# Patient Record
Sex: FEMALE | Race: WHITE | NOT HISPANIC OR LATINO | Employment: OTHER | URBAN - METROPOLITAN AREA
[De-identification: names, ages, dates, MRNs, and addresses within clinical notes are randomized per-mention and may not be internally consistent; named-entity substitution may affect disease eponyms.]

---

## 2023-09-13 LAB
INR IN PPP BY COAGULATION ASSAY EXTERNAL: 1.8
PROTHROMBIN TIME (PT) IN PPP BY COAGULATION ASSAY EXTERNAL: NORMAL SECONDS

## 2023-10-24 ENCOUNTER — TELEPHONE (OUTPATIENT)
Dept: PHARMACY | Facility: HOSPITAL | Age: 88
End: 2023-10-24
Payer: MEDICARE

## 2023-10-24 DIAGNOSIS — Z79.899 ON AMIODARONE THERAPY: Primary | ICD-10-CM

## 2023-10-24 LAB
INR IN PPP BY COAGULATION ASSAY EXTERNAL: 2 (ref 2–3)
PROTHROMBIN TIME (PT) IN PPP BY COAGULATION ASSAY EXTERNAL: NORMAL SECONDS

## 2023-10-24 RX ORDER — AMIODARONE HYDROCHLORIDE 200 MG/1
200 TABLET ORAL DAILY
Qty: 90 TABLET | Refills: 0 | OUTPATIENT
Start: 2023-10-24

## 2023-10-24 NOTE — PROGRESS NOTES
Chief Complaint/Reason for Visit:   8 month follow up (overdue 6 month fup)     History Of Present Illness:    Ms. Magana is coming today as an 8-month cardiovascular follow-up.  We have followed this patient previously for atrial fibrillation, diastolic heart failure, aortic valve stenosis, and hypertension.  Patient previously followed with Dr. Limon and has since switched to Dr. Gutierres.  Echocardiogram from 2022 showed normal LV systolic function, EF 60-65%, positive diastolic dysfunction.  Normal RV size and function, moderate aortic valve stenosis with mean gradient 15 mmHg.  Patient was cardioverted in June, 2022 and has been maintaining sinus rhythm with the use of amiodarone.    Patient comes in today in a wheelchair accompanied by her daughter.  She denies any chest pain, pressure, palpitations, orthopnea, or edema.  She has been taking her medication as prescribed.  As far as she knows she is maintaining sinus rhythm.  She follows with the [facility] for her INR checks.    Past Medical History:  She has a past medical history of Essential (primary) hypertension (10/03/2022), Paroxysmal atrial fibrillation (CMS/HCC) (10/03/2022), Personal history of other (healed) physical injury and trauma, Personal history of other diseases of the nervous system and sense organs (08/31/2020), and Personal history of other specified conditions (07/22/2020).    Past Surgical History:  She has a past surgical history that includes Other surgical history (06/28/2022); MR angio head wo IV contrast (7/21/2020); and MR angio neck wo IV contrast (7/21/2020).      Social History:  She reports that she has never smoked. She has never used smokeless tobacco. She reports that she does not drink alcohol and does not use drugs.    Family History:  Family History   Problem Relation Name Age of Onset    Other (Half sister with CAD) Sister      Coronary artery disease Other Children         Allergies:  Patient has no  known allergies.    Medications:  Current Outpatient Medications   Medication Instructions    acetaminophen (Tylenol) 325 mg tablet oral    amiodarone (PACERONE) 200 mg, oral, Daily    calcium carbonate (Tums Ultra) 470 mg calcium (1,177 mg) chewable tablet CHEW AND SWALLOW TWO TABLETS BY MOUTH FOUR TIMES A DAY AS NEEDED FOR EPIGASTRIC DISCOMFORT OR UPSET STOMACH    cimetidine (TAGAMET) 300 mg, oral, 2 times daily    levothyroxine (SYNTHROID, LEVOXYL) 88 mcg, oral, Daily RT    Simbrinza 1-0.2 % drops,suspension ophthalmic suspension ophthalmic (eye)    warfarin (COUMADIN) 2.5 mg, oral, Every other day, Take as directed per After Visit Summary.       Review of Systems:  Constitutional: feeling tired but stays active  Eyes: no eyesight problems . glasses.   ENT: no hearing loss.   Cardiovascular: No CP/pressure/palpitations  Respiratory: no shortness of breath . Has chest congestion and orthopnea when in Afib (nothing recently)  Gastrointestinal: no blood in stools.   Genitourinary: no hematuria.   Musculoskeletal: arthralgias . knee pain R.   Skin: no skin rashes.   Neurological: no frequent falls and no dizziness.   Psychiatric: no confusion and no memory lapses or loss.   Endocrine: thyroid disorder, but no diabetes mellitus.     Vitals  Visit Vitals  BP (!) 102/48   Pulse 63   Wt 58.1 kg (128 lb)   BMI 23.41 kg/m²   Smoking Status Never   BSA 1.59 m²        Physical Exam:  Constitutional: alert and in no acute distress, thin  Eyes: no erythema, swelling or discharge from the eye .   Ears, Nose, Mouth, and Throat:. mask.   Neck: neck is supple, symmetric, trachea midline, no masses .   Pulmonary: no increased work of breathing or signs of respiratory distress  and lungs clear to auscultation.    Cardiovascular: carotid pulses 2+ bilaterally with no bruit , JVP was normal,  the heart rate was normal the rhythm was regular, normal S1, normal S2, no S3, no S4 gr 2/6 syst murmur at base., pedal pulses 2+ bilaterally   "and no edema .   Abdomen: abdomen non-tender, no masses .   Musculoskeletal: normal gait.   Skin: skin warm and dry, normal skin turgor .   Neurologic: non-focal neurologic examination.   Psychiatric oriented to person, place and time  and normal mood and affect .      Last Labs:  CBC -  Lab Results   Component Value Date    WBC 7.0 08/06/2023    HGB 13.1 08/06/2023    HCT 40.1 08/06/2023    MCV 96 08/06/2023     08/06/2023     Lab Results   Component Value Date    GLUCOSE 112 (H) 08/06/2023    CALCIUM 9.8 08/06/2023     08/06/2023    K 4.4 08/06/2023    CO2 22 08/06/2023     (H) 08/06/2023    BUN 26 (H) 08/06/2023    CREATININE 1.55 (H) 08/06/2023      CMP -  Lab Results   Component Value Date    CALCIUM 9.8 08/06/2023    PROT 6.4 08/06/2023    ALBUMIN 4.1 08/06/2023    AST 16 08/06/2023    ALT 11 08/06/2023    ALKPHOS 64 08/06/2023    BILITOT 0.6 08/06/2023       LIPID PANEL -   No results found for: \"CHOL\", \"TRIG\", \"HDL\", \"CHHDL\", \"LDLF\", \"VLDL\", \"NHDL\"    Lab Results   Component Value Date     (H) 12/11/2022     [id number]   1. Heart Failure or EF less than or equal to 35%? Yes (1 pt)   2. Hypertension? Yes (1 pt)   3. Age? Greater than or equal to 75 (2 pt)   4. Diabetes? No (0 pt)   5. Stroke, TIA, or Systemic Emboli? No (0 pt)   6. Vascular Disease? No (0 pt)   7. Gender? Female (1 pt)   Total Risk Score: The [id number] Score is 5 which corresponds to High Risk.     Last Cardiology Tests:  ECG: EKG done in the office today and personally reviewed shows sinus rhythm at 63 bpm, QT corrected interval 470 ms.  There are no significant ST or T wave abnormalities.  This will go to Dr. Gutierres for review.    Echo:12-17-22  CONCLUSIONS:   1. Left ventricular systolic function is normal with a 60-65% estimated ejection fraction.   2. Spectral Doppler shows a pseudonormal pattern of left ventricular diastolic filling.   3. The left atrium is moderately dilated.   4. Moderate aortic valve " stenosis.       Lab review: I have personally reviewed the laboratory result(s)     Assessment/Plan:  Paroxysmal atrial fibrillation: Patient has been maintaining sinus rhythm most recently with the use of amiodarone.  Her EKG done in the office today is acceptable.  She is anticoagulated with warfarin and follows at the [facility].  Patient is not having any abnormal bleeding or bruising and will continue on anticoagulation.  Patient had recent lab work done but did not have her thyroid checked.  I recommended that she get her thyroid checked this week.  I ordered amiodarone labs that will be due in February.    Chronic diastolic heart failure: Patient has a history of chronic diastolic heart failure.  It was thought to be exacerbated by atrial fibrillation.  She currently has been maintaining sinus rhythm with amiodarone and looks to be well compensated.  Patient should be on a low-sodium diet.    Aortic valve stenosis: Patient has moderate aortic valve stenosis noted on her December, 2022 echocardiogram.  I recommending updating an echocardiogram to evaluate the aortic valve this December.  Patient's blood pressure and heart rate are well controlled.    Hypertension: Patient's blood pressure today is on the low normal side she is 102/48.  She is not having any issues with lightheadedness.  Patient is not on any blood pressure lowering medications any longer.  (She previously was on metoprolol)    Patient will be scheduled to see Dr. Gutierres in 6 months.  Patient instructed to call with any cardiovascular complaints. All questions were answered.       Dragon dictation was utilized to create this document. Quite often unanticipated grammatical, syntax,  and other interpretive errors are inadvertently transcribed by the computer software.  Please disregard these errors.  Please excuse any errors that have escaped final proofreading.        Keshia Mansfield, APRN-CNP

## 2023-10-25 ENCOUNTER — ANTICOAGULATION - WARFARIN VISIT (OUTPATIENT)
Dept: PHARMACY | Facility: HOSPITAL | Age: 88
End: 2023-10-25
Payer: MEDICARE

## 2023-10-25 DIAGNOSIS — I48.0 PAROXYSMAL ATRIAL FIBRILLATION (MULTI): Primary | ICD-10-CM

## 2023-10-25 NOTE — PATIENT INSTRUCTIONS
Your INR today is in range at 2.0. Please continue your weekly regimen of 5mg on Sunday and Thursday and 2.5 mg every day. We will follow up in one week.  If any questions arise do not hesitate to call us at [phone] M-F from 8:30am-4:30pm or at   [phone] after 5pm or on the weekends. Continue to monitor for any excess bleeding or bruising, especially for blood in your stool.

## 2023-10-25 NOTE — PROGRESS NOTES
I received a fax from Berny on 10/24/23 with an INR reading of 2.0. Her daughter reports no changes in medications and no signs or symptoms of bleeding or unexpected bruising. Her mother has been eating well and nothing has changed. Given her INR is in range at 2.0, we will continue her on 5mg on Sunday and Thursday and 2.5mg all other days. We will follow up in 2 weeks.

## 2023-10-26 ENCOUNTER — OFFICE VISIT (OUTPATIENT)
Dept: CARDIOLOGY | Facility: CLINIC | Age: 88
End: 2023-10-26
Payer: MEDICARE

## 2023-10-26 VITALS
HEART RATE: 63 BPM | BODY MASS INDEX: 23.41 KG/M2 | DIASTOLIC BLOOD PRESSURE: 48 MMHG | SYSTOLIC BLOOD PRESSURE: 102 MMHG | WEIGHT: 128 LBS

## 2023-10-26 DIAGNOSIS — I48.19 PERSISTENT ATRIAL FIBRILLATION (MULTI): Primary | ICD-10-CM

## 2023-10-26 DIAGNOSIS — I35.0 AORTIC VALVE STENOSIS, ETIOLOGY OF CARDIAC VALVE DISEASE UNSPECIFIED: ICD-10-CM

## 2023-10-26 DIAGNOSIS — Z79.899 ENCOUNTER FOR LONG-TERM (CURRENT) USE OF HIGH-RISK MEDICATION: ICD-10-CM

## 2023-10-26 DIAGNOSIS — I50.32 CHRONIC DIASTOLIC HEART FAILURE (MULTI): ICD-10-CM

## 2023-10-26 DIAGNOSIS — I10 ESSENTIAL HYPERTENSION: ICD-10-CM

## 2023-10-26 PROBLEM — R60.0 LOWER EXTREMITY EDEMA: Status: ACTIVE | Noted: 2023-10-26

## 2023-10-26 PROBLEM — I48.0 PAROXYSMAL ATRIAL FIBRILLATION (MULTI): Status: ACTIVE | Noted: 2023-10-26

## 2023-10-26 PROCEDURE — 3074F SYST BP LT 130 MM HG: CPT | Performed by: NURSE PRACTITIONER

## 2023-10-26 PROCEDURE — 99214 OFFICE O/P EST MOD 30 MIN: CPT | Performed by: NURSE PRACTITIONER

## 2023-10-26 PROCEDURE — 1036F TOBACCO NON-USER: CPT | Performed by: NURSE PRACTITIONER

## 2023-10-26 PROCEDURE — 1159F MED LIST DOCD IN RCRD: CPT | Performed by: NURSE PRACTITIONER

## 2023-10-26 PROCEDURE — 3078F DIAST BP <80 MM HG: CPT | Performed by: NURSE PRACTITIONER

## 2023-10-26 PROCEDURE — 1160F RVW MEDS BY RX/DR IN RCRD: CPT | Performed by: NURSE PRACTITIONER

## 2023-10-26 RX ORDER — AMIODARONE HYDROCHLORIDE 200 MG/1
200 TABLET ORAL DAILY
Qty: 90 TABLET | Refills: 1 | Status: SHIPPED | OUTPATIENT
Start: 2023-10-26

## 2023-10-26 RX ORDER — LEVOTHYROXINE SODIUM 88 UG/1
88 TABLET ORAL
COMMUNITY
Start: 2023-01-17

## 2023-10-26 RX ORDER — CALCIUM CARBONATE 1177 MG/1
BAR, CHEWABLE ORAL
COMMUNITY
Start: 2020-07-27

## 2023-10-26 RX ORDER — CIMETIDINE 300 MG/1
300 TABLET, FILM COATED ORAL 2 TIMES DAILY
COMMUNITY
Start: 2023-01-12

## 2023-10-26 RX ORDER — ACETAMINOPHEN 325 MG/1
TABLET ORAL
COMMUNITY
Start: 2020-07-27

## 2023-10-26 RX ORDER — BRINZOLAMIDE/BRIMONIDINE TARTRATE 10; 2 MG/ML; MG/ML
SUSPENSION/ DROPS OPHTHALMIC
COMMUNITY
Start: 2022-12-24

## 2023-10-26 RX ORDER — AMIODARONE HYDROCHLORIDE 200 MG/1
200 TABLET ORAL 2 TIMES DAILY
COMMUNITY
Start: 2022-07-28 | End: 2023-10-26 | Stop reason: SDUPTHER

## 2023-10-26 RX ORDER — WARFARIN 2.5 MG/1
2.5 TABLET ORAL EVERY OTHER DAY
COMMUNITY
End: 2024-05-06 | Stop reason: WASHOUT

## 2023-10-26 RX ORDER — CIMETIDINE 300 MG/1
300 TABLET, FILM COATED ORAL 2 TIMES DAILY
COMMUNITY
End: 2023-10-26 | Stop reason: SDUPTHER

## 2023-10-26 NOTE — PATIENT INSTRUCTIONS
Continue on current meds  Heart healthy, low sodium diet  Mediterranean diet is recommended  Updated echo in December  TSH this week  Amiodarone labs in February  Follow up with Dr Gutierres in 6 months     Linda called very upset crying and scattered because her daughter Daysi has been diagnosed with more thyroid problems , sound like during the conversation Linda stated that it was cancer .     Patient is asking for refills of her Ativan     Ativan      Last Written Prescription Date:  10-6-16  Last Fill Quantity: 30,   # refills: 0  Last Office Visit with [facility], [facility] or [facility] prescribing provider: 7-6-16 with ericka Suresh  Future Office visit:       Routing refill request to provider for review/approval because:  Drug not on the [facility], [facility] or [facility] refill protocol or controlled substance    [employer] [facility] [employer] [facility]

## 2023-11-28 ENCOUNTER — TELEPHONE (OUTPATIENT)
Dept: PHARMACY | Facility: HOSPITAL | Age: 88
End: 2023-11-28
Payer: MEDICARE

## 2023-11-29 ENCOUNTER — ANTICOAGULATION - WARFARIN VISIT (OUTPATIENT)
Dept: PHARMACY | Facility: HOSPITAL | Age: 88
End: 2023-11-29
Payer: MEDICARE

## 2023-11-29 NOTE — PATIENT INSTRUCTIONS
Your INR today is in range at 2.0. Please continue your weekly regimen of 5mg on Sunday and Thursday and 2.5 mg every day. We will follow up in two weeks.  If any questions arise do not hesitate to call us at [phone] M-F from 8:30am-4:30pm or at   [phone] after 5pm or on the weekends. Continue to monitor for any excess bleeding or bruising, especially for blood in your stool.

## 2023-11-29 NOTE — PROGRESS NOTES
I received a phone call from Ms. Magana's daughter Goldy on 11/29/23 with an INR reading of 2.0. Her daughter reports no changes in medications and no signs or symptoms of bleeding or unexpected bruising. Her mother has been eating well and nothing has changed. Given her INR is in range at 2.0, we will continue her on 5mg on Sunday and Thursday and 2.5mg all other days. We will follow up in 2 weeks.

## 2023-12-13 LAB
INR IN PPP BY COAGULATION ASSAY EXTERNAL: 1.2 (ref 2–3)
PROTHROMBIN TIME (PT) IN PPP BY COAGULATION ASSAY EXTERNAL: ABNORMAL SECONDS

## 2023-12-19 ENCOUNTER — APPOINTMENT (OUTPATIENT)
Dept: CARDIOLOGY | Facility: HOSPITAL | Age: 88
End: 2023-12-19
Payer: COMMERCIAL

## 2024-01-02 ENCOUNTER — ANTICOAGULATION - WARFARIN VISIT (OUTPATIENT)
Dept: PHARMACY | Facility: HOSPITAL | Age: 89
End: 2024-01-02
Payer: MEDICARE

## 2024-01-02 DIAGNOSIS — I48.0 PAROXYSMAL ATRIAL FIBRILLATION (MULTI): Primary | ICD-10-CM

## 2024-01-02 LAB
INR IN PPP BY COAGULATION ASSAY EXTERNAL: 1.9 (ref 2–3)
PROTHROMBIN TIME (PT) IN PPP BY COAGULATION ASSAY EXTERNAL: ABNORMAL SECONDS

## 2024-01-02 RX ORDER — WARFARIN 2.5 MG/1
TABLET ORAL
Qty: 40 TABLET | Refills: 3 | Status: SHIPPED | OUTPATIENT
Start: 2024-01-02 | End: 2024-05-06

## 2024-01-02 NOTE — PROGRESS NOTES
I received a phone call from Ms. Magana's daughter Goldy on 1/2/2024 with an INR reading of 1.9. Her daughter reports no changes in medications and no signs or symptoms of bleeding or unexpected bruising. Her mother has been eating well and nothing has changed. Given her INR's have been on the lower end of therapeutic goal we discussed increasing her weekly regimen slightly. We will increase her regimen to 5mg M,W,F ad 2.5mg all other days. We will follow up in 2 weeks.

## 2024-01-02 NOTE — PATIENT INSTRUCTIONS
Your INR today is slightly low at 1.9. We will increase your weekly regimen slightly to 5mg M,W,F and 2.5mg all other days. We will follow up in two weeks.  If any questions arise do not hesitate to call us at [phone] M-F from 8:30am-4:30pm or at   [phone] after 5pm or on the weekends. Continue to monitor for any excess bleeding or bruising, especially for blood in your stool.

## 2024-01-04 ENCOUNTER — HOSPITAL ENCOUNTER (OUTPATIENT)
Dept: CARDIOLOGY | Facility: HOSPITAL | Age: 89
Discharge: HOME | End: 2024-01-04
Payer: MEDICARE

## 2024-01-04 DIAGNOSIS — I35.0 AORTIC VALVE STENOSIS, ETIOLOGY OF CARDIAC VALVE DISEASE UNSPECIFIED: ICD-10-CM

## 2024-01-04 LAB
AORTIC VALVE MEAN GRADIENT: 16.9
AORTIC VALVE PEAK VELOCITY: 2.7
AV PEAK GRADIENT: 29.2
AVA (PEAK VEL): 1.02
AVA (VTI): 0.97
EJECTION FRACTION APICAL 4 CHAMBER: 49.3
EJECTION FRACTION: 54
LEFT ATRIUM VOLUME AREA LENGTH INDEX BSA: 54.5
LEFT VENTRICLE INTERNAL DIMENSION DIASTOLE: 2.92 (ref 3.5–6)
LEFT VENTRICULAR OUTFLOW TRACT DIAMETER: 1.95
MITRAL VALVE E/A RATIO: 1.01
MITRAL VALVE E/E' RATIO: 17.67
RIGHT VENTRICLE FREE WALL PEAK S': 12.02
RIGHT VENTRICLE PEAK SYSTOLIC PRESSURE: 34.1
TRICUSPID ANNULAR PLANE SYSTOLIC EXCURSION: 2.8

## 2024-01-04 PROCEDURE — 93306 TTE W/DOPPLER COMPLETE: CPT | Performed by: INTERNAL MEDICINE

## 2024-01-04 PROCEDURE — 93306 TTE W/DOPPLER COMPLETE: CPT

## 2024-01-05 DIAGNOSIS — Z79.899 ENCOUNTER FOR LONG-TERM (CURRENT) USE OF HIGH-RISK MEDICATION: Primary | ICD-10-CM

## 2024-01-05 DIAGNOSIS — I10 ESSENTIAL HYPERTENSION: ICD-10-CM

## 2024-01-05 DIAGNOSIS — I48.19 PERSISTENT ATRIAL FIBRILLATION (MULTI): ICD-10-CM

## 2024-01-05 DIAGNOSIS — I50.32 CHRONIC DIASTOLIC HEART FAILURE (MULTI): ICD-10-CM

## 2024-01-05 NOTE — RESULT ENCOUNTER NOTE
Echo reviewed. No significant change in AORTIC STENOSIS.  Results called to patient's daughter.  No changes made.

## 2024-01-16 LAB
INR IN PPP BY COAGULATION ASSAY EXTERNAL: 1.4 (ref 2–3)
PROTHROMBIN TIME (PT) IN PPP BY COAGULATION ASSAY EXTERNAL: ABNORMAL SECONDS

## 2024-01-29 LAB
INR IN PPP BY COAGULATION ASSAY EXTERNAL: 1.8 (ref 2–3)
PROTHROMBIN TIME (PT) IN PPP BY COAGULATION ASSAY EXTERNAL: ABNORMAL SECONDS

## 2024-01-30 ENCOUNTER — ANTICOAGULATION - WARFARIN VISIT (OUTPATIENT)
Dept: PHARMACY | Facility: HOSPITAL | Age: 89
End: 2024-01-30
Payer: MEDICARE

## 2024-01-30 DIAGNOSIS — I48.0 PAROXYSMAL ATRIAL FIBRILLATION (MULTI): Primary | ICD-10-CM

## 2024-01-30 NOTE — PATIENT INSTRUCTIONS
Your INR today is slightly low at 1.8. Please boost today with 5mg and then continue your weekly regimen slightly of 5mg M,W,F and 2.5mg all other days. We will follow up in one week.  If any questions arise do not hesitate to call us at [phone] M-F from 8:30am-4:30pm or at   [phone] after 5pm or on the weekends. Continue to monitor for any excess bleeding or bruising, especially for blood in your stool.

## 2024-01-30 NOTE — PROGRESS NOTES
I received a fax on 1.29 from Berny with an INR of 1.8. I also received two faxes on 1.22.24 with an INR from 12.13 of 1.2, and1.16 with an INR of 1.4. I spoke with her daughter Goldy and discussed we need to be receiving these reports when she tests. Mrs. Magana has had no change in diet. No signs or symptoms of bleeding. She was increased on her thyroid medication a week ago from 88mcg to 100mcg. Given her INR is low today at 1.8, we will boost today with 5mg and then continue on 5mg M,W,F ad 2.5mg all other days. We discussed the potential interaction with the change in levothyroxine dosage. We will follow up next week.

## 2024-02-14 ENCOUNTER — ANTICOAGULATION - WARFARIN VISIT (OUTPATIENT)
Dept: PHARMACY | Facility: HOSPITAL | Age: 89
End: 2024-02-14
Payer: MEDICARE

## 2024-02-14 DIAGNOSIS — I48.0 PAROXYSMAL ATRIAL FIBRILLATION (MULTI): Primary | ICD-10-CM

## 2024-02-14 NOTE — PROGRESS NOTES
I received a fax from Berny on 2.6 with an INR of 1.9. I spoke with her daughter Goldy today. She stated she retested her mom today and got an INR of 2.0. Mrs. Magana has had no change in diet. No signs or symptoms of bleeding. She was increased on her thyroid medication a week ago from 88mcg to 100mcg.Given her INR is in range today at 2.0 we will continue her on 5mg M,W,F ad 2.5mg all other days. We discussed the increase in levothyroxine may be why she is in range today. We will follow up next week. If her INR comes back slightly low we will then increase her weekly regimen slightly.

## 2024-02-14 NOTE — PATIENT INSTRUCTIONS
Your INR today is in range at 2.0. Please continue your weekly regimen of 5mg M,W,F and 2.5mg all other days. We will follow up in one week.  If any questions arise do not hesitate to call us at [phone] M-F from 8:30am-4:30pm or at   [phone] after 5pm or on the weekends. Continue to monitor for any excess bleeding or bruising, especially for blood in your stool.

## 2024-02-21 ENCOUNTER — ANTICOAGULATION - WARFARIN VISIT (OUTPATIENT)
Dept: PHARMACY | Facility: HOSPITAL | Age: 89
End: 2024-02-21
Payer: MEDICARE

## 2024-02-21 DIAGNOSIS — I48.0 PAROXYSMAL ATRIAL FIBRILLATION (MULTI): Primary | ICD-10-CM

## 2024-02-21 NOTE — PATIENT INSTRUCTIONS
Your INR today is slightly low at 1.9. Please increase your weekly regimen to 5mg M,W,F, Sun  and 2.5mg all other days. We will follow up in one week.  If any questions arise do not hesitate to call us at [phone] M-F from 8:30am-4:30pm or at   [phone] after 5pm or on the weekends. Continue to monitor for any excess bleeding or bruising, especially for blood in your stool.

## 2024-02-21 NOTE — PROGRESS NOTES
I received a fax from Berny on 2.20.24 with an INR of 1.9. I spoke with her daughter Goldy today. Mrs. Magana has had no change in diet. No signs or symptoms of bleeding. She was increased on her thyroid medication a week ago from 88mcg to 100mcg. Given her INR is low again at 1.9, we will increase her weekly regimen to 5mg M,W,F, Sun, and 2.5mg all other days. We will follow up next week.

## 2024-03-05 ENCOUNTER — ANTICOAGULATION - WARFARIN VISIT (OUTPATIENT)
Dept: PHARMACY | Facility: HOSPITAL | Age: 89
End: 2024-03-05
Payer: MEDICARE

## 2024-03-05 DIAGNOSIS — I48.0 PAROXYSMAL ATRIAL FIBRILLATION (MULTI): Primary | ICD-10-CM

## 2024-03-05 NOTE — PATIENT INSTRUCTIONS
Your INR today is slightly low at 1.9. Please continue your weekly regimen of 5mg M,W,F, Sun and 2.5mg all other days. We will follow up in one week.  If any questions arise do not hesitate to call us at [phone] M-F from 8:30am-4:30pm or at   [phone] after 5pm or on the weekends. Continue to monitor for any excess bleeding or bruising, especially for blood in your stool.

## 2024-03-05 NOTE — PROGRESS NOTES
I received a fax from Berny on 2.28.24 with an INR of 1.9. I spoke with her daughter Goldy today after numerous attempts to reach last week. Mrs. Magana has had no change in diet. No signs or symptoms of bleeding. She was increased on her thyroid medication a few weeks ago from 88mcg to 100mcg. Her daughter stated she thinks her mom missed a dose but could not remember when. Given she is testing her tomorrow we will hold off on any changes until we get the new INR result. Follow up tomorrow.

## 2024-03-07 ENCOUNTER — ANTICOAGULATION - WARFARIN VISIT (OUTPATIENT)
Dept: PHARMACY | Facility: HOSPITAL | Age: 89
End: 2024-03-07
Payer: MEDICARE

## 2024-03-07 DIAGNOSIS — I48.0 PAROXYSMAL ATRIAL FIBRILLATION (MULTI): Primary | ICD-10-CM

## 2024-03-07 NOTE — PATIENT INSTRUCTIONS
Your INR today is within range at 2.0. Please increase your weekly regimen to 2.5mg Tues, Thurs and 5mg all other days. We will follow up in one week.  If any questions arise do not hesitate to call us at [phone] M-F from 8:30am-4:30pm or at   [phone] after 5pm or on the weekends. Continue to monitor for any excess bleeding or bruising, especially for blood in your stool.

## 2024-03-07 NOTE — PROGRESS NOTES
I received a fax from Berny on 3.6.24 with an INR of 2.0. I spoke with her daughter Goldy today after numerous attempts to reach last week. Mrs. Magana has had no change in diet. No signs or symptoms of bleeding. She was increased on her thyroid medication a few weeks ago from 88mcg to 100mcg. She typically does not eat greens and did not have any this week. Despite the increase in her weekly regimen her INR is in range but on the lower end of goal. We will increase her weekly regimen to 2.5mg Tue, Thurs and 5mg all other days. We will follow up in a week.

## 2024-03-22 ENCOUNTER — ANTICOAGULATION - WARFARIN VISIT (OUTPATIENT)
Dept: PHARMACY | Facility: HOSPITAL | Age: 89
End: 2024-03-22
Payer: MEDICARE

## 2024-03-22 DIAGNOSIS — I48.0 PAROXYSMAL ATRIAL FIBRILLATION (MULTI): Primary | ICD-10-CM

## 2024-03-22 NOTE — PROGRESS NOTES
I received a fax from Berny on 3.21.24 with an INR of 2.0. Previous fax from 3/14 had an INR of 2.0. I spoke with her daughter Goldy today after numerous attempts to reach last week. Mrs. Magana has had no change in diet. No signs or symptoms of bleeding. She is on levothyroxine. She stated her mom only took 2.5mg on Saturday and not 5mg. She typically does not eat greens and did not have any this week. We will increase her weekly regimen to 2.5mg Tue, Thurs and 5mg all other days. We will follow up in a week.

## 2024-03-22 NOTE — PATIENT INSTRUCTIONS
Your INR today is within range at 2.0. Please start your weekly regimen of 2.5mg Tues, Thurs and 5mg all other days. We will follow up in one week.  If any questions arise do not hesitate to call us at [phone] M-F from 8:30am-4:30pm or at   [phone] after 5pm or on the weekends. Continue to monitor for any excess bleeding or bruising, especially for blood in your stool.

## 2024-03-27 PROBLEM — R53.1 ASTHENIA: Status: ACTIVE | Noted: 2024-03-27

## 2024-03-27 PROBLEM — Z20.822 CONTACT WITH AND (SUSPECTED) EXPOSURE TO COVID-19: Status: ACTIVE | Noted: 2022-12-24

## 2024-03-27 PROBLEM — I99.8 ISCHEMIA OF LOWER EXTREMITY: Status: ACTIVE | Noted: 2024-03-27

## 2024-03-27 PROBLEM — Z87.828 PERSONAL HISTORY OF OTHER (HEALED) PHYSICAL INJURY AND TRAUMA: Status: ACTIVE | Noted: 2024-03-27

## 2024-03-27 PROBLEM — K59.00 CONSTIPATION: Status: ACTIVE | Noted: 2024-03-27

## 2024-03-27 PROBLEM — R07.9 CHEST PAIN: Status: ACTIVE | Noted: 2022-05-23

## 2024-03-27 PROBLEM — Z86.69 PERSONAL HISTORY OF OTHER DISEASES OF THE NERVOUS SYSTEM AND SENSE ORGANS: Status: ACTIVE | Noted: 2024-03-27

## 2024-03-27 PROBLEM — Z87.898 PERSONAL HISTORY OF OTHER SPECIFIED CONDITIONS: Status: ACTIVE | Noted: 2024-03-27

## 2024-03-27 PROBLEM — R42 DIZZINESS: Status: ACTIVE | Noted: 2024-03-27

## 2024-03-27 PROBLEM — R51.9 ACUTE HEADACHE: Status: ACTIVE | Noted: 2024-03-27

## 2024-03-27 PROBLEM — I50.9 CONGESTIVE HEART FAILURE (MULTI): Status: ACTIVE | Noted: 2022-12-11

## 2024-03-27 PROBLEM — R00.1 BRADYCARDIA: Status: ACTIVE | Noted: 2024-03-27

## 2024-03-27 PROBLEM — N18.9 CHRONIC KIDNEY DISEASE: Status: ACTIVE | Noted: 2022-12-24

## 2024-03-27 PROBLEM — K56.41 FECAL IMPACTION OF RECTUM (MULTI): Status: ACTIVE | Noted: 2023-08-06

## 2024-03-27 RX ORDER — MELOXICAM 7.5 MG/1
7.5 TABLET ORAL DAILY
COMMUNITY

## 2024-03-27 RX ORDER — METOPROLOL SUCCINATE 25 MG/1
25 TABLET, EXTENDED RELEASE ORAL DAILY
COMMUNITY
Start: 2019-01-17

## 2024-03-27 RX ORDER — TRAVOPROST OPHTHALMIC SOLUTION 0.04 MG/ML
1 SOLUTION OPHTHALMIC NIGHTLY
COMMUNITY

## 2024-03-27 RX ORDER — DICYCLOMINE HYDROCHLORIDE 20 MG/1
20 TABLET ORAL
COMMUNITY

## 2024-03-27 RX ORDER — LOSARTAN POTASSIUM 100 MG/1
100 TABLET ORAL
COMMUNITY

## 2024-03-27 RX ORDER — LEVOTHYROXINE SODIUM 100 UG/1
100 TABLET ORAL DAILY
COMMUNITY
Start: 2024-01-12

## 2024-03-27 RX ORDER — PANTOPRAZOLE SODIUM 40 MG/1
40 TABLET, DELAYED RELEASE ORAL
COMMUNITY
Start: 2023-10-14

## 2024-03-27 RX ORDER — OXYCODONE HYDROCHLORIDE 10 MG/1
10 TABLET ORAL EVERY 8 HOURS PRN
COMMUNITY
Start: 2022-12-24

## 2024-03-28 LAB
INR IN PPP BY COAGULATION ASSAY EXTERNAL: 2.1 (ref 2–3)
PROTHROMBIN TIME (PT) IN PPP BY COAGULATION ASSAY EXTERNAL: NORMAL SECONDS

## 2024-03-29 ENCOUNTER — ANTICOAGULATION - WARFARIN VISIT (OUTPATIENT)
Dept: PHARMACY | Facility: HOSPITAL | Age: 89
End: 2024-03-29
Payer: MEDICARE

## 2024-03-29 DIAGNOSIS — I48.0 PAROXYSMAL ATRIAL FIBRILLATION (MULTI): Primary | ICD-10-CM

## 2024-03-29 NOTE — PROGRESS NOTES
I received a fax from Berny on 3.29.24 with an INR of 2.1. I spoke with her daughter Goldy today. Mrs. Magana has had no change in diet. No signs or symptoms of bleeding. She is on levothyroxine. She stated she realized the tablets they have at home are the 2.5mg tablets not the 5mg. Therefore she has only been giving her mom 1.25mg Tues, Thurs, Sat and 2.5mg all other days.  She stated they did have the 5mg tablets but lately were only using the 2.5mg tablets. We discovered this after discussing the pill color and realizing she was no longer giving her mom the peach tablets only the green. She typically does not eat greens and did not have any this week. Given she has been taking this dosage and her INR is in range at 2.1, we will continue 1.25mg Tues, Thurs, Sat and 2.5mg all other days. We will follow up next week.

## 2024-03-29 NOTE — PATIENT INSTRUCTIONS
Your INR today is within range at 2.1. Please continue 1.25mg Tues, Thurs, Sat and 2.5mg all other days. We will follow up next week.    If any questions arise do not hesitate to call us at [phone] M-F from 8:30am-4:30pm or at   [phone] after 5pm or on the weekends. Continue to monitor for any excess bleeding or bruising, especially for blood in your stool.

## 2024-04-11 LAB
INR IN PPP BY COAGULATION ASSAY EXTERNAL: 1.9
PROTHROMBIN TIME (PT) IN PPP BY COAGULATION ASSAY EXTERNAL: NORMAL SECONDS

## 2024-04-12 ENCOUNTER — ANTICOAGULATION - WARFARIN VISIT (OUTPATIENT)
Dept: PHARMACY | Facility: HOSPITAL | Age: 89
End: 2024-04-12
Payer: MEDICARE

## 2024-04-12 DIAGNOSIS — I48.0 PAROXYSMAL ATRIAL FIBRILLATION (MULTI): Primary | ICD-10-CM

## 2024-04-12 NOTE — PROGRESS NOTES
I received a fax from Berny on 4.11.24 with an INR of 1.9. I also received a fax from 4/4 with an INR of 2.2. I spoke with her daughter Goldy today. Mrs. Magana has had no change in diet. She typically does not eat greens and did not have any this week. No signs or symptoms of bleeding. She is on levothyroxine. Last week she stated she realized the tablets they have at home are the 2.5mg tablets not the 5mg. Therefore she has only been giving her mom 1.25mg Tues, Thurs, Sat and 2.5mg all other days.  She stated they did have the 5mg tablets but lately were only using the 2.5mg tablets. We discovered this after discussing the pill color and realizing she was no longer giving her mom the peach tablets only the green. Given she her INR slightly subtherapeutic at 1.9, we will continue her on 2.5mg Tue, Thurs and 5mg all other days. We will follow up in a week.

## 2024-04-12 NOTE — PATIENT INSTRUCTIONS
Your INR today is within range at 1.9. Please continue 1.25mg Tues, Thurs, Sat and 2.5mg all other days. We will follow up next week.  If any questions arise do not hesitate to call us at [phone] M-F from 8:30am-4:30pm or at   [phone] after 5pm or on the weekends. Continue to monitor for any excess bleeding or bruising, especially for blood in your stool.

## 2024-04-23 DIAGNOSIS — I48.0 PAROXYSMAL ATRIAL FIBRILLATION (MULTI): ICD-10-CM

## 2024-04-25 ENCOUNTER — ANTICOAGULATION - WARFARIN VISIT (OUTPATIENT)
Dept: PHARMACY | Facility: HOSPITAL | Age: 89
End: 2024-04-25
Payer: MEDICARE

## 2024-04-25 DIAGNOSIS — I48.0 PAROXYSMAL ATRIAL FIBRILLATION (MULTI): Primary | ICD-10-CM

## 2024-04-25 LAB
INR IN PPP BY COAGULATION ASSAY EXTERNAL: 2.2 (ref 2–3)
PROTHROMBIN TIME (PT) IN PPP BY COAGULATION ASSAY EXTERNAL: NORMAL SECONDS

## 2024-04-25 NOTE — PATIENT INSTRUCTIONS
Your INR today is within range at 2.2. Please continue 1.25mg Tues, Thurs, Sat and 2.5mg all other days. We will follow up next week.  If any questions arise do not hesitate to call us at [phone] M-F from 8:30am-4:30pm or at   [phone] after 5pm or on the weekends. Continue to monitor for any excess bleeding or bruising, especially for blood in your stool.

## 2024-04-25 NOTE — PROGRESS NOTES
I received a fax from Berny on 4.25.24 with an INR of 2.2. I also received a fax from 4.18.24 with an INR of 2.1. I spoke with her daughter Goldy today after she returned our phone call. Mrs. Magana has had no change in diet. She typically does not eat greens and did not have any this week. No signs or symptoms of bleeding. She is on levothyroxine. Given her INR is in range at 2.2 today, we will continue 1.25mg Tues, Thurs, Sat and 2.5mg all other days. We will follow up next week.

## 2024-04-30 ENCOUNTER — OFFICE VISIT (OUTPATIENT)
Dept: CARDIOLOGY | Facility: CLINIC | Age: 89
End: 2024-04-30
Payer: MEDICARE

## 2024-04-30 DIAGNOSIS — I48.19 PERSISTENT ATRIAL FIBRILLATION (MULTI): ICD-10-CM

## 2024-05-01 PROBLEM — M25.50 PAIN IN UNSPECIFIED JOINT: Status: ACTIVE | Noted: 2024-05-01

## 2024-05-01 PROBLEM — K21.9 GASTRO-ESOPHAGEAL REFLUX DISEASE WITHOUT ESOPHAGITIS: Status: ACTIVE | Noted: 2024-05-01

## 2024-05-01 PROBLEM — E03.9 HYPOTHYROIDISM, UNSPECIFIED: Status: ACTIVE | Noted: 2024-05-01

## 2024-05-01 RX ORDER — DICLOFENAC SODIUM 10 MG/G
2 GEL TOPICAL 4 TIMES DAILY
COMMUNITY
Start: 2024-02-29

## 2024-05-06 ENCOUNTER — ANTICOAGULATION - WARFARIN VISIT (OUTPATIENT)
Dept: PHARMACY | Facility: HOSPITAL | Age: 89
End: 2024-05-06
Payer: MEDICARE

## 2024-05-06 DIAGNOSIS — I48.19 PERSISTENT ATRIAL FIBRILLATION (MULTI): ICD-10-CM

## 2024-05-06 DIAGNOSIS — I48.0 PAROXYSMAL ATRIAL FIBRILLATION (MULTI): Primary | ICD-10-CM

## 2024-05-06 RX ORDER — WARFARIN 2.5 MG/1
TABLET ORAL
Qty: 24 TABLET | Refills: 6 | Status: SHIPPED | OUTPATIENT
Start: 2024-05-06

## 2024-05-06 NOTE — PROGRESS NOTES
I received a fax from Berny on 5/3/24 with an INR of 2.2. I spoke with her daughter Goldy today. Mrs. Magana has had no change in diet. She typically does not eat greens and did not have any this week. No signs or symptoms of bleeding. She is on levothyroxine. Given her INR is in range at 2.2 today, we will continue her on 2.5mg Tue, Thurs and 5mg all other days. We will follow up in a week.    Verbal clearance received for patient to hold ASA x7 days from Dr. Iglesias.

## 2024-05-06 NOTE — PATIENT INSTRUCTIONS
Your INR today is within range at 2.2. Please continue 1.25mg Tues, Thurs, Sat and 2.5mg all other days. We will follow up next week.  If any questions arise do not hesitate to call us at [phone] M-F from 8:30am-4:30pm or at   [phone] after 5pm or on the weekends. Continue to monitor for any excess bleeding or bruising, especially for blood in your stool.

## 2024-05-10 ENCOUNTER — ANTICOAGULATION - WARFARIN VISIT (OUTPATIENT)
Dept: PHARMACY | Facility: HOSPITAL | Age: 89
End: 2024-05-10
Payer: MEDICARE

## 2024-05-10 DIAGNOSIS — I48.0 PAROXYSMAL ATRIAL FIBRILLATION (MULTI): Primary | ICD-10-CM

## 2024-05-10 DIAGNOSIS — I48.19 PERSISTENT ATRIAL FIBRILLATION (MULTI): ICD-10-CM

## 2024-05-10 NOTE — PATIENT INSTRUCTIONS
Your INR today is within range at 2.1. Please continue 1.25mg Tues, Thurs, Sat and 2.5mg all other days. We will follow up next week.  If any questions arise do not hesitate to call us at [phone] M-F from 8:30am-4:30pm or at   [phone] after 5pm or on the weekends. Continue to monitor for any excess bleeding or bruising, especially for blood in your stool.

## 2024-05-10 NOTE — PROGRESS NOTES
I received a fax from Berny on 5/10/24 with an INR of 2.1. I spoke with her daughter Goldy today. Mrs. Magana has had no change in diet. She typically does not eat greens. No signs or symptoms of bleeding. She is on levothyroxine. Given her INR is in range at 2.1 today, we will continue 1.25mg Tues, Thurs, Sat and 2.5mg all other days. We will follow up next week.
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
## 2024-05-22 ENCOUNTER — ANTICOAGULATION - WARFARIN VISIT (OUTPATIENT)
Dept: PHARMACY | Facility: HOSPITAL | Age: 89
End: 2024-05-22
Payer: MEDICARE

## 2024-05-22 DIAGNOSIS — I48.0 PAROXYSMAL ATRIAL FIBRILLATION (MULTI): Primary | ICD-10-CM

## 2024-05-22 DIAGNOSIS — I48.19 PERSISTENT ATRIAL FIBRILLATION (MULTI): ICD-10-CM

## 2024-05-22 NOTE — PATIENT INSTRUCTIONS
Your INR today is within range at 2.0. Please continue 1.25mg Tues, Thurs, Sat and 2.5mg all other days. We will follow up next week.  If any questions arise do not hesitate to call us at [phone] M-F from 8:30am-4:30pm or at   [phone] after 5pm or on the weekends. Continue to monitor for any excess bleeding or bruising, especially for blood in your stool.

## 2024-05-22 NOTE — PROGRESS NOTES
I received a fax from Berny on 5/18/24 with an INR of 2.0 from 5.16.24. I got ahold of her daughter Goldy today. Mrs. Magana has had no change in diet. She typically does not eat greens. No signs or symptoms of bleeding. She is on levothyroxine. Given her INR is in range at 2.0, we will continue 1.25mg Tues, Thurs, Sat and 2.5mg all other days. We will follow up next week.

## 2024-05-23 ENCOUNTER — OFFICE VISIT (OUTPATIENT)
Dept: CARDIOLOGY | Facility: CLINIC | Age: 89
End: 2024-05-23
Payer: MEDICARE

## 2024-05-23 VITALS — HEART RATE: 59 BPM | DIASTOLIC BLOOD PRESSURE: 86 MMHG | SYSTOLIC BLOOD PRESSURE: 128 MMHG

## 2024-05-23 DIAGNOSIS — Z79.899 ENCOUNTER FOR LONG-TERM (CURRENT) USE OF HIGH-RISK MEDICATION: Primary | ICD-10-CM

## 2024-05-23 PROCEDURE — 3074F SYST BP LT 130 MM HG: CPT | Performed by: INTERNAL MEDICINE

## 2024-05-23 PROCEDURE — 1160F RVW MEDS BY RX/DR IN RCRD: CPT | Performed by: INTERNAL MEDICINE

## 2024-05-23 PROCEDURE — 3079F DIAST BP 80-89 MM HG: CPT | Performed by: INTERNAL MEDICINE

## 2024-05-23 PROCEDURE — 99213 OFFICE O/P EST LOW 20 MIN: CPT | Performed by: INTERNAL MEDICINE

## 2024-05-23 PROCEDURE — 93000 ELECTROCARDIOGRAM COMPLETE: CPT | Performed by: INTERNAL MEDICINE

## 2024-05-23 PROCEDURE — 1159F MED LIST DOCD IN RCRD: CPT | Performed by: INTERNAL MEDICINE
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
## 2024-05-23 NOTE — PATIENT INSTRUCTIONS
Continue all current medications as prescribed.   Repeat echocardiogram (ultrasound of the heart) in 1 year to followup on your heart function and structure.   Followup with Dr. Gutierres in 1 year, sooner should any issues or concerns arise before then.     If you have any questions or cardiac concerns, please call our office at [phone].

## 2024-06-04 ENCOUNTER — ANTICOAGULATION - WARFARIN VISIT (OUTPATIENT)
Dept: PHARMACY | Facility: HOSPITAL | Age: 89
End: 2024-06-04
Payer: MEDICARE

## 2024-06-04 DIAGNOSIS — I48.19 PERSISTENT ATRIAL FIBRILLATION (MULTI): ICD-10-CM

## 2024-06-04 DIAGNOSIS — I48.0 PAROXYSMAL ATRIAL FIBRILLATION (MULTI): Primary | ICD-10-CM

## 2024-06-06 NOTE — PATIENT INSTRUCTIONS
Your INR today is within range at 2.1. Please continue 1.25mg Tues, Thurs, Sat and 2.5mg all other days. We will follow up next week.  If any questions arise do not hesitate to call us at [phone] M-F from 8:30am-4:30pm or at   [phone] after 5pm or on the weekends. Continue to monitor for any excess bleeding or bruising, especially for blood in your stool.

## 2024-06-06 NOTE — PROGRESS NOTES
I received a fax from Berny on 5.31.24 with an INR of 2.1. I also received a fax on 5.24.24 with an INR of 2.1. I got ahold of her daughter Goldy today. Mrs. Magana has had no change in diet. She typically does not eat greens. No signs or symptoms of bleeding. She is on levothyroxine. Given her INR is in range at 2.1, we will continue 1.25mg Tues, Thurs, Sat and 2.5mg all other days. We will follow up next week.

## 2024-06-20 ENCOUNTER — ANTICOAGULATION - WARFARIN VISIT (OUTPATIENT)
Dept: PHARMACY | Facility: HOSPITAL | Age: 89
End: 2024-06-20
Payer: MEDICARE

## 2024-06-20 DIAGNOSIS — I48.19 PERSISTENT ATRIAL FIBRILLATION (MULTI): ICD-10-CM

## 2024-06-20 DIAGNOSIS — I48.0 PAROXYSMAL ATRIAL FIBRILLATION (MULTI): Primary | ICD-10-CM

## 2024-06-20 RX ORDER — WARFARIN 2.5 MG/1
TABLET ORAL
Qty: 72 TABLET | Refills: 3 | Status: SHIPPED | OUTPATIENT
Start: 2024-06-20

## 2024-06-20 NOTE — PROGRESS NOTES
I received a fax from Berny on 6.20.24 with an INR of 2.1. Mrs. Magana has had no change in diet. She typically does not eat greens. No signs or symptoms of bleeding. She is on levothyroxine. Given her INR is in range at 2.1, we will continue 1.25mg Tues, Thurs, Sat and 2.5mg all other days. We will follow up next week.

## 2024-06-20 NOTE — PATIENT INSTRUCTIONS
Your INR today is within range at 2.1. Please continue 1.25mg Tues, Thurs, Sat and 2.5mg all other days. We will follow up next week.  If any questions arise do not hesitate to call us at [phone] M-F from 8:30am-4:30pm or at   [phone] after 5pm or on the weekends. Continue to monitor for any excess bleeding or bruising, especially for blood in your stool.

## 2024-06-24 LAB
INR IN PPP BY COAGULATION ASSAY EXTERNAL: 2 (ref 2–3)
PROTHROMBIN TIME (PT) IN PPP BY COAGULATION ASSAY EXTERNAL: NORMAL

## 2024-07-01 LAB
INR IN PPP BY COAGULATION ASSAY EXTERNAL: 2.2 (ref 2–3)
PROTHROMBIN TIME (PT) IN PPP BY COAGULATION ASSAY EXTERNAL: NORMAL

## 2024-07-11 ENCOUNTER — ANTICOAGULATION - WARFARIN VISIT (OUTPATIENT)
Dept: PHARMACY | Facility: HOSPITAL | Age: 89
End: 2024-07-11
Payer: MEDICARE

## 2024-07-11 DIAGNOSIS — I48.19 PERSISTENT ATRIAL FIBRILLATION (MULTI): ICD-10-CM

## 2024-07-11 DIAGNOSIS — I48.0 PAROXYSMAL ATRIAL FIBRILLATION (MULTI): Primary | ICD-10-CM

## 2024-07-11 NOTE — PATIENT INSTRUCTIONS
Your INR today is within range at 2.0. Please continue 1.25mg Tues, Thurs, Sat and 2.5mg all other days. We will follow up next week.  If any questions arise do not hesitate to call us at [phone] M-F from 8:30am-4:30pm or at   [phone] after 5pm or on the weekends. Continue to monitor for any excess bleeding or bruising, especially for blood in your stool.

## 2024-07-11 NOTE — PROGRESS NOTES
I received a fax from Berny on 7.11.24 with an INR of 2.0. I also received faxes from 7.1.24 with an INR of 2.2 and 6.24.24 with an INR of 2.0. Mrs. Magana has had no change in diet. She typically does not eat greens. No signs or symptoms of bleeding. She is on levothyroxine. Given her INR is in range at 2.0, we will continue 1.25mg Tues, Thurs, Sat and 2.5mg all other days. We will follow up next week.

## 2024-07-18 LAB
INR IN PPP BY COAGULATION ASSAY EXTERNAL: 1.8 (ref 2–3)
PROTHROMBIN TIME (PT) IN PPP BY COAGULATION ASSAY EXTERNAL: ABNORMAL

## 2024-07-25 ENCOUNTER — ANTICOAGULATION - WARFARIN VISIT (OUTPATIENT)
Dept: PHARMACY | Facility: HOSPITAL | Age: 89
End: 2024-07-25
Payer: MEDICARE

## 2024-07-25 NOTE — PROGRESS NOTES
I received a fax from Berny on 7.25.24 with an INR of 2.0. Mrs. Magana has had no changes in her medications or diet. She typically does not eat greens. No signs or symptoms of bleeding. She is on levothyroxine. Given her INR is in range at 2.0, we will continue 1.25mg Tues, Thurs, Sat and 2.5mg all other days. We will follow up next week.

## 2024-07-29 NOTE — PATIENT INSTRUCTIONS
Your INR today is within range at 2.0. Please continue 1.25mg Tues, Thurs, Sat and 2.5mg all other days. We will follow up next week.  If any questions arise do not hesitate to call us at [phone] M-F from 8:30am-4:30pm or at   [phone] after 5pm or on the weekends. Continue to monitor for any excess bleeding or bruising, especially for blood in your stool.

## 2024-08-15 DIAGNOSIS — I48.19 PERSISTENT ATRIAL FIBRILLATION (MULTI): ICD-10-CM

## 2024-08-15 RX ORDER — AMIODARONE HYDROCHLORIDE 200 MG/1
200 TABLET ORAL DAILY
Qty: 90 TABLET | Refills: 0 | Status: SHIPPED | OUTPATIENT
Start: 2024-08-15

## 2024-08-22 LAB
INR IN PPP BY COAGULATION ASSAY EXTERNAL: 2.1 (ref 2–3)
PROTHROMBIN TIME (PT) IN PPP BY COAGULATION ASSAY EXTERNAL: NORMAL

## 2024-08-26 ENCOUNTER — ANTICOAGULATION - WARFARIN VISIT (OUTPATIENT)
Dept: PHARMACY | Facility: HOSPITAL | Age: 89
End: 2024-08-26
Payer: MEDICARE

## 2024-08-26 DIAGNOSIS — I48.0 PAROXYSMAL ATRIAL FIBRILLATION (MULTI): ICD-10-CM

## 2024-08-26 DIAGNOSIS — I48.19 PERSISTENT ATRIAL FIBRILLATION (MULTI): Primary | ICD-10-CM

## 2024-08-26 NOTE — PATIENT INSTRUCTIONS
Your INR today is within range at 2.0. Please continue 1.25mg Tues, Thurs, Sat and 2.5mg all other days. We will follow up next week.  If any questions arise do not hesitate to call us at [phone] M-F from 8:30am-4:30pm or at   [phone] after 5pm or on the weekends. Continue to monitor for any excess bleeding or bruising, especially for blood in your stool.

## 2024-08-28 LAB
INR IN PPP BY COAGULATION ASSAY EXTERNAL: 1.9 (ref 2–3)
PROTHROMBIN TIME (PT) IN PPP BY COAGULATION ASSAY EXTERNAL: ABNORMAL

## 2024-09-24 ENCOUNTER — ANTICOAGULATION - WARFARIN VISIT (OUTPATIENT)
Dept: PHARMACY | Facility: HOSPITAL | Age: 89
End: 2024-09-24
Payer: MEDICARE

## 2024-09-24 DIAGNOSIS — I48.19 PERSISTENT ATRIAL FIBRILLATION (MULTI): Primary | ICD-10-CM

## 2024-09-24 DIAGNOSIS — I48.0 PAROXYSMAL ATRIAL FIBRILLATION (MULTI): ICD-10-CM

## 2024-09-24 NOTE — PROGRESS NOTES
I received a fax from Berny on 9.19.24 with an INR of 1.8, a fax on 9.4.24 with an INR of 2.1, and one from 8.28.24 with an INR of 1.9. I spoke with Ms. Magana's daughter Goldy today after numerous attempts to reach her over the past few weeks. No changes in medications or diet. No signs or symptoms of bleeding. Goldy mentioned her mother is eating well overall. There is a possibly her mother missed a dose within the past week. Given her INR is slightly low at 1.8, we will boost today with 2.5mg and then continue 1.25mg Tues, Thurs, Sat and 2.5mg all other days. We will follow up next week.

## 2024-09-24 NOTE — PATIENT INSTRUCTIONS
Your INR today was slightly low at 1.8. Please take 2.5mg and then continue 1.25mg Tues, Thurs, Sat and 2.5mg all other days. We will follow up next week.  If any questions arise do not hesitate to call us at [phone] M-F from 8:30am-4:30pm or at   [phone] after 5pm or on the weekends. Continue to monitor for any excess bleeding or bruising, especially for blood in your stool.

## 2024-10-18 ENCOUNTER — TELEPHONE (OUTPATIENT)
Dept: PHARMACY | Facility: HOSPITAL | Age: 89
End: 2024-10-18
Payer: MEDICARE

## 2024-10-18 NOTE — TELEPHONE ENCOUNTER
I am unable to reach Goldy, who is Jennifer's daughter and caregiver concerning her INRs and warfarin therapy management. Jennifer's last recorded test was 9/19/2024. I have left voice-mails that are not being returned. I will continue my attempts to reach her.

## 2024-10-22 ENCOUNTER — OFFICE VISIT (OUTPATIENT)
Dept: URGENT CARE | Age: 89
End: 2024-10-22
Payer: MEDICARE

## 2024-10-22 ENCOUNTER — ANTICOAGULATION - WARFARIN VISIT (OUTPATIENT)
Dept: PHARMACY | Facility: HOSPITAL | Age: 89
End: 2024-10-22
Payer: MEDICARE

## 2024-10-22 VITALS
OXYGEN SATURATION: 97 % | HEART RATE: 66 BPM | TEMPERATURE: 97.5 F | SYSTOLIC BLOOD PRESSURE: 145 MMHG | DIASTOLIC BLOOD PRESSURE: 96 MMHG | RESPIRATION RATE: 18 BRPM

## 2024-10-22 DIAGNOSIS — I48.19 PERSISTENT ATRIAL FIBRILLATION (MULTI): Primary | ICD-10-CM

## 2024-10-22 DIAGNOSIS — L03.113 CELLULITIS OF RIGHT UPPER EXTREMITY: Primary | ICD-10-CM

## 2024-10-22 DIAGNOSIS — L30.4 INTERTRIGO: ICD-10-CM

## 2024-10-22 PROCEDURE — 3080F DIAST BP >= 90 MM HG: CPT

## 2024-10-22 PROCEDURE — 3077F SYST BP >= 140 MM HG: CPT

## 2024-10-22 PROCEDURE — 99204 OFFICE O/P NEW MOD 45 MIN: CPT

## 2024-10-22 RX ORDER — DOXYCYCLINE 100 MG/1
100 CAPSULE ORAL 2 TIMES DAILY
Qty: 14 CAPSULE | Refills: 0 | Status: SHIPPED | OUTPATIENT
Start: 2024-10-22 | End: 2024-10-29

## 2024-10-22 RX ORDER — KETOCONAZOLE 20 MG/G
CREAM TOPICAL DAILY
Qty: 60 G | Refills: 1 | Status: SHIPPED | OUTPATIENT
Start: 2024-10-22 | End: 2024-11-05

## 2024-10-22 ASSESSMENT — ENCOUNTER SYMPTOMS
FEVER: 0
CHILLS: 0
NAUSEA: 0
WOUND: 0
WHEEZING: 0
SHORTNESS OF BREATH: 0
VOMITING: 0
DIARRHEA: 0

## 2024-10-22 NOTE — PROGRESS NOTES
Subjective   Patient ID: Jennifer Magana is a 89 y.o. female. They present today with a chief complaint of Wound Check (Pt has wound or possible insect bite on right hand for the 3 past 4 days. ).    History of Present Illness  Patient presents with daughter for symptoms of redness, swelling to right had that began 4 days ago. Patient explains she believes she may have been bitten. Explains that the area is itchy and slightly tender. Denies discharge or drainage from region. Denies fever, chills, sweats. Denies n/v/d. Denies chest pain, sob. Denies any red streaking. Patients daughter also explains that patient has a rash beneath breasts b/l that is red but is non tender and does not itch. Patient denies any limitation to range of motion in hand. Denies injury or fall onto right hand. Denies any wound, laceration or abrasion.       History provided by:  Relative and patient  Wound Check         Past Medical History  Allergies as of 10/22/2024    (No Known Allergies)       (Not in a hospital admission)       Past Medical History:   Diagnosis Date    Essential (primary) hypertension 10/03/2022    Essential hypertension    Paroxysmal atrial fibrillation (Multi) 10/03/2022    Paroxysmal atrial fibrillation    Personal history of other (healed) physical injury and trauma     History of kidney injury    Personal history of other diseases of the nervous system and sense organs 08/31/2020    History of tremor    Personal history of other specified conditions 07/22/2020    History of dizziness       Past Surgical History:   Procedure Laterality Date    MR HEAD ANGIO WO IV CONTRAST  7/21/2020    MR HEAD ANGIO WO IV CONTRAST 7/21/2020 [facility]    MR NECK ANGIO WO IV CONTRAST  7/21/2020    MR NECK ANGIO WO IV CONTRAST 7/21/2020 [facility]    OTHER SURGICAL HISTORY  06/28/2022    Hysterectomy        reports that she has never smoked. She has never used smokeless tobacco. She reports that she does not  drink alcohol and does not use drugs.    Review of Systems  Review of Systems   Constitutional:  Negative for chills and fever.   Respiratory:  Negative for shortness of breath and wheezing.    Cardiovascular:  Negative for chest pain.   Gastrointestinal:  Negative for diarrhea, nausea and vomiting.   Skin:  Positive for rash. Negative for wound.                                  Objective    Vitals:    10/22/24 1227   BP: (!) 145/96   Pulse: 66   Resp: 18   Temp: 36.4 °C (97.5 °F)   SpO2: 97%     No LMP recorded.    Physical Exam  Constitutional:       General: She is not in acute distress.     Appearance: She is not ill-appearing.   Pulmonary:      Effort: Pulmonary effort is normal. No respiratory distress.      Breath sounds: No wheezing.   Musculoskeletal:      Right hand: Swelling present. No bony tenderness. Normal range of motion. Normal sensation. Normal capillary refill. Normal pulse.   Skin:     Comments: Erythematous well marked rash beneath breasts b/l with erythematous satellite lesions.     Right hand with erythema and localized swelling to dorsal aspect generalized over 2-4th MCP joints. No fluctuance. Induration noted on palpation. Tender to touch.   Neurological:      Mental Status: She is alert.         Procedures    Point of Care Test & Imaging Results from this visit  No results found for this visit on 10/22/24.   No results found.    Diagnostic study results (if any) were reviewed by Veronica Mathis PA-C.    Assessment/Plan   Allergies, medications, history, and pertinent labs/EKGs/Imaging reviewed by Veronica Mathis PA-C.     Medical Decision Making  MDM- Signs and symptoms consistent with cellulitis and intertrigo, these rashes are not related. No evidence of sepsis, abscess, or other complications. Plan is for antibiotic therapy and topical antifungal treatment and symptomatic support at home. Plan to follow with PCP. Patient advised to return to clinic or go to the ED if symptoms  change or worsen. Patient and family member  verbalized understanding and agrees with plan.       Orders and Diagnoses  Diagnoses and all orders for this visit:  Cellulitis of right upper extremity  -     doxycycline (Vibramycin) 100 mg capsule; Take 1 capsule (100 mg) by mouth 2 times a day for 7 days. Take with at least 8 ounces (large glass) of water, do not lie down for 30 minutes after  Intertrigo  -     ketoconazole (NIZOral) 2 % cream; Apply topically once daily for 14 days.      Medical Admin Record      Patient disposition: Home    Electronically signed by Veronica Mathis PA-C  12:36 PM

## 2024-10-22 NOTE — PATIENT INSTRUCTIONS
Your INR today was slightly low at 1.9. Please continue 1.25mg Tues, Thurs, Sat and 2.5mg all other days. We will follow up next week.  If any questions arise do not hesitate to call us at [phone] M-F from 8:30am-4:30pm or at   [phone] after 5pm or on the weekends. Continue to monitor for any excess bleeding or bruising, especially for blood in your stool.

## 2024-10-22 NOTE — PROGRESS NOTES
Received a fax from [facility] on 10.17.24 with an INR of 1.9 and on on 10.10.24 with an INR of 2.2, on 10.3.24 with an INR of 2.0, and on 9.26.24 with an INR of 1.9 . I spoke with Ms. Magana's daughter Goldy today after numerous attempts to reach her over the past few weeks. No changes in medications or diet. No signs or symptoms of bleeding. Goldy mentioned her mother is eating well overall. Given her INR is slightly low at 1.9 but this result was from last Thursday, we will continue 1.25mg Tues, Thurs, Sat and 2.5mg all other days. We will follow up next week.  
No pertinent family history in first degree relatives

## 2024-11-07 LAB
INR IN PPP BY COAGULATION ASSAY EXTERNAL: 1.7 (ref 2–3)
PROTHROMBIN TIME (PT) IN PPP BY COAGULATION ASSAY EXTERNAL: ABNORMAL

## 2024-11-22 ENCOUNTER — ANTICOAGULATION - WARFARIN VISIT (OUTPATIENT)
Dept: PHARMACY | Facility: HOSPITAL | Age: 89
End: 2024-11-22
Payer: MEDICARE

## 2024-11-22 DIAGNOSIS — I48.19 PERSISTENT ATRIAL FIBRILLATION (MULTI): Primary | ICD-10-CM

## 2024-11-22 NOTE — PROGRESS NOTES
Received a fax from [facility] on 10.31.24 with an INR of 1.9, 11.7.24 with an INR of 1.7, and on 11.21.24 with an INR of 1.9. Goldy answered today after numerous attempts to reach her over the past few weeks again. We discussed the importance in answering our phone calls. No changes in medications or diet. No signs or symptoms of bleeding. Goldy mentioned her mother is eating well overall. Given her INR is slightly low at 1.9 and has concurrently been running on the lower end, we will increase her regimen to 1.25mg Tues, Thurs and 2.5mg all other days. We will follow up next week.

## 2024-11-22 NOTE — PATIENT INSTRUCTIONS
Your INR today was slightly low at 1.9. Please increase your weekly regimen to 1.25mg Tues, Thurs, Sat and 2.5mg all other days. We will follow up next week.  If any questions arise do not hesitate to call us at [phone] M-F from 8:30am-4:30pm or at   [phone] after 5pm or on the weekends. Continue to monitor for any excess bleeding or bruising, especially for blood in your stool.

## 2024-12-11 ENCOUNTER — ANTICOAGULATION - WARFARIN VISIT (OUTPATIENT)
Dept: PHARMACY | Facility: HOSPITAL | Age: 89
End: 2024-12-11
Payer: MEDICARE

## 2024-12-11 DIAGNOSIS — I48.19 PERSISTENT ATRIAL FIBRILLATION (MULTI): Primary | ICD-10-CM

## 2024-12-11 DIAGNOSIS — I48.0 PAROXYSMAL ATRIAL FIBRILLATION (MULTI): ICD-10-CM

## 2024-12-11 NOTE — PATIENT INSTRUCTIONS
Your INR today was in range at 2.0. Please continue your weekly regimen to 1.25mg Tues & Thurs, and 2.5mg all other days. We will follow up next week.  If any questions arise do not hesitate to call us at [phone] M-F from 8:30am-4:30pm or at   [phone] after 5pm or on the weekends. Continue to monitor for any excess bleeding or bruising, especially for blood in your stool.

## 2024-12-11 NOTE — PROGRESS NOTES
We received a fax from [facility] on 11/28 with an INR of 1.9, and another on 12/5 with an INR of 2.0. We continue to have difficulty connecting with Jennifer or Goldy to follow up after testing. I spoke with Goldy, who reported no changes in Jennifer's medications or diet. No signs or symptoms of bleeding/bruising or headaches. No extra/missing doses of warfarin. Given her last INR resulted in range, we will continue with her regimen of 1.25mg Tues, Thurs and 2.5mg all other days. We will attempt to follow up again next week.

## 2024-12-23 ENCOUNTER — ANTICOAGULATION - WARFARIN VISIT (OUTPATIENT)
Dept: PHARMACY | Facility: HOSPITAL | Age: 89
End: 2024-12-23
Payer: MEDICARE

## 2024-12-23 DIAGNOSIS — I48.19 PERSISTENT ATRIAL FIBRILLATION (MULTI): ICD-10-CM

## 2024-12-23 DIAGNOSIS — I48.0 PAROXYSMAL ATRIAL FIBRILLATION (MULTI): Primary | ICD-10-CM

## 2024-12-23 NOTE — PROGRESS NOTES
12/23: We received a fax from [facility] today from 12/19 with an INR of 2.1. I spoke with Goldy today, who reported no changes in Jennifer's medications or diet. No signs or symptoms of bleeding/bruising or headaches. No extra/missing doses of warfarin. Given her last INR resulted in range, we will continue with her regimen of 1.25mg Tues, Thurs and 2.5mg all other days. We will attempt to follow up again next week after she tests again this Thursday.

## 2024-12-23 NOTE — PATIENT INSTRUCTIONS
Your INR today was in range at 2.1. Please continue your weekly regimen to 1.25mg Tues & Thurs, and 2.5mg all other days. We will follow up next week.  If any questions arise do not hesitate to call us at [phone] M-F from 8:30am-4:30pm or at   [phone] after 5pm or on the weekends. Continue to monitor for any excess bleeding or bruising, especially for blood in your stool.

## 2025-01-13 ENCOUNTER — ANTICOAGULATION - WARFARIN VISIT (OUTPATIENT)
Dept: PHARMACY | Facility: HOSPITAL | Age: OVER 89
End: 2025-01-13
Payer: MEDICARE

## 2025-01-13 DIAGNOSIS — I48.19 PERSISTENT ATRIAL FIBRILLATION (MULTI): Primary | ICD-10-CM

## 2025-01-13 DIAGNOSIS — I48.0 PAROXYSMAL ATRIAL FIBRILLATION (MULTI): ICD-10-CM

## 2025-01-13 NOTE — PROGRESS NOTES
1/13/2025: We received a fax from [facility] today on 1/9 with an INR of 2.1. I spoke with Goldy today, who reported no changes in Jennifer's medications or diet. No signs or symptoms of bleeding/bruising or headaches. No extra/missing doses of warfarin. Given her last INR resulted in range, we will continue with her regimen of 1.25mg Tues, Thurs and 2.5mg all other days. We will attempt to follow up again this week after she tests again this Thursday.

## 2025-01-13 NOTE — PATIENT INSTRUCTIONS
Your INR today was in range at 2.1. Please continue your weekly regimen to 1.25mg Tues & Thurs, and 2.5mg all other days. We will follow up next week.  If any questions arise do not hesitate to call us at [phone] M-F from 8:30am-4:30pm or at   [phone] after 5pm or on the weekends. Continue to monitor for any excess bleeding or bruising, especially for blood in your stool.

## 2025-01-24 DIAGNOSIS — Z79.899 ENCOUNTER FOR MONITORING AMIODARONE THERAPY: Primary | ICD-10-CM

## 2025-01-24 DIAGNOSIS — Z51.81 ENCOUNTER FOR MONITORING AMIODARONE THERAPY: Primary | ICD-10-CM

## 2025-01-24 DIAGNOSIS — I48.19 PERSISTENT ATRIAL FIBRILLATION (MULTI): ICD-10-CM

## 2025-01-24 RX ORDER — AMIODARONE HYDROCHLORIDE 200 MG/1
200 TABLET ORAL DAILY
Qty: 30 TABLET | Refills: 0 | Status: SHIPPED | OUTPATIENT
Start: 2025-01-24

## 2025-01-24 NOTE — TELEPHONE ENCOUNTER
RN called and spoke with daughter Goldy, notified daughter of need for labs, CXR and EKG. Daughter verbalized understanding and is agreeable to plan.

## 2025-02-04 ENCOUNTER — ANTICOAGULATION - WARFARIN VISIT (OUTPATIENT)
Dept: PHARMACY | Facility: HOSPITAL | Age: OVER 89
End: 2025-02-04
Payer: MEDICARE

## 2025-02-04 DIAGNOSIS — I48.0 PAROXYSMAL ATRIAL FIBRILLATION (MULTI): ICD-10-CM

## 2025-02-04 DIAGNOSIS — I48.19 PERSISTENT ATRIAL FIBRILLATION (MULTI): Primary | ICD-10-CM

## 2025-02-04 NOTE — PATIENT INSTRUCTIONS
Your INR today was in range at 2.0. Please continue your weekly regimen to 1.25mg Tues & Thurs, and 2.5mg all other days. We will follow up next week.  If any questions arise do not hesitate to call us at [phone] M-F from 8:30am-4:30pm or at   [phone] after 5pm or on the weekends. Continue to monitor for any excess bleeding or bruising, especially for blood in your stool.

## 2025-02-04 NOTE — PROGRESS NOTES
We received 2 faxed results from [facility] today. #1 from 1/23/2024 INR result: 2.1.  #2 from 1/30/2024 with an INR of 2.0. I spoke with Goldy today, who reported no changes in Jennifer's medications or diet. No signs or symptoms of bleeding/bruising or headaches. No extra/missing doses of warfarin. Given her INR is in range, we will continue with her regimen of 1.25mg Tues, Thurs and 2.5mg all other days. We will attempt to follow up again this week after she tests Jennifer again this Thursday.

## 2025-03-07 ENCOUNTER — ANTICOAGULATION - WARFARIN VISIT (OUTPATIENT)
Dept: PHARMACY | Facility: HOSPITAL | Age: OVER 89
End: 2025-03-07
Payer: MEDICARE

## 2025-03-07 DIAGNOSIS — I48.19 PERSISTENT ATRIAL FIBRILLATION (MULTI): Primary | ICD-10-CM

## 2025-03-07 DIAGNOSIS — I48.0 PAROXYSMAL ATRIAL FIBRILLATION (MULTI): ICD-10-CM

## 2025-03-07 RX ORDER — WARFARIN SODIUM 2.5 MG/1
TABLET ORAL
Qty: 72 TABLET | Refills: 3 | Status: SHIPPED | OUTPATIENT
Start: 2025-03-07

## 2025-03-07 NOTE — PATIENT INSTRUCTIONS
Your INR today was in range at 2.1. Please continue your weekly regimen of 1.25mg Tues & Thurs, and 2.5mg all other days. We will follow up next week.  If any questions arise do not hesitate to call us at [phone] M-F from 8:30am-4:30pm or at   [phone] after 5pm or on the weekends. Continue to monitor for any excess bleeding or bruising, especially for blood in your stool.

## 2025-03-07 NOTE — PROGRESS NOTES
We received faxed results from [facility] with INRS from , from 3/6 with an INR of 2.1, 2.27 with an INR of 1.9, and one from 2.13 with an INR of 1.9. I spoke with Goldy today, who reported that Jennifer was put on Prednisone and Percocet on Tuesday 3/4 after falling. She has one more day left for a 5 day course. No changes to her diet. No signs or symptoms of bleeding/bruising unrelated to her fall. No extra/missing doses of warfarin. Given her INR is in range, we will continue with her regimen of 1.25mg Tues, Thurs and 2.5mg all other days. We will follow up next week. We discussed we may increase her dosage slightly next week.

## 2025-03-18 ENCOUNTER — HOSPITAL ENCOUNTER (OUTPATIENT)
Dept: RADIOLOGY | Facility: CLINIC | Age: OVER 89
Discharge: HOME | End: 2025-03-18
Payer: MEDICARE

## 2025-03-18 DIAGNOSIS — Z51.81 ENCOUNTER FOR MONITORING AMIODARONE THERAPY: ICD-10-CM

## 2025-03-18 DIAGNOSIS — Z79.899 ENCOUNTER FOR MONITORING AMIODARONE THERAPY: ICD-10-CM

## 2025-03-18 PROCEDURE — 71046 X-RAY EXAM CHEST 2 VIEWS: CPT

## 2025-03-19 LAB
ALBUMIN SERPL-MCNC: 4.3 G/DL (ref 3.6–5.1)
ALP SERPL-CCNC: 63 U/L (ref 37–153)
ALT SERPL-CCNC: 17 U/L (ref 6–29)
ANION GAP SERPL CALCULATED.4IONS-SCNC: 11 MMOL/L (CALC) (ref 7–17)
AST SERPL-CCNC: 16 U/L (ref 10–35)
BILIRUB SERPL-MCNC: 0.6 MG/DL (ref 0.2–1.2)
BUN SERPL-MCNC: 31 MG/DL (ref 7–25)
CALCIUM SERPL-MCNC: 9.7 MG/DL (ref 8.6–10.4)
CHLORIDE SERPL-SCNC: 108 MMOL/L (ref 98–110)
CO2 SERPL-SCNC: 24 MMOL/L (ref 20–32)
CREAT SERPL-MCNC: 1.76 MG/DL (ref 0.6–0.95)
EGFRCR SERPLBLD CKD-EPI 2021: 26 ML/MIN/1.73M2
ERYTHROCYTE [DISTWIDTH] IN BLOOD BY AUTOMATED COUNT: 14.1 % (ref 11–15)
GLUCOSE SERPL-MCNC: 88 MG/DL (ref 65–99)
HCT VFR BLD AUTO: 40.6 % (ref 35–45)
HGB BLD-MCNC: 13 G/DL (ref 11.7–15.5)
MCH RBC QN AUTO: 30.4 PG (ref 27–33)
MCHC RBC AUTO-ENTMCNC: 32 G/DL (ref 32–36)
MCV RBC AUTO: 95.1 FL (ref 80–100)
PLATELET # BLD AUTO: 242 THOUSAND/UL (ref 140–400)
PMV BLD REES-ECKER: 10.9 FL (ref 7.5–12.5)
POTASSIUM SERPL-SCNC: 5 MMOL/L (ref 3.5–5.3)
PROT SERPL-MCNC: 6.5 G/DL (ref 6.1–8.1)
RBC # BLD AUTO: 4.27 MILLION/UL (ref 3.8–5.1)
SODIUM SERPL-SCNC: 143 MMOL/L (ref 135–146)
WBC # BLD AUTO: 12.2 THOUSAND/UL (ref 3.8–10.8)

## 2025-03-20 ENCOUNTER — TELEPHONE (OUTPATIENT)
Dept: CARDIOLOGY | Facility: HOSPITAL | Age: OVER 89
End: 2025-03-20
Payer: MEDICARE

## 2025-03-20 DIAGNOSIS — I48.19 PERSISTENT ATRIAL FIBRILLATION (MULTI): ICD-10-CM

## 2025-03-20 RX ORDER — AMIODARONE HYDROCHLORIDE 200 MG/1
200 TABLET ORAL DAILY
Qty: 90 TABLET | Refills: 0 | Status: SHIPPED | OUTPATIENT
Start: 2025-03-20 | End: 2025-06-18

## 2025-03-20 NOTE — TELEPHONE ENCOUNTER
RN called pt at this time regarding results and plan. Pt verbalized understanding and wanting a refill for her amiodarone. Refill sent over for approval.         ----- Message from Dedrick Gutierres sent at 3/20/2025  9:44 AM EDT -----  Let patient know that CXR is stable  
This document is complete and the patient is ready for discharge.

## 2025-03-21 LAB
INR IN PPP BY COAGULATION ASSAY EXTERNAL: 2.3 (ref 2–3)
PROTHROMBIN TIME (PT) IN PPP BY COAGULATION ASSAY EXTERNAL: NORMAL

## 2025-03-24 ENCOUNTER — ANTICOAGULATION - WARFARIN VISIT (OUTPATIENT)
Dept: PHARMACY | Facility: HOSPITAL | Age: OVER 89
End: 2025-03-24
Payer: MEDICARE

## 2025-03-24 DIAGNOSIS — I48.19 PERSISTENT ATRIAL FIBRILLATION (MULTI): Primary | ICD-10-CM

## 2025-03-24 DIAGNOSIS — I48.0 PAROXYSMAL ATRIAL FIBRILLATION (MULTI): ICD-10-CM

## 2025-03-24 NOTE — PATIENT INSTRUCTIONS
Your INR today was in range at 2.3. Please continue your weekly regimen of 1.25mg Tues & Thurs, and 2.5mg all other days. We will follow up next week.  If any questions arise do not hesitate to call us at [phone] M-F from 8:30am-4:30pm or at   [phone] after 5pm or on the weekends. Continue to monitor for any excess bleeding or bruising, especially for blood in your stool.

## 2025-03-24 NOTE — PROGRESS NOTES
We received (2) faxed results from [facility] with INRS one from 3/13 with an INR of 2.0, and one from 3/21 with an INR of 2.3.  I spoke with Goldy today, who reported that Jennifer still has 2 more days left of taking the Prednisone and Percocet. No changes to her diet. No signs or symptoms of bleeding/bruising. No extra/missing doses of warfarin. Given her INR is in range, we will continue with her regimen of 1.25mg Tues, Thurs and 2.5mg all other days. We will follow up next week.

## 2025-03-29 ENCOUNTER — APPOINTMENT (OUTPATIENT)
Dept: RADIOLOGY | Facility: HOSPITAL | Age: OVER 89
End: 2025-03-29
Payer: MEDICARE

## 2025-03-29 ENCOUNTER — APPOINTMENT (OUTPATIENT)
Dept: CARDIOLOGY | Facility: HOSPITAL | Age: OVER 89
End: 2025-03-29
Payer: MEDICARE

## 2025-03-29 ENCOUNTER — HOSPITAL ENCOUNTER (INPATIENT)
Facility: HOSPITAL | Age: OVER 89
End: 2025-03-29
Attending: STUDENT IN AN ORGANIZED HEALTH CARE EDUCATION/TRAINING PROGRAM | Admitting: INTERNAL MEDICINE
Payer: MEDICARE

## 2025-03-29 DIAGNOSIS — I48.0 PAROXYSMAL ATRIAL FIBRILLATION (MULTI): ICD-10-CM

## 2025-03-29 DIAGNOSIS — N18.9 ACUTE KIDNEY INJURY SUPERIMPOSED ON CHRONIC KIDNEY DISEASE: ICD-10-CM

## 2025-03-29 DIAGNOSIS — I35.0 NONRHEUMATIC AORTIC VALVE STENOSIS: ICD-10-CM

## 2025-03-29 DIAGNOSIS — I50.9 CONGESTIVE HEART FAILURE, UNSPECIFIED HF CHRONICITY, UNSPECIFIED HEART FAILURE TYPE: ICD-10-CM

## 2025-03-29 DIAGNOSIS — I24.89 DEMAND ISCHEMIA (MULTI): ICD-10-CM

## 2025-03-29 DIAGNOSIS — I50.9 ACUTE ON CHRONIC CONGESTIVE HEART FAILURE, UNSPECIFIED HEART FAILURE TYPE: ICD-10-CM

## 2025-03-29 DIAGNOSIS — I48.91 ATRIAL FIBRILLATION, UNSPECIFIED TYPE (MULTI): Primary | ICD-10-CM

## 2025-03-29 DIAGNOSIS — N17.9 ACUTE KIDNEY INJURY SUPERIMPOSED ON CHRONIC KIDNEY DISEASE: ICD-10-CM

## 2025-03-29 LAB
ALBUMIN SERPL BCP-MCNC: 3.9 G/DL (ref 3.4–5)
ALP SERPL-CCNC: 92 U/L (ref 33–136)
ALT SERPL W P-5'-P-CCNC: 52 U/L (ref 7–45)
ANION GAP SERPL CALC-SCNC: 11 MMOL/L (ref 10–20)
AST SERPL W P-5'-P-CCNC: 29 U/L (ref 9–39)
BASOPHILS # BLD AUTO: 0.02 X10*3/UL (ref 0–0.1)
BASOPHILS NFR BLD AUTO: 0.3 %
BILIRUB SERPL-MCNC: 0.5 MG/DL (ref 0–1.2)
BNP SERPL-MCNC: 849 PG/ML (ref 0–99)
BUN SERPL-MCNC: 30 MG/DL (ref 6–23)
CALCIUM SERPL-MCNC: 8.9 MG/DL (ref 8.6–10.3)
CARDIAC TROPONIN I PNL SERPL HS: 16 NG/L (ref 0–13)
CARDIAC TROPONIN I PNL SERPL HS: 17 NG/L (ref 0–13)
CHLORIDE SERPL-SCNC: 111 MMOL/L (ref 98–107)
CO2 SERPL-SCNC: 23 MMOL/L (ref 21–32)
CREAT SERPL-MCNC: 1.92 MG/DL (ref 0.5–1.05)
EGFRCR SERPLBLD CKD-EPI 2021: 24 ML/MIN/1.73M*2
EOSINOPHIL # BLD AUTO: 0 X10*3/UL (ref 0–0.4)
EOSINOPHIL NFR BLD AUTO: 0 %
ERYTHROCYTE [DISTWIDTH] IN BLOOD BY AUTOMATED COUNT: 15.4 % (ref 11.5–14.5)
GLUCOSE SERPL-MCNC: 174 MG/DL (ref 74–99)
HCT VFR BLD AUTO: 40 % (ref 36–46)
HGB BLD-MCNC: 12 G/DL (ref 12–16)
IMM GRANULOCYTES # BLD AUTO: 0.13 X10*3/UL (ref 0–0.5)
IMM GRANULOCYTES NFR BLD AUTO: 1.6 % (ref 0–0.9)
INR PPP: 2.1 (ref 0.9–1.1)
LACTATE SERPL-SCNC: 2 MMOL/L (ref 0.4–2)
LYMPHOCYTES # BLD AUTO: 0.43 X10*3/UL (ref 0.8–3)
LYMPHOCYTES NFR BLD AUTO: 5.4 %
MAGNESIUM SERPL-MCNC: 2.1 MG/DL (ref 1.6–2.4)
MCH RBC QN AUTO: 29.3 PG (ref 26–34)
MCHC RBC AUTO-ENTMCNC: 30 G/DL (ref 32–36)
MCV RBC AUTO: 98 FL (ref 80–100)
MONOCYTES # BLD AUTO: 0.25 X10*3/UL (ref 0.05–0.8)
MONOCYTES NFR BLD AUTO: 3.1 %
NEUTROPHILS # BLD AUTO: 7.11 X10*3/UL (ref 1.6–5.5)
NEUTROPHILS NFR BLD AUTO: 89.6 %
NRBC BLD-RTO: 0 /100 WBCS (ref 0–0)
PLATELET # BLD AUTO: 247 X10*3/UL (ref 150–450)
POTASSIUM SERPL-SCNC: 4.2 MMOL/L (ref 3.5–5.3)
PROT SERPL-MCNC: 5.8 G/DL (ref 6.4–8.2)
PROTHROMBIN TIME: 23.3 SECONDS (ref 9.8–12.4)
RBC # BLD AUTO: 4.09 X10*6/UL (ref 4–5.2)
SODIUM SERPL-SCNC: 141 MMOL/L (ref 136–145)
WBC # BLD AUTO: 7.9 X10*3/UL (ref 4.4–11.3)

## 2025-03-29 PROCEDURE — 96374 THER/PROPH/DIAG INJ IV PUSH: CPT

## 2025-03-29 PROCEDURE — 99285 EMERGENCY DEPT VISIT HI MDM: CPT | Mod: 25 | Performed by: STUDENT IN AN ORGANIZED HEALTH CARE EDUCATION/TRAINING PROGRAM

## 2025-03-29 PROCEDURE — 2500000004 HC RX 250 GENERAL PHARMACY W/ HCPCS (ALT 636 FOR OP/ED): Performed by: STUDENT IN AN ORGANIZED HEALTH CARE EDUCATION/TRAINING PROGRAM

## 2025-03-29 PROCEDURE — 2500000002 HC RX 250 W HCPCS SELF ADMINISTERED DRUGS (ALT 637 FOR MEDICARE OP, ALT 636 FOR OP/ED)

## 2025-03-29 PROCEDURE — 36415 COLL VENOUS BLD VENIPUNCTURE: CPT | Performed by: STUDENT IN AN ORGANIZED HEALTH CARE EDUCATION/TRAINING PROGRAM

## 2025-03-29 PROCEDURE — 84484 ASSAY OF TROPONIN QUANT: CPT | Performed by: STUDENT IN AN ORGANIZED HEALTH CARE EDUCATION/TRAINING PROGRAM

## 2025-03-29 PROCEDURE — 83735 ASSAY OF MAGNESIUM: CPT | Performed by: STUDENT IN AN ORGANIZED HEALTH CARE EDUCATION/TRAINING PROGRAM

## 2025-03-29 PROCEDURE — 83880 ASSAY OF NATRIURETIC PEPTIDE: CPT | Performed by: STUDENT IN AN ORGANIZED HEALTH CARE EDUCATION/TRAINING PROGRAM

## 2025-03-29 PROCEDURE — 80053 COMPREHEN METABOLIC PANEL: CPT | Performed by: STUDENT IN AN ORGANIZED HEALTH CARE EDUCATION/TRAINING PROGRAM

## 2025-03-29 PROCEDURE — 2500000001 HC RX 250 WO HCPCS SELF ADMINISTERED DRUGS (ALT 637 FOR MEDICARE OP): Performed by: NURSE PRACTITIONER

## 2025-03-29 PROCEDURE — 93005 ELECTROCARDIOGRAM TRACING: CPT

## 2025-03-29 PROCEDURE — 85025 COMPLETE CBC W/AUTO DIFF WBC: CPT | Performed by: STUDENT IN AN ORGANIZED HEALTH CARE EDUCATION/TRAINING PROGRAM

## 2025-03-29 PROCEDURE — 71046 X-RAY EXAM CHEST 2 VIEWS: CPT | Performed by: RADIOLOGY

## 2025-03-29 PROCEDURE — 83605 ASSAY OF LACTIC ACID: CPT | Performed by: STUDENT IN AN ORGANIZED HEALTH CARE EDUCATION/TRAINING PROGRAM

## 2025-03-29 PROCEDURE — 71046 X-RAY EXAM CHEST 2 VIEWS: CPT

## 2025-03-29 PROCEDURE — 96375 TX/PRO/DX INJ NEW DRUG ADDON: CPT

## 2025-03-29 PROCEDURE — 96361 HYDRATE IV INFUSION ADD-ON: CPT

## 2025-03-29 PROCEDURE — 85610 PROTHROMBIN TIME: CPT | Performed by: STUDENT IN AN ORGANIZED HEALTH CARE EDUCATION/TRAINING PROGRAM

## 2025-03-29 PROCEDURE — 99222 1ST HOSP IP/OBS MODERATE 55: CPT | Performed by: NURSE PRACTITIONER

## 2025-03-29 PROCEDURE — 2060000001 HC INTERMEDIATE ICU ROOM DAILY

## 2025-03-29 RX ORDER — LEVOTHYROXINE SODIUM 100 UG/1
100 TABLET ORAL DAILY
Status: DISCONTINUED | OUTPATIENT
Start: 2025-03-30 | End: 2025-04-03 | Stop reason: HOSPADM

## 2025-03-29 RX ORDER — METOPROLOL TARTRATE 1 MG/ML
5 INJECTION, SOLUTION INTRAVENOUS ONCE
Status: COMPLETED | OUTPATIENT
Start: 2025-03-29 | End: 2025-03-29

## 2025-03-29 RX ORDER — WARFARIN 2.5 MG/1
2.5 TABLET ORAL ONCE
Status: COMPLETED | OUTPATIENT
Start: 2025-03-29 | End: 2025-03-29

## 2025-03-29 RX ORDER — LATANOPROST 50 UG/ML
1 SOLUTION/ DROPS OPHTHALMIC NIGHTLY
Status: DISCONTINUED | OUTPATIENT
Start: 2025-03-29 | End: 2025-04-03 | Stop reason: HOSPADM

## 2025-03-29 RX ORDER — DICYCLOMINE HYDROCHLORIDE 20 MG/1
20 TABLET ORAL
Status: DISCONTINUED | OUTPATIENT
Start: 2025-03-29 | End: 2025-04-03 | Stop reason: HOSPADM

## 2025-03-29 RX ORDER — AMIODARONE HYDROCHLORIDE 200 MG/1
200 TABLET ORAL DAILY
Status: DISCONTINUED | OUTPATIENT
Start: 2025-03-30 | End: 2025-03-30

## 2025-03-29 RX ORDER — LEVOTHYROXINE SODIUM 88 UG/1
88 TABLET ORAL
Status: DISCONTINUED | OUTPATIENT
Start: 2025-03-29 | End: 2025-03-29

## 2025-03-29 RX ORDER — FAMOTIDINE 20 MG/1
10 TABLET, FILM COATED ORAL DAILY
Status: DISCONTINUED | OUTPATIENT
Start: 2025-03-29 | End: 2025-04-03 | Stop reason: HOSPADM

## 2025-03-29 RX ORDER — ACETAMINOPHEN 325 MG/1
650 TABLET ORAL EVERY 4 HOURS PRN
Status: DISCONTINUED | OUTPATIENT
Start: 2025-03-29 | End: 2025-04-03 | Stop reason: HOSPADM

## 2025-03-29 RX ORDER — AMOXICILLIN 250 MG
1 CAPSULE ORAL NIGHTLY
Status: DISCONTINUED | OUTPATIENT
Start: 2025-03-29 | End: 2025-04-01

## 2025-03-29 RX ORDER — FUROSEMIDE 10 MG/ML
20 INJECTION INTRAMUSCULAR; INTRAVENOUS ONCE
Status: COMPLETED | OUTPATIENT
Start: 2025-03-29 | End: 2025-03-29

## 2025-03-29 RX ADMIN — METOPROLOL TARTRATE 5 MG: 5 INJECTION INTRAVENOUS at 15:31

## 2025-03-29 RX ADMIN — WARFARIN SODIUM 2.5 MG: 2.5 TABLET ORAL at 17:01

## 2025-03-29 RX ADMIN — FAMOTIDINE 10 MG: 20 TABLET, FILM COATED ORAL at 16:46

## 2025-03-29 RX ADMIN — DICYCLOMINE HYDROCHLORIDE 20 MG: 20 TABLET ORAL at 16:46

## 2025-03-29 RX ADMIN — FUROSEMIDE 20 MG: 10 INJECTION, SOLUTION INTRAMUSCULAR; INTRAVENOUS at 15:31

## 2025-03-29 RX ADMIN — SODIUM CHLORIDE 500 ML: 0.9 INJECTION, SOLUTION INTRAVENOUS at 12:44

## 2025-03-29 RX ADMIN — LATANOPROST 1 DROP: 50 SOLUTION OPHTHALMIC at 20:17

## 2025-03-29 SDOH — ECONOMIC STABILITY: HOUSING INSECURITY: IN THE LAST 12 MONTHS, WAS THERE A TIME WHEN YOU WERE NOT ABLE TO PAY THE MORTGAGE OR RENT ON TIME?: NO

## 2025-03-29 SDOH — SOCIAL STABILITY: SOCIAL INSECURITY
WITHIN THE LAST YEAR, HAVE YOU BEEN RAPED OR FORCED TO HAVE ANY KIND OF SEXUAL ACTIVITY BY YOUR PARTNER OR EX-PARTNER?: NO

## 2025-03-29 SDOH — ECONOMIC STABILITY: FOOD INSECURITY: WITHIN THE PAST 12 MONTHS, THE FOOD YOU BOUGHT JUST DIDN'T LAST AND YOU DIDN'T HAVE MONEY TO GET MORE.: NEVER TRUE

## 2025-03-29 SDOH — ECONOMIC STABILITY: HOUSING INSECURITY: AT ANY TIME IN THE PAST 12 MONTHS, WERE YOU HOMELESS OR LIVING IN A SHELTER (INCLUDING NOW)?: NO

## 2025-03-29 SDOH — ECONOMIC STABILITY: INCOME INSECURITY: IN THE PAST 12 MONTHS HAS THE ELECTRIC, GAS, OIL, OR WATER COMPANY THREATENED TO SHUT OFF SERVICES IN YOUR HOME?: NO

## 2025-03-29 SDOH — SOCIAL STABILITY: SOCIAL INSECURITY
WITHIN THE LAST YEAR, HAVE YOU BEEN KICKED, HIT, SLAPPED, OR OTHERWISE PHYSICALLY HURT BY YOUR PARTNER OR EX-PARTNER?: NO

## 2025-03-29 SDOH — SOCIAL STABILITY: SOCIAL INSECURITY: ABUSE: ADULT

## 2025-03-29 SDOH — ECONOMIC STABILITY: FOOD INSECURITY: WITHIN THE PAST 12 MONTHS, YOU WORRIED THAT YOUR FOOD WOULD RUN OUT BEFORE YOU GOT THE MONEY TO BUY MORE.: NEVER TRUE

## 2025-03-29 SDOH — SOCIAL STABILITY: SOCIAL INSECURITY: WITHIN THE LAST YEAR, HAVE YOU BEEN HUMILIATED OR EMOTIONALLY ABUSED IN OTHER WAYS BY YOUR PARTNER OR EX-PARTNER?: NO

## 2025-03-29 SDOH — SOCIAL STABILITY: SOCIAL INSECURITY: WITHIN THE LAST YEAR, HAVE YOU BEEN AFRAID OF YOUR PARTNER OR EX-PARTNER?: NO

## 2025-03-29 SDOH — SOCIAL STABILITY: SOCIAL INSECURITY: HAVE YOU HAD THOUGHTS OF HARMING ANYONE ELSE?: NO

## 2025-03-29 SDOH — ECONOMIC STABILITY: HOUSING INSECURITY: IN THE PAST 12 MONTHS, HOW MANY TIMES HAVE YOU MOVED WHERE YOU WERE LIVING?: 1

## 2025-03-29 SDOH — SOCIAL STABILITY: SOCIAL INSECURITY: WERE YOU ABLE TO COMPLETE ALL THE BEHAVIORAL HEALTH SCREENINGS?: YES

## 2025-03-29 SDOH — ECONOMIC STABILITY: FOOD INSECURITY: HOW HARD IS IT FOR YOU TO PAY FOR THE VERY BASICS LIKE FOOD, HOUSING, MEDICAL CARE, AND HEATING?: NOT HARD AT ALL

## 2025-03-29 SDOH — ECONOMIC STABILITY: TRANSPORTATION INSECURITY: IN THE PAST 12 MONTHS, HAS LACK OF TRANSPORTATION KEPT YOU FROM MEDICAL APPOINTMENTS OR FROM GETTING MEDICATIONS?: NO

## 2025-03-29 ASSESSMENT — COGNITIVE AND FUNCTIONAL STATUS - GENERAL
PATIENT BASELINE BEDBOUND: NO
STANDING UP FROM CHAIR USING ARMS: A LITTLE
DAILY ACTIVITIY SCORE: 22
MOBILITY SCORE: 24
MOBILITY SCORE: 19
CLIMB 3 TO 5 STEPS WITH RAILING: A LITTLE
WALKING IN HOSPITAL ROOM: A LITTLE
TURNING FROM BACK TO SIDE WHILE IN FLAT BAD: A LITTLE
MOVING TO AND FROM BED TO CHAIR: A LITTLE
DRESSING REGULAR UPPER BODY CLOTHING: A LITTLE
TOILETING: A LITTLE
DAILY ACTIVITIY SCORE: 24

## 2025-03-29 ASSESSMENT — PATIENT HEALTH QUESTIONNAIRE - PHQ9
1. LITTLE INTEREST OR PLEASURE IN DOING THINGS: NOT AT ALL
2. FEELING DOWN, DEPRESSED OR HOPELESS: NOT AT ALL
SUM OF ALL RESPONSES TO PHQ9 QUESTIONS 1 & 2: 0

## 2025-03-29 ASSESSMENT — ACTIVITIES OF DAILY LIVING (ADL)
DRESSING YOURSELF: INDEPENDENT
BATHING: INDEPENDENT
GROOMING: INDEPENDENT
HEARING - LEFT EAR: FUNCTIONAL
PATIENT'S MEMORY ADEQUATE TO SAFELY COMPLETE DAILY ACTIVITIES?: YES
JUDGMENT_ADEQUATE_SAFELY_COMPLETE_DAILY_ACTIVITIES: YES
WALKS IN HOME: INDEPENDENT
TOILETING: INDEPENDENT
LACK_OF_TRANSPORTATION: NO
ADEQUATE_TO_COMPLETE_ADL: YES
HEARING - RIGHT EAR: FUNCTIONAL
FEEDING YOURSELF: INDEPENDENT

## 2025-03-29 ASSESSMENT — LIFESTYLE VARIABLES
HOW OFTEN DO YOU HAVE A DRINK CONTAINING ALCOHOL: NEVER
HOW MANY STANDARD DRINKS CONTAINING ALCOHOL DO YOU HAVE ON A TYPICAL DAY: PATIENT DOES NOT DRINK
HOW OFTEN DO YOU HAVE 6 OR MORE DRINKS ON ONE OCCASION: NEVER
SKIP TO QUESTIONS 9-10: 1
AUDIT-C TOTAL SCORE: 0
AUDIT-C TOTAL SCORE: 0

## 2025-03-29 ASSESSMENT — PAIN - FUNCTIONAL ASSESSMENT
PAIN_FUNCTIONAL_ASSESSMENT: 0-10

## 2025-03-29 ASSESSMENT — COLUMBIA-SUICIDE SEVERITY RATING SCALE - C-SSRS
2. HAVE YOU ACTUALLY HAD ANY THOUGHTS OF KILLING YOURSELF?: NO
6. HAVE YOU EVER DONE ANYTHING, STARTED TO DO ANYTHING, OR PREPARED TO DO ANYTHING TO END YOUR LIFE?: NO
1. IN THE PAST MONTH, HAVE YOU WISHED YOU WERE DEAD OR WISHED YOU COULD GO TO SLEEP AND NOT WAKE UP?: NO

## 2025-03-29 ASSESSMENT — PAIN SCALES - GENERAL
PAINLEVEL_OUTOF10: 0 - NO PAIN

## 2025-03-29 NOTE — ED PROVIDER NOTES
HPI   Chief Complaint   Patient presents with    Irregular Heart Beat    Pain With Breathing       HPI: Patient is a 89-year-old female, she has a past medical history of A-fib on amiodarone, hypertension, GERD, open-angle glaucoma in both eyes, arthritis, she is presenting to the emergency department today for concern for chest pain.  She reports that the symptoms started over the past few days.  She went to the doctors a few days ago and noted that she was in A-fib, she took her rescue metoprolol but noticed no improvement of symptoms, she has been getting lightheaded particularly with exertion and standing, she has been having chest pressure, she is also noticed lower extremity swelling.  She denies any shortness of breath, denies any fevers or chills, no recent viral-like illnesses, no vomiting or diarrhea.  Patient reports compliance with all of her medications including her amiodarone.  No recent changes in medications.  She last took metoprolol yesterday for rescue.    ROS: Complete 12 point review of systems performed, otherwise negative except as noted in the history of present illness    PMH: Reviewed, documented below in note. Pertinents in HPI  PSH: Reviewed and documented below in note. Pertinents in HPI  SH: No illicits.  Not homeless.  Fam: Reviewed, noncontributory to patients current complaint  MEDS: Reviewed and documented below in note. Pertinents in HPI  ALLERGIES: Reviewed and documented below in note.                                    No data recorded                Patient History   Past Medical History:   Diagnosis Date    Essential (primary) hypertension 10/03/2022    Essential hypertension    Paroxysmal atrial fibrillation (Multi) 10/03/2022    Paroxysmal atrial fibrillation    Personal history of other (healed) physical injury and trauma     History of kidney injury    Personal history of other diseases of the nervous system and sense organs 08/31/2020    History of tremor    Personal  "history of other specified conditions 07/22/2020    History of dizziness     Past Surgical History:   Procedure Laterality Date    MR HEAD ANGIO WO IV CONTRAST  7/21/2020    MR HEAD ANGIO WO IV CONTRAST 7/21/2020 [facility]    MR NECK ANGIO WO IV CONTRAST  7/21/2020    MR NECK ANGIO WO IV CONTRAST 7/21/2020 [facility]    OTHER SURGICAL HISTORY  06/28/2022    Hysterectomy     Family History   Problem Relation Name Age of Onset    Other (Half sister with CAD) Sister      Coronary artery disease Other Children      Social History     Tobacco Use    Smoking status: Never    Smokeless tobacco: Never   Substance Use Topics    Alcohol use: Never    Drug use: Never       Physical Exam   Visit Vitals  /61   Pulse 87   Temp 36.8 °C (98.2 °F) (Tympanic)   Resp 16   Ht 1.575 m (5' 2\")   Wt 56.7 kg (125 lb)   SpO2 96%   BMI 22.86 kg/m²   Smoking Status Never   BSA 1.58 m²      Physical Exam  Vitals and nursing note reviewed.   Constitutional:       General: She is not in acute distress.     Appearance: Normal appearance. She is not ill-appearing.   HENT:      Head: Normocephalic and atraumatic.   Neck:      Vascular: No carotid bruit.   Cardiovascular:      Rate and Rhythm: Tachycardia present. Rhythm irregular.      Pulses: Normal pulses.      Heart sounds: Normal heart sounds.   Pulmonary:      Effort: Pulmonary effort is normal.      Breath sounds: Normal breath sounds.   Abdominal:      General: There is no distension.      Palpations: Abdomen is soft.      Tenderness: There is no abdominal tenderness. There is no guarding or rebound.   Musculoskeletal:         General: No tenderness, deformity or signs of injury.      Cervical back: Normal range of motion. No rigidity.      Right lower leg: Edema present.      Left lower leg: Edema present.   Skin:     General: Skin is warm and dry.      Capillary Refill: Capillary refill takes less than 2 seconds.   Neurological:      General: No focal deficit " present.      Mental Status: She is alert and oriented to person, place, and time.      Sensory: No sensory deficit.      Motor: No weakness.   Psychiatric:         Mood and Affect: Mood normal.         Behavior: Behavior normal.         XR chest 2 views    (Results Pending)       Labs Reviewed   CBC WITH AUTO DIFFERENTIAL   COMPREHENSIVE METABOLIC PANEL   MAGNESIUM   TROPONIN SERIES- (INITIAL, 1 HR)    Narrative:     The following orders were created for panel order Troponin I Series, High Sensitivity (0, 1 HR).  Procedure                               Abnormality         Status                     ---------                               -----------         ------                     Troponin I, High Sensiti...[[id number]]                                                   Please view results for these tests on the individual orders.   B-TYPE NATRIURETIC PEPTIDE   LACTATE   SERIAL TROPONIN-INITIAL   PROTIME-INR         ED Course & MDM            Medical Decision Making         Your medication list        ASK your doctor about these medications        Instructions Last Dose Given Next Dose Due   acetaminophen 325 mg tablet  Commonly known as: Tylenol           amiodarone 200 mg tablet  Commonly known as: Pacerone      Take 1 tablet (200 mg) by mouth once daily.       cimetidine 300 mg tablet  Commonly known as: Tagamet           Cozaar 100 mg tablet  Generic drug: losartan           diclofenac sodium 1 % gel  Commonly known as: Voltaren           dicyclomine 20 mg tablet  Commonly known as: Bentyl           Jantoven 2.5 mg tablet  Generic drug: warfarin      Take as directed. If you are unsure how to take this medication, talk to your nurse or doctor.  Original instructions: Take 1.25mg (0.5 tablets) by mouth on Tues,Thurs and 2.5mg (1 tablet) all other days or as directed by Coumadin clinic.       levothyroxine 88 mcg tablet  Commonly known as: Synthroid, Levoxyl           levothyroxine 100 mcg tablet  Commonly known  as: Synthroid, Levoxyl           meloxicam 7.5 mg tablet  Commonly known as: Mobic           metoprolol succinate XL 25 mg 24 hr tablet  Commonly known as: Toprol-XL           oxyCODONE 10 mg immediate release tablet  Commonly known as: Roxicodone           pantoprazole 40 mg EC tablet  Commonly known as: ProtoNix           Simbrinza 1-0.2 % drops,suspension ophthalmic suspension  Generic drug: brinzolamide-brimonidine           travoprost 0.004 % drops ophthalmic solution  Commonly known as: Travatan Z           Tums Ultra 470 mg calcium (1,177 mg) chewable tablet  Generic drug: calcium carbonate                    Procedure  Procedures     *This report was transcribed using voice recognition software.  Every effort was made to ensure accuracy; however, inadvertent computerized transcription errors may be present.*  Hao Sandy MD  03/29/25         [facility]. Direct result comparisons should only   be made within the same method.   BASIC METABOLIC PANEL - Abnormal    Glucose 122 (*)     Sodium 142      Potassium 4.5      Chloride 110 (*)     Bicarbonate 24      Anion Gap 13      Urea Nitrogen 33 (*)     Creatinine 2.15 (*)     eGFR 21 (*)     Calcium 9.0     CBC - Abnormal    WBC 9.8      nRBC 0.0      RBC 4.04      Hemoglobin 12.1      Hematocrit 40.0      MCV 99      MCH 30.0      MCHC 30.3 (*)     RDW 15.5 (*)     Platelets 228     PROTIME-INR - Abnormal    Protime 25.0 (*)     INR 2.3 (*)    PROTIME-INR - Abnormal    Protime 23.0 (*)     INR 2.1 (*)    BASIC METABOLIC PANEL - Abnormal    Glucose 126 (*)     Sodium 142      Potassium 4.0      Chloride 105      Bicarbonate 26      Anion Gap 15      Urea Nitrogen 35 (*)     Creatinine 2.18 (*)     eGFR 20 (*)     Calcium 8.9     MAGNESIUM - Normal    Magnesium 2.10     LACTATE - Normal    Lactate 2.0      Narrative:     Venipuncture immediately after or during the administration of Metamizole may lead to falsely low results. Testing should be performed immediately prior to Metamizole dosing.   TROPONIN SERIES- (INITIAL, 1 HR)    Narrative:     The following orders were created for panel order Troponin I Series, High Sensitivity (0, 1 HR).  Procedure                               Abnormality         Status                     ---------                               -----------         ------                     Troponin I, High Sensiti...[[id number]]  Abnormal            Final result               Troponin, High Sensitivi...[[id number]]  Abnormal            Final result                 Please view results for these tests on the individual orders.         ED Course & MDM   ED Course as of 03/31/25 1522   Sat Mar 29, 2025   1521 EKG as interpreted by myself demonstrates atrial fibrillation with rapid ventricular response, heart rate of 119, LVH with left axis deviation noted.  Normal QRS interval with  prolonged QTc interval.  No evidence of an acute STEMI. [NS]      ED Course User Index  [NS] Hao Sandy MD         Diagnoses as of 03/31/25 1522   Atrial fibrillation, unspecified type (Multi)   Acute on chronic congestive heart failure, unspecified heart failure type   Demand ischemia (Multi)   Acute kidney injury superimposed on chronic kidney disease           Medical Decision Making  All mentioned lab results, ECGs, and imaging were independently reviewed by myself  - Patient evaluated. Patient is presenting to the emergency department for chest pain.  Differential includes but is not limited to potential ACS event, Prinzmetal angina, pericarditis, atrial fibrillation, musculoskeletal pain, infectious etiology, esophageal spasm, esophagitis or gastritis to name a few.  Workup was undertaken with labs, EKG, and imaging.  Patient was started on fluids as she is an initial A-fib with RVR here in the ER, she was also given a dose of Lopressor.  Workup demonstrates a mild acute on chronic kidney injury with an elevated BNP and some demand ischemia.  Her chest x-ray demonstrates interstitial edema as well.  Patient's heart rate improved with fluids and IV beta-blockers here in the ER.  Patient was given a dose of Lasix, we will admit the patient to medicine undergo continued workup for her chest pain and diuresis for acute on chronic heart failure.  - Monitored for any changes in stability or symptomatology. Patient remained stable.   - Counseled regarding labs, imaging, diagnosis, and plan. Patient was agreeable. All questions were answered. The patient was receptive and agreeable to the plan of care.       *Disclaimer: This note was dictated by speech recognition. Minor errors in transcription may be present. Please call with questions.    Attila Sandy MD             Your medication list        ASK your doctor about these medications        Instructions Last Dose Given Next Dose Due   amiodarone  200 mg tablet  Commonly known as: Pacerone      Take 1 tablet (200 mg) by mouth once daily.       cimetidine 300 mg tablet  Commonly known as: Tagamet           dicyclomine 20 mg tablet  Commonly known as: Bentyl           Jantoven 2.5 mg tablet  Generic drug: warfarin      Take as directed. If you are unsure how to take this medication, talk to your nurse or doctor.  Original instructions: Take 1.25mg (0.5 tablets) by mouth on Tues,Thurs and 2.5mg (1 tablet) all other days or as directed by Coumadin clinic.       levothyroxine 100 mcg tablet  Commonly known as: Synthroid, Levoxyl  Ask about: Which instructions should I use?           meloxicam 7.5 mg tablet  Commonly known as: Mobic           metoprolol succinate XL 25 mg 24 hr tablet  Commonly known as: Toprol-XL           Simbrinza 1-0.2 % drops,suspension ophthalmic suspension  Generic drug: brinzolamide-brimonidine           travoprost 0.004 % drops ophthalmic solution  Commonly known as: Travatan Z                    Procedure  Procedures     *This report was transcribed using voice recognition software.  Every effort was made to ensure accuracy; however, inadvertent computerized transcription errors may be present.*  Hao Sandy MD  03/31/25         Hao Sandy MD  03/31/25 [id number]

## 2025-03-29 NOTE — PROGRESS NOTES
Jennifer Magana is a 89 y.o. female admitted for No Principal Problem: There is no principal problem currently on the Problem List. Please update the Problem List and refresh.. Pharmacy reviewed the patient's [id number] medications and allergies for accuracy.    The list below reflects the PTA list prior to pharmacy medication history. A summary a changes to the PTA medication list has been listed below. Please review each medication in order reconciliation for additional clarification and justification.    Source of information:  T2P dtr     Medications added:    Medications modified:  Metoprolol Succ 25mg every day --> 25mg every day prn  Simbrinza 1-0.2% --> 1gtt ou bid    Medications to be removed:  Apap 325mg   Tums 470mg   Diclofenac gel 1%  Levothyroxine 88mcg  Losartan 100mg  Oxycodone 10mg   Pantoprazole 40mg   Medications of concern:      Prior to Admission Medications   Prescriptions Last Dose Informant Patient Reported? Taking?   Jantoven 2.5 mg tablet   No No   Sig: Take 1.25mg (0.5 tablets) by mouth on Tues,Thurs and 2.5mg (1 tablet) all other days or as directed by Coumadin clinic.   Simbrinza 1-0.2 % drops,suspension ophthalmic suspension   Yes No   Sig: Administer into affected eye(s).   acetaminophen (Tylenol) 325 mg tablet   Yes No   Sig: Take by mouth.   amiodarone (Pacerone) 200 mg tablet   No No   Sig: Take 1 tablet (200 mg) by mouth once daily.   calcium carbonate (Tums Ultra) 470 mg calcium (1,177 mg) chewable tablet   Yes No   Sig: CHEW AND SWALLOW TWO TABLETS BY MOUTH FOUR TIMES A DAY AS NEEDED FOR EPIGASTRIC DISCOMFORT OR UPSET STOMACH   cimetidine (Tagamet) 300 mg tablet   Yes No   Sig: Take 1 tablet (300 mg) by mouth twice a day.   diclofenac sodium (Voltaren) 1 % gel   Yes No   Sig: Apply 2.25 inches (2 g) topically 4 times a day.   dicyclomine (Bentyl) 20 mg tablet   Yes No   Sig: Take 1 tablet (20 mg) by mouth 4 times a day before meals.   levothyroxine (Synthroid, Levoxyl)  100 mcg tablet   Yes No   Sig: Take 1 tablet (100 mcg) by mouth once daily.   levothyroxine (Synthroid, Levoxyl) 88 mcg tablet   Yes No   Sig: Take 1 tablet (88 mcg) by mouth once daily.   losartan (Cozaar) 100 mg tablet   Yes No   Sig: Take 1 tablet (100 mg) by mouth.   meloxicam (Mobic) 7.5 mg tablet   Yes No   Sig: Take 1 tablet (7.5 mg) by mouth once daily.   metoprolol succinate XL (Toprol-XL) 25 mg 24 hr tablet   Yes No   Sig: Take 1 tablet (25 mg) by mouth once daily.   oxyCODONE (Roxicodone) 10 mg immediate release tablet   Yes No   Sig: Take 1 tablet (10 mg) by mouth every 8 hours if needed.   pantoprazole (ProtoNix) 40 mg EC tablet   Yes No   Sig: Take 1 tablet (40 mg) by mouth once daily.   travoprost (Travatan Z) 0.004 % drops ophthalmic solution   Yes No   Sig: Administer 1 drop into both eyes once daily at bedtime.      Facility-Administered Medications: None       Rafia Bess

## 2025-03-29 NOTE — ED TRIAGE NOTES
Abnormal heart rate, follows with Dr Gutierres. Pt taking her metoprolol for A-fib, heart rate between 40s to 100s. Said she has some shortness of breath with.

## 2025-03-29 NOTE — PROGRESS NOTES
"Pharmacy Consult for Warfarin (Coumadin) Management - Initial Consult Note     Jennifer Magana is a 89 y.o. female admitted for Atrial fibrillation, unspecified type (Multi). Pharmacy was consulted for warfarin dosing and monitoring.       Labs  INR External   Date Value Ref Range Status   03/21/2025 2.30 2.00 - 3.00 Final   03/13/2025 2.00 2.00 - 3.00 Final   03/06/2025 2.10 2.00 - 3.00 Final     INR   Date Value Ref Range Status   03/29/2025 2.1 (H) 0.9 - 1.1 Final     Protime   Date Value Ref Range Status   03/29/2025 23.3 (H) 9.8 - 12.4 seconds Final   02/17/2023 35.0 (H) 9.8 - 13.4 sec Final   12/30/2022 32.4 (H) 9.8 - 13.4 sec Final   12/24/2022 24.0 (H) 9.8 - 13.4 sec Final     Hemoglobin   Date Value Ref Range Status   03/29/2025 12.0 12.0 - 16.0 g/dL Final     HEMOGLOBIN   Date Value Ref Range Status   03/18/2025 13.0 11.7 - 15.5 g/dL Final     Hematocrit   Date Value Ref Range Status   03/29/2025 40.0 36.0 - 46.0 % Final     HEMATOCRIT   Date Value Ref Range Status   03/18/2025 40.6 35.0 - 45.0 % Final     Platelets   Date Value Ref Range Status   03/29/2025 247 150 - 450 x10*3/uL Final     PLATELET COUNT   Date Value Ref Range Status   03/18/2025 242 140 - 400 Thousand/uL Final     No results found for: \"PTT\"    Warfarin Indication: Atrial fibrillation or flutter  Target INR: 2 - 3    Assessment   Home dose: 1.25mg Tues, Thurs and 2.5mg all other days   INR: 2.1 (3/29)   - Continue home dose today as INR 2.1. She will receive 2.5 mg. Will get INR lab tomorrow am and reassess. Per nurse, patient takes warfarin in the evening and has not taken dose yet today prior to coming in.     Plan    Orders placed per pharmacy consult. Pharmacy will continue to monitor and adjust therapy as needed.     Fabiana Gutierrez, PharmD  "

## 2025-03-29 NOTE — H&P
[facility] MEDICINE HISTORY AND PHYSICAL    History Of Present Illness     Jennifer Magana is a 89 y.o. female with PMHx of atrial fib on warfarin, HFpEF, CKD, HTN and moderate aortic stenosis who presented with chest pressure that started a few days ago. She is asymptomatic normally with atrial fib and was found to be in afib at her provider's office a few days ago. She took her metoprolol (which she takes PRN) but had no improvement of symptoms. Metoprolol had always converted her in the past. She was lightheaded, particularly with exertion and standing. She also c/o lower extremity swelling but no shortness of breath. She is compliant with all of her medications including her amiodarone. Her son recently .  In the ED she was in afib RVR with HR to 120 bpm. ED workup was significant for blood glucose 174, creatinine 1.92, , troponins 16/17, INR 2.1. CXR revealed diffuse interstitial infiltrates bilaterally. The pt received  ml and metoprolol 5 mg IV with improvement in her HR to the 100s.   Remainder of ROS reviewed and negative except as indicated in HPI.     Objective     Past Medical History  She has a past medical history of (HFpEF) heart failure with preserved ejection fraction, Aortic stenosis, CKD (chronic kidney disease), Essential (primary) hypertension, GERD (gastroesophageal reflux disease), Hypothyroidism, and Paroxysmal atrial fibrillation (Multi).    Surgical History  She has a past surgical history that includes MR angio head wo IV contrast (2020); MR angio neck wo IV contrast (2020); and Hysterectomy.    Social History     Tobacco Use    Smoking status: Never    Smokeless tobacco: Never   Substance Use Topics    Alcohol use: Never    Drug use: Never       Family History  Family History   Problem Relation Name Age of Onset    Other (Half sister with CAD) Sister      Coronary artery disease Other Children        Allergies  Patient has no known  allergies.    Vitals:    03/29/25 1529   BP: (!) 133/97   Pulse: (!) 120   Resp: (!) 22   Temp: 36.6 °C (97.9 °F)   SpO2: 99%       Vitals:    03/29/25 1202   Weight: 56.7 kg (125 lb)       Scheduled medications  amiodarone, 200 mg, oral, Daily  dicyclomine, 20 mg, oral, Before meals & nightly  famotidine, 20 mg, oral, BID  influenza, 0.5 mL, intramuscular, During hospitalization  furosemide, 20 mg, intravenous, Once  latanoprost, 1 drop, Both Eyes, Nightly  levothyroxine, 100 mcg, oral, Daily  levothyroxine, 88 mcg, oral, Daily  metoprolol, 5 mg, intravenous, Once  sennosides-docusate sodium, 1 tablet, oral, Nightly      Continuous medications     PRN medications  PRN medications: acetaminophen    Results for orders placed or performed during the hospital encounter of 03/29/25 (from the past 24 hours)   CBC and Auto Differential   Result Value Ref Range    WBC 7.9 4.4 - 11.3 x10*3/uL    nRBC 0.0 0.0 - 0.0 /100 WBCs    RBC 4.09 4.00 - 5.20 x10*6/uL    Hemoglobin 12.0 12.0 - 16.0 g/dL    Hematocrit 40.0 36.0 - 46.0 %    MCV 98 80 - 100 fL    MCH 29.3 26.0 - 34.0 pg    MCHC 30.0 (L) 32.0 - 36.0 g/dL    RDW 15.4 (H) 11.5 - 14.5 %    Platelets 247 150 - 450 x10*3/uL    Neutrophils % 89.6 40.0 - 80.0 %    Immature Granulocytes %, Automated 1.6 (H) 0.0 - 0.9 %    Lymphocytes % 5.4 13.0 - 44.0 %    Monocytes % 3.1 2.0 - 10.0 %    Eosinophils % 0.0 0.0 - 6.0 %    Basophils % 0.3 0.0 - 2.0 %    Neutrophils Absolute 7.11 (H) 1.60 - 5.50 x10*3/uL    Immature Granulocytes Absolute, Automated 0.13 0.00 - 0.50 x10*3/uL    Lymphocytes Absolute 0.43 (L) 0.80 - 3.00 x10*3/uL    Monocytes Absolute 0.25 0.05 - 0.80 x10*3/uL    Eosinophils Absolute 0.00 0.00 - 0.40 x10*3/uL    Basophils Absolute 0.02 0.00 - 0.10 x10*3/uL   Comprehensive metabolic panel   Result Value Ref Range    Glucose 174 (H) 74 - 99 mg/dL    Sodium 141 136 - 145 mmol/L    Potassium 4.2 3.5 - 5.3 mmol/L    Chloride 111 (H) 98 - 107 mmol/L    Bicarbonate 23 21 - 32  mmol/L    Anion Gap 11 10 - 20 mmol/L    Urea Nitrogen 30 (H) 6 - 23 mg/dL    Creatinine 1.92 (H) 0.50 - 1.05 mg/dL    eGFR 24 (L) >60 mL/min/1.73m*2    Calcium 8.9 8.6 - 10.3 mg/dL    Albumin 3.9 3.4 - 5.0 g/dL    Alkaline Phosphatase 92 33 - 136 U/L    Total Protein 5.8 (L) 6.4 - 8.2 g/dL    AST 29 9 - 39 U/L    Bilirubin, Total 0.5 0.0 - 1.2 mg/dL    ALT 52 (H) 7 - 45 U/L   Magnesium   Result Value Ref Range    Magnesium 2.10 1.60 - 2.40 mg/dL   B-Type Natriuretic Peptide   Result Value Ref Range     (H) 0 - 99 pg/mL   Lactate   Result Value Ref Range    Lactate 2.0 0.4 - 2.0 mmol/L   Troponin I, High Sensitivity, Initial   Result Value Ref Range    Troponin I, High Sensitivity 16 (H) 0 - 13 ng/L   Protime-INR   Result Value Ref Range    Protime 23.3 (H) 9.8 - 12.4 seconds    INR 2.1 (H) 0.9 - 1.1   Troponin, High Sensitivity, 1 Hour   Result Value Ref Range    Troponin I, High Sensitivity 17 (H) 0 - 13 ng/L         I personally reviewed all pertinent labwork, imaging and vital signs, as well as medications, nursing, therapy, discharge planning and consult notes.     Constitutional: Thin, awake, alert, calm, oriented x4, no acute distress, cooperative   Eyes: EOMI, clear sclerae   ENMT: mucous membranes moist, no lesions seen   Head/Neck: Neck supple, no apparent injury, head atraumatic   Respiratory/Thorax: CTAB anteriorly, good chest expansion, respirations even and unlabored   Cardiovascular: Irregular rate and rhythm, HR 80s-90s, no murmurs/rubs/gallops, normal S1 and S 2   Gastrointestinal: Abdomen nondistended, soft, nontender, +BS, no bruits   Musculoskeletal: ROM intact, no joint swelling, normal  strength   Extremities: no cyanosis, contusions or clubbing, 1+ pitting BLE edema   Neurological: no focal deficit, pt alert and oriented x4   Psychological: Appropriate affect and behavior, pleasant   Skin: Warm and dry, no lesions, no rashes       Assessment/Plan     Atrial fib with RVR  Hx atrial fib  on warfarin, she is rate controlled on after IV metoprolol x1  Continue home amiodarone  Will consult cardiology, pt is symptomatic with chest pain and lightheadedeness  Continue warfarin and consult pharmacy for management    Acute on chronic HFpEF  Likely arrhythmia-mediated, will start IV Lasix, cardiology consulted  Pt is not on a diuretic at home    NESTOR on CKD  Likely prerenal in the setting of arrhythmia-mediated renal hypoperfusion  Hold home losartan and Mobic and monitor BMP, defer IVF given acute CHF    HTN   BP is controlled, monitor off losartan    DVT ppx: warfarin    Discharge disposition  Pending PT/OT suzan Navarrete, CNP  [facility] Medicine

## 2025-03-29 NOTE — ED NOTES
Pt arrives to ED via private vehicle from home    Code Status:  No Order    HPI     Chief Complaint   Patient presents with    Irregular Heart Beat    Pain With Breathing       BP (!) 133/97 (BP Location: Right arm, Patient Position: Lying)   Pulse (!) 120   Temp 36.6 °C (97.9 °F) (Temporal)   Resp (!) 22   Wt 56.7 kg (125 lb)   SpO2 99%     No data recorded    LDA:   Peripheral IV 03/29/25 20 G Left;Proximal;Anterior Antecubital (Active)   Placement Date/Time: 03/29/25 1235   Hand Hygiene Completed: Yes  Size (Gauge): 20 G  Orientation: Left;Proximal;Anterior  Location: Antecubital  Site Prep: Chlorhexidine   Comfort Measures: Family member present  Local Anesthetic: None  Technique: An...   Number of days: 0        BACKGROUND  Past Medical History:   Diagnosis Date    (HFpEF) heart failure with preserved ejection fraction     Aortic stenosis     CKD (chronic kidney disease)     Essential (primary) hypertension     GERD (gastroesophageal reflux disease)     Hypothyroidism     Paroxysmal atrial fibrillation (Multi)      Past Surgical History:   Procedure Laterality Date    HYSTERECTOMY      MR HEAD ANGIO WO IV CONTRAST  07/21/2020    MR NECK ANGIO WO IV CONTRAST  07/21/2020     No current facility-administered medications on file prior to encounter.     Current Outpatient Medications on File Prior to Encounter   Medication Sig Dispense Refill    amiodarone (Pacerone) 200 mg tablet Take 1 tablet (200 mg) by mouth once daily. 90 tablet 0    cimetidine (Tagamet) 300 mg tablet Take 1 tablet (300 mg) by mouth twice a day.      dicyclomine (Bentyl) 20 mg tablet Take 1 tablet (20 mg) by mouth 4 times a day before meals.      Jantoven 2.5 mg tablet Take 1.25mg (0.5 tablets) by mouth on Tues,Thurs and 2.5mg (1 tablet) all other days or as directed by Coumadin clinic. 72 tablet 3    levothyroxine (Synthroid, Levoxyl) 100 mcg tablet Take 1 tablet (100 mcg) by mouth once daily.      levothyroxine (Synthroid, Levoxyl) 88  mcg tablet Take 1 tablet (88 mcg) by mouth once daily.      losartan (Cozaar) 100 mg tablet Take 1 tablet (100 mg) by mouth.      meloxicam (Mobic) 7.5 mg tablet Take 1 tablet (7.5 mg) by mouth once daily.      metoprolol succinate XL (Toprol-XL) 25 mg 24 hr tablet Take 1 tablet (25 mg) by mouth once daily as needed.      Simbrinza 1-0.2 % drops,suspension ophthalmic suspension Administer 1 drop into both eyes 2 times a day.      travoprost (Travatan Z) 0.004 % drops ophthalmic solution Administer 1 drop into both eyes once daily at bedtime.      [DISCONTINUED] acetaminophen (Tylenol) 325 mg tablet Take by mouth.      [DISCONTINUED] calcium carbonate (Tums Ultra) 470 mg calcium (1,177 mg) chewable tablet CHEW AND SWALLOW TWO TABLETS BY MOUTH FOUR TIMES A DAY AS NEEDED FOR EPIGASTRIC DISCOMFORT OR UPSET STOMACH      [DISCONTINUED] diclofenac sodium (Voltaren) 1 % gel Apply 2.25 inches (2 g) topically 4 times a day.      [DISCONTINUED] oxyCODONE (Roxicodone) 10 mg immediate release tablet Take 1 tablet (10 mg) by mouth every 8 hours if needed.      [DISCONTINUED] pantoprazole (ProtoNix) 40 mg EC tablet Take 1 tablet (40 mg) by mouth once daily.          ASSESSMENT  ED Course as of 03/29/25 1552   Sat Mar 29, 2025   1521 EKG as interpreted by myself demonstrates atrial fibrillation with rapid ventricular response, heart rate of 119, LVH with left axis deviation noted.  Normal QRS interval with prolonged QTc interval.  No evidence of an acute STEMI. [NS]      ED Course User Index  [NS] Hao Sandy MD         Diagnoses as of 03/29/25 1552   Atrial fibrillation, unspecified type (Multi)   Acute on chronic congestive heart failure, unspecified heart failure type   Demand ischemia (Multi)   Acute kidney injury superimposed on chronic kidney disease       Medications Currently Running:        Medications Given:  ED Medication Administration from 03/29/2025 1159 to 03/29/2025 1552         Date/Time Order Dose  Route Action Action by     03/29/2025 1244 EDT sodium chloride 0.9 % bolus 500 mL 500 mL intravenous New Bag Hilty, T     03/29/2025 1401 EDT sodium chloride 0.9 % bolus 500 mL 0 mL intravenous Stopped Tiwari, T     03/29/2025 1531 EDT furosemide (Lasix) injection 20 mg 20 mg intravenous Given Hilty, T     03/29/2025 1531 EDT metoprolol tartrate (Lopressor) injection 5 mg 5 mg intravenous Given Hilty, T                 RESULTS    Imaging:  XR chest 2 views   Final Result   Diffuse interstitial infiltrates bilaterally, as above. Clinical   correlation and continued follow-up until clearing is recommended.        MACRO:   None.        Signed by: Brijesh Puri 3/29/2025 1:36 PM   Dictation workstation:   [id number]         }  Labs ::99  Abnormal Labs Reviewed   CBC WITH AUTO DIFFERENTIAL - Abnormal; Notable for the following components:       Result Value    MCHC 30.0 (*)     RDW 15.4 (*)     Immature Granulocytes %, Automated 1.6 (*)     Neutrophils Absolute 7.11 (*)     Lymphocytes Absolute 0.43 (*)     All other components within normal limits   COMPREHENSIVE METABOLIC PANEL - Abnormal; Notable for the following components:    Glucose 174 (*)     Chloride 111 (*)     Urea Nitrogen 30 (*)     Creatinine 1.92 (*)     eGFR 24 (*)     Total Protein 5.8 (*)     ALT 52 (*)     All other components within normal limits   B-TYPE NATRIURETIC PEPTIDE - Abnormal; Notable for the following components:     (*)     All other components within normal limits    Narrative:        <100 pg/mL - Heart failure unlikely                  100-299 pg/mL - Intermediate probability of acute heart                                  failure exacerbation. Correlate with clinical                                  context and patient history.                    >=300 pg/mL - Heart Failure likely. Correlate with clinical                                  context and patient history.                                    BNP testing is performed  using different testing methodology at [facility] than at other [facility]. Direct result comparisons should only be made within the same method.                      SERIAL TROPONIN-INITIAL - Abnormal; Notable for the following components:    Troponin I, High Sensitivity 16 (*)     All other components within normal limits    Narrative:     Less than 99th percentile of normal range cutoff-                  Female and children under 18 years old <14 ng/L; Male <21 ng/L: Negative                  Repeat testing should be performed if clinically indicated.                                     Female and children under 18 years old 14-50 ng/L; Male 21-50 ng/L:                  Consistent with possible cardiac damage and possible increased clinical                   risk. Serial measurements may help to assess extent of myocardial damage.                                     >50 ng/L: Consistent with cardiac damage, increased clinical risk and                  myocardial infarction. Serial measurements may help assess extent of                   myocardial damage.                                      NOTE: Children less than 1 year old may have higher baseline troponin                   levels and results should be interpreted in conjunction with the overall                   clinical context.                                     NOTE: Troponin I testing is performed using a different                   testing methodology at [facility] than at other                   [facility]. Direct result comparisons should only                   be made within the same method.   PROTIME-INR - Abnormal; Notable for the following components:    Protime 23.3 (*)     INR 2.1 (*)     All other components within normal limits   SERIAL TROPONIN, 1 HOUR - Abnormal; Notable for the following components:    Troponin I, High Sensitivity 17 (*)     All other components within normal limits    Narrative:      Less than 99th percentile of normal range cutoff-                  Female and children under 18 years old <14 ng/L; Male <21 ng/L: Negative                  Repeat testing should be performed if clinically indicated.                                     Female and children under 18 years old 14-50 ng/L; Male 21-50 ng/L:                  Consistent with possible cardiac damage and possible increased clinical                   risk. Serial measurements may help to assess extent of myocardial damage.                                     >50 ng/L: Consistent with cardiac damage, increased clinical risk and                  myocardial infarction. Serial measurements may help assess extent of                   myocardial damage.                                      NOTE: Children less than 1 year old may have higher baseline troponin                   levels and results should be interpreted in conjunction with the overall                   clinical context.                                     NOTE: Troponin I testing is performed using a different                   testing methodology at [facility] than at other                   [facility]. Direct result comparisons should only                   be made within the same method.                Meryl Tiwari RN  03/29/25 9434

## 2025-03-30 VITALS
HEART RATE: 118 BPM | WEIGHT: 131.2 LBS | BODY MASS INDEX: 24.14 KG/M2 | OXYGEN SATURATION: 96 % | RESPIRATION RATE: 18 BRPM | DIASTOLIC BLOOD PRESSURE: 92 MMHG | TEMPERATURE: 98.3 F | HEIGHT: 62 IN | SYSTOLIC BLOOD PRESSURE: 129 MMHG

## 2025-03-30 LAB
ANION GAP SERPL CALC-SCNC: 13 MMOL/L (ref 10–20)
BUN SERPL-MCNC: 33 MG/DL (ref 6–23)
CALCIUM SERPL-MCNC: 9 MG/DL (ref 8.6–10.3)
CHLORIDE SERPL-SCNC: 110 MMOL/L (ref 98–107)
CO2 SERPL-SCNC: 24 MMOL/L (ref 21–32)
CREAT SERPL-MCNC: 2.15 MG/DL (ref 0.5–1.05)
EGFRCR SERPLBLD CKD-EPI 2021: 21 ML/MIN/1.73M*2
ERYTHROCYTE [DISTWIDTH] IN BLOOD BY AUTOMATED COUNT: 15.5 % (ref 11.5–14.5)
GLUCOSE SERPL-MCNC: 122 MG/DL (ref 74–99)
HCT VFR BLD AUTO: 40 % (ref 36–46)
HGB BLD-MCNC: 12.1 G/DL (ref 12–16)
INR PPP: 2.3 (ref 0.9–1.1)
MCH RBC QN AUTO: 30 PG (ref 26–34)
MCHC RBC AUTO-ENTMCNC: 30.3 G/DL (ref 32–36)
MCV RBC AUTO: 99 FL (ref 80–100)
NRBC BLD-RTO: 0 /100 WBCS (ref 0–0)
PLATELET # BLD AUTO: 228 X10*3/UL (ref 150–450)
POTASSIUM SERPL-SCNC: 4.5 MMOL/L (ref 3.5–5.3)
PROTHROMBIN TIME: 25 SECONDS (ref 9.8–12.4)
RBC # BLD AUTO: 4.04 X10*6/UL (ref 4–5.2)
SODIUM SERPL-SCNC: 142 MMOL/L (ref 136–145)
WBC # BLD AUTO: 9.8 X10*3/UL (ref 4.4–11.3)

## 2025-03-30 PROCEDURE — 2500000001 HC RX 250 WO HCPCS SELF ADMINISTERED DRUGS (ALT 637 FOR MEDICARE OP): Performed by: NURSE PRACTITIONER

## 2025-03-30 PROCEDURE — 85027 COMPLETE CBC AUTOMATED: CPT | Performed by: NURSE PRACTITIONER

## 2025-03-30 PROCEDURE — 99232 SBSQ HOSP IP/OBS MODERATE 35: CPT | Performed by: NURSE PRACTITIONER

## 2025-03-30 PROCEDURE — 2060000001 HC INTERMEDIATE ICU ROOM DAILY

## 2025-03-30 PROCEDURE — 36415 COLL VENOUS BLD VENIPUNCTURE: CPT | Performed by: NURSE PRACTITIONER

## 2025-03-30 PROCEDURE — 99222 1ST HOSP IP/OBS MODERATE 55: CPT | Performed by: INTERNAL MEDICINE

## 2025-03-30 PROCEDURE — 2500000002 HC RX 250 W HCPCS SELF ADMINISTERED DRUGS (ALT 637 FOR MEDICARE OP, ALT 636 FOR OP/ED): Performed by: NURSE PRACTITIONER

## 2025-03-30 PROCEDURE — 2500000004 HC RX 250 GENERAL PHARMACY W/ HCPCS (ALT 636 FOR OP/ED): Performed by: INTERNAL MEDICINE

## 2025-03-30 PROCEDURE — 2500000004 HC RX 250 GENERAL PHARMACY W/ HCPCS (ALT 636 FOR OP/ED): Performed by: NURSE PRACTITIONER

## 2025-03-30 PROCEDURE — 2500000001 HC RX 250 WO HCPCS SELF ADMINISTERED DRUGS (ALT 637 FOR MEDICARE OP): Performed by: INTERNAL MEDICINE

## 2025-03-30 PROCEDURE — 85610 PROTHROMBIN TIME: CPT | Performed by: NURSE PRACTITIONER

## 2025-03-30 PROCEDURE — 80048 BASIC METABOLIC PNL TOTAL CA: CPT | Performed by: NURSE PRACTITIONER

## 2025-03-30 RX ORDER — METOPROLOL TARTRATE 1 MG/ML
5 INJECTION, SOLUTION INTRAVENOUS ONCE
Status: COMPLETED | OUTPATIENT
Start: 2025-03-30 | End: 2025-03-30

## 2025-03-30 RX ORDER — FUROSEMIDE 10 MG/ML
20 INJECTION INTRAMUSCULAR; INTRAVENOUS DAILY
Status: DISCONTINUED | OUTPATIENT
Start: 2025-03-30 | End: 2025-04-02

## 2025-03-30 RX ORDER — WARFARIN 2.5 MG/1
2.5 TABLET ORAL ONCE
Status: COMPLETED | OUTPATIENT
Start: 2025-03-30 | End: 2025-03-30

## 2025-03-30 RX ORDER — METOPROLOL TARTRATE 50 MG/1
50 TABLET ORAL 2 TIMES DAILY
Status: DISCONTINUED | OUTPATIENT
Start: 2025-03-30 | End: 2025-03-31

## 2025-03-30 RX ADMIN — FUROSEMIDE 20 MG: 10 INJECTION, SOLUTION INTRAVENOUS at 12:28

## 2025-03-30 RX ADMIN — LATANOPROST 1 DROP: 50 SOLUTION OPHTHALMIC at 20:34

## 2025-03-30 RX ADMIN — WARFARIN SODIUM 2.5 MG: 2.5 TABLET ORAL at 18:38

## 2025-03-30 RX ADMIN — METOPROLOL TARTRATE 50 MG: 50 TABLET, FILM COATED ORAL at 09:59

## 2025-03-30 RX ADMIN — FAMOTIDINE 10 MG: 20 TABLET, FILM COATED ORAL at 09:59

## 2025-03-30 RX ADMIN — METOPROLOL TARTRATE 50 MG: 50 TABLET, FILM COATED ORAL at 20:34

## 2025-03-30 RX ADMIN — METOPROLOL TARTRATE 5 MG: 5 INJECTION INTRAVENOUS at 09:59

## 2025-03-30 RX ADMIN — LEVOTHYROXINE SODIUM 100 MCG: 0.1 TABLET ORAL at 09:59

## 2025-03-30 ASSESSMENT — PAIN - FUNCTIONAL ASSESSMENT
PAIN_FUNCTIONAL_ASSESSMENT: 0-10
PAIN_FUNCTIONAL_ASSESSMENT: 0-10

## 2025-03-30 ASSESSMENT — PAIN SCALES - GENERAL
PAINLEVEL_OUTOF10: 0 - NO PAIN
PAINLEVEL_OUTOF10: 0 - NO PAIN

## 2025-03-30 NOTE — CONSULTS
Inpatient consult to Cardiology  Consult performed by: Dedrick Gutierres MD  Consult ordered by: EVELIN Sosa-CNP        History Of Present Illness:    Jennifer Magana is a 89 y.o. female with PMHx of atrial fib on warfarin, HFpEF, CKD, HTN and moderate aortic stenosis who presented with chest pressure that started a few days ago. She is asymptomatic normally with atrial fib and was found to be in afib at her provider's office a few days ago. She took her metoprolol (which she takes PRN) but had no improvement of symptoms. Metoprolol had always converted her in the past. She was lightheaded, particularly with exertion and standing. She also c/o lower extremity swelling but no shortness of breath. She is compliant with all of her medications including her amiodarone. Her son recently .  In the ED she was in afib RVR with HR to 120 bpm. ED workup was significant for blood glucose 174, creatinine 1.92, , troponins 16/17, INR 2.1. CXR revealed diffuse interstitial infiltrates bilaterally. The pt received  ml and metoprolol 5 mg IV with improvement in her HR to the 100s.   Remainder of ROS reviewed and negative except as indicated in HPI.      Last Recorded Vitals:  Vitals:    25 1955 25 2330 25 0348 25 0736   BP: 123/80 127/76 135/77 132/90   BP Location: Right arm Right arm Right arm Right arm   Patient Position: Lying Lying Lying Lying   Pulse: (!) 113 (!) 120 110 (!) 126   Resp: 16 18 18 19   Temp: 36.4 °C (97.6 °F) 36.1 °C (96.9 °F) 36.3 °C (97.4 °F) 36.5 °C (97.7 °F)   TempSrc: Temporal Temporal Temporal Temporal   SpO2: 97% 92% 97% 95%   Weight:   59.5 kg (131 lb 3.2 oz)    Height:           Last Labs:  CBC - 3/30/2025:  3:56 AM  9.8 12.1 228    40.0      CMP - 3/30/2025:  3:56 AM  9.0 5.8 29 --- 0.5   _ 3.9 52 92      PTT - No results in last year.  2.3   25.0 _     Troponin I, High Sensitivity   Date/Time Value Ref Range Status   2025 01:43 PM 17 (H) 0 - 13  ng/L Final   03/29/2025 12:35 PM 16 (H) 0 - 13 ng/L Final     Troponin I   Date/Time Value Ref Range Status   12/11/2022 04:00 AM 10 0 - 13 ng/L Final     Comment:     .  Less than 99th percentile of normal range cutoff-  Female and children under 18 years old <14 ng/L; Male <21 ng/L: Negative  Repeat testing should be performed if clinically indicated.   .  Female and children under 18 years old 14-50 ng/L; Male 21-50 ng/L:  Consistent with possible cardiac damage and possible increased clinical   risk. Serial measurements may help to assess extent of myocardial damage.   .  >50 ng/L: Consistent with cardiac damage, increased clinical risk and  myocardial infarction. Serial measurements may help assess extent of   myocardial damage.   .   NOTE: Children less than 1 year old may have higher baseline troponin   levels and results should be interpreted in conjunction with the overall   clinical context.   .  NOTE: Troponin I testing is performed using a different   testing methodology at [facility] than at other   [facility]. Direct result comparisons should only   be made within the same method.     05/22/2022 08:42 AM 13 0 - 13 ng/L Final     Comment:     .  Less than 99th percentile of normal range cutoff-  Female and children under 18 years old <14 ng/L; Male <21 ng/L: Negative  Repeat testing should be performed if clinically indicated.   .  Female and children under 18 years old 14-50 ng/L; Male 21-50 ng/L:  Consistent with possible cardiac damage and possible increased clinical   risk. Serial measurements may help to assess extent of myocardial damage.   .  >50 ng/L: Consistent with cardiac damage, increased clinical risk and  myocardial infarction. Serial measurements may help assess extent of   myocardial damage.   .   NOTE: Children less than 1 year old may have higher baseline troponin   levels and results should be interpreted in conjunction with the overall   clinical context.    .  NOTE: Troponin I testing is performed using a different   testing methodology at [facility] than at other   system hospitals. Direct result comparisons should only   be made within the same method.     05/22/2022 07:36 AM 14 (H) 0 - 13 ng/L Final     Comment:     .  Less than 99th percentile of normal range cutoff-  Female and children under 18 years old <14 ng/L; Male <21 ng/L: Negative  Repeat testing should be performed if clinically indicated.   .  Female and children under 18 years old 14-50 ng/L; Male 21-50 ng/L:  Consistent with possible cardiac damage and possible increased clinical   risk. Serial measurements may help to assess extent of myocardial damage.   .  >50 ng/L: Consistent with cardiac damage, increased clinical risk and  myocardial infarction. Serial measurements may help assess extent of   myocardial damage.   .   NOTE: Children less than 1 year old may have higher baseline troponin   levels and results should be interpreted in conjunction with the overall   clinical context.   .  NOTE: Troponin I testing is performed using a different   testing methodology at [facility] than at other   [facility]. Direct result comparisons should only   be made within the same method.       BNP   Date/Time Value Ref Range Status   03/29/2025 12:35  (H) 0 - 99 pg/mL Final   12/11/2022 04:24  (H) 0 - 99 pg/mL Final     Comment:     .  <100 pg/mL - Heart failure unlikely  100-299 pg/mL - Intermediate probability of acute heart  .               failure exacerbation. Correlate with clinical  .               context and patient history.    >=300 pg/mL - Heart Failure likely. Correlate with clinical  .               context and patient history.  BNP testing is performed using different testing   methodology at [facility] than at other   system hospitals. Direct result comparisons should   only be made within the same method.     05/23/2022 04:14   (H) 0 - 99 pg/mL Final     Comment:     .  <100 pg/mL - Heart failure unlikely  100-299 pg/mL - Intermediate probability of acute heart  .               failure exacerbation. Correlate with clinical  .               context and patient history.    >=300 pg/mL - Heart Failure likely. Correlate with clinical  .               context and patient history.  BNP testing is performed using different testing   methodology at [facility] than at other   [facility]. Direct result comparisons should   only be made within the same method.        Last I/O:  I/O last 3 completed shifts:  In: 390 (6.6 mL/kg) [P.O.:390]  Out: 200 (3.4 mL/kg) [Urine:200 (0.1 mL/kg/hr)]  Weight: 59.5 kg     Past Cardiology Tests (Last 3 Years):  EKG:  ECG 12 lead (Clinic Performed) 05/23/2024      ECG 12 Lead     Echo:  Transthoracic echo (TTE) complete 01/04/2024    Ejection Fractions:  EF   Date/Time Value Ref Range Status   01/04/2024 02:26 PM 54       Cath:  No results found for this or any previous visit from the past 1095 days.    Stress Test:  No results found for this or any previous visit from the past 1095 days.    Cardiac Imaging:  No results found for this or any previous visit from the past 1095 days.      Past Medical History:  She has a past medical history of (HFpEF) heart failure with preserved ejection fraction, Aortic stenosis, CKD (chronic kidney disease), Essential (primary) hypertension, GERD (gastroesophageal reflux disease), Hypothyroidism, and Paroxysmal atrial fibrillation (Multi).    Past Surgical History:  She has a past surgical history that includes MR angio head wo IV contrast (07/21/2020); MR angio neck wo IV contrast (07/21/2020); and Hysterectomy.      Social History:  She reports that she has never smoked. She has never used smokeless tobacco. She reports that she does not drink alcohol and does not use drugs.    Family History:  Family History   Problem Relation Name Age of Onset    Other (Half  sister with CAD) Sister      Coronary artery disease Other Children         Allergies:  Patient has no known allergies.    Inpatient Medications:  Scheduled medications   Medication Dose Route Frequency    dicyclomine  20 mg oral Before meals & nightly    famotidine  10 mg oral Daily    influenza  0.5 mL intramuscular During hospitalization    latanoprost  1 drop Both Eyes Nightly    levothyroxine  100 mcg oral Daily    metoprolol  5 mg intravenous Once    metoprolol tartrate  50 mg oral BID    sennosides-docusate sodium  1 tablet oral Nightly     PRN medications   Medication    acetaminophen     Continuous Medications   Medication Dose Last Rate     Outpatient Medications:  Current Outpatient Medications   Medication Instructions    amiodarone (PACERONE) 200 mg, oral, Daily    cimetidine (TAGAMET) 300 mg, oral, 2 times daily    dicyclomine (BENTYL) 20 mg, oral, 4 times daily before meals and nightly    Jantoven 2.5 mg tablet Take 1.25mg (0.5 tablets) by mouth on Tues,Thurs and 2.5mg (1 tablet) all other days or as directed by Coumadin clinic.    levothyroxine (SYNTHROID, LEVOXYL) 100 mcg, oral, Daily    meloxicam (MOBIC) 7.5 mg, oral, Daily    metoprolol succinate XL (TOPROL-XL) 25 mg, oral, Daily PRN    Simbrinza 1-0.2 % drops,suspension ophthalmic suspension 1 drop, Both Eyes, 2 times daily    travoprost (Travatan Z) 0.004 % drops ophthalmic solution 1 drop, Both Eyes, Nightly       Physical Exam:  Constitutional: Thin, awake, alert, calm, oriented x4, no acute distress, cooperative   Eyes: EOMI, clear sclerae   ENMT: mucous membranes moist, no lesions seen   Head/Neck: Neck supple, no apparent injury, head atraumatic   Respiratory/Thorax: CTAB anteriorly, good chest expansion, respirations even and unlabored   Cardiovascular: Irregular rate and rhythm, HR 80s-90s, no murmurs/rubs/gallops, normal S1 and S 2   Gastrointestinal: Abdomen nondistended, soft, nontender, +BS, no bruits   Musculoskeletal: ROM intact, no  joint swelling, normal  strength   Extremities: no cyanosis, contusions or clubbing, 1+ pitting BLE edema   Neurological: no focal deficit, pt alert and oriented x4   Psychological: Appropriate affect and behavior, pleasant   Skin: Warm and dry, no lesions, no rashes         Assessment/Plan   1) Atrial Fib  Would favor rate control strategy in her with anticoagulation  discontinue amio  Start Metoprolol    2) Moderate AS  Repeat echo    3) Acute on chronic diastolic heart failure  IV diuresis  May want have Nephrology follow with CKD    Peripheral IV 03/29/25 20 G Left;Proximal;Anterior Antecubital (Active)   Site Assessment Clean;Dry;Intact 03/29/25 1922   Dressing Status Clean;Dry 03/29/25 1922   Number of days: 1       Code Status:  No Order    I spent 30 minutes in the professional and overall care of this patient.        Dedrick Gutierres MD

## 2025-03-30 NOTE — PROGRESS NOTES
03/30/25 1425   Discharge Planning   Living Arrangements Children   Support Systems Children   Assistance Needed independent   Type of Residence Private residence   Home or Post Acute Services None   Expected Discharge Disposition Home   Does the patient need discharge transport arranged? No   Intensity of Service   Intensity of Service 0-30 min     Met with patient at bedside, introduced self and role as RN TCC. Patient lives with daughter. She states she is independent in her own care at baseline. She is able to cook if needed, dresses self, showers self, daughter is able to assist with medication management. She is still able to drive, daughter can assist as well. PCP is Dr Torres. Patient is admitted for CP. Cardiology consulted. Plan to return home when medically ready. She denies needs at this time. TCC to follow.

## 2025-03-30 NOTE — PROGRESS NOTES
Pharmacy Consult for Warfarin (Coumadin) Management - Daily Progress Note     Labs  INR External   Date Value Ref Range Status   03/21/2025 2.30 2.00 - 3.00 Final   03/13/2025 2.00 2.00 - 3.00 Final   03/06/2025 2.10 2.00 - 3.00 Final     INR   Date Value Ref Range Status   03/30/2025 2.3 (H) 0.9 - 1.1 Final   03/29/2025 2.1 (H) 0.9 - 1.1 Final     Review  Warfarin Indication: Atrial fibrillation or flutter  Target INR: 2 - 3    INR 2.3, pt. Eating, no offending drugs, continued home dose.     Orders placed per pharmacy consult. Pharmacy will continue to monitor and adjust therapy as needed.   Aldair Ibarra, PharmD

## 2025-03-30 NOTE — CARE PLAN
Problem: Discharge Planning  Goal: Discharge to home or other facility with appropriate resources  Outcome: Progressing     Problem: Pain - Adult  Goal: Verbalizes/displays adequate comfort level or baseline comfort level  Outcome: Met     Problem: Safety - Adult  Goal: Free from fall injury  Outcome: Met     Problem: Chronic Conditions and Co-morbidities  Goal: Patient's chronic conditions and co-morbidity symptoms are monitored and maintained or improved  Outcome: Met     Problem: Nutrition  Goal: Nutrient intake appropriate for maintaining nutritional needs  Outcome: Met   The patient's goals for the shift include  safety and comfort.     The clinical goals for the shift include Pt will remain hemodynamically stable throughout shift.

## 2025-03-30 NOTE — PROGRESS NOTES
[facility] MEDICINE PROGRESS NOTE    Subjective     Pt has chronic occasional JACOB that is no worse. She had palpitations overnight. She denies chest pain.    Objective     I personally reviewed all pertinent labwork, imaging and vital signs, as well as nursing, therapy, discharge planning and consult notes.     Vitals:    03/30/25 0736   BP: 132/90   Pulse: (!) 126   Resp: 19   Temp: 36.5 °C (97.7 °F)   SpO2: 95%       Vitals:    03/30/25 0348   Weight: 59.5 kg (131 lb 3.2 oz)       Scheduled medications  amiodarone, 200 mg, oral, Daily  dicyclomine, 20 mg, oral, Before meals & nightly  famotidine, 10 mg, oral, Daily  influenza, 0.5 mL, intramuscular, During hospitalization  latanoprost, 1 drop, Both Eyes, Nightly  levothyroxine, 100 mcg, oral, Daily  sennosides-docusate sodium, 1 tablet, oral, Nightly      Continuous medications     PRN medications  PRN medications: acetaminophen    Results for orders placed or performed during the hospital encounter of 03/29/25 (from the past 24 hours)   CBC and Auto Differential   Result Value Ref Range    WBC 7.9 4.4 - 11.3 x10*3/uL    nRBC 0.0 0.0 - 0.0 /100 WBCs    RBC 4.09 4.00 - 5.20 x10*6/uL    Hemoglobin 12.0 12.0 - 16.0 g/dL    Hematocrit 40.0 36.0 - 46.0 %    MCV 98 80 - 100 fL    MCH 29.3 26.0 - 34.0 pg    MCHC 30.0 (L) 32.0 - 36.0 g/dL    RDW 15.4 (H) 11.5 - 14.5 %    Platelets 247 150 - 450 x10*3/uL    Neutrophils % 89.6 40.0 - 80.0 %    Immature Granulocytes %, Automated 1.6 (H) 0.0 - 0.9 %    Lymphocytes % 5.4 13.0 - 44.0 %    Monocytes % 3.1 2.0 - 10.0 %    Eosinophils % 0.0 0.0 - 6.0 %    Basophils % 0.3 0.0 - 2.0 %    Neutrophils Absolute 7.11 (H) 1.60 - 5.50 x10*3/uL    Immature Granulocytes Absolute, Automated 0.13 0.00 - 0.50 x10*3/uL    Lymphocytes Absolute 0.43 (L) 0.80 - 3.00 x10*3/uL    Monocytes Absolute 0.25 0.05 - 0.80 x10*3/uL    Eosinophils Absolute 0.00 0.00 - 0.40 x10*3/uL    Basophils Absolute 0.02 0.00 - 0.10 x10*3/uL   Comprehensive  metabolic panel   Result Value Ref Range    Glucose 174 (H) 74 - 99 mg/dL    Sodium 141 136 - 145 mmol/L    Potassium 4.2 3.5 - 5.3 mmol/L    Chloride 111 (H) 98 - 107 mmol/L    Bicarbonate 23 21 - 32 mmol/L    Anion Gap 11 10 - 20 mmol/L    Urea Nitrogen 30 (H) 6 - 23 mg/dL    Creatinine 1.92 (H) 0.50 - 1.05 mg/dL    eGFR 24 (L) >60 mL/min/1.73m*2    Calcium 8.9 8.6 - 10.3 mg/dL    Albumin 3.9 3.4 - 5.0 g/dL    Alkaline Phosphatase 92 33 - 136 U/L    Total Protein 5.8 (L) 6.4 - 8.2 g/dL    AST 29 9 - 39 U/L    Bilirubin, Total 0.5 0.0 - 1.2 mg/dL    ALT 52 (H) 7 - 45 U/L   Magnesium   Result Value Ref Range    Magnesium 2.10 1.60 - 2.40 mg/dL   B-Type Natriuretic Peptide   Result Value Ref Range     (H) 0 - 99 pg/mL   Lactate   Result Value Ref Range    Lactate 2.0 0.4 - 2.0 mmol/L   Troponin I, High Sensitivity, Initial   Result Value Ref Range    Troponin I, High Sensitivity 16 (H) 0 - 13 ng/L   Protime-INR   Result Value Ref Range    Protime 23.3 (H) 9.8 - 12.4 seconds    INR 2.1 (H) 0.9 - 1.1   Troponin, High Sensitivity, 1 Hour   Result Value Ref Range    Troponin I, High Sensitivity 17 (H) 0 - 13 ng/L   Basic Metabolic Panel   Result Value Ref Range    Glucose 122 (H) 74 - 99 mg/dL    Sodium 142 136 - 145 mmol/L    Potassium 4.5 3.5 - 5.3 mmol/L    Chloride 110 (H) 98 - 107 mmol/L    Bicarbonate 24 21 - 32 mmol/L    Anion Gap 13 10 - 20 mmol/L    Urea Nitrogen 33 (H) 6 - 23 mg/dL    Creatinine 2.15 (H) 0.50 - 1.05 mg/dL    eGFR 21 (L) >60 mL/min/1.73m*2    Calcium 9.0 8.6 - 10.3 mg/dL   CBC   Result Value Ref Range    WBC 9.8 4.4 - 11.3 x10*3/uL    nRBC 0.0 0.0 - 0.0 /100 WBCs    RBC 4.04 4.00 - 5.20 x10*6/uL    Hemoglobin 12.1 12.0 - 16.0 g/dL    Hematocrit 40.0 36.0 - 46.0 %    MCV 99 80 - 100 fL    MCH 30.0 26.0 - 34.0 pg    MCHC 30.3 (L) 32.0 - 36.0 g/dL    RDW 15.5 (H) 11.5 - 14.5 %    Platelets 228 150 - 450 x10*3/uL   Protime-INR   Result Value Ref Range    Protime 25.0 (H) 9.8 - 12.4 seconds     INR 2.3 (H) 0.9 - 1.1       Constitutional: Thin, awake, alert, calm, oriented x4, no acute distress, cooperative   Eyes: EOMI, clear sclerae   ENMT: mucous membranes moist, no lesions seen   Head/Neck: Neck supple, no apparent injury, head atraumatic   Respiratory/Thorax: CTAB anteriorly, good chest expansion, respirations even and unlabored   Cardiovascular: Irregular rate and rhythm, HR 110s-120s, no murmurs/rubs/gallops, normal S1 and S 2   Gastrointestinal: Abdomen nondistended, soft, nontender, +BS, no bruits   Musculoskeletal: ROM intact, no joint swelling, normal  strength   Extremities: no cyanosis, contusions or clubbing, 1+ pitting BLE edema   Neurological: no focal deficit, pt alert and oriented x4   Psychological: Appropriate affect and behavior, pleasant   Skin: Warm and dry, no lesions, no rashes       Past Medical History:   Diagnosis Date    (HFpEF) heart failure with preserved ejection fraction     Aortic stenosis     CKD (chronic kidney disease)     Essential (primary) hypertension     GERD (gastroesophageal reflux disease)     Hypothyroidism     Paroxysmal atrial fibrillation (Multi)         Assessment/Plan     Atrial fib with RVR  Hx atrial fib on warfarin, pt was rate controlled after IV metoprolol x1 in ED but is in the 110s-120s again today, will repeat the dose  Cardiology on board, amiodarone switched to metoprolol  Continue warfarin, pharmacy on board for management, INR is therapeutic     Acute on chronic HFpEF  Likely arrhythmia-mediated, IV Lasix continued by cardiology   Pt is not on a diuretic at home     NESTOR on CKD  Baseline is ~1.5, was 1.9 on admission  Likely prerenal in the setting of arrhythmia-mediated renal hypoperfusion  Worsened today after IV Lasix, continued by cardiology   Home losartan and Mobic held, BMP daily, defer IVF given acute CHF     HTN   BP is controlled off losartan     DVT ppx: warfarin     Discharge disposition  Pending PT/OT suzan Navarrete,  CNP  [facility]

## 2025-03-31 ENCOUNTER — APPOINTMENT (OUTPATIENT)
Dept: CARDIOLOGY | Facility: HOSPITAL | Age: OVER 89
End: 2025-03-31
Payer: MEDICARE

## 2025-03-31 LAB
ANION GAP SERPL CALC-SCNC: 15 MMOL/L (ref 10–20)
AORTIC VALVE MEAN GRADIENT: 25 MMHG
AORTIC VALVE PEAK VELOCITY: 3.02 M/S
AV PEAK GRADIENT: 36 MMHG
AVA (PEAK VEL): 0.54 CM2
AVA (VTI): 0.42 CM2
BUN SERPL-MCNC: 35 MG/DL (ref 6–23)
CALCIUM SERPL-MCNC: 8.9 MG/DL (ref 8.6–10.3)
CHLORIDE SERPL-SCNC: 105 MMOL/L (ref 98–107)
CO2 SERPL-SCNC: 26 MMOL/L (ref 21–32)
CREAT SERPL-MCNC: 2.18 MG/DL (ref 0.5–1.05)
EGFRCR SERPLBLD CKD-EPI 2021: 20 ML/MIN/1.73M*2
EJECTION FRACTION APICAL 4 CHAMBER: 46.6
EJECTION FRACTION: 48 %
GLUCOSE SERPL-MCNC: 126 MG/DL (ref 74–99)
INR PPP: 2.1 (ref 0.9–1.1)
LEFT ATRIUM VOLUME AREA LENGTH INDEX BSA: 50.1 ML/M2
LEFT VENTRICLE INTERNAL DIMENSION DIASTOLE: 2.85 CM (ref 3.5–6)
LEFT VENTRICULAR OUTFLOW TRACT DIAMETER: 1.79 CM
LV EJECTION FRACTION BIPLANE: 49 %
MITRAL VALVE E/A RATIO: 2.8
POTASSIUM SERPL-SCNC: 4 MMOL/L (ref 3.5–5.3)
PROTHROMBIN TIME: 23 SECONDS (ref 9.8–12.4)
RIGHT VENTRICLE FREE WALL PEAK S': 10.92 CM/S
RIGHT VENTRICLE PEAK SYSTOLIC PRESSURE: 32.3 MMHG
SODIUM SERPL-SCNC: 142 MMOL/L (ref 136–145)
TRICUSPID ANNULAR PLANE SYSTOLIC EXCURSION: 2.2 CM

## 2025-03-31 PROCEDURE — 99232 SBSQ HOSP IP/OBS MODERATE 35: CPT | Performed by: INTERNAL MEDICINE

## 2025-03-31 PROCEDURE — 97161 PT EVAL LOW COMPLEX 20 MIN: CPT | Mod: GP | Performed by: PHYSICAL THERAPIST

## 2025-03-31 PROCEDURE — 99232 SBSQ HOSP IP/OBS MODERATE 35: CPT | Performed by: NURSE PRACTITIONER

## 2025-03-31 PROCEDURE — 2500000004 HC RX 250 GENERAL PHARMACY W/ HCPCS (ALT 636 FOR OP/ED): Performed by: INTERNAL MEDICINE

## 2025-03-31 PROCEDURE — 2500000001 HC RX 250 WO HCPCS SELF ADMINISTERED DRUGS (ALT 637 FOR MEDICARE OP): Performed by: NURSE PRACTITIONER

## 2025-03-31 PROCEDURE — 2060000001 HC INTERMEDIATE ICU ROOM DAILY

## 2025-03-31 PROCEDURE — 2500000002 HC RX 250 W HCPCS SELF ADMINISTERED DRUGS (ALT 637 FOR MEDICARE OP, ALT 636 FOR OP/ED)

## 2025-03-31 PROCEDURE — 93306 TTE W/DOPPLER COMPLETE: CPT | Performed by: INTERNAL MEDICINE

## 2025-03-31 PROCEDURE — 2500000002 HC RX 250 W HCPCS SELF ADMINISTERED DRUGS (ALT 637 FOR MEDICARE OP, ALT 636 FOR OP/ED): Performed by: NURSE PRACTITIONER

## 2025-03-31 PROCEDURE — 36415 COLL VENOUS BLD VENIPUNCTURE: CPT | Performed by: NURSE PRACTITIONER

## 2025-03-31 PROCEDURE — 2500000001 HC RX 250 WO HCPCS SELF ADMINISTERED DRUGS (ALT 637 FOR MEDICARE OP): Performed by: INTERNAL MEDICINE

## 2025-03-31 PROCEDURE — 82374 ASSAY BLOOD CARBON DIOXIDE: CPT | Performed by: NURSE PRACTITIONER

## 2025-03-31 PROCEDURE — 97165 OT EVAL LOW COMPLEX 30 MIN: CPT | Mod: GO | Performed by: OCCUPATIONAL THERAPIST

## 2025-03-31 PROCEDURE — 93306 TTE W/DOPPLER COMPLETE: CPT

## 2025-03-31 PROCEDURE — 85610 PROTHROMBIN TIME: CPT | Performed by: NURSE PRACTITIONER

## 2025-03-31 RX ORDER — HYDRALAZINE HYDROCHLORIDE 20 MG/ML
10 INJECTION INTRAMUSCULAR; INTRAVENOUS EVERY 6 HOURS PRN
Status: DISCONTINUED | OUTPATIENT
Start: 2025-03-31 | End: 2025-04-03 | Stop reason: HOSPADM

## 2025-03-31 RX ORDER — METOPROLOL TARTRATE 50 MG/1
100 TABLET ORAL 2 TIMES DAILY
Status: DISCONTINUED | OUTPATIENT
Start: 2025-03-31 | End: 2025-04-03 | Stop reason: HOSPADM

## 2025-03-31 RX ORDER — WARFARIN 2.5 MG/1
2.5 TABLET ORAL ONCE
Status: COMPLETED | OUTPATIENT
Start: 2025-03-31 | End: 2025-03-31

## 2025-03-31 RX ADMIN — LEVOTHYROXINE SODIUM 100 MCG: 0.1 TABLET ORAL at 09:00

## 2025-03-31 RX ADMIN — FUROSEMIDE 20 MG: 10 INJECTION, SOLUTION INTRAVENOUS at 08:59

## 2025-03-31 RX ADMIN — FAMOTIDINE 10 MG: 20 TABLET, FILM COATED ORAL at 09:00

## 2025-03-31 RX ADMIN — WARFARIN SODIUM 2.5 MG: 2.5 TABLET ORAL at 18:45

## 2025-03-31 RX ADMIN — METOPROLOL TARTRATE 100 MG: 50 TABLET, FILM COATED ORAL at 20:47

## 2025-03-31 RX ADMIN — LATANOPROST 1 DROP: 50 SOLUTION OPHTHALMIC at 20:48

## 2025-03-31 RX ADMIN — METOPROLOL TARTRATE 100 MG: 50 TABLET, FILM COATED ORAL at 09:00

## 2025-03-31 ASSESSMENT — PAIN - FUNCTIONAL ASSESSMENT
PAIN_FUNCTIONAL_ASSESSMENT: 0-10

## 2025-03-31 ASSESSMENT — ACTIVITIES OF DAILY LIVING (ADL): ADL_ASSISTANCE: INDEPENDENT

## 2025-03-31 ASSESSMENT — COGNITIVE AND FUNCTIONAL STATUS - GENERAL
HELP NEEDED FOR BATHING: A LITTLE
MOBILITY SCORE: 19
WALKING IN HOSPITAL ROOM: A LITTLE
TOILETING: A LITTLE
DRESSING REGULAR LOWER BODY CLOTHING: A LITTLE
HELP NEEDED FOR BATHING: A LITTLE
PERSONAL GROOMING: A LITTLE
STANDING UP FROM CHAIR USING ARMS: A LITTLE
CLIMB 3 TO 5 STEPS WITH RAILING: A LOT
MOVING TO AND FROM BED TO CHAIR: A LITTLE
WALKING IN HOSPITAL ROOM: A LITTLE
TOILETING: A LOT
MOBILITY SCORE: 18
DRESSING REGULAR LOWER BODY CLOTHING: A LITTLE
STANDING UP FROM CHAIR USING ARMS: A LITTLE
MOVING TO AND FROM BED TO CHAIR: A LITTLE
DAILY ACTIVITIY SCORE: 20
TURNING FROM BACK TO SIDE WHILE IN FLAT BAD: A LITTLE
DRESSING REGULAR UPPER BODY CLOTHING: A LITTLE
TURNING FROM BACK TO SIDE WHILE IN FLAT BAD: A LITTLE
CLIMB 3 TO 5 STEPS WITH RAILING: A LITTLE
DAILY ACTIVITIY SCORE: 19

## 2025-03-31 ASSESSMENT — PAIN SCALES - GENERAL
PAINLEVEL_OUTOF10: 0 - NO PAIN

## 2025-03-31 NOTE — PROGRESS NOTES
Physical Therapy    Physical Therapy Evaluation    Patient Name: Jennifer Magana  MRN: [id number]  Today's Date: 3/31/2025   Time Calculation  Start Time: 1524  Stop Time: 1548  Time Calculation (min): 24 min  2029/2029-A    Assessment/Plan   PT Assessment  PT Assessment Results: Decreased strength, Decreased range of motion, Decreased mobility  Rehab Prognosis: Good  Barriers to Discharge Home: No anticipated barriers  Evaluation/Treatment Tolerance: Patient tolerated treatment well  End of Session Communication: Bedside nurse  Assessment Comment: pt demos mobility close to baseline (abn gait with cane use d/t R knee) except may need slightly more assistance (leaning on furniture)--disucssed with dtr  End of Session Patient Position: Bed, 3 rail up, Alarm on  IP OR SWING BED PT PLAN  Inpatient or Swing Bed: Inpatient  PT Plan  Treatment/Interventions: Bed mobility, Transfer training, Gait training, Stair training, Neuromuscular re-education, Therapeutic exercise, Therapeutic activity  PT Plan: Ongoing PT  PT Frequency: 3 times per week  PT Discharge Recommendations: No PT needed after discharge  PT - OK to Discharge: Yes    Subjective     Current Problem:  1. Atrial fibrillation, unspecified type (Multi)        2. Acute on chronic congestive heart failure, unspecified heart failure type        3. Demand ischemia (Multi)        4. Acute kidney injury superimposed on chronic kidney disease        5. Paroxysmal atrial fibrillation (Multi)  Transthoracic Echo (TTE) Complete    Transthoracic Echo (TTE) Complete      6. Nonrheumatic aortic valve stenosis  Transthoracic Echo (TTE) Complete    Transthoracic Echo (TTE) Complete        General Visit Information:  General  Reason for Referral: AFIB, CP  Referred By: LORETO VALENTIN. PT FOR IMPAIRED MOBILITY  Past Medical History Relevant to Rehab: atrial fib on warfarin, HFpEF, CKD, HTN and moderate aortic stenosis  Family/Caregiver Present: Yes  Caregiver Feedback: DTR observed  session, endorses family support prn  Co-Treatment: OT  Co-Treatment Reason: to maximize safe mobility  Prior to Session Communication: Bedside nurse (approved PT, wants to monitor activity HR response to meds given)  Patient Position Received: Bed, 3 rail up, Alarm on  General Comment: pt seen in 2029 with lenora velez severe valgus RLE    Home Living:  Home Living  Type of Home: House  Lives With: Adult children (dtr)  Home Adaptive Equipment: Cane  Home Layout: Two level, Stairs to alternate level with rails (bi-level)  Alternate Level Stairs-Rails: Both  Alternate Level Stairs-Number of Steps: 5 steps up to living area, dtr does laundry so pt does NOT need to go downstairs  Bathroom Shower/Tub: Walk-in shower    Prior Level of Function:  Prior Function Per Pt/Caregiver Report  Ambulatory Assistance: Needs assistance  Transfers: Assistive device  Gait: Assistive device (uses cane at all times d/t severe deformity RLE (bad knee))    Precautions:  Precautions  Medical Precautions: Fall precautions, Cardiac precautions  Precautions Comment: watch HR for AFIB/tachycardia    Vital Signs:  Vital Signs  Heart Rate: (!) 131 (max, HR fluctuating 90's to 1-teens for remainder of activity, RN monitoring and approved amb)  Patient Position: Sitting  Objective     Pain:  Pain Assessment  Pain Assessment: 0-10  0-10 (Numeric) Pain Score: 0 - No pain    Cognition:  Cognition  Orientation Level: Oriented X4    General Assessments:  General Observation  General Observation: transfer OOB, used BSC then saf back on bed to prepare for amb, gait training hallway then return to dane EOB (alarm ON and dtr present)   Activity Tolerance  Endurance: Endurance does not limit participation in activity  Activity Tolerance Comments: HR monitored     Dynamic Sitting Balance  Dynamic Sitting-Comments: good  Dynamic Standing Balance  Dynamic Standing-Comments: fair- needs cane plus grabs furniture or assist of other person (can did not provide  enough support alone)    Functional Assessments:     Bed Mobility 1  Bed Mobility 1: Supine to sitting, Sitting to supine  Level of Assistance 1: Close supervision  Transfer 1  Transfer From 1: Bed to  Transfer to 1: Commode-standard  Technique 1: Sit to stand  Transfer Level of Assistance 1: Arm in arm assistance, Minimum assistance  Transfers 2  Transfer From 2: Bed to  Technique 2: Sit to stand  Transfer Device 2: Cane  Transfer Level of Assistance 2: Minimum assistance  Ambulation/Gait Training 1  Device 1: Single point cane  Assistance 1: Minimum assistance  Quality of Gait 1: Antalgic, Forward flexed posture (severe R knee valgus)  Comments/Distance (ft) 1: 50  Stairs  Stairs: No       Extremity/Trunk Assessments:        RLE   RLE : Exceptions to WFL  AROM RLE (degrees)  RLE AROM Comment: WFL with severe valgus deformity  Strength RLE  RLE Overall Strength: Greater than or equal to 3/5 as evidenced by functional mobility  LLE   LLE : Within Functional Limits    Outcome Measures:     [facility] Basic Mobility  Turning from your back to your side while in a flat bed without using bedrails: None  Moving from lying on your back to sitting on the side of a flat bed without using bedrails: A little  Moving to and from bed to chair (including a wheelchair): A little  Standing up from a chair using your arms (e.g. wheelchair or bedside chair): A little  To walk in hospital room: A little  Climbing 3-5 steps with railing: A lot  Basic Mobility - Total Score: 18       Goals:  Encounter Problems       Encounter Problems (Active)       Mobility       STG - Patient will ambulate household distance modified indep using cane, no caregiver assist needed       Start:  03/31/25    Expected End:  04/14/25            STG - Patient will navigate 4-6 steps with rails/device with no more than min Ax1 to enter home       Start:  03/31/25    Expected End:  04/14/25               PT Transfers       STG - Patient will demonstrate safe  transfer techniques modified indep using cane, no caregiver assist needed       Start:  03/31/25    Expected End:  04/14/25               Pain - Adult            Education Documentation  Mobility Training, taught by Dulce Buckley, PT at 3/31/2025  7:33 PM.  Learner: Family, Patient  Readiness: Acceptance  Method: Explanation  Response: Verbalizes Understanding, Needs Reinforcement    Education Comments  No comments found.

## 2025-03-31 NOTE — PROGRESS NOTES
Subjective Data:  Jennifer Magana is a 89 y.o. female with PMHx of atrial fib on warfarin, HFpEF, CKD, HTN and moderate aortic stenosis who presented with chest pressure that started a few days ago. She is asymptomatic normally with atrial fib and was found to be in afib at her provider's office a few days ago. She took her metoprolol (which she takes PRN) but had no improvement of symptoms. Metoprolol had always converted her in the past. She was lightheaded, particularly with exertion and standing. She also c/o lower extremity swelling but no shortness of breath. She is compliant with all of her medications including her amiodarone. Her son recently .  In the ED she was in afib RVR with HR to 120 bpm. ED workup was significant for blood glucose 174, creatinine 1.92, , troponins 16/17, INR 2.1. CXR revealed diffuse interstitial infiltrates bilaterally. The pt received  ml and metoprolol 5 mg IV with improvement in her HR to the 100s.     Overnight Events:    3/31/2025 : Still in afib with RVR. Breathing better     Objective Data:  Last Recorded Vitals:  Vitals:    25 1940 25 2342 25 0337 25 0729   BP: (!) 144/94 (!) 129/92 109/78 (!) 157/100   BP Location: Right arm Right arm Left arm Left arm   Patient Position: Lying Lying Lying Lying   Pulse: (!) 118 (!) 118 97 (!) 115   Resp: 18 18 18 16   Temp: 36.4 °C (97.5 °F) 36.8 °C (98.3 °F) 36.3 °C (97.3 °F) 36 °C (96.8 °F)   TempSrc: Temporal Temporal Temporal Temporal   SpO2: 96% 96% 96% 94%   Weight:   57.9 kg (127 lb 10.3 oz)    Height:           Last Labs:  CBC - 3/30/2025:  3:56 AM  9.8 12.1 228    40.0      CMP - 3/30/2025:  3:56 AM  9.0 5.8 29 --- 0.5   _ 3.9 52 92      PTT - No results in last year.  2.1   23.0 _     TROPHS   Date/Time Value Ref Range Status   2025 01:43 PM 17 0 - 13 ng/L Final   2025 12:35 PM 16 0 - 13 ng/L Final   2022 04:00 AM 10 0 - 13 ng/L Final     Comment:     .  Less than 99th  percentile of normal range cutoff-  Female and children under 18 years old <14 ng/L; Male <21 ng/L: Negative  Repeat testing should be performed if clinically indicated.   .  Female and children under 18 years old 14-50 ng/L; Male 21-50 ng/L:  Consistent with possible cardiac damage and possible increased clinical   risk. Serial measurements may help to assess extent of myocardial damage.   .  >50 ng/L: Consistent with cardiac damage, increased clinical risk and  myocardial infarction. Serial measurements may help assess extent of   myocardial damage.   .   NOTE: Children less than 1 year old may have higher baseline troponin   levels and results should be interpreted in conjunction with the overall   clinical context.   .  NOTE: Troponin I testing is performed using a different   testing methodology at [facility] than at other   [facility]. Direct result comparisons should only   be made within the same method.     05/22/2022 08:42 AM 13 0 - 13 ng/L Final     Comment:     .  Less than 99th percentile of normal range cutoff-  Female and children under 18 years old <14 ng/L; Male <21 ng/L: Negative  Repeat testing should be performed if clinically indicated.   .  Female and children under 18 years old 14-50 ng/L; Male 21-50 ng/L:  Consistent with possible cardiac damage and possible increased clinical   risk. Serial measurements may help to assess extent of myocardial damage.   .  >50 ng/L: Consistent with cardiac damage, increased clinical risk and  myocardial infarction. Serial measurements may help assess extent of   myocardial damage.   .   NOTE: Children less than 1 year old may have higher baseline troponin   levels and results should be interpreted in conjunction with the overall   clinical context.   .  NOTE: Troponin I testing is performed using a different   testing methodology at [facility] than at other   [facility] hospitals. Direct result comparisons should only   be made  within the same method.     05/22/2022 07:36 AM 14 0 - 13 ng/L Final     Comment:     .  Less than 99th percentile of normal range cutoff-  Female and children under 18 years old <14 ng/L; Male <21 ng/L: Negative  Repeat testing should be performed if clinically indicated.   .  Female and children under 18 years old 14-50 ng/L; Male 21-50 ng/L:  Consistent with possible cardiac damage and possible increased clinical   risk. Serial measurements may help to assess extent of myocardial damage.   .  >50 ng/L: Consistent with cardiac damage, increased clinical risk and  myocardial infarction. Serial measurements may help assess extent of   myocardial damage.   .   NOTE: Children less than 1 year old may have higher baseline troponin   levels and results should be interpreted in conjunction with the overall   clinical context.   .  NOTE: Troponin I testing is performed using a different   testing methodology at [facility] than at other   [facility] hospitals. Direct result comparisons should only   be made within the same method.       BNP   Date/Time Value Ref Range Status   03/29/2025 12:35  0 - 99 pg/mL Final   12/11/2022 04:24  0 - 99 pg/mL Final     Comment:     .  <100 pg/mL - Heart failure unlikely  100-299 pg/mL - Intermediate probability of acute heart  .               failure exacerbation. Correlate with clinical  .               context and patient history.    >=300 pg/mL - Heart Failure likely. Correlate with clinical  .               context and patient history.  BNP testing is performed using different testing   methodology at [facility] than at other   [facility]. Direct result comparisons should   only be made within the same method.     05/23/2022 04:14  0 - 99 pg/mL Final     Comment:     .  <100 pg/mL - Heart failure unlikely  100-299 pg/mL - Intermediate probability of acute heart  .               failure exacerbation. Correlate with clinical  .                context and patient history.    >=300 pg/mL - Heart Failure likely. Correlate with clinical  .               context and patient history.  BNP testing is performed using different testing   methodology at [facility] than at other   [facility] hospitals. Direct result comparisons should   only be made within the same method.        Last I/O:  I/O last 3 completed shifts:  In: 870 (15 mL/kg) [P.O.:870]  Out: 600 (10.4 mL/kg) [Urine:600 (0.3 mL/kg/hr)]  Weight: 57.9 kg     Past Cardiology Tests (Last 3 Years):  EKG:  ECG 12 lead (Clinic Performed) 05/23/2024      ECG 12 Lead     Echo:  Transthoracic echo (TTE) complete 01/04/2024    Ejection Fractions:  EF   Date/Time Value Ref Range Status   01/04/2024 02:26 PM 54       Cath:  No results found for this or any previous visit from the past 1095 days.    Stress Test:  No results found for this or any previous visit from the past 1095 days.    Cardiac Imaging:  No results found for this or any previous visit from the past 1095 days.      Inpatient Medications:  Scheduled medications   Medication Dose Route Frequency    dicyclomine  20 mg oral Before meals & nightly    famotidine  10 mg oral Daily    influenza  0.5 mL intramuscular During hospitalization    furosemide  20 mg intravenous Daily    latanoprost  1 drop Both Eyes Nightly    levothyroxine  100 mcg oral Daily    metoprolol tartrate  100 mg oral BID    sennosides-docusate sodium  1 tablet oral Nightly     PRN medications   Medication    acetaminophen     Continuous Medications   Medication Dose Last Rate       Physical Exam:  Constitutional: Thin, awake, alert, calm, oriented x4, no acute distress, cooperative   Eyes: EOMI, clear sclerae   ENMT: mucous membranes moist, no lesions seen   Head/Neck: Neck supple, no apparent injury, head atraumatic   Respiratory/Thorax: CTAB anteriorly, good chest expansion, respirations even and unlabored   Cardiovascular: Irregular rate and rhythm, HR 80s-90s, no  murmurs/rubs/gallops, normal S1 and S 2   Gastrointestinal: Abdomen nondistended, soft, nontender, +BS, no bruits   Musculoskeletal: ROM intact, no joint swelling, normal  strength   Extremities: no cyanosis, contusions or clubbing, 1+ pitting BLE edema   Neurological: no focal deficit, pt alert and oriented x4   Psychological: Appropriate affect and behavior, pleasant   Skin: Warm and dry, no lesions, no rashes         Assessment/Plan   1) Atrial Fib  Would favor rate control strategy in her with anticoagulation  discontinue amio  Start Metoprolol  3/31/2025 : Increase Metoprolol     2) Moderate AS  Repeat echo     3) Acute on chronic diastolic heart failure  IV diuresis  May want have Nephrology follow with CKD  Peripheral IV 03/29/25 20 G Left;Proximal;Anterior Antecubital (Active)   Site Assessment Clean;Dry;Intact 03/30/25 1908   Dressing Type Transparent 03/30/25 1908   Line Status Saline locked;Flushed 03/30/25 1908   Dressing Status Clean;Dry;Occlusive 03/30/25 1908   Number of days: 2       Code Status:  No Order    I spent 30 minutes in the professional and overall care of this patient.        Dedrick Gutierres MD

## 2025-03-31 NOTE — PROGRESS NOTES
Pharmacy Consult for Warfarin (Coumadin) Management - Daily Progress Note     Jennifer Magana is a 89 y.o. female admitted for Atrial fibrillation, unspecified type (Multi). Pharmacy was consulted for warfarin dosing and monitoring.       Labs  INR External   Date Value Ref Range Status   03/21/2025 2.30 2.00 - 3.00 Final   03/13/2025 2.00 2.00 - 3.00 Final   03/06/2025 2.10 2.00 - 3.00 Final     INR   Date Value Ref Range Status   03/31/2025 2.1 (H) 0.9 - 1.1 Final   03/30/2025 2.3 (H) 0.9 - 1.1 Final   03/29/2025 2.1 (H) 0.9 - 1.1 Final     Review  Warfarin Indication: Atrial fibrillation or flutter  Target INR: 2 - 3     Home: 1.25mg Tues, Thurs and 2.5mg all other days     INR at 2.1, will continue home dose of 2.5mg tonight. Recheck INR tomorrow.    Orders placed per pharmacy consult. Pharmacy will continue to monitor and adjust therapy as needed.       Danika Ocampo, PharmD

## 2025-03-31 NOTE — PROGRESS NOTES
Occupational Therapy    Evaluation    Patient Name: Jennifer Magana  MRN: [id number]  Today's Date: 3/31/2025  Time Calculation  Start Time: 1530  Stop Time: 1546  Time Calculation (min): 16 min  2029/2029-A    Assessment  IP OT Assessment  OT Assessment: pt. at or close to baseline , Min.A amb. and ADLs  Prognosis: Good  Barriers to Discharge Home: No anticipated barriers  Medical Staff Made Aware: Yes  End of Session Communication: Bedside nurse  End of Session Patient Position: Up in chair    Plan:  No Skilled OT: No acute OT goals identified  OT Discharge Recommendations: No OT needed after discharge  OT Recommended Transfer Status: Minimal assist  OT - OK to Discharge: Yes ((when medically approp.))    Subjective     Current Problem:  1. Atrial fibrillation, unspecified type (Multi)        2. Acute on chronic congestive heart failure, unspecified heart failure type        3. Demand ischemia (Multi)        4. Acute kidney injury superimposed on chronic kidney disease        5. Paroxysmal atrial fibrillation (Multi)  Transthoracic Echo (TTE) Complete    Transthoracic Echo (TTE) Complete      6. Nonrheumatic aortic valve stenosis  Transthoracic Echo (TTE) Complete    Transthoracic Echo (TTE) Complete          General:  General  Reason for Referral: A.Fib.  Referred By: Rosalba  Past Medical History Relevant to Rehab: Hx. of CKD, HTN  Family/Caregiver Present:  (dtr. present)  Caregiver Feedback: states pt. looks better today  Co-Treatment: PT  Co-Treatment Reason: to maximize safe mobility  Prior to Session Communication: Bedside nurse  Patient Position Received: Bed, 3 rail up, Alarm on  General Comment: Pt. pleasant, agreeable, no c/o any kind    Precautions:  Medical Precautions: Fall precautions    Vital Signs:see nurses notes        Pain:  Pain Assessment  Pain Assessment: 0-10  0-10 (Numeric) Pain Score: 0 - No pain    Objective     Cognition:  Overall Cognitive Status: Within Functional Limits  Orientation  Level: Oriented X4         Home Living:  Type of Home: House  Lives With:  (dtr.)  Home Adaptive Equipment: Cane  Home Layout:  (bi-level)  Bathroom Shower/Tub: Walk-in shower     Prior Function:  Level of [place]: Independent with ADLs and functional transfers  ADL Assistance: Independent  Homemaking Assistance: Needs assistance  Meal Prep:  (assisted)  Driving/Transportation:  (drives)  Ambulatory Assistance: Independent    IADL History:  Homemaking Responsibilities:  (assisted)    ADL:  LE Dressing Assistance: Minimal  Toileting Assistance with Device:  (MIn.A to/from bathroom or BSC with cane)    Activity Tolerance:  Endurance: Endurance does not limit participation in activity (HR WNL when checked)    Bed Mobility/Transfers:   Bed Mobility  Bed Mobility:  (SBA supine-to-sit)       Ambulation/Gait Training:  Functional Mobility  Functional Mobility Performed:  (Min.A func. mobility in room and ferrell with cane)       Standing Balance:  Dynamic Standing Balance  Dynamic Standing-Balance Support:  (CGA)    Vision: Vision - Basic Assessment  Current Vision: No visual deficits     Sensation:  Light Touch: No apparent deficits    Strength:  Strength Comments: UEs WNL    Perception:  Inattention/Neglect: Appears intact    Coordination:  Movements are Fluid and Coordinated: Yes     Hand Function:  Hand Function  Gross Grasp: Functional      Outcome Measures: [facility] Daily Activity  Putting on and taking off regular lower body clothing: A little  Bathing (including washing, rinsing, drying): A little  Putting on and taking off regular upper body clothing: None  Toileting, which includes using toilet, bedpan or urinal: A lot  Taking care of personal grooming such as brushing teeth: A little  Eating Meals: None  Daily Activity - Total Score: 19                       EDUCATION:     Education Documentation  Precautions, taught by Willow Andujar OT at 3/31/2025  4:09 PM.  Learner: Patient  Readiness:  Acceptance  Method: Demonstration  Response: Demonstrated Understanding  Comment: use of call light for assist    Education Comments  No comments found.

## 2025-03-31 NOTE — PROGRESS NOTES
[facility] MEDICINE PROGRESS NOTE    Subjective     Pt denies palpitations and SOB. Daughters are at the bedside and one tells me the pt was switched from metoprolol to amiodarone in the past for severe hypotension requiring hospitalization (per EMR metoprolol was stopped December 2022 due to bradycardia with HR 40s-50s and amiodarone was continued).     Objective     I personally reviewed all pertinent labwork, imaging and vital signs, as well as nursing, therapy, discharge planning and consult notes.     Vitals:    03/31/25 0729   BP: (!) 157/100   Pulse: (!) 115   Resp: 16   Temp: 36 °C (96.8 °F)   SpO2: 94%       Vitals:    03/31/25 0337   Weight: 57.9 kg (127 lb 10.3 oz)       Scheduled medications  dicyclomine, 20 mg, oral, Before meals & nightly  famotidine, 10 mg, oral, Daily  influenza, 0.5 mL, intramuscular, During hospitalization  furosemide, 20 mg, intravenous, Daily  latanoprost, 1 drop, Both Eyes, Nightly  levothyroxine, 100 mcg, oral, Daily  metoprolol tartrate, 100 mg, oral, BID  sennosides-docusate sodium, 1 tablet, oral, Nightly      Continuous medications     PRN medications  PRN medications: acetaminophen    Results for orders placed or performed during the hospital encounter of 03/29/25 (from the past 24 hours)   Protime-INR   Result Value Ref Range    Protime 23.0 (H) 9.8 - 12.4 seconds    INR 2.1 (H) 0.9 - 1.1       Constitutional: Thin, awake, alert, calm, oriented x4, no acute distress, cooperative   Eyes: EOMI, clear sclerae   ENMT: mucous membranes moist, no lesions seen   Head/Neck: Neck supple, no apparent injury, head atraumatic   Respiratory/Thorax: CTAB anteriorly, good chest expansion, respirations even and unlabored   Cardiovascular: Irregular rate and rhythm, HR 90s-100s, no murmurs/rubs/gallops, normal S1 and S 2   Gastrointestinal: Abdomen nondistended, soft, nontender, +BS, no bruits   Musculoskeletal: ROM intact, no joint swelling, normal  strength   Extremities: no  cyanosis, contusions or clubbing, 1+ pitting BLE edema   Neurological: no focal deficit, pt alert and oriented x4   Psychological: Appropriate affect and behavior, pleasant   Skin: Warm and dry, no lesions, no rashes       Past Medical History:   Diagnosis Date    (HFpEF) heart failure with preserved ejection fraction     Aortic stenosis     CKD (chronic kidney disease)     Essential (primary) hypertension     GERD (gastroesophageal reflux disease)     Hypothyroidism     Paroxysmal atrial fibrillation (Multi)         Assessment/Plan     Atrial fib with RVR  Hx atrial fib on warfarin, HR is presently in the 90s-100s   Cardiology on board, amiodarone was switched to metoprolol (increased to 100 mg BID today)  Per EMR metoprolol 25 mg every day was stopped December 2022 due to bradycardia with HR 40s-50s and amiodarone was continued, monitor HR closely, I made cardiology aware   Continue warfarin, pharmacy on board for management, INR is therapeutic     Acute on chronic HFpEF  Likely arrhythmia-mediated, low-dose IV Lasix continued by cardiology   Pt is not on a diuretic at home     NESTOR on CKD  Baseline is ~1.5, today is 2.18 and stable  Likely prerenal in the setting of arrhythmia-mediated renal hypoperfusion  Exacerbated by IV Lasix, home losartan and Mobic are held  BMP daily, defer IVF given acute CHF     HTN   BP was controlled off losartan but is elevated today, will order PRN IV hydralazine  Metoprolol was increased today as above    Moderate aortic stenosis  Echo ordered by cardiology      DVT ppx: warfarin     Discharge disposition  Pending PT/OT suzan Navarrete, CNP  [facility]

## 2025-03-31 NOTE — PROGRESS NOTES
Occupational Therapy                 Therapy Communication Note    Patient Name: Jennifer Magana  MRN: [id number]  Department: [facility]  Room: 2029/2029-A  Today's Date: 3/31/2025     Discipline: Occupational Therapy    OT Missed Visit: Yes      Missed Visit Reason: Patient in a medical procedure

## 2025-03-31 NOTE — CARE PLAN
Problem: Discharge Planning  Goal: Discharge to home or other facility with appropriate resources  Outcome: Progressing     Problem: Fall/Injury  Goal: Not fall by end of shift  Outcome: Met  Goal: Be free from injury by end of the shift  Outcome: Met  Goal: Verbalize understanding of personal risk factors for fall in the hospital  Outcome: Met  Goal: Verbalize understanding of risk factor reduction measures to prevent injury from fall in the home  Outcome: Met  Goal: Use assistive devices by end of the shift  Outcome: Met  Goal: Pace activities to prevent fatigue by end of the shift  Outcome: Met   The patient's goals for the shift include  safety and comfort.     The clinical goals for the shift include Pt will remain hemodynamically stable throughout shift.

## 2025-04-01 LAB
ANION GAP SERPL CALC-SCNC: 17 MMOL/L (ref 10–20)
BUN SERPL-MCNC: 35 MG/DL (ref 6–23)
CALCIUM SERPL-MCNC: 8.6 MG/DL (ref 8.6–10.3)
CHLORIDE SERPL-SCNC: 104 MMOL/L (ref 98–107)
CO2 SERPL-SCNC: 26 MMOL/L (ref 21–32)
CREAT SERPL-MCNC: 1.86 MG/DL (ref 0.5–1.05)
EGFRCR SERPLBLD CKD-EPI 2021: 25 ML/MIN/1.73M*2
GLUCOSE SERPL-MCNC: 97 MG/DL (ref 74–99)
INR PPP: 1.9 (ref 0.9–1.1)
POTASSIUM SERPL-SCNC: 3.7 MMOL/L (ref 3.5–5.3)
PROTHROMBIN TIME: 21.1 SECONDS (ref 9.8–12.4)
SODIUM SERPL-SCNC: 143 MMOL/L (ref 136–145)

## 2025-04-01 PROCEDURE — 85610 PROTHROMBIN TIME: CPT | Performed by: NURSE PRACTITIONER

## 2025-04-01 PROCEDURE — 2500000001 HC RX 250 WO HCPCS SELF ADMINISTERED DRUGS (ALT 637 FOR MEDICARE OP): Performed by: NURSE PRACTITIONER

## 2025-04-01 PROCEDURE — 2500000004 HC RX 250 GENERAL PHARMACY W/ HCPCS (ALT 636 FOR OP/ED): Performed by: INTERNAL MEDICINE

## 2025-04-01 PROCEDURE — 2500000002 HC RX 250 W HCPCS SELF ADMINISTERED DRUGS (ALT 637 FOR MEDICARE OP, ALT 636 FOR OP/ED)

## 2025-04-01 PROCEDURE — 99232 SBSQ HOSP IP/OBS MODERATE 35: CPT | Performed by: NURSE PRACTITIONER

## 2025-04-01 PROCEDURE — 36415 COLL VENOUS BLD VENIPUNCTURE: CPT | Performed by: NURSE PRACTITIONER

## 2025-04-01 PROCEDURE — 2500000001 HC RX 250 WO HCPCS SELF ADMINISTERED DRUGS (ALT 637 FOR MEDICARE OP): Performed by: INTERNAL MEDICINE

## 2025-04-01 PROCEDURE — 99232 SBSQ HOSP IP/OBS MODERATE 35: CPT | Performed by: PHYSICIAN ASSISTANT

## 2025-04-01 PROCEDURE — 80048 BASIC METABOLIC PNL TOTAL CA: CPT | Performed by: NURSE PRACTITIONER

## 2025-04-01 PROCEDURE — 2500000002 HC RX 250 W HCPCS SELF ADMINISTERED DRUGS (ALT 637 FOR MEDICARE OP, ALT 636 FOR OP/ED): Performed by: NURSE PRACTITIONER

## 2025-04-01 PROCEDURE — 2060000001 HC INTERMEDIATE ICU ROOM DAILY

## 2025-04-01 RX ORDER — DOCUSATE SODIUM 100 MG/1
100 CAPSULE, LIQUID FILLED ORAL 2 TIMES DAILY
Status: DISCONTINUED | OUTPATIENT
Start: 2025-04-01 | End: 2025-04-03 | Stop reason: HOSPADM

## 2025-04-01 RX ORDER — BRIMONIDINE TARTRATE 2 MG/ML
1 SOLUTION/ DROPS OPHTHALMIC 2 TIMES DAILY
Status: DISCONTINUED | OUTPATIENT
Start: 2025-04-01 | End: 2025-04-03 | Stop reason: HOSPADM

## 2025-04-01 RX ORDER — WARFARIN 2.5 MG/1
2.5 TABLET ORAL ONCE
Status: COMPLETED | OUTPATIENT
Start: 2025-04-01 | End: 2025-04-01

## 2025-04-01 RX ADMIN — LEVOTHYROXINE SODIUM 100 MCG: 0.1 TABLET ORAL at 08:28

## 2025-04-01 RX ADMIN — LATANOPROST 1 DROP: 50 SOLUTION OPHTHALMIC at 20:46

## 2025-04-01 RX ADMIN — DOCUSATE SODIUM 100 MG: 100 CAPSULE, LIQUID FILLED ORAL at 13:34

## 2025-04-01 RX ADMIN — METOPROLOL TARTRATE 100 MG: 50 TABLET, FILM COATED ORAL at 08:28

## 2025-04-01 RX ADMIN — FAMOTIDINE 10 MG: 20 TABLET, FILM COATED ORAL at 08:28

## 2025-04-01 RX ADMIN — FUROSEMIDE 20 MG: 10 INJECTION, SOLUTION INTRAVENOUS at 08:28

## 2025-04-01 RX ADMIN — ACETAMINOPHEN 650 MG: 325 TABLET ORAL at 20:46

## 2025-04-01 RX ADMIN — DOCUSATE SODIUM 100 MG: 100 CAPSULE, LIQUID FILLED ORAL at 20:47

## 2025-04-01 RX ADMIN — BRIMONIDINE TARTRATE 1 DROP: 2 SOLUTION/ DROPS OPHTHALMIC at 20:46

## 2025-04-01 RX ADMIN — WARFARIN SODIUM 2.5 MG: 2.5 TABLET ORAL at 18:45

## 2025-04-01 ASSESSMENT — ENCOUNTER SYMPTOMS
DIARRHEA: 0
DECREASED APPETITE: 1
ORTHOPNEA: 0
VOMITING: 0
PALPITATIONS: 0
DYSURIA: 0
FEVER: 0
ABDOMINAL PAIN: 0
WEIGHT LOSS: 1
WEAKNESS: 0
NAUSEA: 0
WHEEZING: 0
SHORTNESS OF BREATH: 0

## 2025-04-01 ASSESSMENT — COGNITIVE AND FUNCTIONAL STATUS - GENERAL
MOVING TO AND FROM BED TO CHAIR: A LITTLE
DRESSING REGULAR UPPER BODY CLOTHING: A LITTLE
TURNING FROM BACK TO SIDE WHILE IN FLAT BAD: A LITTLE
STANDING UP FROM CHAIR USING ARMS: A LITTLE
WALKING IN HOSPITAL ROOM: A LITTLE
HELP NEEDED FOR BATHING: A LITTLE
MOBILITY SCORE: 19
TOILETING: A LITTLE
CLIMB 3 TO 5 STEPS WITH RAILING: A LITTLE

## 2025-04-01 ASSESSMENT — PAIN SCALES - GENERAL
PAINLEVEL_OUTOF10: 0 - NO PAIN
PAINLEVEL_OUTOF10: 3
PAINLEVEL_OUTOF10: 0 - NO PAIN
PAINLEVEL_OUTOF10: 0 - NO PAIN

## 2025-04-01 ASSESSMENT — PAIN DESCRIPTION - LOCATION: LOCATION: HEAD

## 2025-04-01 ASSESSMENT — PAIN - FUNCTIONAL ASSESSMENT
PAIN_FUNCTIONAL_ASSESSMENT: 0-10

## 2025-04-01 NOTE — PROGRESS NOTES
04/01/25 1224   Discharge Planning   Living Arrangements Children   Support Systems Children   Home or Post Acute Services None   Expected Discharge Disposition Home     Spoke with pt, role of TCC explained. Pt confirms her plan is home and that she is without CT needs. Reports that she had hhc in past but no longer needs. 2 daughters live with her and states that if needs arise that her daughter will call and have same set up. Plan- home no needs. CT will follow.

## 2025-04-01 NOTE — PROGRESS NOTES
"Jennifer Magana is a 89 y.o. female on day 3 of admission presenting with Atrial fibrillation, unspecified type (Multi).    Subjective   Patient resting in bed. Feels well today. Mild constipation. Denies chest pain, shortness of breath, abdominal pain, fevers, chills.        Objective     Physical Exam  Constitutional:       Appearance: Normal appearance.   HENT:      Head: Normocephalic and atraumatic.      Mouth/Throat:      Mouth: Mucous membranes are moist.      Pharynx: Oropharynx is clear.   Eyes:      Extraocular Movements: Extraocular movements intact.      Conjunctiva/sclera: Conjunctivae normal.      Pupils: Pupils are equal, round, and reactive to light.   Cardiovascular:      Rate and Rhythm: Normal rate and regular rhythm.      Pulses: Normal pulses.      Heart sounds: Normal heart sounds.   Pulmonary:      Effort: Pulmonary effort is normal.      Breath sounds: Normal breath sounds.   Abdominal:      General: Bowel sounds are normal.      Palpations: Abdomen is soft.      Tenderness: There is no abdominal tenderness.   Musculoskeletal:         General: Normal range of motion.      Cervical back: Normal range of motion and neck supple.   Skin:     General: Skin is warm and dry.      Capillary Refill: Capillary refill takes less than 2 seconds.   Neurological:      General: No focal deficit present.      Mental Status: She is alert and oriented to person, place, and time.   Psychiatric:         Mood and Affect: Mood normal.         Behavior: Behavior normal.         Last Recorded Vitals  Blood pressure 114/71, pulse 95, temperature 35.1 °C (95.2 °F), temperature source Temporal, resp. rate 18, height 1.575 m (5' 2\"), weight 57.9 kg (127 lb 10.3 oz), SpO2 94%.  Intake/Output last 3 Shifts:  I/O last 3 completed shifts:  In: 780 (13.5 mL/kg) [P.O.:780]  Out: 0 (0 mL/kg)   Weight: 57.9 kg     Relevant Results  Results for orders placed or performed during the hospital encounter of 03/29/25 (from the " past 24 hours)   Transthoracic Echo (TTE) Complete   Result Value Ref Range    LVOT diam 1.79 cm    LV Biplane EF 49 %    MV E/A ratio 2.80     Tricuspid annular plane systolic excursion 2.2 cm    AV mn grad 25 mmHg    LA vol index A/L 50.1 ml/m2    AV pk cuate 3.02 m/s    LV EF 48 %    RV free wall pk S' 10.92 cm/s    RVSP 32.3 mmHg    LVIDd 2.85 cm    Aortic Valve Area by Continuity of VTI 0.42 cm2    Aortic Valve Area by Continuity of Peak Velocity 0.54 cm2    AV pk grad 36 mmHg    LV A4C EF 46.6    Protime-INR   Result Value Ref Range    Protime 21.1 (H) 9.8 - 12.4 seconds    INR 1.9 (H) 0.9 - 1.1   Basic Metabolic Panel   Result Value Ref Range    Glucose 97 74 - 99 mg/dL    Sodium 143 136 - 145 mmol/L    Potassium 3.7 3.5 - 5.3 mmol/L    Chloride 104 98 - 107 mmol/L    Bicarbonate 26 21 - 32 mmol/L    Anion Gap 17 10 - 20 mmol/L    Urea Nitrogen 35 (H) 6 - 23 mg/dL    Creatinine 1.86 (H) 0.50 - 1.05 mg/dL    eGFR 25 (L) >60 mL/min/1.73m*2    Calcium 8.6 8.6 - 10.3 mg/dL     Transthoracic Echo (TTE) Complete    Result Date: 3/31/2025              [place] [address]      Phone [phone] Fax [phone] TRANSTHORACIC ECHOCARDIOGRAM REPORT Patient Name:       QUENTIN Bustamante Physician:    [id number] Moran MD Study Date:         3/31/2025            Ordering Provider:    [id number] VINES MRN/PID:            [id number]             Fellow: Accession#:         [id number]         Nurse: Date of Birth/Age:  6/5/1928 / 89 years  Sonographer:          Floresita Doyle RDCS Gender Assigned at  F                    Additional Staff: Birth: Height:             157.48 cm            Admit Date:           3/29/2025 Weight:             57.61 kg             Admission Status:     Inpatient -                                                                Routine BSA / BMI:          1.58 m2 / 23.23       Department Location:  [facility] Echo                     kg/m2                                      Lab Blood Pressure: 129 /66 mmHg Study Type:    TRANSTHORACIC ECHO (TTE) COMPLETE Diagnosis/ICD: Nonrheumatic aortic (valve) stenosis-I35.0 Indication:    Congestive Heart Failure, AS CPT Codes:     Echo Complete w Full Doppler-[id number]  Study Detail: The following Echo studies were performed: 2D, M-Mode, Doppler and               color flow. Patient's heart rhythm is atrial fibrillation.  PHYSICIAN INTERPRETATION: Left Ventricle: The left ventricular systolic function is mildly decreased, with a visually estimated ejection fraction of 45-50%. There is moderate concentric left ventricular hypertrophy. There are no regional wall motion abnormalities. The left ventricular cavity size is decreased. There is severely increased septal and severely increased posterior left ventricular wall thickness. Spectral Doppler shows a Grade III (restrictive pattern) of left ventricular diastolic filling with an elevated left atrial pressure. Left Atrium: The left atrial size is moderate to severely dilated. Right Ventricle: The right ventricle is normal in size. There is normal right ventricular global systolic function. Right Atrium: The right atrial size is upper limits of normal. Aortic Valve: The aortic valve is trileaflet. There is evidence of severe aortic valve stenosis. There is a low aortic valve gradient, with a low ejection fraction. The aortic valve dimensionless index is 0.17. There is mild aortic valve regurgitation. The peak instantaneous gradient of the aortic valve is 36 mmHg. The mean gradient of the aortic valve is 25 mmHg. Mitral Valve: The mitral valve is mildly thickened. There is moderate mitral annular calcification. There is moderate mitral valve regurgitation. The mitral regurgitant volume is 15.02 ml. Tricuspid Valve: The tricuspid valve is structurally normal. There is mild to moderate tricuspid  regurgitation. Pulmonic Valve: The pulmonic valve is structurally normal. There is mild pulmonic valve regurgitation. Pericardium: No pericardial effusion noted. Aorta: The aortic root is normal. Systemic Veins: The inferior vena cava appears normal in size, with IVC inspiratory collapse greater than 50%.  CONCLUSIONS:  1. The left ventricular systolic function is mildly decreased, with a visually estimated ejection fraction of 45-50%.  2. No regional wall motion abnormalities.  3. Spectral Doppler shows a a Grade III (restrictive pattern) of left ventricular diastolic filling with an elevated left atrial pressure.  4. Left ventricular cavity size is decreased.  5. There is normal right ventricular global systolic function.  6. The left atrial size is moderate to severely dilated.  7. There is moderate mitral annular calcification.  8. Moderate mitral valve regurgitation.  9. Mild to moderate tricuspid regurgitation. 10. Severe aortic valve stenosis. 11. Mild aortic valve regurgitation. 12. The inferior vena cava appears normal in size, with IVC inspiratory collapse greater than 50%. 13. There is severely increased septal and severely increased posterior left ventricular wall thickness. QUANTITATIVE DATA SUMMARY:  2D MEASUREMENTS:             Normal Ranges: IVSd:            1.84 cm     (0.6-1.1cm) LVPWd:           1.55 cm     (0.6-1.1cm) LVIDd:           2.85 cm     (3.9-5.9cm) LVIDs:           2.04 cm LV Mass Index:   116.4 g/m2 LVEDV Index:     19.79 ml/m2 LV % FS          28.2 %  LEFT ATRIUM:                  Normal Ranges: LA Vol A4C:        65.7 ml    (22+/-6mL/m2) LA Vol A2C:        87.1 ml LA Vol BP:         78.9 ml LA Vol Index A4C:  41.7ml/m2 LA Vol Index A2C:  55.3 ml/m2 LA Vol Index BP:   50.1 ml/m2 LA Area A4C:       20.8 cm2 LA Area A2C:       25.0 cm2 LA Major Axis A4C: 5.6 cm LA Major Axis A2C: 6.1 cm LA Volume Index:   49.5 ml/m2 LA Vol A4C:        59.8 ml LA Vol A2C:        82.0 ml LA Vol Index BSA:   45.0 ml/m2  RIGHT ATRIUM:          Normal Ranges: RA Area A4C:  20.3 cm2  M-MODE MEASUREMENTS:         Normal Ranges: Ao Root:             2.40 cm (2.0-3.7cm) AoV Exc:             1.60 cm (1.5-2.5cm) LAs:                 3.89 cm (2.7-4.0cm)  AORTA MEASUREMENTS:         Normal Ranges: AoV Exc:            1.60 cm (1.5-2.5cm) Ao Sinus, d:        2.30 cm (2.1-3.5cm) Ao STJ, d:          1.90 cm (1.7-3.4cm) Asc Ao, d:          2.70 cm (2.1-3.4cm)  LV SYSTOLIC FUNCTION:                      Normal Ranges: EF-A4C View:    47 % (>=55%) EF-A2C View:    54 % EF-Biplane:     49 % EF-Visual:      48 % LV EF Reported: 48 %  LV DIASTOLIC FUNCTION:           Normal Ranges: MV Peak E:             0.99 m/s  (0.7-1.2 m/s) MV Peak A:             0.35 m/s  (0.42-0.7 m/s) E/A Ratio:             2.80      (1.0-2.2) MV e'                  0.093 m/s (>8.0) MV lateral e'          0.10 m/s MV medial e'           0.09 m/s E/e' Ratio:            10.58     (<8.0)  MITRAL VALVE:          Normal Ranges: MV DT:        207 msec (150-240msec)  MITRAL INSUFFICIENCY:            Normal Ranges: PISA Radius:          0.4 cm MR Alias Sudeep:         36.0 cm/s MR Volume:            15.02 ml MR Flow Rt:           44.52 ml/s  AORTIC VALVE:                      Normal Ranges: AoV Vmax:                3.02 m/s  (<=1.7m/s) AoV Peak P.4 mmHg (<20mmHg) AoV Mean P.9 mmHg (1.7-11.5mmHg) LVOT Max Sudeep:            0.65 m/s  (<=1.1m/s) AoV VTI:                 67.89 cm  (18-25cm) LVOT VTI:                11.39 cm LVOT Diameter:           1.79 cm   (1.8-2.4cm) AoV Area, VTI:           0.42 cm2  (2.5-5.5cm2) AoV Area,Vmax:           0.54 cm2  (2.5-4.5cm2) AoV Area, planim:        0.94 cm2  (2.5-4.5cm2) AoV Dimensionless Index: 0.17  RIGHT VENTRICLE: RV Basal 3.80 cm RV Mid   2.70 cm RV Major 6.0 cm TAPSE:   21.6 mm RV s'    0.11 m/s  TRICUSPID VALVE/RVSP:          Normal Ranges: Peak TR Velocity:     2.71 m/s RV Syst Pressure:     32 mmHg   (< 30mmHg) IVC Diam:             1.80 cm  AORTA: Asc Ao Diam 2.73 cm  [id number] Cristin Moran MD Electronically signed on 3/31/2025 at 5:39:28 PM  ** Final **          Assessment/Plan   Atrial fib with RVR  Hx atrial fib on warfarin, HR is presently in the 90s-100s   Cardiology on board, amiodarone was switched to metoprolol  Per EMR metoprolol 25 mg every day was stopped December 2022 due to bradycardia with HR 40s-50s and amiodarone was continued, monitor HR closely, cardiology aware   Continue warfarin, pharmacy on board for management, INR is therapeutic     Acute on chronic HFpEF  Likely arrhythmia-mediated,   low-dose IV Lasix changed to oral lasix today   Echo shows preserved LVEF and slightly increased AV stenosis      NESTOR on CKD  Baseline is ~1.5, today is 1.86  home losartan and Mobic are held  BMP daily  Diuresis per cardiology      HTN   BP is controlled off losartan   Metoprolol was increased as above     Moderate aortic stenosis  Echo ordered by cardiology   See above      DVT ppx:   Warfarin  Managed by pharmacy during admission   Daily INR      Discharge disposition  Pending PT/OT evals  Plan for home with no needs when medically stable for discharge      Mandy Rubio, APRN-CNP

## 2025-04-01 NOTE — PROGRESS NOTES
Pharmacy Consult for Warfarin (Coumadin) Management - Daily Progress Note     Jennifer Magana is a 89 y.o. female admitted for Atrial fibrillation, unspecified type (Multi). Pharmacy was consulted for warfarin dosing and monitoring.       Labs  INR External   Date Value Ref Range Status   03/21/2025 2.30 2.00 - 3.00 Final   03/13/2025 2.00 2.00 - 3.00 Final   03/06/2025 2.10 2.00 - 3.00 Final     INR   Date Value Ref Range Status   04/01/2025 1.9 (H) 0.9 - 1.1 Final   03/31/2025 2.1 (H) 0.9 - 1.1 Final   03/30/2025 2.3 (H) 0.9 - 1.1 Final     Review  Warfarin Indication: Atrial fibrillation or flutter  Target INR: 2 - 3     Home: 1.25mg Tues, Thurs and 2.5mg all other days      INR at 1.9, will boost today with 2.5mg tonight. Recheck INR tomorrow.       Orders placed per pharmacy consult. Pharmacy will continue to monitor and adjust therapy as needed.     Danika Ocampo, PharmD

## 2025-04-01 NOTE — PROGRESS NOTES
Subjective Data:  Jennifer Magana is a 89 y.o. female with PMHx of atrial fib on warfarin, HFpEF, CKD, HTN and moderate aortic stenosis who presented with chest pressure that started a few days ago. She is asymptomatic normally with atrial fib and was found to be in afib at her provider's office a few days ago. She took her metoprolol (which she takes PRN) but had no improvement of symptoms. Metoprolol had always converted her in the past. She was lightheaded, particularly with exertion and standing. She also c/o lower extremity swelling but no shortness of breath. She is compliant with all of her medications including her amiodarone. Her son recently .  In the ED she was in afib RVR with HR to 120 bpm. ED workup was significant for blood glucose 174, creatinine 1.92, , troponins 16/17, INR 2.1. CXR revealed diffuse interstitial infiltrates bilaterally. The pt received  ml and metoprolol 5 mg IV with improvement in her HR to the 100s.     Overnight Events:    3/31/2025 : Still in afib with RVR. Breathing better  25:  Resting comfortably.  Family at bedside.  Initially had low BP with metoprolol but BP's are better now and VR's 's.  Off Amio.  Pharmacy managing INR's.  She has no complaints.     Objective Data:  Last Recorded Vitals:  Vitals:    25 1943 25 2321 25 0405 25 0750   BP: 120/81 112/76 111/72 121/86   BP Location: Right arm Left arm  Right arm   Patient Position: Lying Lying  Lying   Pulse: 75 102 84 100   Resp: 18 18 18 16   Temp: 36.5 °C (97.7 °F) 36.4 °C (97.5 °F) 36.2 °C (97.2 °F) 37.2 °C (99 °F)   TempSrc: Temporal Temporal Temporal Temporal   SpO2: 95% 97% 95% 95%   Weight:       Height:           Last Labs:  CBC - 3/30/2025:  3:56 AM  9.8 12.1 228    40.0      CMP - 2025:  3:42 AM  8.6 5.8 29 --- 0.5   _ 3.9 52 92      PTT - No results in last year.  1.9   21.1 _     TROPHS   Date/Time Value Ref Range Status   2025 01:43 PM 17 0 - 13  ng/L Final   03/29/2025 12:35 PM 16 0 - 13 ng/L Final   12/11/2022 04:00 AM 10 0 - 13 ng/L Final     Comment:     .  Less than 99th percentile of normal range cutoff-  Female and children under 18 years old <14 ng/L; Male <21 ng/L: Negative  Repeat testing should be performed if clinically indicated.   .  Female and children under 18 years old 14-50 ng/L; Male 21-50 ng/L:  Consistent with possible cardiac damage and possible increased clinical   risk. Serial measurements may help to assess extent of myocardial damage.   .  >50 ng/L: Consistent with cardiac damage, increased clinical risk and  myocardial infarction. Serial measurements may help assess extent of   myocardial damage.   .   NOTE: Children less than 1 year old may have higher baseline troponin   levels and results should be interpreted in conjunction with the overall   clinical context.   .  NOTE: Troponin I testing is performed using a different   testing methodology at [facility] than at other   [facility]. Direct result comparisons should only   be made within the same method.     05/22/2022 08:42 AM 13 0 - 13 ng/L Final     Comment:     .  Less than 99th percentile of normal range cutoff-  Female and children under 18 years old <14 ng/L; Male <21 ng/L: Negative  Repeat testing should be performed if clinically indicated.   .  Female and children under 18 years old 14-50 ng/L; Male 21-50 ng/L:  Consistent with possible cardiac damage and possible increased clinical   risk. Serial measurements may help to assess extent of myocardial damage.   .  >50 ng/L: Consistent with cardiac damage, increased clinical risk and  myocardial infarction. Serial measurements may help assess extent of   myocardial damage.   .   NOTE: Children less than 1 year old may have higher baseline troponin   levels and results should be interpreted in conjunction with the overall   clinical context.   .  NOTE: Troponin I testing is performed using a different    testing methodology at [facility] than at other   [facility]. Direct result comparisons should only   be made within the same method.     05/22/2022 07:36 AM 14 0 - 13 ng/L Final     Comment:     .  Less than 99th percentile of normal range cutoff-  Female and children under 18 years old <14 ng/L; Male <21 ng/L: Negative  Repeat testing should be performed if clinically indicated.   .  Female and children under 18 years old 14-50 ng/L; Male 21-50 ng/L:  Consistent with possible cardiac damage and possible increased clinical   risk. Serial measurements may help to assess extent of myocardial damage.   .  >50 ng/L: Consistent with cardiac damage, increased clinical risk and  myocardial infarction. Serial measurements may help assess extent of   myocardial damage.   .   NOTE: Children less than 1 year old may have higher baseline troponin   levels and results should be interpreted in conjunction with the overall   clinical context.   .  NOTE: Troponin I testing is performed using a different   testing methodology at [facility] than at other   [facility]. Direct result comparisons should only   be made within the same method.       BNP   Date/Time Value Ref Range Status   03/29/2025 12:35  0 - 99 pg/mL Final   12/11/2022 04:24  0 - 99 pg/mL Final     Comment:     .  <100 pg/mL - Heart failure unlikely  100-299 pg/mL - Intermediate probability of acute heart  .               failure exacerbation. Correlate with clinical  .               context and patient history.    >=300 pg/mL - Heart Failure likely. Correlate with clinical  .               context and patient history.  BNP testing is performed using different testing   methodology at [facility] than at other   [facility]. Direct result comparisons should   only be made within the same method.     05/23/2022 04:14  0 - 99 pg/mL Final     Comment:     .  <100 pg/mL - Heart failure  unlikely  100-299 pg/mL - Intermediate probability of acute heart  .               failure exacerbation. Correlate with clinical  .               context and patient history.    >=300 pg/mL - Heart Failure likely. Correlate with clinical  .               context and patient history.  BNP testing is performed using different testing   methodology at [facility] than at other   [facility] hospitals. Direct result comparisons should   only be made within the same method.        Last I/O:  I/O last 3 completed shifts:  In: 780 (13.5 mL/kg) [P.O.:780]  Out: 0 (0 mL/kg)   Weight: 57.9 kg     Past Cardiology Tests (Last 3 Years):    Echo:  Transthoracic echo (TTE) complete 3-31-25:  CONCLUSIONS:   1. The left ventricular systolic function is mildly decreased, with a visually estimated ejection fraction of 45-50%.   2. No regional wall motion abnormalities.   3. Spectral Doppler shows a a Grade III (restrictive pattern) of left ventricular diastolic filling with an elevated left atrial pressure.   4. Left ventricular cavity size is decreased.   5. There is normal right ventricular global systolic function.   6. The left atrial size is moderate to severely dilated.   7. There is moderate mitral annular calcification.   8. Moderate mitral valve regurgitation.   9. Mild to moderate tricuspid regurgitation.  10. Severe aortic valve stenosis.  11. Mild aortic valve regurgitation.  12. The inferior vena cava appears normal in size, with IVC inspiratory collapse greater than 50%.  13. There is severely increased septal and severely increased posterior left ventricular wall thickness.    Ejection Fractions:  EF   Date/Time Value Ref Range Status   03/31/2025 02:35 PM 48 %    01/04/2024 02:26 PM 54           Inpatient Medications:  Scheduled medications   Medication Dose Route Frequency    dicyclomine  20 mg oral Before meals & nightly    famotidine  10 mg oral Daily    influenza  0.5 mL intramuscular During hospitalization     furosemide  20 mg intravenous Daily    latanoprost  1 drop Both Eyes Nightly    levothyroxine  100 mcg oral Daily    metoprolol tartrate  100 mg oral BID    sennosides-docusate sodium  1 tablet oral Nightly    warfarin  2.5 mg oral Once   Review of Systems   Constitutional: Positive for decreased appetite and weight loss. Negative for fever and malaise/fatigue.   Cardiovascular:  Negative for chest pain, orthopnea and palpitations.   Respiratory:  Negative for shortness of breath and wheezing.    Skin:  Negative for itching and rash.   Gastrointestinal:  Negative for abdominal pain, diarrhea, nausea and vomiting.   Genitourinary:  Negative for dysuria.   Neurological:  Negative for weakness.          Physical Exam:  Constitutional: Thin, awake, alert, calm, oriented x4, no acute distress, cooperative   Eyes: EOMI, clear sclerae   ENMT: mucous membranes moist, no lesions seen   Head/Neck: Neck supple, no apparent injury, head atraumatic   Respiratory/Thorax: CTAB anteriorly, good chest expansion, respirations even and unlabored   Cardiovascular: Irregular rate and rhythm, HR 80s-90s, no murmurs/rubs/gallops, normal S1 and S 2   Gastrointestinal: Abdomen nondistended, soft, nontender, +BS, no bruits   Musculoskeletal: ROM intact, no joint swelling, normal  strength   Extremities: no cyanosis, contusions or clubbing, 1+ pitting BLE edema   Neurological: no focal deficit, pt alert and oriented x4   Psychological: Appropriate affect and behavior, pleasant   Skin: Warm and dry, no lesions, no rashes         Assessment/Plan   1) Atrial Fib  Would favor rate control strategy in her with anticoagulation  discontinue amio  Start Metoprolol  3/31/2025 : Increase Metoprolol  4-1-25:  Patient had some low BP after initial dose of BB but now stable and VR's are acceptable.  She is on warfarin with management per pharmacy team     2) Moderate AS  Repeat echo  4-1-25:  Likely some progression as peak gradient slightly increased  and has some LVD so may be further underestimated but at her advanced age will follow up as OP and manage conservatively.  Family updated.     3) Acute on chronic diastolic heart failure  IV diuresis  May want have Nephrology follow with CKD  4-1-25:  No SOB or evidence of volume overload.  Renal function improved despite IV lasix.  Will transition to low dose PO Lasix.  Monitor labs.      Recommendations--continue rate control strategy and OAC with management per pharmacy.  Transition to oral lasix.  Monitor labs. Will review echo and aortic stenosis with Dr. Gutierres.      Sourav Daniel PA-C  4/1/2025  10:07 AM

## 2025-04-02 LAB
ANION GAP SERPL CALC-SCNC: 19 MMOL/L (ref 10–20)
ATRIAL RATE: 0 BPM
BUN SERPL-MCNC: 39 MG/DL (ref 6–23)
CALCIUM SERPL-MCNC: 8.8 MG/DL (ref 8.6–10.3)
CHLORIDE SERPL-SCNC: 102 MMOL/L (ref 98–107)
CO2 SERPL-SCNC: 26 MMOL/L (ref 21–32)
CREAT SERPL-MCNC: 2.14 MG/DL (ref 0.5–1.05)
EGFRCR SERPLBLD CKD-EPI 2021: 21 ML/MIN/1.73M*2
ERYTHROCYTE [DISTWIDTH] IN BLOOD BY AUTOMATED COUNT: 15.1 % (ref 11.5–14.5)
GLUCOSE SERPL-MCNC: 130 MG/DL (ref 74–99)
HCT VFR BLD AUTO: 40.8 % (ref 36–46)
HGB BLD-MCNC: 12.9 G/DL (ref 12–16)
INR PPP: 1.9 (ref 0.9–1.1)
MAGNESIUM SERPL-MCNC: 2.16 MG/DL (ref 1.6–2.4)
MCH RBC QN AUTO: 29.7 PG (ref 26–34)
MCHC RBC AUTO-ENTMCNC: 31.6 G/DL (ref 32–36)
MCV RBC AUTO: 94 FL (ref 80–100)
NRBC BLD-RTO: 0 /100 WBCS (ref 0–0)
P AXIS: 207 DEGREES
PLATELET # BLD AUTO: 245 X10*3/UL (ref 150–450)
POTASSIUM SERPL-SCNC: 3.7 MMOL/L (ref 3.5–5.3)
PR INTERVAL: 65 MS
PROTHROMBIN TIME: 20.6 SECONDS (ref 9.8–12.4)
Q ONSET: 254 MS
QRS COUNT: 18 BEATS
QRS DURATION: 84 MS
QT INTERVAL: 417 MS
QTC CALCULATION(BAZETT): 587 MS
QTC FREDERICIA: 524 MS
R AXIS: -27 DEGREES
RBC # BLD AUTO: 4.34 X10*6/UL (ref 4–5.2)
SODIUM SERPL-SCNC: 143 MMOL/L (ref 136–145)
T OFFSET: 463 MS
VENTRICULAR RATE: 119 BPM
WBC # BLD AUTO: 6.4 X10*3/UL (ref 4.4–11.3)

## 2025-04-02 PROCEDURE — 99232 SBSQ HOSP IP/OBS MODERATE 35: CPT | Performed by: PHYSICIAN ASSISTANT

## 2025-04-02 PROCEDURE — 99232 SBSQ HOSP IP/OBS MODERATE 35: CPT | Performed by: NURSE PRACTITIONER

## 2025-04-02 PROCEDURE — 2060000001 HC INTERMEDIATE ICU ROOM DAILY

## 2025-04-02 PROCEDURE — 2500000001 HC RX 250 WO HCPCS SELF ADMINISTERED DRUGS (ALT 637 FOR MEDICARE OP): Performed by: NURSE PRACTITIONER

## 2025-04-02 PROCEDURE — 2500000002 HC RX 250 W HCPCS SELF ADMINISTERED DRUGS (ALT 637 FOR MEDICARE OP, ALT 636 FOR OP/ED)

## 2025-04-02 PROCEDURE — 80048 BASIC METABOLIC PNL TOTAL CA: CPT | Performed by: PHYSICIAN ASSISTANT

## 2025-04-02 PROCEDURE — 36415 COLL VENOUS BLD VENIPUNCTURE: CPT | Performed by: PHYSICIAN ASSISTANT

## 2025-04-02 PROCEDURE — 2500000002 HC RX 250 W HCPCS SELF ADMINISTERED DRUGS (ALT 637 FOR MEDICARE OP, ALT 636 FOR OP/ED): Performed by: NURSE PRACTITIONER

## 2025-04-02 PROCEDURE — 83735 ASSAY OF MAGNESIUM: CPT | Performed by: PHYSICIAN ASSISTANT

## 2025-04-02 PROCEDURE — 85610 PROTHROMBIN TIME: CPT | Performed by: NURSE PRACTITIONER

## 2025-04-02 PROCEDURE — 85027 COMPLETE CBC AUTOMATED: CPT | Performed by: NURSE PRACTITIONER

## 2025-04-02 RX ORDER — WARFARIN 3 MG/1
3 TABLET ORAL ONCE
Status: COMPLETED | OUTPATIENT
Start: 2025-04-02 | End: 2025-04-02

## 2025-04-02 RX ORDER — FUROSEMIDE 20 MG/1
20 TABLET ORAL DAILY
Status: DISCONTINUED | OUTPATIENT
Start: 2025-04-02 | End: 2025-04-02

## 2025-04-02 RX ADMIN — FUROSEMIDE 20 MG: 20 TABLET ORAL at 08:49

## 2025-04-02 RX ADMIN — BRIMONIDINE TARTRATE 1 DROP: 2 SOLUTION/ DROPS OPHTHALMIC at 08:33

## 2025-04-02 RX ADMIN — BRIMONIDINE TARTRATE 1 DROP: 2 SOLUTION/ DROPS OPHTHALMIC at 20:59

## 2025-04-02 RX ADMIN — METOPROLOL TARTRATE 100 MG: 50 TABLET, FILM COATED ORAL at 08:33

## 2025-04-02 RX ADMIN — LATANOPROST 1 DROP: 50 SOLUTION OPHTHALMIC at 20:59

## 2025-04-02 RX ADMIN — FAMOTIDINE 10 MG: 20 TABLET, FILM COATED ORAL at 08:33

## 2025-04-02 RX ADMIN — LEVOTHYROXINE SODIUM 100 MCG: 0.1 TABLET ORAL at 08:33

## 2025-04-02 RX ADMIN — WARFARIN SODIUM 3 MG: 3 TABLET ORAL at 17:37

## 2025-04-02 RX ADMIN — DOCUSATE SODIUM 100 MG: 100 CAPSULE, LIQUID FILLED ORAL at 08:33

## 2025-04-02 ASSESSMENT — ENCOUNTER SYMPTOMS
DYSURIA: 0
WHEEZING: 0
WEAKNESS: 0
ABDOMINAL PAIN: 0
NAUSEA: 0
VOMITING: 0
ORTHOPNEA: 0
DECREASED APPETITE: 0
FEVER: 0
SHORTNESS OF BREATH: 0
WEIGHT LOSS: 1
PALPITATIONS: 0
DIARRHEA: 0

## 2025-04-02 ASSESSMENT — COGNITIVE AND FUNCTIONAL STATUS - GENERAL
WALKING IN HOSPITAL ROOM: A LITTLE
DAILY ACTIVITIY SCORE: 24
STANDING UP FROM CHAIR USING ARMS: A LITTLE
MOBILITY SCORE: 21
CLIMB 3 TO 5 STEPS WITH RAILING: A LITTLE

## 2025-04-02 ASSESSMENT — PAIN SCALES - GENERAL
PAINLEVEL_OUTOF10: 0 - NO PAIN

## 2025-04-02 ASSESSMENT — PAIN - FUNCTIONAL ASSESSMENT
PAIN_FUNCTIONAL_ASSESSMENT: 0-10
PAIN_FUNCTIONAL_ASSESSMENT: 0-10

## 2025-04-02 NOTE — CARE PLAN
The patient's goals for the shift include  remain free from falls and injury    The clinical goals for the shift include pt will remain hemodynamically stable throughout shift

## 2025-04-02 NOTE — PROGRESS NOTES
Pharmacy Consult for Warfarin (Coumadin) Management - Daily Progress Note     Jennifer Magana is a 89 y.o. female admitted for Atrial fibrillation, unspecified type (Multi). Pharmacy was consulted for warfarin dosing and monitoring.       Labs  INR External   Date Value Ref Range Status   03/21/2025 2.30 2.00 - 3.00 Final   03/13/2025 2.00 2.00 - 3.00 Final   03/06/2025 2.10 2.00 - 3.00 Final     INR   Date Value Ref Range Status   04/02/2025 1.9 (H) 0.9 - 1.1 Final   04/01/2025 1.9 (H) 0.9 - 1.1 Final   03/31/2025 2.1 (H) 0.9 - 1.1 Final     Review  Warfarin Indication: Atrial fibrillation or flutter  Target INR: 2 - 3     Home: 1.25mg Tues, Thurs and 2.5mg all other days     INR at 1.9. Boost today with 3mg. Recheck INR tomorrow.    Orders placed per pharmacy consult. Pharmacy will continue to monitor and adjust therapy as needed.     Danika Ocampo, PharmD

## 2025-04-02 NOTE — PROGRESS NOTES
04/02/25 1219   Discharge Planning   Expected Discharge Disposition Home   Intensity of Service   Intensity of Service 0-30 min     I met with pt. She states she was told she is going to be here another day with plan to DC tomorrow to home.  Medicare message explained to pt, signed, given to pt, copy placed in chart, and entered in careport.

## 2025-04-02 NOTE — PROGRESS NOTES
Subjective Data:  Jennifer Magana is a 89 y.o. female with PMHx of atrial fib on warfarin, HFpEF, CKD, HTN and moderate aortic stenosis who presented with chest pressure that started a few days ago. She is asymptomatic normally with atrial fib and was found to be in afib at her provider's office a few days ago. She took her metoprolol (which she takes PRN) but had no improvement of symptoms. Metoprolol had always converted her in the past. She was lightheaded, particularly with exertion and standing. She also c/o lower extremity swelling but no shortness of breath. She is compliant with all of her medications including her amiodarone. Her son recently .  In the ED she was in afib RVR with HR to 120 bpm. ED workup was significant for blood glucose 174, creatinine 1.92, , troponins 16/17, INR 2.1. CXR revealed diffuse interstitial infiltrates bilaterally. The pt received  ml and metoprolol 5 mg IV with improvement in her HR to the 100s.     Overnight Events:    3/31/2025 : Still in afib with RVR. Breathing better  25:  Resting comfortably.  Family at bedside.  Initially had low BP with metoprolol but BP's are better now and VR's 's.  Off Amio.  Pharmacy managing INR's.  She has no complaints.  25: Patient resting comfortably eating her breakfast.  No specific cardiac complaints this morning of chest pain or feeling short of breath.  She was out of bed earlier and did not have the same wooziness and dizziness since yesterday.  She does not appear volume overloaded so we will discontinue her Lasix to allow better blood pressure room with the metoprolol to control ventricular rates and currently on telemetry heart rates are in the 80s with her A-fib.  Reviewed echo with Dr. Gutierres yesterday and she does have severe aortic stenosis but no good treatment options considering her advanced age.  This was explained to patient as well as family at bedside.  If no further cardiac testing and she  remained stable family is questioning whether or not she could be possibly discharged to home today.     Objective Data:  Last Recorded Vitals:  Vitals:    04/01/25 1950 04/01/25 2328 04/02/25 0407 04/02/25 0759   BP: 87/55 89/55 99/61 107/68   BP Location: Right arm Right arm Right arm Right arm   Patient Position: Lying Lying Lying Lying   Pulse: 81 110 88 92   Resp: 16 16 16 16   Temp: 36.4 °C (97.5 °F) 36 °C (96.8 °F) 36.1 °C (97 °F) 36.2 °C (97.2 °F)   TempSrc: Temporal Temporal Temporal Temporal   SpO2: 96% 94% 95% 96%   Weight:   55.3 kg (121 lb 14.6 oz)    Height:           Last Labs:  CBC - 4/2/2025:  4:12 AM  6.4 12.9 245    40.8      CMP - 4/2/2025:  4:12 AM  8.8 5.8 29 --- 0.5   _ 3.9 52 92      PTT - No results in last year.  1.9   20.6 _     TROPHS   Date/Time Value Ref Range Status   03/29/2025 01:43 PM 17 0 - 13 ng/L Final   03/29/2025 12:35 PM 16 0 - 13 ng/L Final   12/11/2022 04:00 AM 10 0 - 13 ng/L Final     Comment:     .  Less than 99th percentile of normal range cutoff-  Female and children under 18 years old <14 ng/L; Male <21 ng/L: Negative  Repeat testing should be performed if clinically indicated.   .  Female and children under 18 years old 14-50 ng/L; Male 21-50 ng/L:  Consistent with possible cardiac damage and possible increased clinical   risk. Serial measurements may help to assess extent of myocardial damage.   .  >50 ng/L: Consistent with cardiac damage, increased clinical risk and  myocardial infarction. Serial measurements may help assess extent of   myocardial damage.   .   NOTE: Children less than 1 year old may have higher baseline troponin   levels and results should be interpreted in conjunction with the overall   clinical context.   .  NOTE: Troponin I testing is performed using a different   testing methodology at [facility] than at other   [facility] hospitals. Direct result comparisons should only   be made within the same method.     05/22/2022 08:42 AM 13 0 -  13 ng/L Final     Comment:     .  Less than 99th percentile of normal range cutoff-  Female and children under 18 years old <14 ng/L; Male <21 ng/L: Negative  Repeat testing should be performed if clinically indicated.   .  Female and children under 18 years old 14-50 ng/L; Male 21-50 ng/L:  Consistent with possible cardiac damage and possible increased clinical   risk. Serial measurements may help to assess extent of myocardial damage.   .  >50 ng/L: Consistent with cardiac damage, increased clinical risk and  myocardial infarction. Serial measurements may help assess extent of   myocardial damage.   .   NOTE: Children less than 1 year old may have higher baseline troponin   levels and results should be interpreted in conjunction with the overall   clinical context.   .  NOTE: Troponin I testing is performed using a different   testing methodology at [facility] than at other   [facility]. Direct result comparisons should only   be made within the same method.     05/22/2022 07:36 AM 14 0 - 13 ng/L Final     Comment:     .  Less than 99th percentile of normal range cutoff-  Female and children under 18 years old <14 ng/L; Male <21 ng/L: Negative  Repeat testing should be performed if clinically indicated.   .  Female and children under 18 years old 14-50 ng/L; Male 21-50 ng/L:  Consistent with possible cardiac damage and possible increased clinical   risk. Serial measurements may help to assess extent of myocardial damage.   .  >50 ng/L: Consistent with cardiac damage, increased clinical risk and  myocardial infarction. Serial measurements may help assess extent of   myocardial damage.   .   NOTE: Children less than 1 year old may have higher baseline troponin   levels and results should be interpreted in conjunction with the overall   clinical context.   .  NOTE: Troponin I testing is performed using a different   testing methodology at [facility] than at other   [facility].  Direct result comparisons should only   be made within the same method.       BNP   Date/Time Value Ref Range Status   03/29/2025 12:35  0 - 99 pg/mL Final   12/11/2022 04:24  0 - 99 pg/mL Final     Comment:     .  <100 pg/mL - Heart failure unlikely  100-299 pg/mL - Intermediate probability of acute heart  .               failure exacerbation. Correlate with clinical  .               context and patient history.    >=300 pg/mL - Heart Failure likely. Correlate with clinical  .               context and patient history.  BNP testing is performed using different testing   methodology at [facility] than at other   [facility] hospitals. Direct result comparisons should   only be made within the same method.     05/23/2022 04:14  0 - 99 pg/mL Final     Comment:     .  <100 pg/mL - Heart failure unlikely  100-299 pg/mL - Intermediate probability of acute heart  .               failure exacerbation. Correlate with clinical  .               context and patient history.    >=300 pg/mL - Heart Failure likely. Correlate with clinical  .               context and patient history.  BNP testing is performed using different testing   methodology at [facility] than at other   [facility] hospitals. Direct result comparisons should   only be made within the same method.        Last I/O:  I/O last 3 completed shifts:  In: 300 (5.4 mL/kg) [P.O.:300]  Out: 0 (0 mL/kg)   Weight: 55.3 kg     Past Cardiology Tests (Last 3 Years):    Echo:  Transthoracic echo (TTE) complete 3-31-25:  CONCLUSIONS:   1. The left ventricular systolic function is mildly decreased, with a visually estimated ejection fraction of 45-50%.   2. No regional wall motion abnormalities.   3. Spectral Doppler shows a a Grade III (restrictive pattern) of left ventricular diastolic filling with an elevated left atrial pressure.   4. Left ventricular cavity size is decreased.   5. There is normal right ventricular global systolic  function.   6. The left atrial size is moderate to severely dilated.   7. There is moderate mitral annular calcification.   8. Moderate mitral valve regurgitation.   9. Mild to moderate tricuspid regurgitation.  10. Severe aortic valve stenosis.  11. Mild aortic valve regurgitation.  12. The inferior vena cava appears normal in size, with IVC inspiratory collapse greater than 50%.  13. There is severely increased septal and severely increased posterior left ventricular wall thickness.    Ejection Fractions:  EF   Date/Time Value Ref Range Status   03/31/2025 02:35 PM 48 %    01/04/2024 02:26 PM 54           Inpatient Medications:  Scheduled medications   Medication Dose Route Frequency    brimonidine  1 drop Both Eyes BID    dicyclomine  20 mg oral Before meals & nightly    docusate sodium  100 mg oral BID    famotidine  10 mg oral Daily    influenza  0.5 mL intramuscular During hospitalization    latanoprost  1 drop Both Eyes Nightly    levothyroxine  100 mcg oral Daily    metoprolol tartrate  100 mg oral BID    warfarin  3 mg oral Once   Review of Systems   Constitutional: Positive for weight loss. Negative for decreased appetite, fever and malaise/fatigue.        Better appetite off amiodarone   Cardiovascular:  Negative for chest pain, orthopnea and palpitations.        No real awareness of the A-fib   Respiratory:  Negative for shortness of breath and wheezing.    Skin:  Negative for itching and rash.   Gastrointestinal:  Negative for abdominal pain, diarrhea, nausea and vomiting.   Genitourinary:  Negative for dysuria.   Neurological:  Negative for weakness.          Physical Exam:  Constitutional: Thin, awake, alert, calm, oriented x4, no acute distress, cooperative   Eyes: EOMI, clear sclerae   ENMT: mucous membranes moist, no lesions seen   Head/Neck: Neck supple, no apparent injury, head atraumatic   Respiratory/Thorax: CTAB anteriorly, good chest expansion, respirations even and unlabored    Cardiovascular: Irregular rate and rhythm, HR 80s-90s, aortic stenosis murmur, normal S1 and S 2   Gastrointestinal: Abdomen nondistended, soft, nontender, +BS, no bruits   Musculoskeletal: ROM intact, no joint swelling, normal  strength   Extremities: no cyanosis, contusions or clubbing--no edema   Neurological: no focal deficit, pt alert and oriented x4   Psychological: Appropriate affect and behavior, pleasant   Skin: Warm and dry, no lesions, no rashes         Assessment/Plan   1) Atrial Fib  Would favor rate control strategy in her with anticoagulation  discontinue amio  Start Metoprolol  3/31/2025 : Increase Metoprolol  4-1-25:  Patient had some low BP after initial dose of BB but now stable and VR's are acceptable.  She is on warfarin with management per pharmacy team  4-2-25: Continue metoprolol for ventricular rate control and she is on warfarin.  INR today 1.9.  VR's 80's.  Not candidate for digoxin with renal dysfunction.     2) Moderate AS  Repeat echo  4-1-25:  Likely some progression as peak gradient slightly increased and has some LVD so may be further underestimated but at her advanced age will follow up as OP and manage conservatively.  Family updated.  4-2-25:  Echo reviewed with Dr. Gutierres and does feel that her aortic stenosis is likely underestimated and is severe.  Need to avoid over diuresis so will discontinue the lasix as she appears compensated.        3) Acute on chronic diastolic heart failure  IV diuresis  May want have Nephrology follow with CKD  4-1-25:  No SOB or evidence of volume overload.  Renal function improved despite IV lasix.  Will transition to low dose PO Lasix.  Monitor labs.    4-2-25:  Again we will discontinue the lasix to allow BP room and she appears compensated.      Recommendations--continue rate control strategy and OAC with management per pharmacy.  Discontinue lasix with her renal function and avoidance of dehydration with the aortic stenosis.    Reviewed with  [facility].  Possibly home today.    Sourav Daniel PA-C  4/2/2025  9:56 AM

## 2025-04-02 NOTE — PROGRESS NOTES
"Jennifer Magana is a 89 y.o. female on day 4 of admission presenting with Atrial fibrillation, unspecified type (Multi).    Subjective   Patient resting in bed. Denies chest pain, shortness of breath, abdominal pain, fevers, chills. States she was sitting in chair earlier without difficulty        Objective     Physical Exam  Constitutional:       Appearance: Normal appearance.   HENT:      Head: Normocephalic and atraumatic.      Mouth/Throat:      Mouth: Mucous membranes are moist.      Pharynx: Oropharynx is clear.   Eyes:      Extraocular Movements: Extraocular movements intact.      Conjunctiva/sclera: Conjunctivae normal.      Pupils: Pupils are equal, round, and reactive to light.   Cardiovascular:      Rate and Rhythm: Normal rate and regular rhythm.      Pulses: Normal pulses.      Heart sounds: Normal heart sounds.   Pulmonary:      Effort: Pulmonary effort is normal.      Breath sounds: Normal breath sounds.   Abdominal:      General: Bowel sounds are normal.      Palpations: Abdomen is soft.      Tenderness: There is no abdominal tenderness.   Musculoskeletal:         General: Normal range of motion.      Cervical back: Normal range of motion and neck supple.   Skin:     General: Skin is warm and dry.      Capillary Refill: Capillary refill takes less than 2 seconds.   Neurological:      General: No focal deficit present.      Mental Status: She is alert and oriented to person, place, and time.   Psychiatric:         Mood and Affect: Mood normal.         Behavior: Behavior normal.         Last Recorded Vitals  Blood pressure 109/71, pulse (!) 122, temperature 36.1 °C (97 °F), temperature source Temporal, resp. rate 16, height 1.575 m (5' 2\"), weight 55.3 kg (121 lb 14.6 oz), SpO2 96%.  Intake/Output last 3 Shifts:  I/O last 3 completed shifts:  In: 300 (5.4 mL/kg) [P.O.:300]  Out: 0 (0 mL/kg)   Weight: 55.3 kg     Relevant Results  Results for orders placed or performed during the hospital encounter " of 03/29/25 (from the past 24 hours)   Protime-INR   Result Value Ref Range    Protime 20.6 (H) 9.8 - 12.4 seconds    INR 1.9 (H) 0.9 - 1.1   Magnesium   Result Value Ref Range    Magnesium 2.16 1.60 - 2.40 mg/dL   Basic Metabolic Panel   Result Value Ref Range    Glucose 130 (H) 74 - 99 mg/dL    Sodium 143 136 - 145 mmol/L    Potassium 3.7 3.5 - 5.3 mmol/L    Chloride 102 98 - 107 mmol/L    Bicarbonate 26 21 - 32 mmol/L    Anion Gap 19 10 - 20 mmol/L    Urea Nitrogen 39 (H) 6 - 23 mg/dL    Creatinine 2.14 (H) 0.50 - 1.05 mg/dL    eGFR 21 (L) >60 mL/min/1.73m*2    Calcium 8.8 8.6 - 10.3 mg/dL   CBC   Result Value Ref Range    WBC 6.4 4.4 - 11.3 x10*3/uL    nRBC 0.0 0.0 - 0.0 /100 WBCs    RBC 4.34 4.00 - 5.20 x10*6/uL    Hemoglobin 12.9 12.0 - 16.0 g/dL    Hematocrit 40.8 36.0 - 46.0 %    MCV 94 80 - 100 fL    MCH 29.7 26.0 - 34.0 pg    MCHC 31.6 (L) 32.0 - 36.0 g/dL    RDW 15.1 (H) 11.5 - 14.5 %    Platelets 245 150 - 450 x10*3/uL     No results found.      Assessment/Plan   Atrial fib with RVR  Hx atrial fib on warfarin,  Cardiology on board, amiodarone was switched to metoprolol  Per EMR metoprolol 25 mg every day was stopped December 2022 due to bradycardia with HR 40s-50s and amiodarone was continued, monitor HR closely, cardiology aware   Continue warfarin, pharmacy on board for management, INR is therapeutic  Continue metoprolol. I added hold parameters today in the setting of hypotention yesterday   Diuretics held today due to hypotension      Acute on chronic HFpEF  Likely arrhythmia-mediated,   Hold diuretics   Echo shows preserved LVEF and slightly increased AV stenosis      NESTOR on CKD  Baseline is ~1.5  home losartan and Mobic are held  BMP daily  Diuresis held per cardiology      HTN   BP is controlled off losartan   Metoprolol was increased as above     Moderate aortic stenosis  Echo ordered by cardiology   See above      DVT ppx:   Warfarin  Managed by pharmacy during admission   Daily INR       Discharge disposition  Plan for home with no needs tomorrow if BP remains stable   If BP remains stable, cleared for discharge by cardiology     EVELIN Langston-CNP

## 2025-04-03 VITALS
HEIGHT: 62 IN | BODY MASS INDEX: 22.27 KG/M2 | SYSTOLIC BLOOD PRESSURE: 128 MMHG | TEMPERATURE: 97.4 F | OXYGEN SATURATION: 95 % | HEART RATE: 125 BPM | RESPIRATION RATE: 18 BRPM | WEIGHT: 121.03 LBS | DIASTOLIC BLOOD PRESSURE: 93 MMHG

## 2025-04-03 LAB
ANION GAP SERPL CALC-SCNC: 7 MMOL/L (ref 10–20)
BUN SERPL-MCNC: 37 MG/DL (ref 6–23)
CALCIUM SERPL-MCNC: 9.3 MG/DL (ref 8.6–10.3)
CHLORIDE SERPL-SCNC: 107 MMOL/L (ref 98–107)
CO2 SERPL-SCNC: 27 MMOL/L (ref 21–32)
CREAT SERPL-MCNC: 2.04 MG/DL (ref 0.5–1.05)
EGFRCR SERPLBLD CKD-EPI 2021: 22 ML/MIN/1.73M*2
ERYTHROCYTE [DISTWIDTH] IN BLOOD BY AUTOMATED COUNT: 15.1 % (ref 11.5–14.5)
GLUCOSE SERPL-MCNC: 116 MG/DL (ref 74–99)
HCT VFR BLD AUTO: 45.3 % (ref 36–46)
HGB BLD-MCNC: 14.3 G/DL (ref 12–16)
INR IN PPP BY COAGULATION ASSAY EXTERNAL: 1.9 (ref 2–3)
INR PPP: 2 (ref 0.9–1.1)
MCH RBC QN AUTO: 30.4 PG (ref 26–34)
MCHC RBC AUTO-ENTMCNC: 31.6 G/DL (ref 32–36)
MCV RBC AUTO: 96 FL (ref 80–100)
NRBC BLD-RTO: 0 /100 WBCS (ref 0–0)
PLATELET # BLD AUTO: 280 X10*3/UL (ref 150–450)
POTASSIUM SERPL-SCNC: 3.7 MMOL/L (ref 3.5–5.3)
PROTHROMBIN TIME (PT) IN PPP BY COAGULATION ASSAY EXTERNAL: ABNORMAL
PROTHROMBIN TIME: 22.4 SECONDS (ref 9.8–12.4)
RBC # BLD AUTO: 4.71 X10*6/UL (ref 4–5.2)
SODIUM SERPL-SCNC: 137 MMOL/L (ref 136–145)
WBC # BLD AUTO: 9.2 X10*3/UL (ref 4.4–11.3)

## 2025-04-03 PROCEDURE — 36415 COLL VENOUS BLD VENIPUNCTURE: CPT | Performed by: NURSE PRACTITIONER

## 2025-04-03 PROCEDURE — 2500000001 HC RX 250 WO HCPCS SELF ADMINISTERED DRUGS (ALT 637 FOR MEDICARE OP): Performed by: NURSE PRACTITIONER

## 2025-04-03 PROCEDURE — 80048 BASIC METABOLIC PNL TOTAL CA: CPT | Performed by: NURSE PRACTITIONER

## 2025-04-03 PROCEDURE — 2500000002 HC RX 250 W HCPCS SELF ADMINISTERED DRUGS (ALT 637 FOR MEDICARE OP, ALT 636 FOR OP/ED): Performed by: NURSE PRACTITIONER

## 2025-04-03 PROCEDURE — 85610 PROTHROMBIN TIME: CPT | Performed by: NURSE PRACTITIONER

## 2025-04-03 PROCEDURE — 85027 COMPLETE CBC AUTOMATED: CPT | Performed by: NURSE PRACTITIONER

## 2025-04-03 RX ORDER — METOPROLOL SUCCINATE 25 MG/1
50 TABLET, EXTENDED RELEASE ORAL DAILY
Qty: 60 TABLET | Refills: 1 | Status: SHIPPED | OUTPATIENT
Start: 2025-04-03

## 2025-04-03 RX ORDER — FAMOTIDINE 20 MG/1
10 TABLET, FILM COATED ORAL DAILY
Qty: 30 TABLET | Refills: 1 | Status: SHIPPED | OUTPATIENT
Start: 2025-04-04

## 2025-04-03 RX ADMIN — LEVOTHYROXINE SODIUM 100 MCG: 0.1 TABLET ORAL at 08:14

## 2025-04-03 RX ADMIN — FAMOTIDINE 10 MG: 20 TABLET, FILM COATED ORAL at 08:14

## 2025-04-03 RX ADMIN — METOPROLOL TARTRATE 100 MG: 50 TABLET, FILM COATED ORAL at 08:14

## 2025-04-03 ASSESSMENT — PAIN SCALES - GENERAL
PAINLEVEL_OUTOF10: 0 - NO PAIN
PAINLEVEL_OUTOF10: 0 - NO PAIN

## 2025-04-03 NOTE — PROGRESS NOTES
04/03/25 1120   Discharge Planning   Home or Post Acute Services None   Expected Discharge Disposition Home     Spoke with daughter Who confirms pt plan to return home without CT needs.

## 2025-04-03 NOTE — DISCHARGE SUMMARY
Discharge Diagnosis  Chronic CHF  Atrial fibrillation, unspecified type (Multi)  Severe aortic stenosis  CKD    Issues Requiring Follow-Up  Nephrology and cardiology follow-up for the above-mentioned issues    Discharge Meds     Medication List      ASK your doctor about these medications     amiodarone 200 mg tablet; Commonly known as: Pacerone; Take 1 tablet   (200 mg) by mouth once daily.   cimetidine 300 mg tablet; Commonly known as: Tagamet   dicyclomine 20 mg tablet; Commonly known as: Bentyl   Jantoven 2.5 mg tablet; Generic drug: warfarin; Take as directed. If you   are unsure how to take this medication, talk to your nurse or doctor.;   Original instructions: Take 1.25mg (0.5 tablets) by mouth on Tues,Thurs   and 2.5mg (1 tablet) all other days or as directed by Coumadin clinic.   levothyroxine 100 mcg tablet; Commonly known as: Synthroid, Levoxyl; Ask   about: Which instructions should I use?   meloxicam 7.5 mg tablet; Commonly known as: Mobic   metoprolol succinate XL 25 mg 24 hr tablet; Commonly known as: Toprol-XL   Simbrinza 1-0.2 % drops,suspension ophthalmic suspension; Generic drug:   brinzolamide-brimonidine   travoprost 0.004 % drops ophthalmic solution; Commonly known as:   Travatan Z       Test Results Pending At Discharge  Pending Labs       No current pending labs.            Hospital Course  Patient was treated for acute on chronic HFpEF by cardiology with IV diuresis with metoprolol for rate control for A-fib with RVR.  Subsequently IV diuresis was discontinued and volume status improved as well as heart rate runs between 100-110.  Echo shows LVEF 45-50%.  Grade 3 diastolic dysfunction and severe aortic stenosis.  Cardiology recommended no intervention for severe AS giving her age.  Had an NESTOR on CKD, losartan and Mobic were discontinued.  However creatinine still hovering around 2.0 at discharge. Cardiology cleared the patient for discharge.  Amiodarone was discontinued by cardiology.  She  will continue Coumadin at home.  She will need nephrology and cardiology follow-up outpatient.  She would need to follow PCP one week after discharge and repeat BMP.         Pertinent Physical Exam At Time of Discharge  Physical Exam  Constitutional: Alert oriented x 3,  no acute respiratory distress  Head and Neck: Normocephalic, atraumatic.   Respiratory: Clear bilateral breath sounds, no wheezes or rales  cardiovascular: S1-S2, regular  GI: Abdomen soft, nontender, bowel sound preserved  Extremities: No LE edema  Neuro: No neurological deficit    Outpatient Follow-Up  Future Appointments   Date Time Provider Department Center   5/6/2025 10:00 AM POR ECHO 1 PORNIC1 [place] [facility]   5/19/2025 10:00 AM Dedrick Gutierres MD [id number] [facility]       Time spent more than 30 minutes  Josefina Bean MD

## 2025-04-03 NOTE — CARE PLAN
The clinical goals for the shift include pt will remain hemodynamically stable      Problem: Discharge Planning  Goal: Discharge to home or other facility with appropriate resources  Outcome: Progressing     Problem: Fall/Injury  Goal: Not fall by end of shift  Outcome: Progressing  Goal: Be free from injury by end of the shift  Outcome: Progressing  Goal: Verbalize understanding of personal risk factors for fall in the hospital  Outcome: Progressing  Goal: Verbalize understanding of risk factor reduction measures to prevent injury from fall in the home  Outcome: Progressing  Goal: Use assistive devices by end of the shift  Outcome: Progressing  Goal: Pace activities to prevent fatigue by end of the shift  Outcome: Progressing

## 2025-04-07 ENCOUNTER — PATIENT OUTREACH (OUTPATIENT)
Dept: CARE COORDINATION | Age: OVER 89
End: 2025-04-07
Payer: MEDICARE

## 2025-04-07 DIAGNOSIS — I48.19 PERSISTENT ATRIAL FIBRILLATION (MULTI): Primary | ICD-10-CM

## 2025-04-07 RX ORDER — PANTOPRAZOLE SODIUM 40 MG/1
40 TABLET, DELAYED RELEASE ORAL
COMMUNITY
Start: 2025-04-04 | End: 2025-04-14 | Stop reason: SDUPTHER

## 2025-04-07 SDOH — HEALTH STABILITY: MENTAL HEALTH: HOW OFTEN DO YOU HAVE A DRINK CONTAINING ALCOHOL?: NEVER

## 2025-04-07 SDOH — HEALTH STABILITY: MENTAL HEALTH: HOW OFTEN DO YOU HAVE SIX OR MORE DRINKS ON ONE OCCASION?: NEVER

## 2025-04-07 SDOH — SOCIAL STABILITY: SOCIAL INSECURITY: ARE YOU MARRIED, WIDOWED, DIVORCED, SEPARATED, NEVER MARRIED, OR LIVING WITH A PARTNER?: WIDOWED

## 2025-04-07 SDOH — SOCIAL STABILITY: SOCIAL NETWORK: HOW OFTEN DO YOU GET TOGETHER WITH FRIENDS OR RELATIVES?: TWICE A WEEK

## 2025-04-07 SDOH — ECONOMIC STABILITY: FOOD INSECURITY: WITHIN THE PAST 12 MONTHS, YOU WORRIED THAT YOUR FOOD WOULD RUN OUT BEFORE YOU GOT THE MONEY TO BUY MORE.: NEVER TRUE

## 2025-04-07 SDOH — ECONOMIC STABILITY: FOOD INSECURITY: HOW HARD IS IT FOR YOU TO PAY FOR THE VERY BASICS LIKE FOOD, HOUSING, MEDICAL CARE, AND HEATING?: NOT HARD AT ALL

## 2025-04-07 SDOH — SOCIAL STABILITY: SOCIAL NETWORK
DO YOU BELONG TO ANY CLUBS OR ORGANIZATIONS SUCH AS CHURCH GROUPS, UNIONS, FRATERNAL OR ATHLETIC GROUPS, OR SCHOOL GROUPS?: NO

## 2025-04-07 SDOH — HEALTH STABILITY: MENTAL HEALTH
DO YOU FEEL STRESS - TENSE, RESTLESS, NERVOUS, OR ANXIOUS, OR UNABLE TO SLEEP AT NIGHT BECAUSE YOUR MIND IS TROUBLED ALL THE TIME - THESE DAYS?: TO SOME EXTENT

## 2025-04-07 SDOH — ECONOMIC STABILITY: HOUSING INSECURITY: AT ANY TIME IN THE PAST 12 MONTHS, WERE YOU HOMELESS OR LIVING IN A SHELTER (INCLUDING NOW)?: NO

## 2025-04-07 SDOH — ECONOMIC STABILITY: FOOD INSECURITY: WITHIN THE PAST 12 MONTHS, THE FOOD YOU BOUGHT JUST DIDN'T LAST AND YOU DIDN'T HAVE MONEY TO GET MORE.: NEVER TRUE

## 2025-04-07 SDOH — ECONOMIC STABILITY: HOUSING INSECURITY: IN THE LAST 12 MONTHS, WAS THERE A TIME WHEN YOU WERE NOT ABLE TO PAY THE MORTGAGE OR RENT ON TIME?: NO

## 2025-04-07 SDOH — SOCIAL STABILITY: SOCIAL INSECURITY: WITHIN THE LAST YEAR, HAVE YOU BEEN HUMILIATED OR EMOTIONALLY ABUSED IN OTHER WAYS BY YOUR PARTNER OR EX-PARTNER?: NO

## 2025-04-07 SDOH — ECONOMIC STABILITY: INCOME INSECURITY: IN THE PAST 12 MONTHS HAS THE ELECTRIC, GAS, OIL, OR WATER COMPANY THREATENED TO SHUT OFF SERVICES IN YOUR HOME?: NO

## 2025-04-07 SDOH — SOCIAL STABILITY: SOCIAL NETWORK: HOW OFTEN DO YOU ATTEND CHURCH OR RELIGIOUS SERVICES?: NEVER

## 2025-04-07 SDOH — SOCIAL STABILITY: SOCIAL INSECURITY: WITHIN THE LAST YEAR, HAVE YOU BEEN AFRAID OF YOUR PARTNER OR EX-PARTNER?: NO

## 2025-04-07 SDOH — HEALTH STABILITY: PHYSICAL HEALTH: ON AVERAGE, HOW MANY DAYS PER WEEK DO YOU ENGAGE IN MODERATE TO STRENUOUS EXERCISE (LIKE A BRISK WALK)?: 0 DAYS

## 2025-04-07 SDOH — ECONOMIC STABILITY: TRANSPORTATION INSECURITY: IN THE PAST 12 MONTHS, HAS LACK OF TRANSPORTATION KEPT YOU FROM MEDICAL APPOINTMENTS OR FROM GETTING MEDICATIONS?: NO

## 2025-04-07 SDOH — HEALTH STABILITY: MENTAL HEALTH: HOW MANY DRINKS CONTAINING ALCOHOL DO YOU HAVE ON A TYPICAL DAY WHEN YOU ARE DRINKING?: PATIENT DOES NOT DRINK

## 2025-04-07 SDOH — HEALTH STABILITY: PHYSICAL HEALTH: ON AVERAGE, HOW MANY MINUTES DO YOU ENGAGE IN EXERCISE AT THIS LEVEL?: 0 MIN

## 2025-04-07 SDOH — SOCIAL STABILITY: SOCIAL NETWORK: HOW OFTEN DO YOU ATTEND MEETINGS OF THE CLUBS OR ORGANIZATIONS YOU BELONG TO?: NEVER

## 2025-04-07 SDOH — ECONOMIC STABILITY: HOUSING INSECURITY: IN THE PAST 12 MONTHS, HOW MANY TIMES HAVE YOU MOVED WHERE YOU WERE LIVING?: 0

## 2025-04-07 SDOH — SOCIAL STABILITY: SOCIAL NETWORK
IN A TYPICAL WEEK, HOW MANY TIMES DO YOU TALK ON THE PHONE WITH FAMILY, FRIENDS, OR NEIGHBORS?: MORE THAN THREE TIMES A WEEK

## 2025-04-07 ASSESSMENT — ACTIVITIES OF DAILY LIVING (ADL): LACK_OF_TRANSPORTATION: NO

## 2025-04-07 ASSESSMENT — LIFESTYLE VARIABLES
SKIP TO QUESTIONS 9-10: 1
AUDIT-C TOTAL SCORE: 0

## 2025-04-08 ENCOUNTER — PATIENT OUTREACH (OUTPATIENT)
Dept: CARE COORDINATION | Age: OVER 89
End: 2025-04-08

## 2025-04-08 ENCOUNTER — TELEMEDICINE (OUTPATIENT)
Dept: PHARMACY | Facility: HOSPITAL | Age: OVER 89
End: 2025-04-08
Payer: MEDICARE

## 2025-04-08 DIAGNOSIS — I48.19 PERSISTENT ATRIAL FIBRILLATION (MULTI): ICD-10-CM

## 2025-04-08 NOTE — PROGRESS NOTES
Patient's daughter picked up the line and was checked in by clinical pharmacy. After introducing her to the [facility] program, patient's daughter requested call to be moved to tomorrow. New initial [facility] rescheduled to tomorrow, 4/9 @ 6215.

## 2025-04-09 ENCOUNTER — TELEMEDICINE (OUTPATIENT)
Dept: PHARMACY | Facility: HOSPITAL | Age: OVER 89
End: 2025-04-09
Payer: MEDICARE

## 2025-04-09 ENCOUNTER — PATIENT OUTREACH (OUTPATIENT)
Dept: CARE COORDINATION | Age: OVER 89
End: 2025-04-09

## 2025-04-09 VITALS — DIASTOLIC BLOOD PRESSURE: 82 MMHG | HEART RATE: 100 BPM | SYSTOLIC BLOOD PRESSURE: 108 MMHG

## 2025-04-09 DIAGNOSIS — I48.19 PERSISTENT ATRIAL FIBRILLATION (MULTI): ICD-10-CM

## 2025-04-09 NOTE — PROGRESS NOTES
[facility]: spoke to patients daughter Goldy, she reports patient is   A little better than before. States her appetite is improving.   Daughter lives with patient. She cares for her and takes vitals.   States HR fluctuates, not been >110 since being home.   Discussed recent family dynamics perhaps contributing to increased stress level for patient.   Reports vitals.   Not been weighing, instructed to log.   INR- dtr checks at home every Thursday. She calls cardio for changes.   Discussed nephro recs, with lab work. Referral placed and lab ordered per DC instruction. Dtr ok with us scheduling nephro.  She will either take her to [facility] or have [employer] insurance provider draw lab.   PCP at [facility], pt saw her [facility]/7.  [facility]

## 2025-04-09 NOTE — PROGRESS NOTES
"[facility]    Jennifer Magana is a 89 y.o. female was referred to Clinical Pharmacy Team to complete a post-discharge medication optimization and monitoring visit.  The patient was referred for CHF management while in [facility]. Today was our initial visit.     Admission Date: 3/29  Discharge Date: 4/3    Referring Provider: Tory Garcia APRN*  PCP: Shay Torres DO - outside PCP      Subjective   No Known Allergies    [id number] - [place], [place] - [address] [address]  [address] [address]  Phone: [phone] Fax: [phone]      Medication System Management:  Affordability/Accessibility: no issues  Adherence/Organization: no concerns      Social History     Social History Narrative    Not on file        Notable Medication changes following discharge:  Start: none from this discharge  Stop: none   Change: none    HPI      Review of Systems        Objective     There were no vitals taken for this visit.   BP Readings from Last 4 Encounters:   04/03/25 (!) 128/93   10/22/24 (!) 145/96   05/23/24 128/86   10/26/23 (!) 102/48      There were no vitals filed for this visit.     LAB  Lab Results   Component Value Date    BILITOT 0.5 03/29/2025    CALCIUM 9.3 04/03/2025    CO2 27 04/03/2025     04/03/2025    CREATININE 2.04 (H) 04/03/2025    GLUCOSE 116 (H) 04/03/2025    ALKPHOS 92 03/29/2025    K 3.7 04/03/2025    PROT 5.8 (L) 03/29/2025     04/03/2025    AST 29 03/29/2025    ALT 52 (H) 03/29/2025    BUN 37 (H) 04/03/2025    ANIONGAP 7 (L) 04/03/2025    MG 2.16 04/02/2025    ALBUMIN 3.9 03/29/2025    LIPASE 11 08/06/2023    GFRF 31 (A) 08/06/2023     No results found for: \"TRIG\", \"CHOL\", \"LDLCALC\", \"HDL\"  No results found for: \"HGBA1C\"      Current Outpatient Medications   Medication Instructions    dicyclomine (BENTYL) 20 mg, oral, 4 times daily before meals and nightly    famotidine (PEPCID) 10 mg, oral, Daily, Start on April 4th    Jantoven 2.5 mg " tablet Take 1.25mg (0.5 tablets) by mouth on Tues,Thurs and 2.5mg (1 tablet) all other days or as directed by Coumadin clinic.    levothyroxine (SYNTHROID, LEVOXYL) 100 mcg, Daily    metoprolol succinate XL (TOPROL-XL) 50 mg, oral, Daily    pantoprazole (PROTONIX) 40 mg, Daily before breakfast    Simbrinza 1-0.2 % drops,suspension ophthalmic suspension 1 drop, Both Eyes, 2 times daily    travoprost (Travatan Z) 0.004 % drops ophthalmic solution 1 drop, Both Eyes, Nightly        HISTORICAL PHARMACOTHERAPY      DRUG INTERACTIONS  None at time of review      Assessment/Plan   Problem List Items Addressed This Visit       Persistent atrial fibrillation (Multi)     -call conducted with patient's daughter, Goldy  -saw PCP on Monday 4/7 --> no changes made   -Goldy reports appetite is improving   -Goldy takes care of meds, she lives with her mom   -reports HR fluctuating all day yesterday but denies HR > 110 since being discharged   -INR checked by daughter in the home every Thursday and calls results in   -endorses some SOB but denies leg edema    /82      Medications Changes/Concerns:  Afib  Continue metoprolol succinate ER 25 mg 2 tabs once daily   Continue warfarin 2.5 mg 1/2 tab Tues/Thurs and 1 tab ROW (12.5 mg weekly total dose)   Does not qualify for DOAC based on renal function   Managed by Coumadin Clinic/cardiology @  [facility]   Confirmed amiodarone was discontinued while inpatient      Refills Needed:  -none needed today       Plan/To Do:  -order repeat labwork   -referral to nephrology   -continue to log daily vitals and INR every Thursday   -encouraged daily weights   -nursing will continue daily calls       PAP Status:  -n/a   -on all generic medications      Time Spent with Patient and [facility] Team - Tory Garcia NP and Bernie RN via phone call: 10 minutes     Follow Up: 1 week     Continue all meds under the continuation of care with the referring provider and clinical pharmacy  team.    Savanah Crowley PharmD     Verbal consent to manage patient's drug therapy was obtained from the patient. They were informed they may decline to participate or withdraw from participation in pharmacy services at any time.

## 2025-04-10 ENCOUNTER — PATIENT OUTREACH (OUTPATIENT)
Dept: CARE COORDINATION | Age: OVER 89
End: 2025-04-10
Payer: MEDICARE

## 2025-04-11 ENCOUNTER — ANTICOAGULATION - WARFARIN VISIT (OUTPATIENT)
Dept: PHARMACY | Facility: HOSPITAL | Age: OVER 89
End: 2025-04-11
Payer: MEDICARE

## 2025-04-11 DIAGNOSIS — I48.0 PAROXYSMAL ATRIAL FIBRILLATION (MULTI): ICD-10-CM

## 2025-04-11 DIAGNOSIS — I48.19 PERSISTENT ATRIAL FIBRILLATION (MULTI): Primary | ICD-10-CM

## 2025-04-11 NOTE — PATIENT INSTRUCTIONS
Your INR today was in range at 2.0. Please continue your weekly regimen of 1.25mg Tues & Thurs, and 2.5mg all other days. We will follow up next week.  If any questions arise do not hesitate to call us at [phone] M-F from 8:30am-4:30pm or at   [phone] after 5pm or on the weekends. Continue to monitor for any excess bleeding or bruising, especially for blood in your stool.

## 2025-04-11 NOTE — PROGRESS NOTES
We received two faxed results from [facility] with [facility] one from 4/3 with an INR of 1.9, and one from 4/10 with an INR of 2.0. I spoke with Goldy today, who reported no changes to her diet/medications. No signs or symptoms of bleeding/bruising. No extra/missing doses of warfarin. Given her INR is in range, we will continue with her regimen of 1.25mg Tues, Thurs and 2.5mg all other days. We will follow up next week.

## 2025-04-14 ENCOUNTER — TELEMEDICINE (OUTPATIENT)
Dept: PHARMACY | Facility: HOSPITAL | Age: OVER 89
End: 2025-04-14
Payer: MEDICARE

## 2025-04-14 ENCOUNTER — PATIENT OUTREACH (OUTPATIENT)
Dept: CARE COORDINATION | Age: OVER 89
End: 2025-04-14
Payer: MEDICARE

## 2025-04-14 ENCOUNTER — PATIENT OUTREACH (OUTPATIENT)
Dept: CARE COORDINATION | Age: OVER 89
End: 2025-04-14

## 2025-04-14 VITALS — DIASTOLIC BLOOD PRESSURE: 75 MMHG | HEART RATE: 107 BPM | SYSTOLIC BLOOD PRESSURE: 132 MMHG

## 2025-04-14 VITALS — SYSTOLIC BLOOD PRESSURE: 130 MMHG | HEART RATE: 103 BPM | DIASTOLIC BLOOD PRESSURE: 96 MMHG

## 2025-04-14 DIAGNOSIS — I48.19 PERSISTENT ATRIAL FIBRILLATION (MULTI): Primary | ICD-10-CM

## 2025-04-14 DIAGNOSIS — I48.19 PERSISTENT ATRIAL FIBRILLATION (MULTI): ICD-10-CM

## 2025-04-14 RX ORDER — PANTOPRAZOLE SODIUM 40 MG/1
40 TABLET, DELAYED RELEASE ORAL
Qty: 90 TABLET | Refills: 3 | Status: SHIPPED | OUTPATIENT
Start: 2025-04-14

## 2025-04-14 NOTE — PROGRESS NOTES
[facility]: spoke to Goldy daughter. She reports JACOB, pulse ox at rest 98% on room air. Pulse ox went to 94% with exertion walking across room and became SOB.   Discussed recent vitals.   Denies any swelling. Endorses urinary frequency, no verbal complaints of pain with urination.   Endorses abdominal pain. History of constipation.   Insurance with [employer] can do home tests.   Provided ordered CXR and and xray.   She described stools as less watery and now more soft this time.   Discussed stool sample, and labs ordered.   Her insurance will do all tests and xrays, daughter will contact them.   Discussed if CT maybe later.   Zofran discussed, provider discussed EKG results. No zofran right now, discussed BRAT diet.   [facility] scheduled,

## 2025-04-14 NOTE — PROGRESS NOTES
"[facility]    Jennifer Magana is a 89 y.o. female was referred to Clinical Pharmacy Team to complete a post-discharge medication optimization and monitoring visit.  The patient was referred for CHF management while in [facility]. Today was our second visit.       Referring Provider: Tory Garcia APRN*  PCP: Shay Torres, DO - not with        Subjective   No Known Allergies    [id number] - [place], [place] - [address] [facility] [address] [facility]  [address] [address]  Phone: [phone] Fax: [phone]      Medication System Management:  Affordability/Accessibility: no concerns currently   Adherence/Organization: no issues       Social History     Social History Narrative    Not on file          HPI      Review of Systems        Objective     There were no vitals taken for this visit.   BP Readings from Last 4 Encounters:   04/14/25 (!) 130/96   04/09/25 108/82   04/03/25 (!) 128/93   10/22/24 (!) 145/96      There were no vitals filed for this visit.     LAB  Lab Results   Component Value Date    BILITOT 0.5 03/29/2025    CALCIUM 9.3 04/03/2025    CO2 27 04/03/2025     04/03/2025    CREATININE 2.04 (H) 04/03/2025    GLUCOSE 116 (H) 04/03/2025    ALKPHOS 92 03/29/2025    K 3.7 04/03/2025    PROT 5.8 (L) 03/29/2025     04/03/2025    AST 29 03/29/2025    ALT 52 (H) 03/29/2025    BUN 37 (H) 04/03/2025    ANIONGAP 7 (L) 04/03/2025    MG 2.16 04/02/2025    ALBUMIN 3.9 03/29/2025    LIPASE 11 08/06/2023    GFRF 31 (A) 08/06/2023     No results found for: \"TRIG\", \"CHOL\", \"LDLCALC\", \"HDL\"  No results found for: \"HGBA1C\"      Current Outpatient Medications   Medication Instructions    dicyclomine (BENTYL) 20 mg, oral, 4 times daily before meals and nightly    famotidine (PEPCID) 10 mg, oral, Daily, Start on April 4th    Jantoven 2.5 mg tablet Take 1.25mg (0.5 tablets) by mouth on Tues,Thurs and 2.5mg (1 tablet) all other days or as directed by Coumadin clinic.    " levothyroxine (SYNTHROID, LEVOXYL) 100 mcg, Daily    metoprolol succinate XL (TOPROL-XL) 50 mg, oral, Daily    pantoprazole (PROTONIX) 40 mg, Daily before breakfast    Simbrinza 1-0.2 % drops,suspension ophthalmic suspension 1 drop, Both Eyes, 2 times daily    travoprost (Travatan Z) 0.004 % drops ophthalmic solution 1 drop, Both Eyes, Nightly          Assessment/Plan   Problem List Items Addressed This Visit       Persistent atrial fibrillation (Multi)       -call conducted with patient's daughter, Goldy Madison takes care of meds, she lives with her mom   -has been feeling pretty poor past few days --> severe nausea, SOB and has been having diarrhea   -also has had a poor appetite     -INR checked by daughter in the home every Thursday and calls results in to Coumadin Clinic  -most recent INR was 4/10 --> was 2.0  -most recent EF was 48%  -monitoring BP's daily     /75      Medications Changes/Concerns:  Afib  Continue metoprolol succinate ER 25 mg 2 tabs once daily   Continue warfarin 2.5 mg 1/2 tab Tues/Thurs and 1 tab ROW (12.5 mg weekly total dose)   Does not qualify for DOAC based on renal function   Managed by Coumadin Clinic/cardiology @  [facility]   Confirmed amiodarone was discontinued while inpatient      Refills Needed:  -none needed today       Plan/To Do:  -repeat blood work  -CXR to rule out PNA  -also will order flu/covid swabs  -nursing to continue with calls        PAP Status:  N/a  -on all generic/non-PAP medications currently       Time Spent with Patient and [facility] Team - Tory Garcia NP and EDGARD Gibbs via phone call: 20 minutes      Follow Up: 1 week     Continue all meds under the continuation of care with the referring provider and clinical pharmacy team.    Josh Chávez PharmD     Verbal consent to manage patient's drug therapy was obtained from the patient and an individual authorized to act on behalf of a patient. They were informed they may decline to participate or  withdraw from participation in pharmacy services at any time.

## 2025-04-14 NOTE — PROGRESS NOTES
Spoke to daughter Goldy, states patient not feeling well. Reports severe nausea, no emesis, SOB, and diarrhea past few days. Diarrhea wakes her at night.   Reports vitals and advised to check pulse ox when SOB.   Discussed meds, only new med is metoprolol, dose decreased from hospital from 200mg.)  Denies swelling, states weight is same. HR fluctuates,   Denies fever. Advised to take med with food, but appetite very poor.   Advised to keep monitoring vitals and [facility] rescheduled for today.

## 2025-04-15 ENCOUNTER — HOSPITAL ENCOUNTER (OUTPATIENT)
Dept: RADIOLOGY | Facility: CLINIC | Age: OVER 89
Discharge: HOME | End: 2025-04-15
Payer: MEDICARE

## 2025-04-15 ENCOUNTER — PATIENT OUTREACH (OUTPATIENT)
Dept: CARE COORDINATION | Age: OVER 89
End: 2025-04-15
Payer: MEDICARE

## 2025-04-15 DIAGNOSIS — R68.89 FLU-LIKE SYMPTOMS: ICD-10-CM

## 2025-04-15 PROCEDURE — 74021 RADEX ABDOMEN 3+ VIEWS: CPT

## 2025-04-15 PROCEDURE — 71046 X-RAY EXAM CHEST 2 VIEWS: CPT

## 2025-04-15 PROCEDURE — 71046 X-RAY EXAM CHEST 2 VIEWS: CPT | Performed by: RADIOLOGY

## 2025-04-15 NOTE — PROGRESS NOTES
Daily call, spoke to Goldy, she is just getting going this morning. No vitals yet, she will call later if concerning.   She is taking patient to have labs and films at local location today, her insurance is not coming to her.   She states she gave her imodium last night because stools got more loose.   She will monitor her stools and if worsen, provider considered stool tests.   Next call reviewed.

## 2025-04-16 ENCOUNTER — PATIENT OUTREACH (OUTPATIENT)
Dept: CARE COORDINATION | Age: OVER 89
End: 2025-04-16

## 2025-04-16 ENCOUNTER — APPOINTMENT (OUTPATIENT)
Dept: CARE COORDINATION | Age: OVER 89
End: 2025-04-16
Payer: MEDICARE

## 2025-04-16 ENCOUNTER — APPOINTMENT (OUTPATIENT)
Dept: PHARMACY | Facility: HOSPITAL | Age: OVER 89
End: 2025-04-16
Payer: MEDICARE

## 2025-04-16 VITALS
OXYGEN SATURATION: 94 % | WEIGHT: 126.4 LBS | SYSTOLIC BLOOD PRESSURE: 115 MMHG | HEART RATE: 106 BPM | BODY MASS INDEX: 23.12 KG/M2 | DIASTOLIC BLOOD PRESSURE: 87 MMHG

## 2025-04-16 NOTE — PROGRESS NOTES
Daily call, spoke to daughter she reports a rough night last night for patient. While diarrhea weaning, with softer BM's, she was up SOB not able to rest due to breathing. No more Imodium since night before last.   Daughter reports weight, no visible swelling. Pulse ox 94% on room air during dyspnea.   Vitals noted, denies any fever.   She had blood work done yesterday, reviewed with daughter. Lab location unable to do swabs. She will have to pay$75 to do at office, so since imaging pending she will wait for those results before acquiring swabs.   Will monitor for imaging results throughout day. Advised Dtr if imaging results, will send to our provider for review.

## 2025-04-17 ENCOUNTER — PATIENT OUTREACH (OUTPATIENT)
Dept: CARE COORDINATION | Age: OVER 89
End: 2025-04-17
Payer: MEDICARE

## 2025-04-17 VITALS — OXYGEN SATURATION: 97 % | DIASTOLIC BLOOD PRESSURE: 92 MMHG | SYSTOLIC BLOOD PRESSURE: 136 MMHG | HEART RATE: 83 BPM

## 2025-04-17 DIAGNOSIS — J90 PLEURAL EFFUSION: ICD-10-CM

## 2025-04-17 DIAGNOSIS — J18.9 COMMUNITY ACQUIRED PNEUMONIA, UNSPECIFIED LATERALITY: Primary | ICD-10-CM

## 2025-04-17 RX ORDER — FUROSEMIDE 20 MG/1
20 TABLET ORAL DAILY
Qty: 3 TABLET | Refills: 0 | Status: SHIPPED | OUTPATIENT
Start: 2025-04-17 | End: 2025-04-20

## 2025-04-17 RX ORDER — DOXYCYCLINE 100 MG/1
100 CAPSULE ORAL 2 TIMES DAILY
Qty: 20 CAPSULE | Refills: 0 | Status: SHIPPED | OUTPATIENT
Start: 2025-04-17 | End: 2025-04-27

## 2025-04-17 NOTE — PROGRESS NOTES
"Daily Call Note:   Chest x-ray results from 4/16 listed below in note; messages sent to provider.    Chest x-ray results:  \"Mild coarse interstitial thickening is seen throughout the lungs  bilaterally, and may represent fibrosis, edema and/or pneumonia.  Small bilateral pleural effusions are present. Bibasilar airspace  consolidations are seen and may represent atelectasis and/or  pneumonia. No pneumothorax is identified. The cardiac silhouette is  within normal limits for size. Mild-to-moderate discogenic  degenerative changes are seen throughout the thoracic spine.\"      IMPRESSION:  \"Diffuse interstitial infiltrates, bilateral pleural effusions and  bibasilar airspace consolidations, as above. Clinical correlation and  continued follow-up until clearing is recommended.\"    1240: Tory Garcia CNP- putting in an order for 20 mg lasix x 3 days, also a    course of antibiotics. Daughter called to update on new orders.    1325: Called back and left instructions with pt's daughter Goldy. Orders have been placed for Lasix and Doxycycline. Order to repeat a Chest X-Ray after antibiotic course. Instructed if pt feels worse, such as increase in SOB, chest pain, dizziness or lightheadedness, or any other new or worsening symptoms, pt to be evaluated in ER.    Topics for Daily Review: chest xray results, messages sent to provider.  Daughter demonstrates clear understanding: Yes    Daily VS:  BP (!) 136/92   Pulse 83   SpO2 97%       Last 3 Weights:  Wt Readings from Last 7 Encounters:   04/16/25 57.3 kg (126 lb 6.4 oz)   04/03/25 54.9 kg (121 lb 0.5 oz)   10/26/23 58.1 kg (128 lb)   02/09/23 59.9 kg (132 lb)   10/03/22 59.1 kg (130 lb 4 oz)   06/28/22 59 kg (130 lb 2 oz)   08/31/20 57.9 kg (127 lb 9.6 oz)       Masimo Device: No       Virtual Visits--Scheduled (Most Recent Date at Top)  Follow up Appointments  Recent Visits  No visits were found meeting these conditions.  Showing recent visits within past 30 days and " meeting all other requirements  Future Appointments  No visits were found meeting these conditions.  Showing future appointments within next 90 days and meeting all other requirements       Frequency of RN Calls & Virtual Visits per Team Agreement: [facility] Frequency: Daily    Medication issues Addressed (what was done): NEW: Doxycycline and Lasix ordered today.    Follow up appointments scheduled by [facility] Staff: [facility] 4/21 @5944

## 2025-04-17 NOTE — PROGRESS NOTES
CXR shows diffuse interstitial infiltrates and bilateral pleural effusions not previously seen on CXR. Will order 20 mg lasix daily x 3 days and Doxycline 100 mg BID x 10 days. Will need to repeat CXR after antibiotic course

## 2025-04-18 ENCOUNTER — PATIENT OUTREACH (OUTPATIENT)
Dept: CARE COORDINATION | Age: OVER 89
End: 2025-04-18
Payer: MEDICARE

## 2025-04-19 ENCOUNTER — PATIENT OUTREACH (OUTPATIENT)
Dept: CARE COORDINATION | Age: OVER 89
End: 2025-04-19
Payer: MEDICARE

## 2025-04-21 ENCOUNTER — PATIENT OUTREACH (OUTPATIENT)
Dept: CARE COORDINATION | Age: OVER 89
End: 2025-04-21

## 2025-04-21 ENCOUNTER — APPOINTMENT (OUTPATIENT)
Dept: CARE COORDINATION | Age: OVER 89
End: 2025-04-21
Payer: MEDICARE

## 2025-04-22 ENCOUNTER — APPOINTMENT (OUTPATIENT)
Dept: RADIOLOGY | Facility: HOSPITAL | Age: OVER 89
DRG: 871 | End: 2025-04-22
Payer: MEDICARE

## 2025-04-22 ENCOUNTER — PATIENT OUTREACH (OUTPATIENT)
Dept: CARE COORDINATION | Age: OVER 89
End: 2025-04-22
Payer: MEDICARE

## 2025-04-22 ENCOUNTER — APPOINTMENT (OUTPATIENT)
Dept: CARDIOLOGY | Facility: HOSPITAL | Age: OVER 89
DRG: 871 | End: 2025-04-22
Payer: MEDICARE

## 2025-04-22 ENCOUNTER — HOSPITAL ENCOUNTER (INPATIENT)
Facility: HOSPITAL | Age: OVER 89
DRG: 871 | End: 2025-04-22
Attending: EMERGENCY MEDICINE | Admitting: INTERNAL MEDICINE
Payer: MEDICARE

## 2025-04-22 DIAGNOSIS — R78.81 BACTEREMIA: ICD-10-CM

## 2025-04-22 DIAGNOSIS — R60.0 LOWER EXTREMITY EDEMA: ICD-10-CM

## 2025-04-22 DIAGNOSIS — I48.91 ATRIAL FIBRILLATION, UNSPECIFIED TYPE (MULTI): ICD-10-CM

## 2025-04-22 DIAGNOSIS — R65.20 SEPSIS WITH ACUTE ORGAN DYSFUNCTION WITHOUT SEPTIC SHOCK, DUE TO UNSPECIFIED ORGANISM, UNSPECIFIED ORGAN DYSFUNCTION TYPE (MULTI): ICD-10-CM

## 2025-04-22 DIAGNOSIS — R10.9 ABDOMINAL PAIN, UNSPECIFIED ABDOMINAL LOCATION: Primary | ICD-10-CM

## 2025-04-22 DIAGNOSIS — D72.829 LEUKOCYTOSIS, UNSPECIFIED TYPE: ICD-10-CM

## 2025-04-22 DIAGNOSIS — N18.4 STAGE 4 CHRONIC KIDNEY DISEASE (MULTI): ICD-10-CM

## 2025-04-22 DIAGNOSIS — I50.33 ACUTE ON CHRONIC DIASTOLIC CHF (CONGESTIVE HEART FAILURE): ICD-10-CM

## 2025-04-22 DIAGNOSIS — A41.9 SEPSIS WITH ACUTE ORGAN DYSFUNCTION WITHOUT SEPTIC SHOCK, DUE TO UNSPECIFIED ORGANISM, UNSPECIFIED ORGAN DYSFUNCTION TYPE (MULTI): ICD-10-CM

## 2025-04-22 PROBLEM — K21.00 GASTROESOPHAGEAL REFLUX DISEASE WITH ESOPHAGITIS: Status: ACTIVE | Noted: 2025-04-22

## 2025-04-22 PROBLEM — K80.20 CALCULUS OF GALLBLADDER WITHOUT CHOLECYSTITIS WITHOUT OBSTRUCTION: Status: ACTIVE | Noted: 2025-04-22

## 2025-04-22 LAB
ALBUMIN SERPL BCP-MCNC: 4.5 G/DL (ref 3.4–5)
ALP SERPL-CCNC: 91 U/L (ref 33–136)
ALT SERPL W P-5'-P-CCNC: 13 U/L (ref 7–45)
AMYLASE SERPL-CCNC: 15 U/L (ref 29–103)
ANION GAP SERPL CALC-SCNC: 16 MMOL/L (ref 10–20)
AST SERPL W P-5'-P-CCNC: 16 U/L (ref 9–39)
BASOPHILS # BLD MANUAL: 0 X10*3/UL (ref 0–0.1)
BASOPHILS NFR BLD MANUAL: 0 %
BILIRUB SERPL-MCNC: 1.2 MG/DL (ref 0–1.2)
BNP SERPL-MCNC: 680 PG/ML (ref 0–99)
BUN SERPL-MCNC: 27 MG/DL (ref 6–23)
CALCIUM SERPL-MCNC: 10.1 MG/DL (ref 8.6–10.3)
CARDIAC TROPONIN I PNL SERPL HS: 14 NG/L (ref 0–13)
CARDIAC TROPONIN I PNL SERPL HS: 15 NG/L (ref 0–13)
CHLORIDE SERPL-SCNC: 103 MMOL/L (ref 98–107)
CO2 SERPL-SCNC: 26 MMOL/L (ref 21–32)
CREAT SERPL-MCNC: 1.93 MG/DL (ref 0.5–1.05)
EGFRCR SERPLBLD CKD-EPI 2021: 23 ML/MIN/1.73M*2
EOSINOPHIL # BLD MANUAL: 0 X10*3/UL (ref 0–0.4)
EOSINOPHIL NFR BLD MANUAL: 0 %
ERYTHROCYTE [DISTWIDTH] IN BLOOD BY AUTOMATED COUNT: 15.2 % (ref 11.5–14.5)
GLUCOSE SERPL-MCNC: 160 MG/DL (ref 74–99)
HCT VFR BLD AUTO: 49 % (ref 36–46)
HGB BLD-MCNC: 15.1 G/DL (ref 12–16)
IMM GRANULOCYTES # BLD AUTO: 0.18 X10*3/UL (ref 0–0.5)
IMM GRANULOCYTES NFR BLD AUTO: 0.7 % (ref 0–0.9)
LACTATE SERPL-SCNC: 2 MMOL/L (ref 0.4–2)
LACTATE SERPL-SCNC: 2.1 MMOL/L (ref 0.4–2)
LACTATE SERPL-SCNC: 2.6 MMOL/L (ref 0.4–2)
LIPASE SERPL-CCNC: 11 U/L (ref 9–82)
LYMPHOCYTES # BLD MANUAL: 0.51 X10*3/UL (ref 0.8–3)
LYMPHOCYTES NFR BLD MANUAL: 2 %
MAGNESIUM SERPL-MCNC: 1.98 MG/DL (ref 1.6–2.4)
MCH RBC QN AUTO: 29.7 PG (ref 26–34)
MCHC RBC AUTO-ENTMCNC: 30.8 G/DL (ref 32–36)
MCV RBC AUTO: 96 FL (ref 80–100)
METAMYELOCYTES # BLD MANUAL: 0.51 X10*3/UL
METAMYELOCYTES NFR BLD MANUAL: 2 %
MONOCYTES # BLD MANUAL: 0.76 X10*3/UL (ref 0.05–0.8)
MONOCYTES NFR BLD MANUAL: 3 %
MYELOCYTES # BLD MANUAL: 0.25 X10*3/UL
MYELOCYTES NFR BLD MANUAL: 1 %
NEUTROPHILS # BLD MANUAL: 23.37 X10*3/UL (ref 1.6–5.5)
NEUTS BAND # BLD MANUAL: 1.02 X10*3/UL (ref 0–0.5)
NEUTS BAND NFR BLD MANUAL: 4 %
NEUTS SEG # BLD MANUAL: 22.35 X10*3/UL (ref 1.6–5)
NEUTS SEG NFR BLD MANUAL: 88 %
NRBC BLD-RTO: 0 /100 WBCS (ref 0–0)
PHOSPHATE SERPL-MCNC: 3.2 MG/DL (ref 2.5–4.9)
PLATELET # BLD AUTO: 340 X10*3/UL (ref 150–450)
POTASSIUM SERPL-SCNC: 4.2 MMOL/L (ref 3.5–5.3)
PROT SERPL-MCNC: 7.3 G/DL (ref 6.4–8.2)
RBC # BLD AUTO: 5.09 X10*6/UL (ref 4–5.2)
RBC MORPH BLD: ABNORMAL
SODIUM SERPL-SCNC: 141 MMOL/L (ref 136–145)
TOTAL CELLS COUNTED BLD: 100
TSH SERPL-ACNC: 1.26 MIU/L (ref 0.44–3.98)
WBC # BLD AUTO: 25.4 X10*3/UL (ref 4.4–11.3)

## 2025-04-22 PROCEDURE — 83605 ASSAY OF LACTIC ACID: CPT | Performed by: PHYSICIAN ASSISTANT

## 2025-04-22 PROCEDURE — 83690 ASSAY OF LIPASE: CPT | Performed by: PHYSICIAN ASSISTANT

## 2025-04-22 PROCEDURE — 85007 BL SMEAR W/DIFF WBC COUNT: CPT | Performed by: PHYSICIAN ASSISTANT

## 2025-04-22 PROCEDURE — 2500000004 HC RX 250 GENERAL PHARMACY W/ HCPCS (ALT 636 FOR OP/ED): Mod: JZ | Performed by: PHYSICIAN ASSISTANT

## 2025-04-22 PROCEDURE — 83735 ASSAY OF MAGNESIUM: CPT | Performed by: PHYSICIAN ASSISTANT

## 2025-04-22 PROCEDURE — 99291 CRITICAL CARE FIRST HOUR: CPT | Mod: 25 | Performed by: PHYSICIAN ASSISTANT

## 2025-04-22 PROCEDURE — 96361 HYDRATE IV INFUSION ADD-ON: CPT

## 2025-04-22 PROCEDURE — 83880 ASSAY OF NATRIURETIC PEPTIDE: CPT | Performed by: PHYSICIAN ASSISTANT

## 2025-04-22 PROCEDURE — 71045 X-RAY EXAM CHEST 1 VIEW: CPT | Performed by: STUDENT IN AN ORGANIZED HEALTH CARE EDUCATION/TRAINING PROGRAM

## 2025-04-22 PROCEDURE — 96365 THER/PROPH/DIAG IV INF INIT: CPT

## 2025-04-22 PROCEDURE — 74176 CT ABD & PELVIS W/O CONTRAST: CPT | Performed by: SURGERY

## 2025-04-22 PROCEDURE — 2500000004 HC RX 250 GENERAL PHARMACY W/ HCPCS (ALT 636 FOR OP/ED): Mod: JZ | Performed by: EMERGENCY MEDICINE

## 2025-04-22 PROCEDURE — 84443 ASSAY THYROID STIM HORMONE: CPT

## 2025-04-22 PROCEDURE — 93005 ELECTROCARDIOGRAM TRACING: CPT

## 2025-04-22 PROCEDURE — 96375 TX/PRO/DX INJ NEW DRUG ADDON: CPT

## 2025-04-22 PROCEDURE — 99223 1ST HOSP IP/OBS HIGH 75: CPT | Performed by: INTERNAL MEDICINE

## 2025-04-22 PROCEDURE — 2500000001 HC RX 250 WO HCPCS SELF ADMINISTERED DRUGS (ALT 637 FOR MEDICARE OP): Performed by: INTERNAL MEDICINE

## 2025-04-22 PROCEDURE — 2020000001 HC ICU ROOM DAILY

## 2025-04-22 PROCEDURE — 84075 ASSAY ALKALINE PHOSPHATASE: CPT | Performed by: PHYSICIAN ASSISTANT

## 2025-04-22 PROCEDURE — 87205 SMEAR GRAM STAIN: CPT | Mod: PORLAB | Performed by: PHYSICIAN ASSISTANT

## 2025-04-22 PROCEDURE — 36415 COLL VENOUS BLD VENIPUNCTURE: CPT | Performed by: PHYSICIAN ASSISTANT

## 2025-04-22 PROCEDURE — 71250 CT THORAX DX C-: CPT

## 2025-04-22 PROCEDURE — 85027 COMPLETE CBC AUTOMATED: CPT | Performed by: PHYSICIAN ASSISTANT

## 2025-04-22 PROCEDURE — 84484 ASSAY OF TROPONIN QUANT: CPT | Performed by: PHYSICIAN ASSISTANT

## 2025-04-22 PROCEDURE — 71045 X-RAY EXAM CHEST 1 VIEW: CPT

## 2025-04-22 PROCEDURE — 84145 PROCALCITONIN (PCT): CPT | Mod: PORLAB

## 2025-04-22 PROCEDURE — 82150 ASSAY OF AMYLASE: CPT

## 2025-04-22 PROCEDURE — 84100 ASSAY OF PHOSPHORUS: CPT

## 2025-04-22 PROCEDURE — 99285 EMERGENCY DEPT VISIT HI MDM: CPT | Mod: 25 | Performed by: EMERGENCY MEDICINE

## 2025-04-22 PROCEDURE — 71250 CT THORAX DX C-: CPT | Performed by: SURGERY

## 2025-04-22 RX ORDER — GUAIFENESIN 600 MG/1
600 TABLET, EXTENDED RELEASE ORAL EVERY 12 HOURS PRN
Status: DISCONTINUED | OUTPATIENT
Start: 2025-04-22 | End: 2025-04-28 | Stop reason: HOSPADM

## 2025-04-22 RX ORDER — POLYETHYLENE GLYCOL 3350 17 G/17G
17 POWDER, FOR SOLUTION ORAL DAILY
Status: DISCONTINUED | OUTPATIENT
Start: 2025-04-23 | End: 2025-04-28

## 2025-04-22 RX ORDER — BISACODYL 5 MG
10 TABLET, DELAYED RELEASE (ENTERIC COATED) ORAL DAILY PRN
Status: DISCONTINUED | OUTPATIENT
Start: 2025-04-22 | End: 2025-04-28 | Stop reason: HOSPADM

## 2025-04-22 RX ORDER — METOPROLOL TARTRATE 1 MG/ML
5 INJECTION, SOLUTION INTRAVENOUS ONCE
Status: COMPLETED | OUTPATIENT
Start: 2025-04-22 | End: 2025-04-22

## 2025-04-22 RX ORDER — ACETAMINOPHEN 325 MG/1
650 TABLET ORAL EVERY 6 HOURS PRN
Status: DISCONTINUED | OUTPATIENT
Start: 2025-04-22 | End: 2025-04-28 | Stop reason: HOSPADM

## 2025-04-22 RX ORDER — PANTOPRAZOLE SODIUM 40 MG/1
40 TABLET, DELAYED RELEASE ORAL
Status: DISCONTINUED | OUTPATIENT
Start: 2025-04-23 | End: 2025-04-28 | Stop reason: HOSPADM

## 2025-04-22 RX ORDER — TALC
3 POWDER (GRAM) TOPICAL NIGHTLY PRN
Status: DISCONTINUED | OUTPATIENT
Start: 2025-04-22 | End: 2025-04-28 | Stop reason: HOSPADM

## 2025-04-22 RX ORDER — MORPHINE SULFATE 2 MG/ML
2 INJECTION, SOLUTION INTRAMUSCULAR; INTRAVENOUS EVERY 4 HOURS PRN
Status: DISCONTINUED | OUTPATIENT
Start: 2025-04-22 | End: 2025-04-28 | Stop reason: HOSPADM

## 2025-04-22 RX ORDER — LEVOTHYROXINE SODIUM 100 UG/1
100 TABLET ORAL DAILY
Status: DISCONTINUED | OUTPATIENT
Start: 2025-04-23 | End: 2025-04-28 | Stop reason: HOSPADM

## 2025-04-22 RX ORDER — LATANOPROST 50 UG/ML
1 SOLUTION/ DROPS OPHTHALMIC NIGHTLY
Status: DISCONTINUED | OUTPATIENT
Start: 2025-04-22 | End: 2025-04-28 | Stop reason: HOSPADM

## 2025-04-22 RX ORDER — PANTOPRAZOLE SODIUM 40 MG/10ML
40 INJECTION, POWDER, LYOPHILIZED, FOR SOLUTION INTRAVENOUS
Status: DISCONTINUED | OUTPATIENT
Start: 2025-04-23 | End: 2025-04-28 | Stop reason: HOSPADM

## 2025-04-22 RX ADMIN — LATANOPROST 1 DROP: 50 SOLUTION OPHTHALMIC at 22:37

## 2025-04-22 RX ADMIN — AMIODARONE HYDROCHLORIDE 1 MG/MIN: 1.8 INJECTION, SOLUTION INTRAVENOUS at 21:03

## 2025-04-22 RX ADMIN — SODIUM CHLORIDE 1000 ML: 0.9 INJECTION, SOLUTION INTRAVENOUS at 15:57

## 2025-04-22 RX ADMIN — SODIUM CHLORIDE 500 ML: 0.9 INJECTION, SOLUTION INTRAVENOUS at 17:19

## 2025-04-22 RX ADMIN — AMIODARONE HYDROCHLORIDE 150 MG: 1.5 INJECTION, SOLUTION INTRAVENOUS at 20:49

## 2025-04-22 RX ADMIN — SODIUM CHLORIDE 500 ML: 9 INJECTION, SOLUTION INTRAVENOUS at 16:38

## 2025-04-22 RX ADMIN — PIPERACILLIN SODIUM AND TAZOBACTAM SODIUM 4.5 G: 4; .5 INJECTION, SOLUTION INTRAVENOUS at 17:19

## 2025-04-22 RX ADMIN — METOPROLOL TARTRATE 5 MG: 5 INJECTION INTRAVENOUS at 20:11

## 2025-04-22 SDOH — SOCIAL STABILITY: SOCIAL INSECURITY: HAVE YOU HAD THOUGHTS OF HARMING ANYONE ELSE?: NO

## 2025-04-22 SDOH — SOCIAL STABILITY: SOCIAL INSECURITY: WITHIN THE LAST YEAR, HAVE YOU BEEN AFRAID OF YOUR PARTNER OR EX-PARTNER?: NO

## 2025-04-22 SDOH — ECONOMIC STABILITY: INCOME INSECURITY: IN THE PAST 12 MONTHS HAS THE ELECTRIC, GAS, OIL, OR WATER COMPANY THREATENED TO SHUT OFF SERVICES IN YOUR HOME?: NO

## 2025-04-22 SDOH — SOCIAL STABILITY: SOCIAL INSECURITY: WITHIN THE LAST YEAR, HAVE YOU BEEN HUMILIATED OR EMOTIONALLY ABUSED IN OTHER WAYS BY YOUR PARTNER OR EX-PARTNER?: NO

## 2025-04-22 SDOH — ECONOMIC STABILITY: FOOD INSECURITY: WITHIN THE PAST 12 MONTHS, YOU WORRIED THAT YOUR FOOD WOULD RUN OUT BEFORE YOU GOT THE MONEY TO BUY MORE.: NEVER TRUE

## 2025-04-22 SDOH — ECONOMIC STABILITY: FOOD INSECURITY: WITHIN THE PAST 12 MONTHS, THE FOOD YOU BOUGHT JUST DIDN'T LAST AND YOU DIDN'T HAVE MONEY TO GET MORE.: NEVER TRUE

## 2025-04-22 SDOH — SOCIAL STABILITY: SOCIAL INSECURITY: WERE YOU ABLE TO COMPLETE ALL THE BEHAVIORAL HEALTH SCREENINGS?: YES

## 2025-04-22 ASSESSMENT — COGNITIVE AND FUNCTIONAL STATUS - GENERAL
MOVING FROM LYING ON BACK TO SITTING ON SIDE OF FLAT BED WITH BEDRAILS: A LITTLE
STANDING UP FROM CHAIR USING ARMS: A LITTLE
HELP NEEDED FOR BATHING: A LITTLE
PATIENT BASELINE BEDBOUND: NO
DRESSING REGULAR LOWER BODY CLOTHING: A LITTLE
TOILETING: A LITTLE
MOVING TO AND FROM BED TO CHAIR: A LITTLE
DAILY ACTIVITIY SCORE: 24
DAILY ACTIVITIY SCORE: 21
MOBILITY SCORE: 24
TURNING FROM BACK TO SIDE WHILE IN FLAT BAD: A LITTLE
WALKING IN HOSPITAL ROOM: A LITTLE
CLIMB 3 TO 5 STEPS WITH RAILING: A LITTLE
MOBILITY SCORE: 18

## 2025-04-22 ASSESSMENT — PATIENT HEALTH QUESTIONNAIRE - PHQ9
1. LITTLE INTEREST OR PLEASURE IN DOING THINGS: NOT AT ALL
SUM OF ALL RESPONSES TO PHQ9 QUESTIONS 1 & 2: 0
2. FEELING DOWN, DEPRESSED OR HOPELESS: NOT AT ALL

## 2025-04-22 ASSESSMENT — ACTIVITIES OF DAILY LIVING (ADL)
ASSISTIVE_DEVICE: CANE;DENTURES LOWER;DENTURES UPPER;EYEGLASSES
BATHING: INDEPENDENT
TOILETING: INDEPENDENT
GROOMING: INDEPENDENT
FEEDING YOURSELF: INDEPENDENT
HEARING - LEFT EAR: DIFFICULTY WITH NOISE
ADEQUATE_TO_COMPLETE_ADL: YES
DRESSING YOURSELF: INDEPENDENT
JUDGMENT_ADEQUATE_SAFELY_COMPLETE_DAILY_ACTIVITIES: YES
HEARING - RIGHT EAR: DIFFICULTY WITH NOISE
LACK_OF_TRANSPORTATION: NO
WALKS IN HOME: INDEPENDENT
LACK_OF_TRANSPORTATION: NO
PATIENT'S MEMORY ADEQUATE TO SAFELY COMPLETE DAILY ACTIVITIES?: YES

## 2025-04-22 ASSESSMENT — LIFESTYLE VARIABLES
SUBSTANCE_ABUSE_PAST_12_MONTHS: NO
AUDIT-C TOTAL SCORE: 0
PRESCIPTION_ABUSE_PAST_12_MONTHS: NO
HOW MANY STANDARD DRINKS CONTAINING ALCOHOL DO YOU HAVE ON A TYPICAL DAY: PATIENT DOES NOT DRINK
EVER HAD A DRINK FIRST THING IN THE MORNING TO STEADY YOUR NERVES TO GET RID OF A HANGOVER: NO
HOW OFTEN DO YOU HAVE 6 OR MORE DRINKS ON ONE OCCASION: NEVER
HAVE YOU EVER FELT YOU SHOULD CUT DOWN ON YOUR DRINKING: NO
HOW OFTEN DO YOU HAVE A DRINK CONTAINING ALCOHOL: NEVER
HAVE PEOPLE ANNOYED YOU BY CRITICIZING YOUR DRINKING: NO
EVER FELT BAD OR GUILTY ABOUT YOUR DRINKING: NO
SKIP TO QUESTIONS 9-10: 1
AUDIT-C TOTAL SCORE: 0
TOTAL SCORE: 0

## 2025-04-22 ASSESSMENT — PAIN - FUNCTIONAL ASSESSMENT
PAIN_FUNCTIONAL_ASSESSMENT: 0-10
PAIN_FUNCTIONAL_ASSESSMENT: 0-10

## 2025-04-22 ASSESSMENT — COLUMBIA-SUICIDE SEVERITY RATING SCALE - C-SSRS
1. IN THE PAST MONTH, HAVE YOU WISHED YOU WERE DEAD OR WISHED YOU COULD GO TO SLEEP AND NOT WAKE UP?: NO
2. HAVE YOU ACTUALLY HAD ANY THOUGHTS OF KILLING YOURSELF?: NO
6. HAVE YOU EVER DONE ANYTHING, STARTED TO DO ANYTHING, OR PREPARED TO DO ANYTHING TO END YOUR LIFE?: NO

## 2025-04-22 ASSESSMENT — ENCOUNTER SYMPTOMS
ABDOMINAL PAIN: 1
CHILLS: 1
APPETITE CHANGE: 1
SHORTNESS OF BREATH: 1

## 2025-04-22 ASSESSMENT — PAIN SCALES - GENERAL
PAINLEVEL_OUTOF10: 0 - NO PAIN
PAINLEVEL_OUTOF10: 6

## 2025-04-22 ASSESSMENT — PAIN DESCRIPTION - LOCATION: LOCATION: ABDOMEN

## 2025-04-22 NOTE — ED PROVIDER NOTES
EMERGENCY MEDICINE EVALUATION NOTE    History of Present Illness     Chief Complaint:   Chief Complaint   Patient presents with    Abdominal Pain     Was recently here for  an admission.  States has not felt good since coming home.  C/o abd pain with nausea and vomiting.   Pt is on antibiotics for pneumonia.         HPI: Jennifer Magana is a 89 y.o. female presents with a chief complaint of multiple complaints.  Patient was recently admitted here for chest pain shortness of breath and A-fib.  States that time she was diagnosed with pneumonia and was discharged home with antibiotics.  She came in today complaining of abdominal pain with nausea and vomiting.  Family reports she has not been eating or drinking appropriately over the last few days.  Patient says she has a bandlike pain across her abdomen that is increasing the pain.  When asked specifically she states that she does not have any nausea or vomiting but family reports that she vomited this morning this is actually reason they brought her in today.  She believes that she is been having normal bowel movements for the last couple days.  She denies urinary symptoms such as dysuria or frequency.  Patient denies any chest pain or shortness of breath currently.    Previous History   Medical History[1]  Surgical History[2]  Social History[3]  Family History[4]  Allergies[5]  Current Outpatient Medications   Medication Instructions    dicyclomine (BENTYL) 20 mg, oral, 4 times daily before meals and nightly    doxycycline (VIBRAMYCIN) 100 mg, oral, 2 times daily, Take with at least 8 ounces (large glass) of water, do not lie down for 30 minutes after    famotidine (PEPCID) 10 mg, oral, Daily, Start on April 4th    furosemide (LASIX) 20 mg, oral, Daily    Jantoven 2.5 mg tablet Take 1.25mg (0.5 tablets) by mouth on Tues,Thurs and 2.5mg (1 tablet) all other days or as directed by Coumadin clinic.    levothyroxine (SYNTHROID, LEVOXYL) 100 mcg, Daily    metoprolol  succinate XL (TOPROL-XL) 50 mg, oral, Daily    pantoprazole (PROTONIX) 40 mg, oral, Daily before breakfast    Simbrinza 1-0.2 % drops,suspension ophthalmic suspension 1 drop, Both Eyes, 2 times daily    travoprost (Travatan Z) 0.004 % drops ophthalmic solution 1 drop, Both Eyes, Nightly       Physical Exam     Appearance: Alert, oriented , cooperative     Skin: Intact,  dry skin, no lesions, rash, petechiae or purpura.      Eyes: PERRLA, EOMs intact,  Conjunctiva pink      ENT: Hearing grossly intact. Pharynx clear     Neck: Supple. Trachea at midline.      Pulmonary: Slightly decreased throughout with no rales, rhonchi or wheezing. No accessory muscle use or stridor.     Cardiac: Tachycardic.  Irregularly irregular.     Abdomen: Soft, active bowel sounds.  Diffuse abdominal tenderness.  No guarding or rebound.     Musculoskeletal: Full range of motion. no pain, edema, or deformity.      Neurological:Cranial nerves II through XII are grossly intact, normal sensation, no weakness, no focal findings identified.     Results     Labs Reviewed   CBC WITH AUTO DIFFERENTIAL - Abnormal       Result Value    WBC 25.4 (*)     nRBC 0.0      RBC 5.09      Hemoglobin 15.1      Hematocrit 49.0 (*)     MCV 96      MCH 29.7      MCHC 30.8 (*)     RDW 15.2 (*)     Platelets 340      Immature Granulocytes %, Automated 0.7      Immature Granulocytes Absolute, Automated 0.18      Narrative:     The previously reported component Neutrophils % is no longer being reported.  The previously reported component Lymphocytes % is no longer being reported.  The previously reported component Monocytes % is no longer being reported.  The previously   reported component Eosinophils % is no longer being reported.  The previously reported component Basophils % is no longer being reported.  The previously reported component Absolute Neutrophils is no longer being reported.  The previously reported   component Absolute Lymphocytes is no longer being  reported.  The previously reported component Absolute Monocytes is no longer being reported.  The previously reported component Absolute Eosinophils is no longer being reported.  The previously reported   component Absolute Basophils is no longer being reported.   COMPREHENSIVE METABOLIC PANEL - Abnormal    Glucose 160 (*)     Sodium 141      Potassium 4.2      Chloride 103      Bicarbonate 26      Anion Gap 16      Urea Nitrogen 27 (*)     Creatinine 1.93 (*)     eGFR 23 (*)     Calcium 10.1      Albumin 4.5      Alkaline Phosphatase 91      Total Protein 7.3      AST 16      Bilirubin, Total 1.2      ALT 13     LACTATE - Abnormal    Lactate 2.1 (*)     Narrative:     Venipuncture immediately after or during the administration of Metamizole may lead to falsely low results. Testing should be performed immediately prior to Metamizole dosing.   SERIAL TROPONIN-INITIAL - Abnormal    Troponin I, High Sensitivity 14 (*)     Narrative:     Less than 99th percentile of normal range cutoff-  Female and children under 18 years old <14 ng/L; Male <21 ng/L: Negative  Repeat testing should be performed if clinically indicated.     Female and children under 18 years old 14-50 ng/L; Male 21-50 ng/L:  Consistent with possible cardiac damage and possible increased clinical   risk. Serial measurements may help to assess extent of myocardial damage.     >50 ng/L: Consistent with cardiac damage, increased clinical risk and  myocardial infarction. Serial measurements may help assess extent of   myocardial damage.      NOTE: Children less than 1 year old may have higher baseline troponin   levels and results should be interpreted in conjunction with the overall   clinical context.     NOTE: Troponin I testing is performed using a different   testing methodology at [facility] than at other   [facility]. Direct result comparisons should only   be made within the same method.   SERIAL TROPONIN, 1 HOUR - Abnormal     Troponin I, High Sensitivity 15 (*)     Narrative:     Less than 99th percentile of normal range cutoff-  Female and children under 18 years old <14 ng/L; Male <21 ng/L: Negative  Repeat testing should be performed if clinically indicated.     Female and children under 18 years old 14-50 ng/L; Male 21-50 ng/L:  Consistent with possible cardiac damage and possible increased clinical   risk. Serial measurements may help to assess extent of myocardial damage.     >50 ng/L: Consistent with cardiac damage, increased clinical risk and  myocardial infarction. Serial measurements may help assess extent of   myocardial damage.      NOTE: Children less than 1 year old may have higher baseline troponin   levels and results should be interpreted in conjunction with the overall   clinical context.     NOTE: Troponin I testing is performed using a different   testing methodology at [facility] than at other   [facility]. Direct result comparisons should only   be made within the same method.   LACTATE - Abnormal    Lactate 2.6 (*)     Narrative:     Venipuncture immediately after or during the administration of Metamizole may lead to falsely low results. Testing should be performed immediately prior to Metamizole dosing.   B-TYPE NATRIURETIC PEPTIDE - Abnormal     (*)     Narrative:        <100 pg/mL - Heart failure unlikely  100-299 pg/mL - Intermediate probability of acute heart                  failure exacerbation. Correlate with clinical                  context and patient history.    >=300 pg/mL - Heart Failure likely. Correlate with clinical                  context and patient history.    BNP testing is performed using different testing methodology at [facility] than at other [facility]. Direct result comparisons should only be made within the same method.      MANUAL DIFFERENTIAL - Abnormal    Neutrophils %, Manual 88.0      Bands %, Manual 4.0      Lymphocytes %, Manual 2.0       Monocytes %, Manual 3.0      Eosinophils %, Manual 0.0      Basophils %, Manual 0.0      Metamyelocytes %, Manual 2.0      Myelocytes %, Manual 1.0      Seg Neutrophils Absolute, Manual 22.35 (*)     Bands Absolute, Manual 1.02 (*)     Lymphocytes Absolute, Manual 0.51 (*)     Monocytes Absolute, Manual 0.76      Eosinophils Absolute, Manual 0.00      Basophils Absolute, Manual 0.00      Metamyelocytes Absolute, Manual 0.51      Myelocytes Absolute, Manual 0.25      Total Cells Counted 100      Neutrophils Absolute, Manual 23.37 (*)     RBC Morphology No significant RBC morphology present     BLOOD CULTURE - Normal    Blood Culture Loaded on Instrument - Culture in progress     BLOOD CULTURE - Normal    Blood Culture Loaded on Instrument - Culture in progress     LIPASE - Normal    Lipase 11      Narrative:     Venipuncture immediately after or during the administration of Metamizole may lead to falsely low results. Testing should be performed immediately prior to Metamizole dosing.   MAGNESIUM - Normal    Magnesium 1.98     LACTATE - Normal    Lactate 2.0      Narrative:     Venipuncture immediately after or during the administration of Metamizole may lead to falsely low results. Testing should be performed immediately prior to Metamizole dosing.   TROPONIN SERIES- (INITIAL, 1 HR)    Narrative:     The following orders were created for panel order Troponin I Series, High Sensitivity (0, 1 HR).  Procedure                               Abnormality         Status                     ---------                               -----------         ------                     Troponin I, High Sensiti...[[id number]]  Abnormal            Final result               Troponin, High Sensitivi...[[id number]]  Abnormal            Final result                 Please view results for these tests on the individual orders.   URINALYSIS WITH REFLEX CULTURE AND MICROSCOPIC    Narrative:     The following orders were created for panel  order Urinalysis with Reflex Culture and Microscopic.  Procedure                               Abnormality         Status                     ---------                               -----------         ------                     Urinalysis with Reflex C...[[id number]]                                                 Extra Urine Gray Tube[[id number]]                                                         Please view results for these tests on the individual orders.   URINALYSIS WITH REFLEX CULTURE AND MICROSCOPIC   EXTRA URINE GRAY TUBE     CT chest abdomen pelvis wo IV contrast   Final Result   Interstitial prominence with smooth interlobular septal thickening   suggests acute pulmonary interstitial edema/CHF. Dependent lower lung   airspace opacities with associated parenchymal volume loss most   likely relate to atelectasis. Small pleural effusions. No   pneumothorax.        Mild peripancreatic fat stranding predominating about the tail   (series 2, images ), suggesting possible acute interstitial   pancreatitis versus retroperitoneal edema secondary to volume   overload. Suggest correlation with pancreatic enzymes. No focal fluid   collection.        Urinary bladder is decompressed, not well evaluated. Perivesical fat   stranding is concerning for acute cystitis although some of this   appearance could be technical in etiology. Suggest clinical   correlation and correlation with urinalysis.        Hepatomegaly.        Suspected cholelithiasis without convincing evidence of acute   cholecystitis.        Cardiomegaly, with disproportionate atrial distension. Mitral annular   and aortic root calcifications. Atherosclerosis, including coronary   artery disease.        Fluid in the esophagus compatible with gastroesophageal reflux. No   definite abnormal esophageal mural thickening.        Additional findings as discussed above.        MACRO:   None        Signed by: Merrick Johnson 4/22/2025 6:34 PM   Dictation  "workstation:   [id number]      XR chest 1 view   Final Result   1.  Similar appearance of bibasilar airspace opacities prior exam,   which may represent atelectasis versus developing infiltrates with   layering pleural effusions not excluded bilaterally.                  MACRO:   None        Signed by: Jose Garcia 4/22/2025 4:02 PM   Dictation workstation:   [id number]            ED Course & Medical Decision Making     Medications   amiodarone (Nexterone) 150 mg in dextrose (iso)  mL (has no administration in time range)     Followed by   amiodarone (Nexterone) 360 mg in dextrose,iso-osm 200 mL (1.8 mg/mL) infusion (premix) (has no administration in time range)     Followed by   amiodarone (Nexterone) 360 mg in dextrose,iso-osm 200 mL (1.8 mg/mL) infusion (premix) (has no administration in time range)   sodium chloride 0.9 % bolus 1,000 mL (0 mL intravenous Stopped 4/22/25 1639)   sodium chloride 0.9 % bolus 500 mL (0 mL intravenous Stopped 4/22/25 1718)   piperacillin-tazobactam (Zosyn) 4.5 g in dextrose (iso)  mL (0 g intravenous Stopped 4/22/25 1758)   sodium chloride 0.9 % bolus 500 mL (0 mL intravenous Stopped 4/22/25 1759)   metoprolol tartrate (Lopressor) injection 5 mg (5 mg intravenous Given 4/22/25 2011)     Heart Rate:  [113-147]   Temperature:  [36.4 °C (97.5 °F)]   Respirations:  [12-27]   BP: ()/(80-99)   Height:  [157.5 cm (5' 2\")]   Weight:  [55.8 kg (123 lb)]   Pulse Ox:  [95 %-99 %]    ED Course as of 04/22/25 2121 Tue Apr 22, 2025   1621 EKG performed 4/22/2025 at 1547.  Atrial fibrillation with a ventricular rate of 134 bpm, QT/QTc of 275/411 ms.  No STEMI.  Interpreted by Myself [WJ]   1631 Seen, nonsurgical abdomen [WJ]   1631 Liter IV fluids almost complete and patient still tachycardic.  No signs of respiratory distress.  Will add another 500 cc of IV fluids [WJ]   1710 WBC(!): 25.4  WBC 25. Sepsis recongized, ABX ordred [WJ]   2369 CONCLUSIONS:   1. The left " ventricular systolic function is mildly decreased, with a visually estimated ejection fraction of 45-50%.   2. No regional wall motion abnormalities.   3. Spectral Doppler shows a a Grade III (restrictive pattern) of left ventricular diastolic filling with an elevated left atrial pressure.   4. Left ventricular cavity size is decreased.   5. There is normal right ventricular global systolic function.   6. The left atrial size is moderate to severely dilated.   7. There is moderate mitral annular calcification.   8. Moderate mitral valve regurgitation.   9. Mild to moderate tricuspid regurgitation.  10. Severe aortic valve stenosis.  11. Mild aortic valve regurgitation.  12. The inferior vena cava appears normal in size, with IVC inspiratory collapse greater than 50%.  13. There is severely increased septal and severely increased posterior left ventricular wall thickness.   [CJ]   1945 LIPASE: 11 [WJ]   2006 Attending physician spoke to the hospitalist.  They recommend a dose of metoprolol and then this determined patient goes to the ICU or stepdown. [CJ]   2031 Patient's heart rate still not improved after reevaluation.  Still around 130 persistently.  At this time patient be admitted to the ICU. [CJ]   2032 Updated Dr Grimes regarding ICU. Amio ordered   [WJ]   2034 Spoke to the ICU midlevel who agrees to consult on patient. [CJ]      ED Course User Index  [CJ] Erasmo Wood PA-C  [WJ] Darío Okeefe DO         Diagnoses as of 04/22/25 2121   Abdominal pain, unspecified abdominal location   Leukocytosis, unspecified type   Atrial fibrillation, unspecified type (Multi)       Procedures   Critical Care    Performed by: Erasmo Wood PA-C  Authorized by: Darío Okeefe DO    Critical care provider statement:     Critical care time (minutes):  50    Critical care time was exclusive of:  Separately billable procedures and treating other patients and teaching time    Critical care was necessary to treat or prevent  imminent or life-threatening deterioration of the following conditions:  Cardiac failure, circulatory failure and sepsis    Critical care was time spent personally by me on the following activities:  Blood draw for specimens, development of treatment plan with patient or surrogate, ordering and performing treatments and interventions, ordering and review of laboratory studies, ordering and review of radiographic studies, evaluation of patient's response to treatment, re-evaluation of patient's condition, review of old charts, pulse oximetry and discussions with consultants    Care discussed with: admitting provider        Diagnosis     1. Abdominal pain, unspecified abdominal location    2. Leukocytosis, unspecified type    3. Atrial fibrillation, unspecified type (Multi)        Disposition   Admit inpt     ED Prescriptions    None         Disclaimer: This note was dictated by speech recognition. Minor errors in transcription may be present. Please call if questions.         Erasmo Wood PA-C  04/22/25 2036       [1]   Past Medical History:  Diagnosis Date    (HFpEF) heart failure with preserved ejection fraction     Aortic stenosis     CKD (chronic kidney disease)     Essential (primary) hypertension     GERD (gastroesophageal reflux disease)     Hypothyroidism     Paroxysmal atrial fibrillation (Multi)    [2]   Past Surgical History:  Procedure Laterality Date    HYSTERECTOMY      MR HEAD ANGIO WO IV CONTRAST  07/21/2020    MR NECK ANGIO WO IV CONTRAST  07/21/2020   [3]   Social History  Tobacco Use    Smoking status: Never    Smokeless tobacco: Never   Substance Use Topics    Alcohol use: Never    Drug use: Never   [4]   Family History  Problem Relation Name Age of Onset    Other (Half sister with CAD) Sister      Coronary artery disease Other Children    [5] No Known Allergies       Erasmo Wood PA-C  04/22/25 2122

## 2025-04-23 LAB
ALBUMIN SERPL BCP-MCNC: 3.5 G/DL (ref 3.4–5)
ALP SERPL-CCNC: 79 U/L (ref 33–136)
ALT SERPL W P-5'-P-CCNC: 20 U/L (ref 7–45)
ANION GAP SERPL CALC-SCNC: 14 MMOL/L (ref 10–20)
APPEARANCE UR: CLEAR
AST SERPL W P-5'-P-CCNC: 20 U/L (ref 9–39)
ATRIAL RATE: 238 BPM
BASOPHILS # BLD AUTO: 0.05 X10*3/UL (ref 0–0.1)
BASOPHILS NFR BLD AUTO: 0.3 %
BILIRUB SERPL-MCNC: 0.9 MG/DL (ref 0–1.2)
BILIRUB UR STRIP.AUTO-MCNC: NEGATIVE MG/DL
BUN SERPL-MCNC: 25 MG/DL (ref 6–23)
CALCIUM SERPL-MCNC: 8.7 MG/DL (ref 8.6–10.3)
CHLORIDE SERPL-SCNC: 106 MMOL/L (ref 98–107)
CO2 SERPL-SCNC: 25 MMOL/L (ref 21–32)
COLOR UR: YELLOW
CREAT SERPL-MCNC: 1.76 MG/DL (ref 0.5–1.05)
EGFRCR SERPLBLD CKD-EPI 2021: 26 ML/MIN/1.73M*2
EOSINOPHIL # BLD AUTO: 0 X10*3/UL (ref 0–0.4)
EOSINOPHIL NFR BLD AUTO: 0 %
ERYTHROCYTE [DISTWIDTH] IN BLOOD BY AUTOMATED COUNT: 15.4 % (ref 11.5–14.5)
GLUCOSE SERPL-MCNC: 136 MG/DL (ref 74–99)
GLUCOSE UR STRIP.AUTO-MCNC: NORMAL MG/DL
HCT VFR BLD AUTO: 40.1 % (ref 36–46)
HGB BLD-MCNC: 12.5 G/DL (ref 12–16)
HOLD SPECIMEN: NORMAL
IMM GRANULOCYTES # BLD AUTO: 0.13 X10*3/UL (ref 0–0.5)
IMM GRANULOCYTES NFR BLD AUTO: 0.8 % (ref 0–0.9)
INR PPP: 1.9 (ref 0.9–1.1)
KETONES UR STRIP.AUTO-MCNC: ABNORMAL MG/DL
LEGIONELLA AG UR QL: NEGATIVE
LEUKOCYTE ESTERASE UR QL STRIP.AUTO: NEGATIVE
LYMPHOCYTES # BLD AUTO: 0.82 X10*3/UL (ref 0.8–3)
LYMPHOCYTES NFR BLD AUTO: 4.9 %
MAGNESIUM SERPL-MCNC: 1.73 MG/DL (ref 1.6–2.4)
MCH RBC QN AUTO: 30.1 PG (ref 26–34)
MCHC RBC AUTO-ENTMCNC: 31.2 G/DL (ref 32–36)
MCV RBC AUTO: 97 FL (ref 80–100)
MONOCYTES # BLD AUTO: 1.09 X10*3/UL (ref 0.05–0.8)
MONOCYTES NFR BLD AUTO: 6.5 %
MRSA DNA SPEC QL NAA+PROBE: NOT DETECTED
NEUTROPHILS # BLD AUTO: 14.65 X10*3/UL (ref 1.6–5.5)
NEUTROPHILS NFR BLD AUTO: 87.5 %
NITRITE UR QL STRIP.AUTO: NEGATIVE
NRBC BLD-RTO: 0 /100 WBCS (ref 0–0)
PH UR STRIP.AUTO: 5.5 [PH]
PHOSPHATE SERPL-MCNC: 2.9 MG/DL (ref 2.5–4.9)
PLATELET # BLD AUTO: 239 X10*3/UL (ref 150–450)
POTASSIUM SERPL-SCNC: 3.7 MMOL/L (ref 3.5–5.3)
PR INTERVAL: 131 MS
PROCALCITONIN SERPL-MCNC: 0.11 NG/ML
PROT SERPL-MCNC: 5.6 G/DL (ref 6.4–8.2)
PROT UR STRIP.AUTO-MCNC: ABNORMAL MG/DL
PROTHROMBIN TIME: 21.2 SECONDS (ref 9.8–12.4)
Q ONSET: 254 MS
QRS COUNT: 22 BEATS
QRS DURATION: 83 MS
QT INTERVAL: 275 MS
QTC CALCULATION(BAZETT): 411 MS
QTC FREDERICIA: 359 MS
R AXIS: -28 DEGREES
RBC # BLD AUTO: 4.15 X10*6/UL (ref 4–5.2)
RBC # UR STRIP.AUTO: ABNORMAL MG/DL
RBC #/AREA URNS AUTO: NORMAL /HPF
S PNEUM AG UR QL: NEGATIVE
SODIUM SERPL-SCNC: 141 MMOL/L (ref 136–145)
SP GR UR STRIP.AUTO: 1.03
T AXIS: 171 DEGREES
T OFFSET: 392 MS
UROBILINOGEN UR STRIP.AUTO-MCNC: NORMAL MG/DL
VENTRICULAR RATE: 134 BPM
WBC # BLD AUTO: 16.7 X10*3/UL (ref 4.4–11.3)
WBC #/AREA URNS AUTO: NORMAL /HPF

## 2025-04-23 PROCEDURE — 99233 SBSQ HOSP IP/OBS HIGH 50: CPT | Performed by: INTERNAL MEDICINE

## 2025-04-23 PROCEDURE — 80053 COMPREHEN METABOLIC PANEL: CPT

## 2025-04-23 PROCEDURE — 99291 CRITICAL CARE FIRST HOUR: CPT | Performed by: INTERNAL MEDICINE

## 2025-04-23 PROCEDURE — 2020000001 HC ICU ROOM DAILY

## 2025-04-23 PROCEDURE — 97165 OT EVAL LOW COMPLEX 30 MIN: CPT | Mod: GO

## 2025-04-23 PROCEDURE — 36415 COLL VENOUS BLD VENIPUNCTURE: CPT

## 2025-04-23 PROCEDURE — 81001 URINALYSIS AUTO W/SCOPE: CPT | Performed by: PHYSICIAN ASSISTANT

## 2025-04-23 PROCEDURE — 84100 ASSAY OF PHOSPHORUS: CPT

## 2025-04-23 PROCEDURE — 87899 AGENT NOS ASSAY W/OPTIC: CPT | Mod: PORLAB

## 2025-04-23 PROCEDURE — 97162 PT EVAL MOD COMPLEX 30 MIN: CPT | Mod: GP | Performed by: PHYSICAL THERAPIST

## 2025-04-23 PROCEDURE — 2500000001 HC RX 250 WO HCPCS SELF ADMINISTERED DRUGS (ALT 637 FOR MEDICARE OP): Performed by: INTERNAL MEDICINE

## 2025-04-23 PROCEDURE — 87641 MR-STAPH DNA AMP PROBE: CPT

## 2025-04-23 PROCEDURE — 51701 INSERT BLADDER CATHETER: CPT

## 2025-04-23 PROCEDURE — 87449 NOS EACH ORGANISM AG IA: CPT | Mod: PORLAB

## 2025-04-23 PROCEDURE — 2500000002 HC RX 250 W HCPCS SELF ADMINISTERED DRUGS (ALT 637 FOR MEDICARE OP, ALT 636 FOR OP/ED): Performed by: PHARMACIST

## 2025-04-23 PROCEDURE — 85610 PROTHROMBIN TIME: CPT | Performed by: INTERNAL MEDICINE

## 2025-04-23 PROCEDURE — 2500000004 HC RX 250 GENERAL PHARMACY W/ HCPCS (ALT 636 FOR OP/ED): Mod: JZ | Performed by: INTERNAL MEDICINE

## 2025-04-23 PROCEDURE — 85025 COMPLETE CBC W/AUTO DIFF WBC: CPT

## 2025-04-23 PROCEDURE — 2500000002 HC RX 250 W HCPCS SELF ADMINISTERED DRUGS (ALT 637 FOR MEDICARE OP, ALT 636 FOR OP/ED): Performed by: INTERNAL MEDICINE

## 2025-04-23 PROCEDURE — 2500000004 HC RX 250 GENERAL PHARMACY W/ HCPCS (ALT 636 FOR OP/ED)

## 2025-04-23 PROCEDURE — 83735 ASSAY OF MAGNESIUM: CPT

## 2025-04-23 PROCEDURE — 2500000001 HC RX 250 WO HCPCS SELF ADMINISTERED DRUGS (ALT 637 FOR MEDICARE OP)

## 2025-04-23 RX ORDER — MAGNESIUM SULFATE 1 G/100ML
1 INJECTION INTRAVENOUS ONCE
Status: COMPLETED | OUTPATIENT
Start: 2025-04-23 | End: 2025-04-23

## 2025-04-23 RX ORDER — VANCOMYCIN HYDROCHLORIDE 500 MG/100ML
500 INJECTION, SOLUTION INTRAVENOUS EVERY 24 HOURS
Status: DISCONTINUED | OUTPATIENT
Start: 2025-04-24 | End: 2025-04-23

## 2025-04-23 RX ORDER — VANCOMYCIN HYDROCHLORIDE 1 G/20ML
INJECTION, POWDER, LYOPHILIZED, FOR SOLUTION INTRAVENOUS DAILY PRN
Status: DISCONTINUED | OUTPATIENT
Start: 2025-04-23 | End: 2025-04-24

## 2025-04-23 RX ORDER — VANCOMYCIN HYDROCHLORIDE 500 MG/100ML
500 INJECTION, SOLUTION INTRAVENOUS EVERY 24 HOURS
Status: DISCONTINUED | OUTPATIENT
Start: 2025-04-24 | End: 2025-04-24

## 2025-04-23 RX ORDER — POTASSIUM CHLORIDE 1.5 G/1.58G
20 POWDER, FOR SOLUTION ORAL ONCE
Status: COMPLETED | OUTPATIENT
Start: 2025-04-23 | End: 2025-04-23

## 2025-04-23 RX ORDER — ONDANSETRON HYDROCHLORIDE 2 MG/ML
4 INJECTION, SOLUTION INTRAVENOUS EVERY 6 HOURS PRN
Status: DISCONTINUED | OUTPATIENT
Start: 2025-04-23 | End: 2025-04-28 | Stop reason: HOSPADM

## 2025-04-23 RX ORDER — ONDANSETRON 4 MG/1
4 TABLET, FILM COATED ORAL EVERY 6 HOURS PRN
Status: DISCONTINUED | OUTPATIENT
Start: 2025-04-23 | End: 2025-04-28 | Stop reason: HOSPADM

## 2025-04-23 RX ORDER — VANCOMYCIN HYDROCHLORIDE 1 G/20ML
INJECTION, POWDER, LYOPHILIZED, FOR SOLUTION INTRAVENOUS DAILY PRN
Status: DISCONTINUED | OUTPATIENT
Start: 2025-04-23 | End: 2025-04-23

## 2025-04-23 RX ORDER — WARFARIN SODIUM 5 MG/1
2.5 TABLET ORAL ONCE
Status: COMPLETED | OUTPATIENT
Start: 2025-04-23 | End: 2025-04-23

## 2025-04-23 RX ADMIN — ONDANSETRON HYDROCHLORIDE 4 MG: 4 TABLET, FILM COATED ORAL at 06:48

## 2025-04-23 RX ADMIN — VANCOMYCIN HYDROCHLORIDE 1250 MG: 1.25 INJECTION, POWDER, LYOPHILIZED, FOR SOLUTION INTRAVENOUS at 06:31

## 2025-04-23 RX ADMIN — ACETAMINOPHEN 650 MG: 325 TABLET ORAL at 07:54

## 2025-04-23 RX ADMIN — AMIODARONE HYDROCHLORIDE 0.5 MG/MIN: 1.8 INJECTION, SOLUTION INTRAVENOUS at 03:03

## 2025-04-23 RX ADMIN — LEVOTHYROXINE SODIUM 100 MCG: 0.1 TABLET ORAL at 06:07

## 2025-04-23 RX ADMIN — LATANOPROST 1 DROP: 50 SOLUTION OPHTHALMIC at 20:47

## 2025-04-23 RX ADMIN — PIPERACILLIN SODIUM AND TAZOBACTAM SODIUM 2.25 G: 2; .25 INJECTION, SOLUTION INTRAVENOUS at 18:47

## 2025-04-23 RX ADMIN — PANTOPRAZOLE SODIUM 40 MG: 40 TABLET, DELAYED RELEASE ORAL at 06:07

## 2025-04-23 RX ADMIN — PIPERACILLIN SODIUM AND TAZOBACTAM SODIUM 2.25 G: 2; .25 INJECTION, SOLUTION INTRAVENOUS at 01:26

## 2025-04-23 RX ADMIN — POTASSIUM CHLORIDE 20 MEQ: 1.5 POWDER, FOR SOLUTION ORAL at 06:08

## 2025-04-23 RX ADMIN — PIPERACILLIN SODIUM AND TAZOBACTAM SODIUM 2.25 G: 2; .25 INJECTION, SOLUTION INTRAVENOUS at 07:53

## 2025-04-23 RX ADMIN — DOXYCYCLINE 100 MG: 100 INJECTION, POWDER, LYOPHILIZED, FOR SOLUTION INTRAVENOUS at 06:14

## 2025-04-23 RX ADMIN — AMIODARONE HYDROCHLORIDE 0.5 MG/MIN: 1.8 INJECTION, SOLUTION INTRAVENOUS at 13:55

## 2025-04-23 RX ADMIN — DOXYCYCLINE 100 MG: 100 INJECTION, POWDER, LYOPHILIZED, FOR SOLUTION INTRAVENOUS at 17:31

## 2025-04-23 RX ADMIN — PIPERACILLIN SODIUM AND TAZOBACTAM SODIUM 2.25 G: 2; .25 INJECTION, SOLUTION INTRAVENOUS at 13:55

## 2025-04-23 RX ADMIN — WARFARIN SODIUM 2.5 MG: 5 TABLET ORAL at 17:31

## 2025-04-23 RX ADMIN — MAGNESIUM SULFATE HEPTAHYDRATE 1 G: 1 INJECTION, SOLUTION INTRAVENOUS at 06:22

## 2025-04-23 ASSESSMENT — COGNITIVE AND FUNCTIONAL STATUS - GENERAL
TURNING FROM BACK TO SIDE WHILE IN FLAT BAD: A LITTLE
MOBILITY SCORE: 17
DAILY ACTIVITIY SCORE: 20
DRESSING REGULAR UPPER BODY CLOTHING: A LITTLE
WALKING IN HOSPITAL ROOM: A LOT
TOILETING: A LITTLE
CLIMB 3 TO 5 STEPS WITH RAILING: A LOT
MOVING TO AND FROM BED TO CHAIR: A LITTLE
DRESSING REGULAR LOWER BODY CLOTHING: A LITTLE
STANDING UP FROM CHAIR USING ARMS: A LITTLE
HELP NEEDED FOR BATHING: A LITTLE

## 2025-04-23 ASSESSMENT — ACTIVITIES OF DAILY LIVING (ADL)
BATHING_ASSISTANCE: STAND BY
ADL_ASSISTANCE: INDEPENDENT

## 2025-04-23 ASSESSMENT — PAIN SCALES - GENERAL
PAINLEVEL_OUTOF10: 0 - NO PAIN
PAINLEVEL_OUTOF10: 5 - MODERATE PAIN
PAINLEVEL_OUTOF10: 0 - NO PAIN

## 2025-04-23 ASSESSMENT — PAIN - FUNCTIONAL ASSESSMENT
PAIN_FUNCTIONAL_ASSESSMENT: 0-10

## 2025-04-23 ASSESSMENT — PAIN DESCRIPTION - LOCATION: LOCATION: ABDOMEN

## 2025-04-23 NOTE — ASSESSMENT & PLAN NOTE
Markedly elevated WBC count with constitutional symptoms & low BP.  Etiology of sepsis unclear but most likely due to complicated UTI; acute cholecystitis & pancreatitis unlikely given normal LFTs & serum lipase respectively.  Patient admitted to the ICU due to rapid atrial fibrillation (likely sepsis related) requiring amiodarone drip.  No further IV fluid due to acute CHF.  Continue empiric IV Zosyn while awaiting final blood/urine c+s.  Intensivist consult requested.

## 2025-04-23 NOTE — ASSESSMENT & PLAN NOTE
Most likely provoked by sepsis.  Patient currently requiring amiodarone drip.  Cardiac consult in a.m. regarding further antiarrhythmic management.

## 2025-04-23 NOTE — ASSESSMENT & PLAN NOTE
Suprapubic abdominal pain radiating to the lower quadrants.  Pain has spontaneously improved. Parenteral narcotic and antiemetic as needed.  Trend serum lactate and consult general surgery as needed (?  Mesenteric ischemia).  Clear liquid diet for now.

## 2025-04-23 NOTE — H&P
"History Of Present Illness  Jennifer Magana is a 89 y.o. female presenting with severe suprapubic abd pain since this morning.    89-year-old female whose past medical history is remarkable for hypertension, hypothyroidism, chronic atrial fibrillation on warfarin, chronic diastolic CHF.  No cigarette smoking, alcohol abuse or illicit drug use.  Patient has 2 daughters living with her and assisting with her ADLs.  Patient began to experience last night initially mild suprapubic abdominal pain but which became severe this morning with radiation across both lower abdominal quadrants.  There was associated nausea and bilious emesis, malaise and chills.  No headache, neck stiffness, acute confusion, skin rash, cough, shortness of breath, chest pain, palpitations, diarrhea, dysuria or urinary frequency.  Patient had a \"soft\" bowel movement earlier this morning.  She arrived to this facility's ED this afternoon with vital signs including /90 and  while complaining of severe lower quadrant/suprapubic abdominal pain.  Pertinent ED labs included serum sodium 141, potassium 4.2, magnesium 1.98, bicarb 26, BUN 27, creatinine 1.93, glucose 160, LFTs within normal limits, serum lipase 11, lactate 2.1->2.0, serial troponin 14->15, WBC 25.4, hemoglobin 15.1, platelets 340, UA currently pending.  Noncontrast CT chest/abdomen/pelvis was remarkable for acute CHF (pulm edema, small pleural effusions), pancreatic tail mild peripancreatic fat stranding, perivesical fat stranding, cholelithiasis without convincing evidence of acute cholecystitis, fluid in the esophagus compatible with GERD.  Patient received 2 L NS IV fluid bolus per sepsis 30 mL/kg IV fluid bolus protocol, as well as IV Zosyn.  She remained in rapid atrial fibrillation up to the 140s despite IV fluid bolus followed by Lopressor 5 mg IV x 1.  SBP at the ED was as low as 90s, hence amiodarone drip was consequently commenced, with patient hospitalization to " "the ICU for further management sought.    A 10+ point systems review was obtained and is as above, otherwise negative.     Past Medical History  See HPI    Surgical History  See HPI     Social History  She reports that she has never smoked. She has never used smokeless tobacco. She reports that she does not drink alcohol and does not use drugs.    Family History  Reviewed & not pertinent     Allergies  Patient has no known allergies.    Review of Systems  See HPI     Physical Exam  Constitutional: Pleasant elderly female, in no overt respiratory distress  HEENT: No pallor or jaundice  Neck: Supple, no JVD  Respiratory: Bibasilar rales, no wheeze  Cardiovascular: S1-S2 audible, irregularly irregular  GI: Soft nondistended abdomen, suprapubic tenderness on deep palpation without rebound or guarding, bowel sounds present and normoactive in all 4 quadrants  Musculoskeletal: No pedal edema  Skin: No suspicious rash  Psych: Oriented to time, person and place  Neuro: Awake, alert, verbally responsive and follows commands, moves all extremities equally, essentially nonfocal    Last Recorded Vitals  Blood pressure 105/78, pulse 104, temperature 36.8 °C (98.2 °F), temperature source Temporal, resp. rate 23, height 1.575 m (5' 2\"), weight 59.1 kg (130 lb 4.7 oz), SpO2 97%.    Relevant Results  See HPI     Assessment & Plan  Sepsis (Multi)  Markedly elevated WBC count with constitutional symptoms & low BP.  Etiology of sepsis unclear but most likely due to complicated UTI; acute cholecystitis & pancreatitis unlikely given normal LFTs & serum lipase respectively.  Patient admitted to the ICU due to rapid atrial fibrillation (likely sepsis related) requiring amiodarone drip.  No further IV fluid due to acute CHF.  Continue empiric IV Zosyn while awaiting final blood/urine c+s.  Intensivist consult requested.  Abdominal pain, unspecified abdominal location  Suprapubic abdominal pain radiating to the lower quadrants.  Pain has " spontaneously improved. Parenteral narcotic and antiemetic as needed.  Trend serum lactate and consult general surgery as needed (?  Mesenteric ischemia).  Clear liquid diet for now.  Acute on chronic diastolic CHF (congestive heart failure)  Most likely provoked by 2 L IV fluid bolus administered in the ED.  Hold further IV fluid at this time.  Sepsis related low BP precludes diuresis with IV Lasix.  Fluid restrict, strict I's and O's, weigh patient daily.  Stage 4 chronic kidney disease (Multi)  Renal function appears stable and at baseline.  Consult nephrology if worse.  Rapid atrial fibrillation (Multi)  Most likely provoked by sepsis.  Patient currently requiring amiodarone drip.  Cardiac consult in a.m. regarding further antiarrhythmic management.  Gastroesophageal reflux disease with esophagitis  Continue PPI  Calculus of gallbladder without cholecystitis without obstruction  Monitor LFTs      I spent 75 minutes in the professional and overall care of this patient.      Balta Grimes MD

## 2025-04-23 NOTE — PROGRESS NOTES
Social work consult placed for discharge planning. SW reviewed pt's chart and communicated with TCC. No SW needs foreseen at this time. SW signing off; available upon request.    Nabeel Henson, MSW, LSW ([id number])   Care Transitions

## 2025-04-23 NOTE — PROGRESS NOTES
Medication Adjustment    The following medication(s) was/were adjusted for Jenniferquinn Bournejerad per protocol/policy due to altered renal function.    Medication(s) adjusted:   Zosyn 3.375g q 6 hrs decreased to 2.25g q 6 hrs for a CrCl of 13.5 mL/min.    Galilea Vega, PharmD

## 2025-04-23 NOTE — PROGRESS NOTES
"Jennifer Magana is a 89 y.o. female on day 1 of admission presenting with Sepsis (Multi).      Subjective   89-year-old female whose past medical history is remarkable for hypertension, hypothyroidism, chronic atrial fibrillation on warfarin, chronic diastolic CHF.  No cigarette smoking, alcohol abuse or illicit drug use.  Patient has 2 daughters living with her and assisting with her ADLs.  Patient began to experience last night initially mild suprapubic abdominal pain but which became severe this morning with radiation across both lower abdominal quadrants.  There was associated nausea and bilious emesis, malaise and chills.  No headache, neck stiffness, acute confusion, skin rash, cough, shortness of breath, chest pain, palpitations, diarrhea, dysuria or urinary frequency.  Patient had a \"soft\" bowel movement earlier this morning.  She arrived to this facility's ED this afternoon with vital signs including /90 and  while complaining of severe lower quadrant/suprapubic abdominal pain.  Pertinent ED labs included serum sodium 141, potassium 4.2, magnesium 1.98, bicarb 26, BUN 27, creatinine 1.93, glucose 160, LFTs within normal limits, serum lipase 11, lactate 2.1->2.0, serial troponin 14->15, WBC 25.4, hemoglobin 15.1, platelets 340, UA currently pending.  Noncontrast CT chest/abdomen/pelvis was remarkable for acute CHF (pulm edema, small pleural effusions), pancreatic tail mild peripancreatic fat stranding, perivesical fat stranding, cholelithiasis without convincing evidence of acute cholecystitis, fluid in the esophagus compatible with GERD.  Patient received 2 L NS IV fluid bolus per sepsis 30 mL/kg IV fluid bolus protocol, as well as IV Zosyn.  She remained in rapid atrial fibrillation up to the 140s despite IV fluid bolus followed by Lopressor 5 mg IV x 1.  SBP at the ED was as low as 90s, hence amiodarone drip was consequently commenced, with patient hospitalization to the ICU for further " management sought.     04/23: Patient was evaluated this morning, awake, does not seem to acute distress.  Abdominal pain is improving, no fever or chills.       Objective     Last Recorded Vitals  /74   Pulse 107   Temp 36.2 °C (97.2 °F) (Temporal)   Resp (!) 27   Wt 59.1 kg (130 lb 4.7 oz)   SpO2 100%   Intake/Output last 3 Shifts:    Intake/Output Summary (Last 24 hours) at 4/23/2025 0808  Last data filed at 4/23/2025 0600  Gross per 24 hour   Intake 2498.43 ml   Output 200 ml   Net 2298.43 ml       Admission Weight  Weight: 55.8 kg (123 lb) (04/22/25 1509)    Daily Weight  04/22/25 : 59.1 kg (130 lb 4.7 oz)    Image Results  CT chest abdomen pelvis wo IV contrast  Narrative: Interpreted By:  Merrick Johnson,   STUDY:  CT CHEST ABDOMEN PELVIS WO CONTRAST; ;  4/22/2025 6:00 pm      INDICATION:  Signs/Symptoms:Abdominal pain, concern for potential pneumonia  bilateral lower lobes, nausea, vomiting.          COMPARISON:  Correlation made to chest radiography of 04/22/2025 and CT abdomen  and pelvis of 08/06/2023..      ACCESSION NUMBER(S):  [id number]      ORDERING CLINICIAN:  FAITH TERRELL      TECHNIQUE:  Noncontrast CT of the chest, abdomen and pelvis, with multiplanar  reformations.      FINDINGS:  CHEST:      VESSELS: Aorta is normal caliber. Atherosclerotic changes in the  aorta and coronary arteries. HEART: Cardiomegaly, with  disproportionate atrial distension. Mitral annular and aortic root  calcification. No pericardial effusion. MEDIASTINUM AND CAROLYNE: No  pathologically enlarged thoracic lymph nodes. Fluid in the esophagus  compatible with gastroesophageal reflux. No definite abnormal  esophageal mural thickening. LUNG, PLEURA, LARGE AIRWAYS:  Interstitial prominence with smooth interlobular septal thickening  suggests acute pulmonary interstitial edema/CHF. Dependent lower lung  airspace opacities with associated parenchymal volume loss most  likely relate to atelectasis. Small pleural effusions.  No  pneumothorax. CHEST WALL AND LOWER NECK: Within normal limits.      ABDOMEN:      BONES: No acute osseous abnormality. Osteopenia. Severe bilateral  glenohumeral osteoarthropathy with the humeral heads articulating  with the undersurfaces of the acromia, compatible with sequelae of  chronic rotator cuff tears. Thoracic kyphoscoliosis and severe  discogenic degeneration. Grade 1 degenerative anterolisthesis at  T11-T12 through L1-L2. Irregular sclerotic density in the left aspect  of the L3 vertebral body, most compatible with incidental bone  island. Severe discogenic and facet degeneration in the lumbar spine.  Moderate hip osteoarthropathy and osteitis pubis. No destructive  osseous lesion. ABDOMINAL WALL: Within normal limits.      LIVER: Enlarged. No focal lesion is seen.  BILE DUCTS: Normal caliber.  GALLBLADDER: Suspected cholelithiasis. No definite mural thickening.  No pericholecystic edema. PANCREAS: Atrophic, with fatty  infiltration. Mild peripancreatic fat stranding predominating about  the tail (series 2, images ), suggesting possible acute  interstitial pancreatitis versus retroperitoneal edema secondary to  volume overload. Suggest correlation with pancreatic enzymes. No  focal fluid collection. SPLEEN: Within normal limits.  ADRENALS: Mild nodular thickening of the adrenal glands probably  relates to adenomatous change. KIDNEYS: Within normal limits.  URETERS: No hydroureter.      PELVIS:      REPRODUCTIVE ORGANS: No pelvic mass or significant free pelvic fluid.  BLADDER: Urinary bladder is decompressed, not well evaluated.  Perivesical fat stranding is concerning for acute cystitis although  some of this appearance could be technical in etiology. Suggest  clinical correlation and correlation with urinalysis.      VESSELS: Moderate calcific atherosclerosis of the abdominal aorta and  branch vessels. No aneurysmal dilation is seen. RETROPERITONEUM:  Within normal limits. BOWEL: Normal  caliber.  Normal appendix.  PERITONEUM: No ascites or free air, no fluid collection.      Impression: Interstitial prominence with smooth interlobular septal thickening  suggests acute pulmonary interstitial edema/CHF. Dependent lower lung  airspace opacities with associated parenchymal volume loss most  likely relate to atelectasis. Small pleural effusions. No  pneumothorax.      Mild peripancreatic fat stranding predominating about the tail  (series 2, images ), suggesting possible acute interstitial  pancreatitis versus retroperitoneal edema secondary to volume  overload. Suggest correlation with pancreatic enzymes. No focal fluid  collection.      Urinary bladder is decompressed, not well evaluated. Perivesical fat  stranding is concerning for acute cystitis although some of this  appearance could be technical in etiology. Suggest clinical  correlation and correlation with urinalysis.      Hepatomegaly.      Suspected cholelithiasis without convincing evidence of acute  cholecystitis.      Cardiomegaly, with disproportionate atrial distension. Mitral annular  and aortic root calcifications. Atherosclerosis, including coronary  artery disease.      Fluid in the esophagus compatible with gastroesophageal reflux. No  definite abnormal esophageal mural thickening.      Additional findings as discussed above.      MACRO:  None      Signed by: Merrick Johnson 4/22/2025 6:34 PM  Dictation workstation:   [id number]  XR chest 1 view  Narrative: Interpreted By:  Jose Garcia,   STUDY:  XR CHEST 1 VIEW;  4/22/2025 3:48 pm      INDICATION:  Signs/Symptoms:tachy.          COMPARISON:  Radiographs of the chest dated 04/15/2023.      ACCESSION NUMBER(S):  [id number]      ORDERING CLINICIAN:  FAITH TERRELL      FINDINGS:  AP radiograph of the chest was provided.              CARDIOMEDIASTINAL SILHOUETTE:  Cardiomediastinal silhouette is stable in appearance to prior exam.      LUNGS:  Similar appearance of the  bibasilar airspace opacities, which may  represent atelectasis, infiltrate with small layering pleural  effusions not excluded. No sizable pneumothorax or new consolidation.      ABDOMEN:  No remarkable upper abdominal findings.      BONES:  No acute osseous changes.      Impression: 1.  Similar appearance of bibasilar airspace opacities prior exam,  which may represent atelectasis versus developing infiltrates with  layering pleural effusions not excluded bilaterally.              MACRO:  None      Signed by: Jose Garcia 4/22/2025 4:02 PM  Dictation workstation:   [id number]      Physical Exam  Patient is awake and orient, not in apparent distress  Eyes: PERRLA, no conjunctival congestion  Chest: Bilateral Air entry, no crackles or wheezing  Heart: s1S2 regular, no murmur  Abdomen: Soft, mild tenderness on lower quadrant,  BS present  Ext:    Relevant Results                              Assessment & Plan  Sepsis (Multi)  Markedly elevated WBC count with constitutional symptoms & low BP.  Etiology of sepsis unclear but most likely due to complicated UTI; acute cholecystitis & pancreatitis unlikely given normal LFTs & serum lipase respectively.  Patient admitted to the ICU due to rapid atrial fibrillation (likely sepsis related) requiring amiodarone drip.  No further IV fluid due to acute CHF.    Continue on IV Zosyn/Doxy  Follow cultures  ID consulted  Critical care follow-up  Abdominal pain, unspecified abdominal location  CT abdomen and pelvis-mild peripancreatic fat stranding,.  Presacral fat stranding, cholelithiasis without concerning evidence of acute cholecystitis.  Symptomatic treatment  Monitor  Acute on chronic diastolic CHF (congestive heart failure)  Continue on IV diuresis  Monitor clinical status  Cardiology consulted    Stage 4 chronic kidney disease (Multi)  Renal function appears stable and at baseline.    Monitor  Rapid atrial fibrillation (Multi)  Most likely provoked by sepsis.   Patient currently requiring amiodarone drip for rate/rhythm control and warfarin for anticoagulation  Cardiology consult  Gastroesophageal reflux disease with esophagitis  Continue PPI  Calculus of gallbladder without cholecystitis without obstruction  Monitor LFTs             Miley Khan MD

## 2025-04-23 NOTE — PROGRESS NOTES
Occupational Therapy    Evaluation    Patient Name: Jennifer Magana  MRN: [id number]  Department: [facility]  Room: 12/12-A  Today's Date: 4/23/2025  Time Calculation  Start Time: 1221  Stop Time: 1244  Time Calculation (min): 23 min        Assessment:  OT Assessment: Pt demonstrated decreased ADLs, functional transfers functional mobility, and endurance at this time. Pt will benefit from skilled OT to address impairments in function at this time.  Prognosis: Good  Barriers to Discharge Home: No anticipated barriers  Evaluation/Treatment Tolerance: Patient tolerated treatment well  Medical Staff Made Aware: Yes  End of Session Communication: Bedside nurse (discussed HR/BP response to therapy)  End of Session Patient Position: Bed, 3 rail up, Alarm on  OT Assessment Results: Decreased ADL status, Decreased upper extremity strength, Decreased endurance, Decreased functional mobility  Prognosis: Good  Evaluation/Treatment Tolerance: Patient tolerated treatment well  Medical Staff Made Aware: Yes  Plan:  Treatment Interventions: ADL retraining, Functional transfer training, UE strengthening/ROM, Endurance training, Patient/family training, Equipment evaluation/education, Compensatory technique education  OT Frequency: 3 times per week  OT Discharge Recommendations: Low intensity level of continued care  Equipment Recommended upon Discharge:  (FWW? TBD)  OT Recommended Transfer Status: Assist of 1  OT - OK to Discharge: Yes  Treatment Interventions: ADL retraining, Functional transfer training, UE strengthening/ROM, Endurance training, Patient/family training, Equipment evaluation/education, Compensatory technique education    Subjective   Current Problem:  1. Abdominal pain, unspecified abdominal location        2. Leukocytosis, unspecified type        3. Atrial fibrillation, unspecified type (Multi)          General:  General  Reason for Referral: abd pain, N/V. Recent adm 3/29-4/3/25, per dtr-ever since pt not feeling  "well/increased abd pain/reduced oral intake.  Past Medical History Relevant to Rehab: atrial fibrillation on warfarin, chronic diastolic heart failure (EF 45-50%), CKD, hypertension, severe aortic stenosis, hypothyroidism,  Family/Caregiver Present: Yes  Caregiver Feedback: dtr states pt has been ambulatory at home but \"very SOB\"  Co-Treatment: PT  Co-Treatment Reason: increase pt safety and functional mobility  Prior to Session Communication: Bedside nurse  Patient Position Received: Bed, 3 rail up, Alarm on  General Comment: Pt willing to participate in therapy session at this time.  Precautions:  Medical Precautions: Fall precautions  Precautions Comment: watch BP (low on eval) and HR (AF RVR)        Pain:  Pain Assessment  Pain Assessment: 0-10  0-10 (Numeric) Pain Score: 0 - No pain    Objective   Cognition:  Overall Cognitive Status: Within Functional Limits  Orientation Level: Oriented X4  Insight: Within function limits  Impulsive: Within functional limits  Processing Speed: Within funtional limits           Home Living:  Type of Home: House  Lives With: Adult children (two daughters)  Home Adaptive Equipment: Cane  Home Layout: Able to live on main level with bedroom/bathroom  Bathroom Shower/Tub: Walk-in shower  Bathroom Equipment: Grab bars in shower, Shower chair with back  Home Living Comments: split level 5 steps to her main living area  Prior Function:  Level of [place]: Independent with ADLs and functional transfers, Needs assistance with homemaking  ADL Assistance: Independent  Ambulatory Assistance: Independent (with cane at all times)  Prior Function Comments: no falls since recent admission, daughters home w patient at all times     ADL:  Eating Assistance: Stand by  Grooming Assistance: Stand by  Bathing Assistance: Stand by  UE Dressing Assistance: Stand by  LE Dressing Assistance: Minimal  Toileting Assistance with Device: Minimal  Functional Assistance: Minimal  Activity " Tolerance:  Endurance: Endurance does not limit participation in activity  Activity Tolerance Comments: asymptomatic AF RVR and low BP  Bed Mobility/Transfers: Bed Mobility  Bed Mobility: Yes  Bed Mobility 1  Bed Mobility 1: Supine to sitting, Sitting to supine  Level of Assistance 1: Close supervision    Transfers  Transfer: Yes  Transfer 1  Transfer From 1: Sit to, Stand to  Transfer to 1: Stand, Sit  Technique 1: Sit to stand, Stand to sit  Transfer Level of Assistance 1: Contact guard  Trials/Comments 1: to from EOB      Functional Mobility:  Functional Mobility  Functional Mobility Performed: Yes  Functional Mobility 1  Surface 1: Level tile  Device 1: No device  Assistance 1: Minimum assistance  Comments 1: few steps back to bed, decreased functional mobility at this time due to pt low BP. Endurance did not limit/ pt not symptomatic at this time.  Sitting Balance:  Static Sitting Balance  Static Sitting-Balance Support: No upper extremity supported  Static Sitting-Level of Assistance: Close supervision  Standing Balance:  Static Standing Balance  Static Standing-Balance Support: Bilateral upper extremity supported  Static Standing-Level of Assistance: Contact guard  Static Standing-Comment/Number of Minutes: BUEs supported on back of chair (5 minutes)        Vision:Vision - Basic Assessment  Current Vision: No visual deficits  Sensation:  Light Touch: No apparent deficits  Strength:  Strength Comments: BUEs able to move against gravity but decreased overall. Grossly 3/5  Perception:  Inattention/Neglect: Appears intact  Coordination:  Movements are Fluid and Coordinated: Yes   Hand Function:  Gross Grasp: Functional  Coordination: Functional  Extremities: RUE   RUE : Within Functional Limits and LUE   LUE: Within Functional Limits      Outcome Measures:[facility] Daily Activity  Putting on and taking off regular lower body clothing: A little  Bathing (including washing, rinsing, drying): A little  Putting on and  taking off regular upper body clothing: A little  Toileting, which includes using toilet, bedpan or urinal: A little  Taking care of personal grooming such as brushing teeth: None  Eating Meals: None  Daily Activity - Total Score: 20        Education Documentation  No documentation found.  Education Comments  No comments found.        OP EDUCATION:       Goals:  Encounter Problems       Encounter Problems (Active)       ADLs       Patient with complete lower body dressing with stand by assist level of assistance donning and doffing all LE clothes  with PRN adaptive equipment while edge of bed        Start:  04/23/25    Expected End:  05/07/25            Patient will complete toileting including hygiene clothing management/hygiene with stand by assist level of assistance.       Start:  04/23/25    Expected End:  05/07/25               MOBILITY       Patient will perform Functional mobility  Household distances/Community Distances with stand by assist level of assistance and least restrictive device in order to improve safety and functional mobility.       Start:  04/23/25    Expected End:  05/07/25               TRANSFERS       Patient will complete functional transfers with least restrictive device with modified independent level of assistance.       Start:  04/23/25    Expected End:  05/07/25

## 2025-04-23 NOTE — PROGRESS NOTES
"Pharmacy Consult for Warfarin (Coumadin) Management - Initial Consult Note     Jennifer Magana is a 89 y.o. female admitted for Sepsis (Multi). Pharmacy was consulted for warfarin dosing and monitoring.       Labs  INR External   Date Value Ref Range Status   04/10/2025 2.00 2.00 - 3.00 Final   04/03/2025 1.90 (A) 2.00 - 3.00 Final   03/21/2025 2.30 2.00 - 3.00 Final     INR   Date Value Ref Range Status   04/23/2025 1.9 (H) 0.9 - 1.1 Final   04/03/2025 2.0 (H) 0.9 - 1.1 Final   04/02/2025 1.9 (H) 0.9 - 1.1 Final     Protime   Date Value Ref Range Status   04/23/2025 21.2 (H) 9.8 - 12.4 seconds Final   04/03/2025 22.4 (H) 9.8 - 12.4 seconds Final   04/02/2025 20.6 (H) 9.8 - 12.4 seconds Final     Hemoglobin   Date Value Ref Range Status   04/23/2025 12.5 12.0 - 16.0 g/dL Final     HEMOGLOBIN   Date Value Ref Range Status   04/15/2025 12.8 11.7 - 15.5 g/dL Final     Hematocrit   Date Value Ref Range Status   04/23/2025 40.1 36.0 - 46.0 % Final     HEMATOCRIT   Date Value Ref Range Status   04/15/2025 40.1 35.0 - 45.0 % Final     Platelets   Date Value Ref Range Status   04/23/2025 239 150 - 450 x10*3/uL Final     PLATELET COUNT   Date Value Ref Range Status   04/15/2025 303 140 - 400 Thousand/uL Final     No results found for: \"PTT\"    Home Regimen: 1.25mg Tues, Thurs and 2.5mg all other days     INR 1.9. Will give 2.5 mg once and recheck INR tomorrow.     Orders placed per pharmacy consult. Pharmacy will continue to monitor and adjust therapy as needed.     Sandhya Marley, PharmD  "

## 2025-04-23 NOTE — ASSESSMENT & PLAN NOTE
Most likely provoked by 2 L IV fluid bolus administered in the ED.  Hold further IV fluid at this time.  Sepsis related low BP precludes diuresis with IV Lasix.  Fluid restrict, strict I's and O's, weigh patient daily.

## 2025-04-23 NOTE — PROGRESS NOTES
Jennifer Magana is a 89 y.o. female admitted for Sepsis (Multi). Pharmacy reviewed the patient's [id number] medications and allergies for accuracy.    The list below reflects the PTA list prior to pharmacy medication history. A summary a changes to the PTA medication list has been listed below. Please review each medication in order reconciliation for additional clarification and justification.    Source of information:  T2P and Dtr  Medications added:    Medications modified:    Medications to be removed:  Dicyclomine 20mg  Furosemide 20mg  Medications of concern:      Prior to Admission Medications   Prescriptions Last Dose Informant Patient Reported? Taking?   Jantoven 2.5 mg tablet   No No   Sig: Take 1.25mg (0.5 tablets) by mouth on Tues,Thurs and 2.5mg (1 tablet) all other days or as directed by Coumadin clinic.   Simbrinza 1-0.2 % drops,suspension ophthalmic suspension   Yes No   Sig: Administer 1 drop into both eyes 2 times a day.   dicyclomine (Bentyl) 20 mg tablet   Yes No   Sig: Take 1 tablet (20 mg) by mouth 4 times a day before meals.   doxycycline (Vibramycin) 100 mg capsule   No No   Sig: Take 1 capsule (100 mg) by mouth 2 times a day for 10 days. Take with at least 8 ounces (large glass) of water, do not lie down for 30 minutes after   famotidine (Pepcid) 20 mg tablet   No No   Sig: Take 0.5 tablets (10 mg) by mouth once daily. Start on April 4th   furosemide (Lasix) 20 mg tablet   No No   Sig: Take 1 tablet (20 mg) by mouth once daily for 3 days.   levothyroxine (Synthroid, Levoxyl) 100 mcg tablet   Yes No   Sig: Take 1 tablet (100 mcg) by mouth once daily.   metoprolol succinate XL (Toprol-XL) 25 mg 24 hr tablet   No No   Sig: Take 2 tablets (50 mg) by mouth once daily.   pantoprazole (ProtoNix) 40 mg EC tablet   No No   Sig: Take 1 tablet (40 mg) by mouth once daily in the morning. Take before meals.   travoprost (Travatan Z) 0.004 % drops ophthalmic solution   Yes No   Sig: Administer  1 drop into both eyes once daily at bedtime.      Facility-Administered Medications: None       Rafia Bess

## 2025-04-23 NOTE — PROGRESS NOTES
Vancomycin Dosing by Pharmacy- INITIAL    Jennifer Magana is a 89 y.o. year old female who Pharmacy has been consulted for vancomycin dosing for pneumonia. Based on the patient's indication and renal status this patient will be dosed based on a goal AUC of 500-600.     Renal function is currently stable. Patient has CKD Stage IV, SCr 1.76 at baseline.    Visit Vitals  /88   Pulse 105   Temp 36.8 °C (98.2 °F) (Temporal)   Resp 20        Lab Results   Component Value Date    CREATININE 1.76 (H) 2025    CREATININE 1.93 (H) 2025    CREATININE 1.76 (H) 04/15/2025    CREATININE 2.04 (H) 2025    CREATININE 2.14 (H) 2025    CREATININE 1.76 (H) 2025        Patient weight is as follows:   Vitals:    25 2138   Weight: 59.1 kg (130 lb 4.7 oz)       Cultures:  No results found for the encounter in last 14 days.        I/O last 3 completed shifts:  In: 1000 (17.9 mL/kg) [IV Piggyback:1000]  Out: - (0 mL/kg)   Weight: 55.8 kg   I/O during current shift:  I/O this shift:  In: 1414.8 [I.V.:264.8; IV Piggyback:1150]  Out: -     Temp (24hrs), Av.6 °C (97.9 °F), Min:36.4 °C (97.5 °F), Max:36.8 °C (98.2 °F)         Assessment/Plan     Patient will be given a loading dose of 1250 mg.  Will initiate vancomycin maintenance,  500 mg every 24 hours.    This dosing regimen is predicted by [facility] to result in the following pharmacokinetic parameters:  Loading dose: 1250 mg at 08:00 2025.  Regimen: 500 mg IV every 24 hours.  Start time: 08:00 on 2025  Exposure target: AUC24 (range)400-600 mg/L.hr   [id number]: 391 mg/L.hr  AUC24,ss: 505 mg/L.hr  Probability of AUC24 > 400: 76 %  Ctrough,ss: 18 mg/L  Probability of Ctrough,ss > 20: 39 %    Follow-up level will be ordered on  at AM Labs unless clinically indicated sooner.  Will continue to monitor renal function daily while on vancomycin and order serum creatinine at least every 48 hours if not already ordered.  Follow for  continued vancomycin needs, clinical response, and signs/symptoms of toxicity.       Galilea Vega, PharmD

## 2025-04-23 NOTE — PROGRESS NOTES
04/23/25 1314   Discharge Planning   Living Arrangements Children   Support Systems Children;Friends/neighbors   Assistance Needed Home Health Care   Type of Residence Private residence   Home or Post Acute Services In home services   Type of Home Care Services Home nursing visits;Home OT;Home PT   Expected Discharge Disposition Home H   Does the patient need discharge transport arranged? No   Patient Choice   Provider Choice list and CMS website (https://medicare.gov/care-compare#search) for post-acute Quality and Resource Measure Data were provided and reviewed with: Patient   Intensity of Service   Intensity of Service 0-30 min     Met with patient and 2 daughters at bedside. Introduced self and role as RN TCC. Patient lives at home with 2 daughters. She is able to care for self at baseline as far as dressing, bathing, walk in shower, daughter assist. Daughter assist with laundry, meals, etc. Patient ambulates with a cane. She is admitted for UTI/Sepsis. She was evaluated by PT OT recommend Home Therapy. They are agreeable and choose [facility] due to PCP being with [facility]. Will send referral. PCP is Shay Torres. Patient open to another agency if [facility] unable to accept. TCC to follow.

## 2025-04-23 NOTE — CARE PLAN
Problem: Pain - Adult  Goal: Verbalizes/displays adequate comfort level or baseline comfort level  Outcome: Progressing     Problem: Safety - Adult  Goal: Free from fall injury  Outcome: Progressing     Problem: Discharge Planning  Goal: Discharge to home or other facility with appropriate resources  Outcome: Progressing     Problem: Chronic Conditions and Co-morbidities  Goal: Patient's chronic conditions and co-morbidity symptoms are monitored and maintained or improved  Outcome: Progressing     Problem: Nutrition  Goal: Nutrient intake appropriate for maintaining nutritional needs  Outcome: Progressing     Problem: Arrythmia/Dysrhythmia  Goal: Lab values return to normal range  Outcome: Progressing  Goal: Promote self management  Outcome: Progressing  Goal: Serial ECG will return to baseline  Outcome: Progressing  Goal: Verbalize understanding of procedures/devices  Outcome: Progressing  Goal: Vital signs return to baseline  Outcome: Progressing     Problem: Skin  Goal: Participates in plan/prevention/treatment measures  Outcome: Progressing  Flowsheets (Taken 4/23/2025 0052)  Participates in plan/prevention/treatment measures:   Elevate heels   Increase activity/out of bed for meals  Goal: Prevent/manage excess moisture  Outcome: Progressing  Flowsheets (Taken 4/23/2025 0052)  Prevent/manage excess moisture:   Cleanse incontinence/protect with barrier cream   Moisturize dry skin  Goal: Prevent/minimize sheer/friction injuries  Outcome: Progressing  Flowsheets (Taken 4/23/2025 0052)  Prevent/minimize sheer/friction injuries: Increase activity/out of bed for meals  Goal: Promote/optimize nutrition  Outcome: Progressing  Flowsheets (Taken 4/23/2025 0052)  Promote/optimize nutrition:   Offer water/supplements/favorite foods   Monitor/record intake including meals    The clinical goals for the shift include  pt will remain hemodynamically stable throughout the shift

## 2025-04-23 NOTE — CONSULTS
"         Infectious Disease Inpatient Consult    Consults  Referred by Dr Sydney Lassiter MD: Shay Torres DO    Reason For Consult  Bacteremia    History Of Present Illness  Jennifer Magana is a 89 y.o. female presenting with hypertension, hypothyroidism, chronic atrial fibrillation on warfarin, chronic diastolic CHF.  No cigarette smoking, alcohol abuse or illicit drug use.  Patient has 2 daughters living with her and assisting with her ADLs.  Patient began to experience last night initially mild suprapubic abdominal pain but which became severe this morning with radiation across both lower abdominal quadrants.  There was associated nausea and bilious emesis, malaise and chills.  No headache, neck stiffness, acute confusion, skin rash, cough, shortness of breath, chest pain, palpitations, diarrhea, dysuria or urinary frequency.  Patient had a \"soft\" bowel movement earlier this morning.  She arrived to this facility's ED this afternoon with vital signs including /90 and  while complaining of severe lower quadrant/suprapubic abdominal pain.  Pertinent ED labs included serum sodium 141, potassium 4.2, magnesium 1.98, bicarb 26, BUN 27, creatinine 1.93, glucose 160, LFTs within normal limits, serum lipase 11, lactate 2.1->2.0, serial troponin 14->15, WBC 25.4, hemoglobin 15.1, platelets 340, UA currently pending.  Noncontrast CT chest/abdomen/pelvis was remarkable for acute CHF (pulm edema, small pleural effusions), pancreatic tail mild peripancreatic fat stranding, perivesical fat stranding, cholelithiasis without convincing evidence of acute cholecystitis, fluid in the esophagus compatible with GERD.      Past Medical History  She has a past medical history of (HFpEF) heart failure with preserved ejection fraction, Aortic stenosis, CKD (chronic kidney disease), Essential (primary) hypertension, GERD (gastroesophageal reflux disease), Hypothyroidism, and Paroxysmal atrial fibrillation " "(Multi).    Surgical History  She has a past surgical history that includes MR angio head wo IV contrast (2020); MR angio neck wo IV contrast (2020); and Hysterectomy.     Social History     Occupational History    Not on file   Tobacco Use    Smoking status: Never    Smokeless tobacco: Never   Substance and Sexual Activity    Alcohol use: Never    Drug use: Never    Sexual activity: Not on file     Travel History   Travel since 25    No documented travel since 25          Family History  Family History[1]  Allergies  Patient has no known allergies.     Immunization History   Administered Date(s) Administered    Moderna SARS-CoV-2 Vaccination 2021, 2021     Medications  Home medications:  Prescriptions Prior to Admission[2]  Current medications:  Scheduled medications  Scheduled Medications[3]  Continuous medications  Continuous Medications[4]  PRN medications  PRN Medications[5]    Review of Systems     Objective  Range of Vitals (last 24 hours)  Heart Rate:  []   Temp:  [36.4 °C (97.5 °F)-36.8 °C (98.2 °F)]   Resp:  [9-27]   BP: ()/()   Height:  [157.5 cm (5' 2\")]   Weight:  [55.8 kg (123 lb)-59.1 kg (130 lb 4.7 oz)]   SpO2:  [95 %-100 %]   Daily Weight  25 : 59.1 kg (130 lb 4.7 oz)    Body mass index is 23.83 kg/m².     Physical Exam  BP (!) 122/104   Pulse 101   Temp 36.8 °C (98.2 °F) (Temporal)   Resp 21   Ht 1.575 m (5' 2\")   Wt 59.1 kg (130 lb 4.7 oz)   SpO2 100%   BMI 23.83 kg/m²   Temp (24hrs), Av.6 °C (97.9 °F), Min:36.4 °C (97.5 °F), Max:36.8 °C (98.2 °F)    General: alert, oriented, NAD  Lungs: bilaterally clear to auscultation  Heart: regular rate and rhythm  Abdomen: soft, non tender, non distended, BS+  Extremities: no edema  No rashes  No joint inflammation  Neck supple  Lines ok  No CVAT     Relevant Results    Labs  Results from last 72 hours   Lab Units 25  0415 25  1537   WBC AUTO x10*3/uL 16.7* 25.4*   HEMOGLOBIN " "g/dL 12.5 15.1   HEMATOCRIT % 40.1 49.0*   PLATELETS AUTO x10*3/uL 239 340   NEUTROS PCT AUTO % 87.5  --    LYMPHO PCT MAN %  --  2.0   LYMPHS PCT AUTO % 4.9  --    MONO PCT MAN %  --  3.0   MONOS PCT AUTO % 6.5  --    EOSINO PCT MAN %  --  0.0   EOS PCT AUTO % 0.0  --      Results from last 72 hours   Lab Units 04/23/25  0415 04/22/25  1537   SODIUM mmol/L 141 141   POTASSIUM mmol/L 3.7 4.2   CHLORIDE mmol/L 106 103   CO2 mmol/L 25 26   BUN mg/dL 25* 27*   CREATININE mg/dL 1.76* 1.93*   GLUCOSE mg/dL 136* 160*   CALCIUM mg/dL 8.7 10.1   ANION GAP mmol/L 14 16   EGFR mL/min/1.73m*2 26* 23*     Results from last 72 hours   Lab Units 04/23/25 0415 04/22/25  1537   ALK PHOS U/L 79 91   BILIRUBIN TOTAL mg/dL 0.9 1.2   PROTEIN TOTAL g/dL 5.6* 7.3   ALT U/L 20 13   AST U/L 20 16   ALBUMIN g/dL 3.5 4.5     Estimated Creatinine Clearance: 14.8 mL/min (A) (by C-G formula based on SCr of 1.76 mg/dL (H)).  No results found for: \"CRP\", \"SEDRATE\"  No results found for: \"HIV1X2\", \"HIVCONF\", \"UCLMYI3QH\"  No results found for: \"HEPCABINIT\", \"HEPCAB\", \"HCVPCRQUANT\"  Microbiology  No results found for the last 100 days.        No lab exists for component: \"AGALPCRNB\"                        Imaging  Imaging  CT chest abdomen pelvis wo IV contrast  Result Date: 4/22/2025  Interstitial prominence with smooth interlobular septal thickening suggests acute pulmonary interstitial edema/CHF. Dependent lower lung airspace opacities with associated parenchymal volume loss most likely relate to atelectasis. Small pleural effusions. No pneumothorax.   Mild peripancreatic fat stranding predominating about the tail (series 2, images ), suggesting possible acute interstitial pancreatitis versus retroperitoneal edema secondary to volume overload. Suggest correlation with pancreatic enzymes. No focal fluid collection.   Urinary bladder is decompressed, not well evaluated. Perivesical fat stranding is concerning for acute cystitis although some " of this appearance could be technical in etiology. Suggest clinical correlation and correlation with urinalysis.   Hepatomegaly.   Suspected cholelithiasis without convincing evidence of acute cholecystitis.   Cardiomegaly, with disproportionate atrial distension. Mitral annular and aortic root calcifications. Atherosclerosis, including coronary artery disease.   Fluid in the esophagus compatible with gastroesophageal reflux. No definite abnormal esophageal mural thickening.   Additional findings as discussed above.   MACRO: None   Signed by: Merrick Johnson 4/22/2025 6:34 PM Dictation workstation:   [id number]    XR chest 1 view  Result Date: 4/22/2025  1.  Similar appearance of bibasilar airspace opacities prior exam, which may represent atelectasis versus developing infiltrates with layering pleural effusions not excluded bilaterally.       MACRO: None   Signed by: Jose Garcia 4/22/2025 4:02 PM Dictation workstation:   [id number]      Cardiology, Vascular, and Other Imaging  No other imaging results found for the past 7 days      Echo  No results found for this or any previous visit.    Assessment   Sepsis, possible sources complicated UTI; acute cholecystitis & pancreatitis   GPC bacteremia  Leucocytosis, improving  Abdominal pain  CHF  Stage 4 CKD  Afib   GERD  Gall stones    Plan   Vancomycin IV  Zosyn  Vancomycin T  Doxy  Repeat Bcx x 2  Monitor temps and counts  Check Echo  ICU supportive care    I spent 32 minutes in the professional and overall care of this patient.      Sukhwinder Tam MD       [1]   Family History  Problem Relation Name Age of Onset    Other (Half sister with CAD) Sister      Coronary artery disease Other Children    [2]   Medications Prior to Admission   Medication Sig Dispense Refill Last Dose/Taking    dicyclomine (Bentyl) 20 mg tablet Take 1 tablet (20 mg) by mouth 4 times a day before meals.       doxycycline (Vibramycin) 100 mg capsule Take 1 capsule (100 mg) by mouth 2 times a  day for 10 days. Take with at least 8 ounces (large glass) of water, do not lie down for 30 minutes after 20 capsule 0     famotidine (Pepcid) 20 mg tablet Take 0.5 tablets (10 mg) by mouth once daily. Start on April 4th 30 tablet 1     furosemide (Lasix) 20 mg tablet Take 1 tablet (20 mg) by mouth once daily for 3 days. 3 tablet 0     Jantoven 2.5 mg tablet Take 1.25mg (0.5 tablets) by mouth on Tues,Thurs and 2.5mg (1 tablet) all other days or as directed by Coumadin clinic. 72 tablet 3     levothyroxine (Synthroid, Levoxyl) 100 mcg tablet Take 1 tablet (100 mcg) by mouth once daily.       metoprolol succinate XL (Toprol-XL) 25 mg 24 hr tablet Take 2 tablets (50 mg) by mouth once daily. 60 tablet 1     pantoprazole (ProtoNix) 40 mg EC tablet Take 1 tablet (40 mg) by mouth once daily in the morning. Take before meals. 90 tablet 3     Simbrinza 1-0.2 % drops,suspension ophthalmic suspension Administer 1 drop into both eyes 2 times a day.       travoprost (Travatan Z) 0.004 % drops ophthalmic solution Administer 1 drop into both eyes once daily at bedtime.      [3] doxycycline, 100 mg, intravenous, q12h  latanoprost, 1 drop, Both Eyes, Nightly  levothyroxine, 100 mcg, oral, Daily  magnesium sulfate, 1 g, intravenous, Once  pantoprazole, 40 mg, oral, Daily before breakfast   Or  pantoprazole, 40 mg, intravenous, Daily before breakfast  piperacillin-tazobactam, 2.25 g, intravenous, q6h  polyethylene glycol, 17 g, oral, Daily  potassium chloride, 20 mEq, oral, Once  vancomycin, 1,250 mg, intravenous, Once  [START ON 4/24/2025] vancomycin, 500 mg, intravenous, q24h    [4] amiodarone, 0.5 mg/min, Last Rate: 0.5 mg/min (04/23/25 0303)    [5] PRN medications: acetaminophen, bisacodyl, guaiFENesin, melatonin, morphine, vancomycin

## 2025-04-23 NOTE — PROGRESS NOTES
Vancomycin Dosing by Pharmacy- Cessation of Therapy    Consult to pharmacy for vancomycin dosing has been discontinued by the prescriber, pharmacy will sign off at this time.    Please call pharmacy if there are further questions or re-enter a consult if vancomycin is resumed.     Jarett Nunez [facility]

## 2025-04-23 NOTE — ASSESSMENT & PLAN NOTE
Markedly elevated WBC count with constitutional symptoms & low BP.  Etiology of sepsis unclear but most likely due to complicated UTI; acute cholecystitis & pancreatitis unlikely given normal LFTs & serum lipase respectively.  Patient admitted to the ICU due to rapid atrial fibrillation (likely sepsis related) requiring amiodarone drip.  No further IV fluid due to acute CHF.    Continue on IV Zosyn/Doxy  Follow cultures  ID consulted  Critical care follow-up

## 2025-04-23 NOTE — PROGRESS NOTES
Physical Therapy    Physical Therapy Evaluation    Patient Name: Jennifer Magana  MRN: [id number]  Today's Date: 4/23/2025   Time Calculation  Start Time: 1221  Stop Time: 1250  Time Calculation (min): 29 min  12/12-A    Assessment/Plan   PT Assessment  PT Assessment Results: Decreased strength, Decreased endurance, Decreased mobility  Rehab Prognosis: Good  Barriers to Discharge Home: Physical needs  Physical Needs: Stair navigation into home limited by function/safety, Ambulating household distances limited by function/safety  Evaluation/Treatment Tolerance: Patient tolerated treatment well  End of Session Communication: Bedside nurse (discussed HR/BP response to therapy)  Assessment Comment: Recommend LOW INTENSITY REHAB for strenth progression after 2 weeks decline (per dtr) and re-adm.  End of Session Patient Position: Bed, 3 rail up, Alarm on  IP OR SWING BED PT PLAN  Inpatient or Swing Bed: Inpatient  PT Plan  Treatment/Interventions: Bed mobility, Transfer training, Gait training, Neuromuscular re-education, Therapeutic exercise, Therapeutic activity, Stair training  PT Plan: Ongoing PT  PT Frequency: 5 times per week  PT Discharge Recommendations: Low intensity level of continued care  PT - OK to Discharge: Yes    Subjective     Current Problem:  1. Abdominal pain, unspecified abdominal location        2. Leukocytosis, unspecified type        3. Atrial fibrillation, unspecified type (Multi)          Problem List[1]    General Visit Information:  General  Reason for Referral: abd pain, N/V. Recent adm 3/29-4/3/25, per dtr-ever since pt not feeling well/increased abd pain/reduced oral intake.  Referred By: PT FOR IMPAIRED MOBILITY/GAIT TRAINING  Past Medical History Relevant to Rehab: atrial fibrillation on warfarin, chronic diastolic heart failure (EF 45-50%), CKD, hypertension, severe aortic stenosis, hypothyroidism,  Family/Caregiver Present: Yes  Caregiver Feedback: dtr states pt has been ambulatory at  "home but \"very SOB\"  Co-Treatment: OT  Co-Treatment Reason: optimize pt safety while focus on discipline specific goals  Prior to Session Communication: Bedside nurse (discussed HR: has been stable AFIB (fluctuating 80's to 120's))  Patient Position Received: Bed, 3 rail up, Alarm on  General Comment: pt seen in ICU with monitor lines. pleasant and cooperative, gave max effor t for therapy    Home Living:  Home Living  Type of Home: House  Lives With: Adult children (2 dtr's)  Home Adaptive Equipment: Cane  Home Layout: Two level, Able to live on main level with bedroom/bathroom  Bathroom Shower/Tub: Walk-in shower  Home Living Comments: Bilevel: 5 up/5 down, pt stays on upper level, dtr does laundry so pt does not need to go downstairs    Prior Level of Function:  Prior Function Per Pt/Caregiver Report  Level of [place]: Independent with ADLs and functional transfers, Needs assistance with homemaking  Receives Help From: Family  Ambulatory Assistance: Needs assistance  Transfers: Assistive device  Gait: Assistive device (uses cane at all times d/t R knee)  Stairs: Assistive device    Precautions:  Precautions  Medical Precautions: Fall precautions  Precautions Comment: watch BP (low on eval) and HR (AF RVR)    Vital Signs:  Vital Signs  BP: 82/64  MAP (mmHg): 71  Objective     Pain:  Pain Assessment  Pain Assessment: 0-10  0-10 (Numeric) Pain Score: 0 - No pain  Pain Type: Chronic pain  Pain Location: Knee  Pain Orientation: Right  Pain Frequency: Intermittent (only with walking)  Pain Interventions: Ambulation/increased activity  Response to Interventions: Resting quietly    Cognition:  Cognition  Overall Cognitive Status: Within Functional Limits  Orientation Level: Oriented X4  Insight: Within function limits  Impulsive: Within functional limits    General Assessments:  General Observation  General Observation: positional changes supine to sit to stand, prolonged standing EOB few minutes for linen change " then RTB.  did not attempt to amb further or leave in chair d/t low BP   Activity Tolerance  Endurance: Endurance does not limit participation in activity  Activity Tolerance Comments: asymptomatic AF RVR and low BP     Coordination  Movements are Fluid and Coordinated: Yes     Dynamic Sitting Balance  Dynamic Sitting-Comments: good  Dynamic Standing Balance  Dynamic Standing-Comments: fair-    Functional Assessments:     Bed Mobility 1  Bed Mobility 1: Supine to sitting, Sitting to supine  Level of Assistance 1: Close supervision, Contact guard  Transfer 1  Transfer From 1: Bed to  Technique 1: Sit to stand  Transfer Level of Assistance 1: Arm in arm assistance  Ambulation/Gait Training 1  Device 1: No device  Assistance 1: Arm in arm assistance, Contact guard (+1 for lines)  Comments/Distance (ft) 1: 2  Stairs  Stairs: No       Extremity/Trunk Assessments:        RLE   RLE : Exceptions to WFL  AROM RLE (degrees)  RLE AROM Comment: <25% severe knee valgus  Strength RLE  RLE Overall Strength: Greater than or equal to 3/5 as evidenced by functional mobility  LLE   LLE : Exceptions to WFL  AROM LLE (degrees)  LLE AROM Comment: grossly 50%  Strength LLE  LLE Overall Strength: Greater than or equal to 3/5 as evidenced by functional mobility    Outcome Measures:     [facility] Basic Mobility  Turning from your back to your side while in a flat bed without using bedrails: None  Moving from lying on your back to sitting on the side of a flat bed without using bedrails: A little  Moving to and from bed to chair (including a wheelchair): A little  Standing up from a chair using your arms (e.g. wheelchair or bedside chair): A little  To walk in hospital room: A lot  Climbing 3-5 steps with railing: A lot  Basic Mobility - Total Score: 17       Goals:  Encounter Problems       Encounter Problems (Active)       Mobility       STG - Patient will ambulate household distance using cane and no more than SUPV assist       Start:   04/23/25            STG - Patient will navigate 3-5 steps with rails/device, no more than MIN Ax1       Start:  04/23/25               PT Transfers       using cane and no more than SUPV assistSTG - Patient will demonstrate safe transfer techniques        Start:  04/23/25               Pain - Adult          Strengthening        Pt will perform 10+ reps AAROM/AROM/RROM BLE to improve functional strength needed for improved mobility.        Start:  04/23/25                 Education Documentation  Mobility Training, taught by Dulce Buckley, PT at 4/23/2025  1:14 PM.  Learner: Patient  Readiness: Acceptance  Method: Explanation, Demonstration  Response: Verbalizes Understanding    Education Comments  No comments found.              [1]   Patient Active Problem List  Diagnosis    Acute on chronic diastolic CHF (congestive heart failure)    Essential hypertension    Lower extremity edema    Paroxysmal atrial fibrillation (Multi)    Persistent atrial fibrillation (Multi)    Aortic valve stenosis    Encounter for long-term (current) use of high-risk medication    Acute headache    Asthenia    Bradycardia    Chest pain    Stage 4 chronic kidney disease (Multi)    Congestive heart failure    Constipation    Contact with and (suspected) exposure to covid-19    Dizziness    Fecal impaction of rectum (Multi)    Ischemia of lower extremity    Personal history of other (healed) physical injury and trauma    Personal history of other diseases of the nervous system and sense organs    Personal history of other specified conditions    Gastro-esophageal reflux disease without esophagitis    Hypothyroidism, unspecified    Pain in unspecified joint    Rapid atrial fibrillation (Multi)    Abdominal pain, unspecified abdominal location    Sepsis (Multi)    Gastroesophageal reflux disease with esophagitis    Calculus of gallbladder without cholecystitis without obstruction

## 2025-04-23 NOTE — ASSESSMENT & PLAN NOTE
Most likely provoked by sepsis.  Patient currently requiring amiodarone drip for rate/rhythm control and warfarin for anticoagulation  Cardiology consult

## 2025-04-23 NOTE — SIGNIFICANT EVENT
Palliative care meeting scheduled for Friday 4/25 at 9:30 am.     Met with patient who states she would like to have her daughter Goldy present for meeting. Phone call to Goldy who is unavailable again until Friday morning, scheduled as above.

## 2025-04-23 NOTE — CONSULTS
Inpatient consult to Cardiology  Consult performed by: Kaiser Lopez MD  Consult ordered by: Balta Grimes MD  Reason for consult: Afib, sepsis        History Of Present Illness:    Jennifer Magana is a 89 y.o. female admitted on 4/22/25 with abdominal/suprapubic pain.  She has a history of hypertension, hypothyroidism, chronic atrial fibrillation on warfarin, chronic diastolic CHF.  She is currently admitted and being treated for sepsis.  We are consulted for atrial fibrillation.    Her initial labwork through the ED was significant for BUN 27, Cr 1.93, lactate 2.1, HSTI 14 > 15, WBC 25.4k.  Noncontrast CT chest/abdomen/pelvis was remarkable for acute CHF (pulm edema, small pleural effusions), pancreatic tail mild peripancreatic fat stranding, perivesical fat stranding, cholelithiasis without convincing evidence of acute cholecystitis, fluid in the esophagus compatible with GERD.  Patient received 2 L NS IV fluid bolus per sepsis 30 mL/kg IV fluid bolus protocol, as well as IV Zosyn.      On presentation she was in Afib with RVR up to rate of 140's despite IVF bolus and lopressor 5mg IV x1.  She was then initiated on amiodarone infusion.  The patient tells me she has chronic Afib and is on rate control with metoprolol succinate XL 25mg daily, with jantoven for CVA prophylaxis - tells me her INR's have been in the therapeutic range and are managed by her PCP at [facility].    Currently she is on amiodarone infusion 0.5mg/min, with resting HR's ranging in the 's.  She is not requiring pressors.  She denies any current chest pain/pressure, palps, LH/dizziness, and appears unaware of the Afib.    Review Of Systems:  The remainder of the review of systems was obtained, and was negative as pertains to the chief complaint.    Fhx:  NC  SocHx:  lifetime nonsmoker     TTE 3/2025:  CONCLUSIONS:   1. The left ventricular systolic function is mildly decreased, with a visually estimated ejection fraction of 45-50%.    2. No regional wall motion abnormalities.   3. Spectral Doppler shows a a Grade III (restrictive pattern) of left ventricular diastolic filling with an elevated left atrial pressure.   4. Left ventricular cavity size is decreased.   5. There is normal right ventricular global systolic function.   6. The left atrial size is moderate to severely dilated.   7. There is moderate mitral annular calcification.   8. Moderate mitral valve regurgitation.   9. Mild to moderate tricuspid regurgitation.  10. Severe aortic valve stenosis.  11. Mild aortic valve regurgitation.  12. The inferior vena cava appears normal in size, with IVC inspiratory collapse greater than 50%.  13. There is severely increased septal and severely increased posterior left ventricular wall thickness.     Last Recorded Vitals:  Vitals:    04/23/25 1500 04/23/25 1541 04/23/25 1600 04/23/25 1700   BP:   80/62    Pulse: 92  107 92   Resp: 19  14 26   Temp:  36.2 °C (97.2 °F)     TempSrc:  Temporal     SpO2: 100%  100%    Weight:       Height:           Last Labs:  CBC - 4/23/2025:  4:15 AM  16.7 12.5 239    40.1      CMP - 4/23/2025:  4:15 AM  8.7 5.6 20 --- 0.9   2.9 3.5 20 79      PTT - No results in last year.  1.9   21.2 _     Troponin I, High Sensitivity   Date/Time Value Ref Range Status   04/22/2025 04:37 PM 15 (H) 0 - 13 ng/L Final   04/22/2025 03:37 PM 14 (H) 0 - 13 ng/L Final   03/29/2025 01:43 PM 17 (H) 0 - 13 ng/L Final     BNP   Date/Time Value Ref Range Status   04/22/2025 03:37  (H) 0 - 99 pg/mL Final   03/29/2025 12:35  (H) 0 - 99 pg/mL Final      Last I/O:  I/O last 3 completed shifts:  In: 2748.4 (46.5 mL/kg) [I.V.:348.4 (5.9 mL/kg); IV Piggyback:2400]  Out: 200 (3.4 mL/kg) [Urine:200 (0.1 mL/kg/hr)]  Weight: 59.1 kg     Past Cardiology Tests (Last 3 Years):  EKG:  ECG 12 lead 04/22/2025      ECG 12 lead 03/29/2025      ECG 12 lead (Clinic Performed) 05/23/2024      ECG 12 Lead     Echo:  Transthoracic Echo (TTE) Complete  03/31/2025      Transthoracic echo (TTE) complete 01/04/2024    Ejection Fractions:  EF   Date/Time Value Ref Range Status   03/31/2025 02:35 PM 48 %    01/04/2024 02:26 PM 54       Cath:  No results found for this or any previous visit from the past 1095 days.    Stress Test:  No results found for this or any previous visit from the past 1095 days.    Cardiac Imaging:  No results found for this or any previous visit from the past 1095 days.      Past Medical History:  She has a past medical history of (HFpEF) heart failure with preserved ejection fraction, Aortic stenosis, CKD (chronic kidney disease), Essential (primary) hypertension, GERD (gastroesophageal reflux disease), Hypothyroidism, and Paroxysmal atrial fibrillation (Multi).    Past Surgical History:  She has a past surgical history that includes MR angio head wo IV contrast (07/21/2020); MR angio neck wo IV contrast (07/21/2020); and Hysterectomy.      Social History:  She reports that she has never smoked. She has never used smokeless tobacco. She reports that she does not drink alcohol and does not use drugs.    Family History:  Family History[1]     Allergies:  Patient has no known allergies.    Inpatient Medications:  Scheduled Medications[2]  PRN Medications[3]  Continuous Medications[4]  Outpatient Medications:  Current Outpatient Medications   Medication Instructions    doxycycline (VIBRAMYCIN) 100 mg, oral, 2 times daily, Take with at least 8 ounces (large glass) of water, do not lie down for 30 minutes after    famotidine (PEPCID) 10 mg, oral, Daily, Start on April 4th    furosemide (LASIX) 20 mg, oral, Daily    Jantoven 2.5 mg tablet Take 1.25mg (0.5 tablets) by mouth on Tues,Thurs and 2.5mg (1 tablet) all other days or as directed by Coumadin clinic.    levothyroxine (SYNTHROID, LEVOXYL) 100 mcg, Daily    metoprolol succinate XL (TOPROL-XL) 50 mg, oral, Daily    pantoprazole (PROTONIX) 40 mg, oral, Daily before breakfast    Simbrinza 1-0.2 %  "drops,suspension ophthalmic suspension 1 drop, Both Eyes, 2 times daily    travoprost (Travatan Z) 0.004 % drops ophthalmic solution 1 drop, Both Eyes, Nightly       Physical Exam:  Physical Exam  Constitutional:       Comments: Frail appearing   HENT:      Head: Atraumatic.      Mouth/Throat:      Mouth: Mucous membranes are dry.   Eyes:      Extraocular Movements: Extraocular movements intact.   Cardiovascular:      Rate and Rhythm: Tachycardia present. Rhythm irregular.      Heart sounds: Murmur heard.      Comments: 2/6 KELLY BUSB, apex  Pulmonary:      Effort: Pulmonary effort is normal.   Musculoskeletal:      Cervical back: Neck supple.   Skin:     General: Skin is warm.   Neurological:      Mental Status: She is alert. Mental status is at baseline.   Psychiatric:         Mood and Affect: Mood normal.              Assessment/Plan   Afib with RVR:  she has chronic Afib and is normally rate controlled with BB.  Presentation of Afib with RVR in setting of sepsis and bacteremia.  She was initiated on amiodarone infusion in ED for rate control (of note, upon chart review, her INR's have been in the range of 1.9 - 2.0 /2.3 since 3/29/25).  -tele monitoring  -at this point would continue amiodarone for rate control purposes to achieve HR <=110  -reinitiate metop succinate XL 25mg daily when BP improves  -continue jantoven, check daily INR\"s - if she becomes subtherapeutic, start heparin infusion  -keep K ~ 4, Mg ~ 2    Sepsis:  unclear source, suspect urinary source vs ? Cholecystitis  -continue Abx (zosyn, doxy) per primary service  -follow cultures    Acute on chronic combined systolic and diastolic HF:  TTE 3/25 with LVEF 45-50% and grade III DD.  Currently favoring fluid administration due to sepsis with low BP's.  -serial volume monitoring  -diurese if appropriate (see below)    Severe aortic stenosis, moderate mitral regurgitation:  Per recent prior cardiology notes from last admission, with progression of " aortic stenosis plan was for outpatient follow up and conservative management given her advanced age (see cardio note 4/2/25) - per this note, patient's family was udpated  -would avoid overdiuresis in setting of her severe aortic stenosis    Dispo:    -palliative care mtg scheduled for 4/25/25 0930    Peripheral IV 04/22/25 20 G Left Antecubital (Active)   Site Assessment Clean;Dry;Intact 04/23/25 0800   Dressing Type Transparent 04/23/25 0800   Line Status Infusing 04/23/25 0800   Dressing Status Clean;Dry;Occlusive 04/23/25 0800   Number of days: 1       Peripheral IV 04/22/25 20 G Posterior;Right Forearm (Active)   Site Assessment Clean;Dry;Intact 04/23/25 0800   Dressing Type Transparent 04/23/25 0800   Line Status Infusing 04/23/25 0800   Dressing Status Clean;Dry;Occlusive 04/23/25 0800   Number of days: 1       Code Status:  Full Code    I spent 30 minutes of critical care time in the professional and overall care of this patient.        Kaiser Lopez MD         [1]   Family History  Problem Relation Name Age of Onset    Other (Half sister with CAD) Sister      Coronary artery disease Other Children    [2]   Scheduled medications   Medication Dose Route Frequency    doxycycline  100 mg intravenous q12h    latanoprost  1 drop Both Eyes Nightly    levothyroxine  100 mcg oral Daily    pantoprazole  40 mg oral Daily before breakfast    Or    pantoprazole  40 mg intravenous Daily before breakfast    piperacillin-tazobactam  2.25 g intravenous q6h    polyethylene glycol  17 g oral Daily    [START ON 4/24/2025] vancomycin  500 mg intravenous q24h    warfarin  2.5 mg oral Once   [3]   PRN medications   Medication    acetaminophen    bisacodyl    guaiFENesin    melatonin    morphine    ondansetron    Or    ondansetron    vancomycin   [4]   Continuous Medications   Medication Dose Last Rate    amiodarone  0.5 mg/min 0.5 mg/min (04/23/25 6085)

## 2025-04-23 NOTE — ASSESSMENT & PLAN NOTE
CT abdomen and pelvis-mild peripancreatic fat stranding,.  Presacral fat stranding, cholelithiasis without concerning evidence of acute cholecystitis.  Symptomatic treatment  Monitor

## 2025-04-23 NOTE — PROGRESS NOTES
Critical Care Daily Progress Notes        Subjective   Patient is a 89 y.o. female admitted on 4/22/2025  3:14 PM with the following indication(s) for ICU care: Atrial fibrillation with RVR, severe sepsis.     Interval History:  Jennifer Magana is a 89 y.o. female with past medical history of atrial fibrillation on warfarin, chronic diastolic heart failure (EF 45-50%), CKD, hypertension, severe aortic stenosis, hypothyroidism, and GERD presented to [facility] ED with complaints of abdominal pain, nausea, and vomiting.  Admitted to the ICU because of atrial fibrillation with RVR and severe sepsis    04/23/2025: Patient continued to be in atrial fibrillation, rate controlled.  On amiodarone 0.5.  Hemodynamically stable, not requiring any vasopressors.  Alert oriented x 3.     Scheduled Medications:   Scheduled Medications[1]     Continuous Medications:   Continuous Medications[2]     PRN Medications:   PRN Medications[3]    Objective   Vitals:  Most Recent:  Vitals:    04/23/25 0900   BP:    Pulse: 82   Resp: (!) 36   Temp:    SpO2: 100%       24hr Min/Max:  Temp  Min: 36.2 °C (97.2 °F)  Max: 36.8 °C (98.2 °F)  Pulse  Min: 82  Max: 147  BP  Min: 93/80  Max: 143/90  Resp  Min: 9  Max: 36  SpO2  Min: 95 %  Max: 100 %    LDA:         Vent settings:       Hemodynamic parameters for last 24 hours:       I/O:    Intake/Output Summary (Last 24 hours) at 4/23/2025 0943  Last data filed at 4/23/2025 0800  Gross per 24 hour   Intake 3238.43 ml   Output 200 ml   Net 3038.43 ml       Physical Exam:   Physical Exam     Constitutional:       General: She is not in acute distress.     Appearance: Normal appearance. She is not ill-appearing.   HENT:      Head: Normocephalic.      Nose: Nose normal.      Mouth/Throat:      Mouth: Mucous membranes are dry.   Eyes:      Extraocular Movements: Extraocular movements intact.      Conjunctiva/sclera: Conjunctivae normal.      Pupils: Pupils are equal, round, and reactive to light.    Cardiovascular:      Rate and Rhythm: Tachycardia present. Rhythm irregular.      Pulses: Normal pulses.      Heart sounds: Murmur heard.      Systolic murmur is present.      Comments: Atrial fibrillation with RVR on telemetry  Pulmonary:      Effort: Pulmonary effort is normal. No respiratory distress.      Breath sounds: Normal breath sounds. No stridor. No wheezing, rhonchi or rales.      Comments: Diminished breath sounds to bilateral bases.   Chest:      Chest wall: No tenderness.   Abdominal:      General: A surgical scar is present. Bowel sounds are normal. There is no distension.      Palpations: Abdomen is soft.      Tenderness: There is abdominal tenderness in the suprapubic area. There is no guarding or rebound.      Comments: Suprapubic tenderness with deep palpation.   Musculoskeletal:         General: Normal range of motion.      Cervical back: Normal range of motion.      Right lower leg: No edema.      Left lower leg: No edema.   Skin:     General: Skin is warm and dry.      Capillary Refill: Capillary refill takes less than 2 seconds.      Findings: Rash (MASD) present.      Comments: MASD bilateral groin, perineum   Neurological:      General: No focal deficit present.      Mental Status: She is alert and oriented to person, place, and time. Mental status is at baseline.      Cranial Nerves: Cranial nerves 2-12 are intact.      Sensory: Sensation is intact.      Motor: Motor function is intact.      Comments: Alert and oriented to person, place, situation but not month or year.    Psychiatric:         Attention and Perception: Attention normal.         Mood and Affect: Mood normal.         Speech: Speech normal.         Behavior: Behavior normal.         Judgment: Judgment normal.      Lab/Radiology/Diagnostic Review:  Results for orders placed or performed during the hospital encounter of 04/22/25 (from the past 24 hours)   CBC and Auto Differential   Result Value Ref Range    WBC 25.4 (H) 4.4  - 11.3 x10*3/uL    nRBC 0.0 0.0 - 0.0 /100 WBCs    RBC 5.09 4.00 - 5.20 x10*6/uL    Hemoglobin 15.1 12.0 - 16.0 g/dL    Hematocrit 49.0 (H) 36.0 - 46.0 %    MCV 96 80 - 100 fL    MCH 29.7 26.0 - 34.0 pg    MCHC 30.8 (L) 32.0 - 36.0 g/dL    RDW 15.2 (H) 11.5 - 14.5 %    Platelets 340 150 - 450 x10*3/uL    Immature Granulocytes %, Automated 0.7 0.0 - 0.9 %    Immature Granulocytes Absolute, Automated 0.18 0.00 - 0.50 x10*3/uL   Comprehensive Metabolic Panel   Result Value Ref Range    Glucose 160 (H) 74 - 99 mg/dL    Sodium 141 136 - 145 mmol/L    Potassium 4.2 3.5 - 5.3 mmol/L    Chloride 103 98 - 107 mmol/L    Bicarbonate 26 21 - 32 mmol/L    Anion Gap 16 10 - 20 mmol/L    Urea Nitrogen 27 (H) 6 - 23 mg/dL    Creatinine 1.93 (H) 0.50 - 1.05 mg/dL    eGFR 23 (L) >60 mL/min/1.73m*2    Calcium 10.1 8.6 - 10.3 mg/dL    Albumin 4.5 3.4 - 5.0 g/dL    Alkaline Phosphatase 91 33 - 136 U/L    Total Protein 7.3 6.4 - 8.2 g/dL    AST 16 9 - 39 U/L    Bilirubin, Total 1.2 0.0 - 1.2 mg/dL    ALT 13 7 - 45 U/L   Lactate   Result Value Ref Range    Lactate 2.1 (H) 0.4 - 2.0 mmol/L   Blood Culture    Specimen: Peripheral Venipuncture; Blood culture   Result Value Ref Range    Blood Culture       Identification and susceptibility testing to follow    Gram Stain Gram positive cocci, pairs and chains (AA)    Lipase   Result Value Ref Range    Lipase 11 9 - 82 U/L   Magnesium   Result Value Ref Range    Magnesium 1.98 1.60 - 2.40 mg/dL   Troponin I, High Sensitivity, Initial   Result Value Ref Range    Troponin I, High Sensitivity 14 (H) 0 - 13 ng/L   Manual Differential   Result Value Ref Range    Neutrophils %, Manual 88.0 40.0 - 80.0 %    Bands %, Manual 4.0 0.0 - 5.0 %    Lymphocytes %, Manual 2.0 13.0 - 44.0 %    Monocytes %, Manual 3.0 2.0 - 10.0 %    Eosinophils %, Manual 0.0 0.0 - 6.0 %    Basophils %, Manual 0.0 0.0 - 2.0 %    Metamyelocytes %, Manual 2.0 0.0 - 0.0 %    Myelocytes %, Manual 1.0 0.0 - 0.0 %    Seg Neutrophils  Absolute, Manual 22.35 (H) 1.60 - 5.00 x10*3/uL    Bands Absolute, Manual 1.02 (H) 0.00 - 0.50 x10*3/uL    Lymphocytes Absolute, Manual 0.51 (L) 0.80 - 3.00 x10*3/uL    Monocytes Absolute, Manual 0.76 0.05 - 0.80 x10*3/uL    Eosinophils Absolute, Manual 0.00 0.00 - 0.40 x10*3/uL    Basophils Absolute, Manual 0.00 0.00 - 0.10 x10*3/uL    Metamyelocytes Absolute, Manual 0.51 0.00 - 0.00 x10*3/uL    Myelocytes Absolute, Manual 0.25 0.00 - 0.00 x10*3/uL    Total Cells Counted 100     Neutrophils Absolute, Manual 23.37 (H) 1.60 - 5.50 x10*3/uL    RBC Morphology No significant RBC morphology present    B-type natriuretic peptide   Result Value Ref Range     (H) 0 - 99 pg/mL   TSH with reflex to Free T4 if abnormal   Result Value Ref Range    Thyroid Stimulating Hormone 1.26 0.44 - 3.98 mIU/L   Blood Culture    Specimen: Peripheral Venipuncture; Blood culture   Result Value Ref Range    Blood Culture       Identification and susceptibility testing to follow    Gram Stain Gram positive cocci, pairs and chains (AA)    ECG 12 lead   Result Value Ref Range    Ventricular Rate 134 BPM    Atrial Rate 238 BPM    KS Interval 131 ms    QRS Duration 83 ms    QT Interval 275 ms    QTC Calculation(Bazett) 411 ms    R Axis -28 degrees    T Axis 171 degrees    QRS Count 22 beats    Q Onset 254 ms    T Offset 392 ms    QTC Fredericia 359 ms   Troponin, High Sensitivity, 1 Hour   Result Value Ref Range    Troponin I, High Sensitivity 15 (H) 0 - 13 ng/L   Amylase   Result Value Ref Range    Amylase 15 (L) 29 - 103 U/L   Phosphorus   Result Value Ref Range    Phosphorus 3.2 2.5 - 4.9 mg/dL   Lactate   Result Value Ref Range    Lactate 2.6 (H) 0.4 - 2.0 mmol/L   Lactate   Result Value Ref Range    Lactate 2.0 0.4 - 2.0 mmol/L   Urinalysis with Reflex Culture and Microscopic   Result Value Ref Range    Color, Urine Yellow Light-Yellow, Yellow, Dark-Yellow    Appearance, Urine Clear Clear    Specific Gravity, Urine 1.034 1.005 - 1.035     pH, Urine 5.5 5.0, 5.5, 6.0, 6.5, 7.0, 7.5, 8.0    Protein, Urine 10 (TRACE) NEGATIVE, 10 (TRACE), 20 (TRACE) mg/dL    Glucose, Urine Normal Normal mg/dL    Blood, Urine 0.06 (1+) (A) NEGATIVE mg/dL    Ketones, Urine TRACE (A) NEGATIVE mg/dL    Bilirubin, Urine NEGATIVE NEGATIVE mg/dL    Urobilinogen, Urine Normal Normal mg/dL    Nitrite, Urine NEGATIVE NEGATIVE    Leukocyte Esterase, Urine NEGATIVE NEGATIVE   Urinalysis Microscopic   Result Value Ref Range    WBC, Urine 1-5 1-5, NONE /HPF    RBC, Urine 3-5 NONE, 1-2, 3-5 /HPF   Protime-INR   Result Value Ref Range    Protime 21.2 (H) 9.8 - 12.4 seconds    INR 1.9 (H) 0.9 - 1.1   CBC and Auto Differential   Result Value Ref Range    WBC 16.7 (H) 4.4 - 11.3 x10*3/uL    nRBC 0.0 0.0 - 0.0 /100 WBCs    RBC 4.15 4.00 - 5.20 x10*6/uL    Hemoglobin 12.5 12.0 - 16.0 g/dL    Hematocrit 40.1 36.0 - 46.0 %    MCV 97 80 - 100 fL    MCH 30.1 26.0 - 34.0 pg    MCHC 31.2 (L) 32.0 - 36.0 g/dL    RDW 15.4 (H) 11.5 - 14.5 %    Platelets 239 150 - 450 x10*3/uL    Neutrophils % 87.5 40.0 - 80.0 %    Immature Granulocytes %, Automated 0.8 0.0 - 0.9 %    Lymphocytes % 4.9 13.0 - 44.0 %    Monocytes % 6.5 2.0 - 10.0 %    Eosinophils % 0.0 0.0 - 6.0 %    Basophils % 0.3 0.0 - 2.0 %    Neutrophils Absolute 14.65 (H) 1.60 - 5.50 x10*3/uL    Immature Granulocytes Absolute, Automated 0.13 0.00 - 0.50 x10*3/uL    Lymphocytes Absolute 0.82 0.80 - 3.00 x10*3/uL    Monocytes Absolute 1.09 (H) 0.05 - 0.80 x10*3/uL    Eosinophils Absolute 0.00 0.00 - 0.40 x10*3/uL    Basophils Absolute 0.05 0.00 - 0.10 x10*3/uL   Comprehensive Metabolic Panel   Result Value Ref Range    Glucose 136 (H) 74 - 99 mg/dL    Sodium 141 136 - 145 mmol/L    Potassium 3.7 3.5 - 5.3 mmol/L    Chloride 106 98 - 107 mmol/L    Bicarbonate 25 21 - 32 mmol/L    Anion Gap 14 10 - 20 mmol/L    Urea Nitrogen 25 (H) 6 - 23 mg/dL    Creatinine 1.76 (H) 0.50 - 1.05 mg/dL    eGFR 26 (L) >60 mL/min/1.73m*2    Calcium 8.7 8.6 - 10.3  mg/dL    Albumin 3.5 3.4 - 5.0 g/dL    Alkaline Phosphatase 79 33 - 136 U/L    Total Protein 5.6 (L) 6.4 - 8.2 g/dL    AST 20 9 - 39 U/L    Bilirubin, Total 0.9 0.0 - 1.2 mg/dL    ALT 20 7 - 45 U/L   Magnesium   Result Value Ref Range    Magnesium 1.73 1.60 - 2.40 mg/dL   Phosphorus   Result Value Ref Range    Phosphorus 2.9 2.5 - 4.9 mg/dL   MRSA Surveillance for Vancomycin De-escalation, PCR    Specimen: Anterior Nares; Swab   Result Value Ref Range    MRSA PCR Not Detected Not Detected     Imaging  CT chest abdomen pelvis wo IV contrast  Result Date: 4/22/2025  Interstitial prominence with smooth interlobular septal thickening suggests acute pulmonary interstitial edema/CHF. Dependent lower lung airspace opacities with associated parenchymal volume loss most likely relate to atelectasis. Small pleural effusions. No pneumothorax.   Mild peripancreatic fat stranding predominating about the tail (series 2, images ), suggesting possible acute interstitial pancreatitis versus retroperitoneal edema secondary to volume overload. Suggest correlation with pancreatic enzymes. No focal fluid collection.   Urinary bladder is decompressed, not well evaluated. Perivesical fat stranding is concerning for acute cystitis although some of this appearance could be technical in etiology. Suggest clinical correlation and correlation with urinalysis.   Hepatomegaly.   Suspected cholelithiasis without convincing evidence of acute cholecystitis.   Cardiomegaly, with disproportionate atrial distension. Mitral annular and aortic root calcifications. Atherosclerosis, including coronary artery disease.   Fluid in the esophagus compatible with gastroesophageal reflux. No definite abnormal esophageal mural thickening.   Additional findings as discussed above.   MACRO: None   Signed by: Merrick Johnson 4/22/2025 6:34 PM Dictation workstation:   [id number]    XR chest 1 view  Result Date: 4/22/2025  1.  Similar appearance of bibasilar  airspace opacities prior exam, which may represent atelectasis versus developing infiltrates with layering pleural effusions not excluded bilaterally.       MACRO: None   Signed by: Jose Garcia 4/22/2025 4:02 PM Dictation workstation:   [id number]      Cardiology, Vascular, and Other Imaging  ECG 12 lead  Result Date: 4/23/2025  Atrial fibrillation Probable LVH with secondary repol abnrm Anterior Q waves, possibly due to LVH                       Assessment/Plan   Assessment & Plan  Sepsis (Multi)    Acute on chronic diastolic CHF (congestive heart failure)    Stage 4 chronic kidney disease (Multi)    Rapid atrial fibrillation (Multi)    Abdominal pain, unspecified abdominal location    Gastroesophageal reflux disease with esophagitis    Calculus of gallbladder without cholecystitis without obstruction    Jennifer Magana is a 89 y.o. female with past medical history of atrial fibrillation on warfarin, chronic diastolic heart failure (EF 45-50%), CKD, hypertension, severe aortic stenosis, hypothyroidism, and GERD presented to [facility] ED with complaints of abdominal pain, nausea, and vomiting.  Admitted to the ICU because of atrial fibrillation with RVR and severe sepsis    Severe sepsis without septic shock suspect likely secondary to UTI and bacteremia  - Presented with complains of abdominal pain and decreased appetite. Abdominal pain located to suprapubic region and no pain to upper quadrants  - Chills but no fevers at home  - WBC 25.4  - Lactate 2.1>>2.6>>2.0  - Chest x-ray results as above  - CT chest/abdomen/pelvis results as above  - SIRS criteria 2/4 met: heart rate and WBC count  - Suspected source: likely complicated UTI, less likely acute cholecystitis or pancreatitis given normal LFTs, lipase, and amylase.   - Organ dysfunction: elevated lactic acid level  - s/p 2 L NS bolus  and zosyn 4.5gm IV x1  - Continue zosyn for empiric coverage, de-escalate as appropriate  - Check UA with reflex  microscopic and culture  - Follow blood cultures  - Follow CBC and trend fevers  - Will avoid additional IV fluids at this time given MAP greater than 65mmHg and acute on chronic CHF and severe aortic stenosis  - Strict intake and output  - Maintain MAP greater than 65mmHg, consider vasopressor support if unable to maintain MAP goal despite fluid resuscitation  04/23/2025: Hemodynamically stable, not requiring any vasopressors.  Alert oriented x 3.   Blood culture growing a gram-positive cocci, pairs and chains  MRSA screen negative  Currently on vancomycin, Zosyn and doxycycline     Abdominal pain  - Presented with complaints of abdominal pain and decreased appetite. Abdominal pain located to suprapubic region and no pain noted to upper quadrants  - CT abdomen/pelvis results as above  - Abdominal pain improved since admission  - Suspect likely secondary to complicated UTI and less likely acute pancreatitis or cholecystitis given normal LFTs, amylase, and lipase  - Check UA with reflex microscopic and culture  - Follow CBC and trend fevers  - Clear liquid diet  - Consider abdominal ultrasound if worsening LFTs and abdominal pain  - Monitor intake and output     Cholelithiasis without cholecystitis  - Presented with complaints of abdominal pain and decreased appetite. Abdominal pain located to suprapubic region and no pain noted to upper quadrants  - CT chest/abdomen/pelvis revealed suspected cholelithiasis without convincing evidence of cholecystitis  - Normal LFTs and serum lipase  - Monitor LFTS  - Consider abdominal ultrasound if worsening LFTs and abdominal pain      Atrial fibrillation with RVR  - History of atrial fibrillation on warfarin at home  - Presented with atrial fibrillation with heart rates sustaining in 130-140s  - Suspect likely provoked by sepsis syndrome and poor oral intake  - S/p 2L NS bolus, 5mg IV metoprolol, and amiodarone bolus followed by continuous infusion.  - Continue amiodarone  infusion  - Hold home Toprol- XL given low-normal blood pressure  - Continuous telemetry monitoring  - Follow BMP, magnesium, and phosphorus  - Replete electrolytes as necessary  - Add on TSH with reflex T4  - Sepsis treatment as above  - Continue home warfarin  - Consult pharmacy for warfarin dosing and management  - Cardiology consult, input appreciated  04/23/2025: Patient continued to be in atrial fibrillation, rate controlled.    On amiodarone 0.5.    Maintain heart rate less than 120     Acute on chronic diastolic heart failure  - TTE 3/31/25 revealed left ventricular systolic function is mildly decreased with a visually estimated ejection fraction 45 to 50%, no regional wall motion abnormalities, spectral Doppler shows a grade 3 (restrictive pattern) of left ventricular diastolic filling with an elevated left atrial pressure, left ventricular cavity size is decreased, there is normal right ventricular global systolic function, the left atrial size is moderately to severely dilated, moderate mitral valve regurgitation, mild to moderate tricuspid regurgitation, severe aortic valve stenosis, mild aortic valve regurgitation, the inferior vena cava appears normal in size with IVC inspiratory collapse greater than 50%, and there is severely increased septal and severely increased posterior left ventricular wall thickness.  - Complaints of shortness of breath  - Chest x-ray results as above  - CT chest/abdomen/pelvis results as above  - No peripheral edema appreciated on exam.  - S/p 2L IV NS bolus in ED for sepsis protocol  - Will hold off on diuresis given sepsis  - Strict intake and output  - Daily weights  - Continue amiodarone infusion for atrial fibrillation with RVR  - Cardiology consult, input appreciated.      CKD stage IV  - Creatinine 1.94, at baseline  - Follow BMP  - Monitor intake and output  - If renal function worsens, consider nephrology consult     MASABBY bilateral groin  - Cleanse groin with soap  and water, pat dry and apply Triad twice daily and prn     GERD with esophagitis  - Continue home protonix     DVT prophylaxis  - Continue home warfarin  - Pharmacy consult to dose warfarin     Advanced care planning  Patient alert and oriented to person, place, situation, but not month or year. She does not have capacity to make code status decision at this time. Discussed goals of care and codes status with patient's daughter/POA, Goldy Damico. Explained differences between Full code and DNR. Goldy stated she would like to continue Full code status for now and possibly discuss further tomorrow about code status. Will continue with Full code status as per family's wishes.   4/23/2025: I had extensive discussion with the patient and updated her overall condition.   Consulted palliative care    I spent 35 minutes of cumulative critical care time with the patient.  Greater than 50% of that time was spent in the direct collaboration and or coordination of care of the patient.     Dragon dictation software was used to dictate this note and thus there may be minor errors in translation/transcription including garbled speech or misspellings. Please contact for clarification if needed.        [1] doxycycline, 100 mg, intravenous, q12h  latanoprost, 1 drop, Both Eyes, Nightly  levothyroxine, 100 mcg, oral, Daily  pantoprazole, 40 mg, oral, Daily before breakfast   Or  pantoprazole, 40 mg, intravenous, Daily before breakfast  piperacillin-tazobactam, 2.25 g, intravenous, q6h  polyethylene glycol, 17 g, oral, Daily    [2] amiodarone, 0.5 mg/min, Last Rate: 0.5 mg/min (04/23/25 0303)    [3] PRN medications: acetaminophen, bisacodyl, guaiFENesin, melatonin, morphine, ondansetron **OR** ondansetron

## 2025-04-23 NOTE — CONSULTS
Critical Care Medicine Consult      Reason For Consult  Atrial fibrillation with RVR    History Of Present Illness  Jennifer Magana is a 89 y.o. female with past medical history of atrial fibrillation on warfarin, chronic diastolic heart failure (EF 45-50%), CKD, hypertension, severe aortic stenosis, hypothyroidism, and GERD presented to [facility] ED with complaints of abdominal pain, nausea, and vomiting. Upon ED workup, symptoms are 36.4 °C, heart rate 134, respiratory rate 16, blood pressure 143/90, and SpO2 95% on room air.  ED labs revealed blood glucose 160, BUN 27, creatinine 1.93, lactate 2.1>> 2.6>> 2.0, , high-sensitivity troponin 14>> 15, WBC 25.4, RBC 5.09, hemoglobin 15.1, hematocrit 49, platelet count 340, sitting neutrophils absolute 22.35, bands absolute 1.02 absolute 0.53 neutrophils absolute 23.37, and remainder of chemistry profile unremarkable.  X-ray revealed similar appearance of bibasilar airspace opacities prior exam which may represent atelectasis versus developing infiltrates with layering pleural effusions not excluded bilaterally.  ET chest/abdomen/pelvis revealed interstitial prominence with smooth interlobular septal thickening suggesting acute pulmonary interstitial edema/CHF, dependent lower lung airspace opacities with associated parenchymal volume loss most likely related to atelectasis, small pleural effusions, no pneumothorax, mild peripancreatic fat stranding predominating about the tail suggesting possible acute interstitial pancreatitis versus retroperitoneal edema secondary to volume overload, no focal fluid collection, urinary bladder is decompressed not well-evaluated, paravesical fat stranding is concerning for acute cystitis although some of this appearance could be technical in etiology, he had a megaly, suspected cholelithiasis without convincing evidence of acute cholecystitis, cardiomegaly with distal portion of the atrial distention, mitral annular and  aortic root calcifications, atherosclerosis including coronary artery disease, fluid in the esophagus compatible with gastroesophageal reflux, and no definite abnormal esophageal mural thickening.  ED interventions included 2 L NS IV fluid bolus, Zosyn 4.5 g IV x 1, metoprolol 5 mg IV x 1, amiodarone bolus followed by amiodarone infusion, and admission to ICU for further management.    Patient seen and examined in ICU bed 12.  Patient alert and oriented to self, place, situation, and not month or year. Patient reported that she came to the hospital because she was having abdominal pain. She reports that she has had abdominal pain for the last couple of days and it is not worsened or improved by anything. She reports having one episode of nausea and vomiting today before coming to the hospital. She reports that her appetite has been okay but states that her daughter would say that she has not been eating well. She stated that her pain is located in her lower abdomen and pointed to suprapubic region. She denied any pain or tenderness to upper or mid abdomen and only endorsed tenderness to palpation over suprapubic region with deep palpation.  She endorses mild shortness of breath and denies any fevers or cough.  Denies any lightheadedness, dizziness, vision changes, chest pain, palpitations, diarrhea, constipation, dysuria, hematuria, urinary frequency, swelling to legs or abdomen, paresthesias, or any other complaints. She stated that she has been taking all of her medications as prescribed but does not know what she takes as her daughter manages all of her medications. She denies any alcohol use, drug use, or history of smoking. She uses a cane to ambulate at home. Called and discussed with daughter, Goldy Damico about patient's presentation to hospital. Goldy stated that the patient was admitted to the hospital a couple of weeks ago with atrial fibrillation with RVR and CHF and ever since she has been home she has  not been feeling well. She has had abdominal pain, since discharge which was worsened after she started Doxcycline last week. She has not been eating well or drinking much for the past week and only eating a bite of food here and there and about a 1/4 cup of coffee. She was taken for labs and x-ray of her chest and abdomen on 4/15 which showed pneumonia and fluid around her heart for which she was started on doxycycline for 10 days and lasix x3 days. Goldy reports that she checks her mother's heart rate every day and has been ranging between 80s-130s since discharge. She stated that the metoprolol does not work for her mother as it only lowers her blood pressure and doesn't do much for rate control. She feels her mother's atrial fibrillation was better controlled when she was on amiodarone. She stated that her mother has had shortness of breath and has had chills for a few days and not sleeping well. She denies the patient having any fevers, coughing, dysuria, hematuria, urinary frequency. She has had diarrhea on and off for last couple of weeks but last couple of days has not had any diarrhea. She has not had any sick contacts. She has been taking all of her medications as prescribed.     Medical History[1]  Surgical History[2]  Prescriptions Prior to Admission[3]  Patient has no known allergies.  Social History[4]  Family History[5]    Scheduled Medications:   Scheduled Medications[6]     Continuous Medications:   Continuous Medications[7]     PRN Medications:   PRN Medications[8]    Review of Systems:  Review of Systems   Constitutional:  Positive for appetite change and chills.   Respiratory:  Positive for shortness of breath.    Gastrointestinal:  Positive for abdominal pain.        Objective   Vitals:  Most Recent:  Vitals:    04/22/25 2200   BP: (!) 117/48   Pulse: (!) 122   Resp: 23   Temp:    SpO2:        24hr Min/Max:  Temp  Min: 36.4 °C (97.5 °F)  Max: 36.8 °C (98.2 °F)  Pulse  Min: 104  Max: 147  BP   Min: 93/80  Max: 143/90  Resp  Min: 12  Max: 27  SpO2  Min: 95 %  Max: 99 %        Intake/Output Summary (Last 24 hours) at 4/22/2025 2213  Last data filed at 4/22/2025 2200  Gross per 24 hour   Intake 2231.64 ml   Output --   Net 2231.64 ml           Physical exam:    Physical Exam  Constitutional:       General: She is not in acute distress.     Appearance: Normal appearance. She is not ill-appearing.   HENT:      Head: Normocephalic.      Nose: Nose normal.      Mouth/Throat:      Mouth: Mucous membranes are dry.   Eyes:      Extraocular Movements: Extraocular movements intact.      Conjunctiva/sclera: Conjunctivae normal.      Pupils: Pupils are equal, round, and reactive to light.   Cardiovascular:      Rate and Rhythm: Tachycardia present. Rhythm irregular.      Pulses: Normal pulses.      Heart sounds: Murmur heard.      Systolic murmur is present.      Comments: Atrial fibrillation with RVR on telemetry  Pulmonary:      Effort: Pulmonary effort is normal. No respiratory distress.      Breath sounds: Normal breath sounds. No stridor. No wheezing, rhonchi or rales.      Comments: Diminished breath sounds to bilateral bases.   Chest:      Chest wall: No tenderness.   Abdominal:      General: A surgical scar is present. Bowel sounds are normal. There is no distension.      Palpations: Abdomen is soft.      Tenderness: There is abdominal tenderness in the suprapubic area. There is no guarding or rebound.      Comments: Suprapubic tenderness with deep palpation.   Musculoskeletal:         General: Normal range of motion.      Cervical back: Normal range of motion.      Right lower leg: No edema.      Left lower leg: No edema.   Skin:     General: Skin is warm and dry.      Capillary Refill: Capillary refill takes less than 2 seconds.      Findings: Rash (MASD) present.      Comments: MASD bilateral groin, perineum   Neurological:      General: No focal deficit present.      Mental Status: She is alert and oriented  to person, place, and time. Mental status is at baseline.      Cranial Nerves: Cranial nerves 2-12 are intact.      Sensory: Sensation is intact.      Motor: Motor function is intact.      Comments: Alert and oriented to person, place, situation but not month or year.    Psychiatric:         Attention and Perception: Attention normal.         Mood and Affect: Mood normal.         Speech: Speech normal.         Behavior: Behavior normal.         Judgment: Judgment normal.          Lab/Radiology/Diagnostic Review:  Results for orders placed or performed during the hospital encounter of 04/22/25 (from the past 24 hours)   CBC and Auto Differential   Result Value Ref Range    WBC 25.4 (H) 4.4 - 11.3 x10*3/uL    nRBC 0.0 0.0 - 0.0 /100 WBCs    RBC 5.09 4.00 - 5.20 x10*6/uL    Hemoglobin 15.1 12.0 - 16.0 g/dL    Hematocrit 49.0 (H) 36.0 - 46.0 %    MCV 96 80 - 100 fL    MCH 29.7 26.0 - 34.0 pg    MCHC 30.8 (L) 32.0 - 36.0 g/dL    RDW 15.2 (H) 11.5 - 14.5 %    Platelets 340 150 - 450 x10*3/uL    Immature Granulocytes %, Automated 0.7 0.0 - 0.9 %    Immature Granulocytes Absolute, Automated 0.18 0.00 - 0.50 x10*3/uL   Comprehensive Metabolic Panel   Result Value Ref Range    Glucose 160 (H) 74 - 99 mg/dL    Sodium 141 136 - 145 mmol/L    Potassium 4.2 3.5 - 5.3 mmol/L    Chloride 103 98 - 107 mmol/L    Bicarbonate 26 21 - 32 mmol/L    Anion Gap 16 10 - 20 mmol/L    Urea Nitrogen 27 (H) 6 - 23 mg/dL    Creatinine 1.93 (H) 0.50 - 1.05 mg/dL    eGFR 23 (L) >60 mL/min/1.73m*2    Calcium 10.1 8.6 - 10.3 mg/dL    Albumin 4.5 3.4 - 5.0 g/dL    Alkaline Phosphatase 91 33 - 136 U/L    Total Protein 7.3 6.4 - 8.2 g/dL    AST 16 9 - 39 U/L    Bilirubin, Total 1.2 0.0 - 1.2 mg/dL    ALT 13 7 - 45 U/L   Lactate   Result Value Ref Range    Lactate 2.1 (H) 0.4 - 2.0 mmol/L   Blood Culture    Specimen: Peripheral Venipuncture; Blood culture   Result Value Ref Range    Blood Culture Loaded on Instrument - Culture in progress    Lipase    Result Value Ref Range    Lipase 11 9 - 82 U/L   Magnesium   Result Value Ref Range    Magnesium 1.98 1.60 - 2.40 mg/dL   Troponin I, High Sensitivity, Initial   Result Value Ref Range    Troponin I, High Sensitivity 14 (H) 0 - 13 ng/L   Manual Differential   Result Value Ref Range    Neutrophils %, Manual 88.0 40.0 - 80.0 %    Bands %, Manual 4.0 0.0 - 5.0 %    Lymphocytes %, Manual 2.0 13.0 - 44.0 %    Monocytes %, Manual 3.0 2.0 - 10.0 %    Eosinophils %, Manual 0.0 0.0 - 6.0 %    Basophils %, Manual 0.0 0.0 - 2.0 %    Metamyelocytes %, Manual 2.0 0.0 - 0.0 %    Myelocytes %, Manual 1.0 0.0 - 0.0 %    Seg Neutrophils Absolute, Manual 22.35 (H) 1.60 - 5.00 x10*3/uL    Bands Absolute, Manual 1.02 (H) 0.00 - 0.50 x10*3/uL    Lymphocytes Absolute, Manual 0.51 (L) 0.80 - 3.00 x10*3/uL    Monocytes Absolute, Manual 0.76 0.05 - 0.80 x10*3/uL    Eosinophils Absolute, Manual 0.00 0.00 - 0.40 x10*3/uL    Basophils Absolute, Manual 0.00 0.00 - 0.10 x10*3/uL    Metamyelocytes Absolute, Manual 0.51 0.00 - 0.00 x10*3/uL    Myelocytes Absolute, Manual 0.25 0.00 - 0.00 x10*3/uL    Total Cells Counted 100     Neutrophils Absolute, Manual 23.37 (H) 1.60 - 5.50 x10*3/uL    RBC Morphology No significant RBC morphology present    B-type natriuretic peptide   Result Value Ref Range     (H) 0 - 99 pg/mL   Blood Culture    Specimen: Peripheral Venipuncture; Blood culture   Result Value Ref Range    Blood Culture Loaded on Instrument - Culture in progress    Troponin, High Sensitivity, 1 Hour   Result Value Ref Range    Troponin I, High Sensitivity 15 (H) 0 - 13 ng/L   Amylase   Result Value Ref Range    Amylase 15 (L) 29 - 103 U/L   Phosphorus   Result Value Ref Range    Phosphorus 3.2 2.5 - 4.9 mg/dL   Lactate   Result Value Ref Range    Lactate 2.6 (H) 0.4 - 2.0 mmol/L   Lactate   Result Value Ref Range    Lactate 2.0 0.4 - 2.0 mmol/L     Imaging  CT chest abdomen pelvis wo IV contrast  Result Date: 4/22/2025  Interstitial  prominence with smooth interlobular septal thickening suggests acute pulmonary interstitial edema/CHF. Dependent lower lung airspace opacities with associated parenchymal volume loss most likely relate to atelectasis. Small pleural effusions. No pneumothorax.   Mild peripancreatic fat stranding predominating about the tail (series 2, images ), suggesting possible acute interstitial pancreatitis versus retroperitoneal edema secondary to volume overload. Suggest correlation with pancreatic enzymes. No focal fluid collection.   Urinary bladder is decompressed, not well evaluated. Perivesical fat stranding is concerning for acute cystitis although some of this appearance could be technical in etiology. Suggest clinical correlation and correlation with urinalysis.   Hepatomegaly.   Suspected cholelithiasis without convincing evidence of acute cholecystitis.   Cardiomegaly, with disproportionate atrial distension. Mitral annular and aortic root calcifications. Atherosclerosis, including coronary artery disease.   Fluid in the esophagus compatible with gastroesophageal reflux. No definite abnormal esophageal mural thickening.   Additional findings as discussed above.   MACRO: None   Signed by: Merrick Johnson 4/22/2025 6:34 PM Dictation workstation:   [id number]    XR chest 1 view  Result Date: 4/22/2025  1.  Similar appearance of bibasilar airspace opacities prior exam, which may represent atelectasis versus developing infiltrates with layering pleural effusions not excluded bilaterally.       MACRO: None   Signed by: Jose Garcia 4/22/2025 4:02 PM Dictation workstation:   [id number]      Cardiology, Vascular, and Other Imaging  No other imaging results found for the past 7 days      Assessment/Plan   Assessment & Plan  Sepsis (Multi)    Acute on chronic diastolic CHF (congestive heart failure)    Stage 4 chronic kidney disease (Multi)    Rapid atrial fibrillation (Multi)    Abdominal pain, unspecified  abdominal location    Gastroesophageal reflux disease with esophagitis    Calculus of gallbladder without cholecystitis without obstruction    Severe sepsis without septic shock suspect likely 2/2 UTI  - Presented with complains of abdominal pain and decreased appetite. Abdominal pain located to suprapubic region and no pain to upper quadrants  - Chills but no fevers at home  - WBC 25.4  - Lactate 2.1>>2.6>>2.0  - Chest x-ray results as above  - CT chest/abdomen/pelvis results as above  - SIRS criteria 2/4 met: heart rate and WBC count  - Suspected source: likely complicated UTI, less likely acute cholecystitis or pancreatitis given normal LFTs, lipase, and amylase.   - Organ dysfunction: elevated lactic acid level  - s/p 2 L NS bolus  and zosyn 4.5gm IV x1  - Continue zosyn for empiric coverage, de-escalate as appropriate  - Check UA with reflex microscopic and culture  - Follow blood cultures  - Follow CBC and trend fevers  - Will avoid additional IV fluids at this time given MAP greater than 65mmHg and acute on chronic CHF and severe aortic stenosis  - Strict intake and output  - Maintain MAP greater than 65mmHg, consider vasopressor support if unable to maintain MAP goal despite fluid resuscitation    Abdominal pain  - Presented with complaints of abdominal pain and decreased appetite. Abdominal pain located to suprapubic region and no pain noted to upper quadrants  - CT abdomen/pelvis results as above  - Abdominal pain improved since admission  - Suspect likely secondary to complicated UTI and less likely acute pancreatitis or cholecystitis given normal LFTs, amylase, and lipase  - Check UA with reflex microscopic and culture  - Follow CBC and trend fevers  - Clear liquid diet  - Consider abdominal ultrasound if worsening LFTs and abdominal pain  - Monitor intake and output    Cholelithiasis without cholecystitis  - Presented with complaints of abdominal pain and decreased appetite. Abdominal pain located to  suprapubic region and no pain noted to upper quadrants  - CT chest/abdomen/pelvis revealed suspected cholelithiasis without convincing evidence of cholecystitis  - Normal LFTs and serum lipase  - Monitor LFTS  - Consider abdominal ultrasound if worsening LFTs and abdominal pain     Atrial fibrillation with RVR  - History of atrial fibrillation on warfarin at home  - Presented with atrial fibrillation with heart rates sustaining in 130-140s  - Suspect likely provoked by sepsis syndrome and poor oral intake  - S/p 2L NS bolus, 5mg IV metoprolol, and amiodarone bolus followed by continuous infusion.  - Continue amiodarone infusion  - Hold home Toprol- XL given low-normal blood pressure  - Continuous telemetry monitoring  - Follow BMP, magnesium, and phosphorus  - Replete electrolytes as necessary  - Add on TSH with reflex T4  - Sepsis treatment as above  - Continue home warfarin  - Consult pharmacy for warfarin dosing and management  - Cardiology consult, input appreciated    Acute on chronic diastolic heart failure  - TTE 3/31/25 revealed left ventricular systolic function is mildly decreased with a visually estimated ejection fraction 45 to 50%, no regional wall motion abnormalities, spectral Doppler shows a grade 3 (restrictive pattern) of left ventricular diastolic filling with an elevated left atrial pressure, left ventricular cavity size is decreased, there is normal right ventricular global systolic function, the left atrial size is moderately to severely dilated, moderate mitral valve regurgitation, mild to moderate tricuspid regurgitation, severe aortic valve stenosis, mild aortic valve regurgitation, the inferior vena cava appears normal in size with IVC inspiratory collapse greater than 50%, and there is severely increased septal and severely increased posterior left ventricular wall thickness.  - Complaints of shortness of breath  - Chest x-ray results as above  - CT chest/abdomen/pelvis results as above  -  No peripheral edema appreciated on exam.  - S/p 2L IV NS bolus in ED for sepsis protocol  - Will hold off on diuresis given sepsis  - Strict intake and output  - Daily weights  - Continue amiodarone infusion for atrial fibrillation with RVR  - Cardiology consult, input appreciated.     CKD stage IV  - Creatinine 1.94, at baseline  - Follow BMP  - Monitor intake and output  - If renal function worsens, consider nephrology consult    MASD bilateral groin  - Cleanse groin with soap and water, pat dry and apply Triad twice daily and prn    GERD with esophagitis  - Continue home protonix    DVT prophylaxis  - Continue home warfarin  - Pharmacy consult to dose warfarin    Advanced care planning  Patient alert and oriented to person, place, situation, but not month or year. She does not have capacity to make code status decision at this time. Discussed goals of care and codes status with patient's daughter/POA, Goldy Damico. Explained differences between Full code and DNR. Goldy stated she would like to continue Full code status for now and possibly discuss further tomorrow about code status. Will continue with Full code status as per family's wishes.     I spent 70 minutes of cumulative critical care time with the patient.  Greater than 50% of that time was spent in the direct collaboration and or coordination of care of the patient.     Dragon dictation software was used to dictate this note and thus there may be minor errors in translation/transcription including garbled speech or misspellings. Please contact for clarification if needed.          [1]   Past Medical History:  Diagnosis Date    (HFpEF) heart failure with preserved ejection fraction     Aortic stenosis     CKD (chronic kidney disease)     Essential (primary) hypertension     GERD (gastroesophageal reflux disease)     Hypothyroidism     Paroxysmal atrial fibrillation (Multi)    [2]   Past Surgical History:  Procedure Laterality Date    HYSTERECTOMY        HEAD ANGIO WO IV CONTRAST  07/21/2020    MR NECK ANGIO WO IV CONTRAST  07/21/2020   [3]   Medications Prior to Admission   Medication Sig Dispense Refill Last Dose/Taking    dicyclomine (Bentyl) 20 mg tablet Take 1 tablet (20 mg) by mouth 4 times a day before meals.       doxycycline (Vibramycin) 100 mg capsule Take 1 capsule (100 mg) by mouth 2 times a day for 10 days. Take with at least 8 ounces (large glass) of water, do not lie down for 30 minutes after 20 capsule 0     famotidine (Pepcid) 20 mg tablet Take 0.5 tablets (10 mg) by mouth once daily. Start on April 4th 30 tablet 1     furosemide (Lasix) 20 mg tablet Take 1 tablet (20 mg) by mouth once daily for 3 days. 3 tablet 0     Jantoven 2.5 mg tablet Take 1.25mg (0.5 tablets) by mouth on Tues,Thurs and 2.5mg (1 tablet) all other days or as directed by Coumadin clinic. 72 tablet 3     levothyroxine (Synthroid, Levoxyl) 100 mcg tablet Take 1 tablet (100 mcg) by mouth once daily.       metoprolol succinate XL (Toprol-XL) 25 mg 24 hr tablet Take 2 tablets (50 mg) by mouth once daily. 60 tablet 1     pantoprazole (ProtoNix) 40 mg EC tablet Take 1 tablet (40 mg) by mouth once daily in the morning. Take before meals. 90 tablet 3     Simbrinza 1-0.2 % drops,suspension ophthalmic suspension Administer 1 drop into both eyes 2 times a day.       travoprost (Travatan Z) 0.004 % drops ophthalmic solution Administer 1 drop into both eyes once daily at bedtime.      [4]   Social History  Tobacco Use    Smoking status: Never    Smokeless tobacco: Never   Substance Use Topics    Alcohol use: Never    Drug use: Never   [5]   Family History  Problem Relation Name Age of Onset    Other (Half sister with CAD) Sister      Coronary artery disease Other Children    [6] latanoprost, 1 drop, Both Eyes, Nightly  [START ON 4/23/2025] levothyroxine, 100 mcg, oral, Daily  [START ON 4/23/2025] pantoprazole, 40 mg, oral, Daily before breakfast   Or  [START ON 4/23/2025] pantoprazole, 40 mg,  intravenous, Daily before breakfast  [START ON 4/23/2025] polyethylene glycol, 17 g, oral, Daily  [7] amiodarone, 1 mg/min, Last Rate: 1 mg/min (04/22/25 2135)   Followed by  [START ON 4/23/2025] amiodarone, 0.5 mg/min  [8] PRN medications: bisacodyl, guaiFENesin, melatonin

## 2025-04-23 NOTE — PROGRESS NOTES
Vancomycin Dosing by Pharmacy- INITIAL    Jennifer Magana is a 89 y.o. year old female who Pharmacy has been consulted for vancomycin dosing for other bacteremia . Based on the patient's indication and renal status this patient will be dosed based on a goal AUC of 500-600.     Renal function is currently stable.    Visit Vitals  BP 82/64   Pulse (!) 121   Temp 36.4 °C (97.5 °F) (Temporal)   Resp 26        Lab Results   Component Value Date    CREATININE 1.76 (H) 2025    CREATININE 1.93 (H) 2025    CREATININE 1.76 (H) 04/15/2025    CREATININE 2.04 (H) 2025    CREATININE 2.14 (H) 2025    CREATININE 1.76 (H) 2025        Patient weight is as follows:   Vitals:    258   Weight: 59.1 kg (130 lb 4.7 oz)       Cultures:  Susceptibility data for the encounter in last 14 days.  Collected Specimen Info Organism   25 Blood culture from Peripheral Venipuncture Streptococcus agalactiae (Group B Streptococcus)         I/O last 3 completed shifts:  In: 2748.4 (46.5 mL/kg) [I.V.:348.4 (5.9 mL/kg); IV Piggyback:2400]  Out: 200 (3.4 mL/kg) [Urine:200 (0.1 mL/kg/hr)]  Weight: 59.1 kg   I/O during current shift:  I/O this shift:  In: 490 [P.O.:240; I.V.:100; IV Piggyback:150]  Out: -     Temp (24hrs), Av.5 °C (97.7 °F), Min:36.2 °C (97.2 °F), Max:36.8 °C (98.2 °F)         Assessment/Plan     Patient has already been given a loading dose of 1250 mg.  Will initiate vancomycin maintenance,  500 mg every 24 hours.    This dosing regimen is predicted by [employer] to result in the following pharmacokinetic parameters:  Regimen: 500 mg IV every 24 hours.  Start time: 06:31 on 2025  Exposure target: AUC24 (range)400-600 mg/L.hr   [id number]: 415 mg/L.hr  AUC24,ss: 504 mg/L.hr  Probability of AUC24 > 400: 76 %  Ctrough,ss: 17.9 mg/L  Probability of Ctrough,ss > 20: 38 %      Follow-up level will be ordered on  at 0500 unless clinically indicated sooner.  Will continue to monitor renal  function daily while on vancomycin and order serum creatinine at least every 48 hours if not already ordered.  Follow for continued vancomycin needs, clinical response, and signs/symptoms of toxicity.       Jarett Nunez, [facility]

## 2025-04-24 ENCOUNTER — APPOINTMENT (OUTPATIENT)
Dept: CARDIOLOGY | Facility: HOSPITAL | Age: OVER 89
DRG: 871 | End: 2025-04-24
Payer: MEDICARE

## 2025-04-24 LAB
ALBUMIN SERPL BCP-MCNC: 3.1 G/DL (ref 3.4–5)
ALP SERPL-CCNC: 72 U/L (ref 33–136)
ALT SERPL W P-5'-P-CCNC: 15 U/L (ref 7–45)
ANION GAP SERPL CALC-SCNC: 18 MMOL/L (ref 10–20)
AST SERPL W P-5'-P-CCNC: 15 U/L (ref 9–39)
BASOPHILS # BLD AUTO: 0.04 X10*3/UL (ref 0–0.1)
BASOPHILS NFR BLD AUTO: 0.3 %
BILIRUB SERPL-MCNC: 0.8 MG/DL (ref 0–1.2)
BUN SERPL-MCNC: 33 MG/DL (ref 6–23)
CALCIUM SERPL-MCNC: 8.5 MG/DL (ref 8.6–10.3)
CHLORIDE SERPL-SCNC: 104 MMOL/L (ref 98–107)
CO2 SERPL-SCNC: 19 MMOL/L (ref 21–32)
CREAT SERPL-MCNC: 2.21 MG/DL (ref 0.5–1.05)
EGFRCR SERPLBLD CKD-EPI 2021: 20 ML/MIN/1.73M*2
EOSINOPHIL # BLD AUTO: 0.06 X10*3/UL (ref 0–0.4)
EOSINOPHIL NFR BLD AUTO: 0.5 %
ERYTHROCYTE [DISTWIDTH] IN BLOOD BY AUTOMATED COUNT: 15.4 % (ref 11.5–14.5)
GLUCOSE SERPL-MCNC: 157 MG/DL (ref 74–99)
HCT VFR BLD AUTO: 37.7 % (ref 36–46)
HGB BLD-MCNC: 11.7 G/DL (ref 12–16)
IMM GRANULOCYTES # BLD AUTO: 0.09 X10*3/UL (ref 0–0.5)
IMM GRANULOCYTES NFR BLD AUTO: 0.7 % (ref 0–0.9)
INR PPP: 2.1 (ref 0.9–1.1)
LYMPHOCYTES # BLD AUTO: 0.9 X10*3/UL (ref 0.8–3)
LYMPHOCYTES NFR BLD AUTO: 7.4 %
MAGNESIUM SERPL-MCNC: 1.96 MG/DL (ref 1.6–2.4)
MCH RBC QN AUTO: 29.4 PG (ref 26–34)
MCHC RBC AUTO-ENTMCNC: 31 G/DL (ref 32–36)
MCV RBC AUTO: 95 FL (ref 80–100)
MONOCYTES # BLD AUTO: 1.08 X10*3/UL (ref 0.05–0.8)
MONOCYTES NFR BLD AUTO: 8.8 %
NEUTROPHILS # BLD AUTO: 10.04 X10*3/UL (ref 1.6–5.5)
NEUTROPHILS NFR BLD AUTO: 82.3 %
NRBC BLD-RTO: 0 /100 WBCS (ref 0–0)
PHOSPHATE SERPL-MCNC: 3.3 MG/DL (ref 2.5–4.9)
PLATELET # BLD AUTO: 195 X10*3/UL (ref 150–450)
POTASSIUM SERPL-SCNC: 3.7 MMOL/L (ref 3.5–5.3)
PROT SERPL-MCNC: 5.3 G/DL (ref 6.4–8.2)
PROTHROMBIN TIME: 22.8 SECONDS (ref 9.8–12.4)
RBC # BLD AUTO: 3.98 X10*6/UL (ref 4–5.2)
RIGHT VENTRICLE PEAK SYSTOLIC PRESSURE: 34.1 MMHG
SODIUM SERPL-SCNC: 137 MMOL/L (ref 136–145)
VANCOMYCIN SERPL-MCNC: 12 UG/ML (ref 5–20)
WBC # BLD AUTO: 12.2 X10*3/UL (ref 4.4–11.3)

## 2025-04-24 PROCEDURE — 76937 US GUIDE VASCULAR ACCESS: CPT

## 2025-04-24 PROCEDURE — 2020000001 HC ICU ROOM DAILY

## 2025-04-24 PROCEDURE — 97110 THERAPEUTIC EXERCISES: CPT | Mod: GP,CQ

## 2025-04-24 PROCEDURE — 36415 COLL VENOUS BLD VENIPUNCTURE: CPT

## 2025-04-24 PROCEDURE — 2500000004 HC RX 250 GENERAL PHARMACY W/ HCPCS (ALT 636 FOR OP/ED): Mod: JZ | Performed by: STUDENT IN AN ORGANIZED HEALTH CARE EDUCATION/TRAINING PROGRAM

## 2025-04-24 PROCEDURE — 2500000001 HC RX 250 WO HCPCS SELF ADMINISTERED DRUGS (ALT 637 FOR MEDICARE OP): Performed by: NURSE PRACTITIONER

## 2025-04-24 PROCEDURE — 83735 ASSAY OF MAGNESIUM: CPT

## 2025-04-24 PROCEDURE — 2500000001 HC RX 250 WO HCPCS SELF ADMINISTERED DRUGS (ALT 637 FOR MEDICARE OP): Performed by: INTERNAL MEDICINE

## 2025-04-24 PROCEDURE — 85025 COMPLETE CBC W/AUTO DIFF WBC: CPT

## 2025-04-24 PROCEDURE — 2500000004 HC RX 250 GENERAL PHARMACY W/ HCPCS (ALT 636 FOR OP/ED): Mod: JZ

## 2025-04-24 PROCEDURE — 93308 TTE F-UP OR LMTD: CPT | Performed by: INTERNAL MEDICINE

## 2025-04-24 PROCEDURE — 85610 PROTHROMBIN TIME: CPT | Performed by: INTERNAL MEDICINE

## 2025-04-24 PROCEDURE — 2500000002 HC RX 250 W HCPCS SELF ADMINISTERED DRUGS (ALT 637 FOR MEDICARE OP, ALT 636 FOR OP/ED)

## 2025-04-24 PROCEDURE — 2500000005 HC RX 250 GENERAL PHARMACY W/O HCPCS

## 2025-04-24 PROCEDURE — 99233 SBSQ HOSP IP/OBS HIGH 50: CPT | Performed by: INTERNAL MEDICINE

## 2025-04-24 PROCEDURE — 99291 CRITICAL CARE FIRST HOUR: CPT | Performed by: INTERNAL MEDICINE

## 2025-04-24 PROCEDURE — 84100 ASSAY OF PHOSPHORUS: CPT

## 2025-04-24 PROCEDURE — 80053 COMPREHEN METABOLIC PANEL: CPT

## 2025-04-24 PROCEDURE — 97535 SELF CARE MNGMENT TRAINING: CPT | Mod: GO,CO

## 2025-04-24 PROCEDURE — 93308 TTE F-UP OR LMTD: CPT

## 2025-04-24 PROCEDURE — 2500000002 HC RX 250 W HCPCS SELF ADMINISTERED DRUGS (ALT 637 FOR MEDICARE OP, ALT 636 FOR OP/ED): Performed by: INTERNAL MEDICINE

## 2025-04-24 PROCEDURE — 2500000004 HC RX 250 GENERAL PHARMACY W/ HCPCS (ALT 636 FOR OP/ED): Mod: JZ | Performed by: PHARMACIST

## 2025-04-24 PROCEDURE — 99232 SBSQ HOSP IP/OBS MODERATE 35: CPT | Performed by: NURSE PRACTITIONER

## 2025-04-24 PROCEDURE — 80202 ASSAY OF VANCOMYCIN: CPT | Performed by: PHARMACIST

## 2025-04-24 PROCEDURE — 97530 THERAPEUTIC ACTIVITIES: CPT | Mod: GP,CQ

## 2025-04-24 PROCEDURE — 2500000004 HC RX 250 GENERAL PHARMACY W/ HCPCS (ALT 636 FOR OP/ED): Mod: JZ | Performed by: INTERNAL MEDICINE

## 2025-04-24 RX ORDER — METOPROLOL SUCCINATE 25 MG/1
25 TABLET, EXTENDED RELEASE ORAL DAILY
Status: DISCONTINUED | OUTPATIENT
Start: 2025-04-24 | End: 2025-04-25

## 2025-04-24 RX ORDER — LANOLIN ALCOHOL/MO/W.PET/CERES
400 CREAM (GRAM) TOPICAL ONCE
Status: COMPLETED | OUTPATIENT
Start: 2025-04-24 | End: 2025-04-24

## 2025-04-24 RX ORDER — SODIUM CHLORIDE, SODIUM LACTATE, POTASSIUM CHLORIDE, CALCIUM CHLORIDE 600; 310; 30; 20 MG/100ML; MG/100ML; MG/100ML; MG/100ML
100 INJECTION, SOLUTION INTRAVENOUS CONTINUOUS
Status: DISCONTINUED | OUTPATIENT
Start: 2025-04-24 | End: 2025-04-24

## 2025-04-24 RX ORDER — CEFAZOLIN SODIUM 2 G/50ML
2 SOLUTION INTRAVENOUS EVERY 12 HOURS
Status: DISCONTINUED | OUTPATIENT
Start: 2025-04-24 | End: 2025-04-28 | Stop reason: HOSPADM

## 2025-04-24 RX ORDER — POTASSIUM CHLORIDE 20 MEQ/1
20 TABLET, EXTENDED RELEASE ORAL ONCE
Status: COMPLETED | OUTPATIENT
Start: 2025-04-24 | End: 2025-04-24

## 2025-04-24 RX ORDER — WARFARIN 2.5 MG/1
1.25 TABLET ORAL ONCE
Status: COMPLETED | OUTPATIENT
Start: 2025-04-24 | End: 2025-04-24

## 2025-04-24 RX ORDER — SODIUM CHLORIDE 9 MG/ML
50 INJECTION, SOLUTION INTRAVENOUS CONTINUOUS
Status: ACTIVE | OUTPATIENT
Start: 2025-04-24 | End: 2025-04-24

## 2025-04-24 RX ADMIN — AMIODARONE HYDROCHLORIDE 0.5 MG/MIN: 1.8 INJECTION, SOLUTION INTRAVENOUS at 02:45

## 2025-04-24 RX ADMIN — CEFAZOLIN SODIUM 2 G: 2 SOLUTION INTRAVENOUS at 17:51

## 2025-04-24 RX ADMIN — Medication 400 MG: at 08:28

## 2025-04-24 RX ADMIN — POTASSIUM CHLORIDE 20 MEQ: 1500 TABLET, EXTENDED RELEASE ORAL at 08:28

## 2025-04-24 RX ADMIN — SODIUM CHLORIDE 100 ML/HR: 0.9 INJECTION, SOLUTION INTRAVENOUS at 03:01

## 2025-04-24 RX ADMIN — METOPROLOL SUCCINATE 25 MG: 25 TABLET, EXTENDED RELEASE ORAL at 15:20

## 2025-04-24 RX ADMIN — PIPERACILLIN SODIUM AND TAZOBACTAM SODIUM 2.25 G: 2; .25 INJECTION, SOLUTION INTRAVENOUS at 09:44

## 2025-04-24 RX ADMIN — LATANOPROST 1 DROP: 50 SOLUTION OPHTHALMIC at 21:40

## 2025-04-24 RX ADMIN — VANCOMYCIN HYDROCHLORIDE 500 MG: 500 INJECTION, SOLUTION INTRAVENOUS at 06:12

## 2025-04-24 RX ADMIN — PIPERACILLIN SODIUM AND TAZOBACTAM SODIUM 2.25 G: 2; .25 INJECTION, SOLUTION INTRAVENOUS at 00:14

## 2025-04-24 RX ADMIN — PANTOPRAZOLE SODIUM 40 MG: 40 TABLET, DELAYED RELEASE ORAL at 08:27

## 2025-04-24 RX ADMIN — AMIODARONE HYDROCHLORIDE 0.5 MG/MIN: 1.8 INJECTION, SOLUTION INTRAVENOUS at 17:55

## 2025-04-24 RX ADMIN — DOXYCYCLINE 100 MG: 100 INJECTION, POWDER, LYOPHILIZED, FOR SOLUTION INTRAVENOUS at 05:09

## 2025-04-24 RX ADMIN — PIPERACILLIN SODIUM AND TAZOBACTAM SODIUM 2.25 G: 2; .25 INJECTION, SOLUTION INTRAVENOUS at 14:13

## 2025-04-24 RX ADMIN — WARFARIN SODIUM 1.25 MG: 2.5 TABLET ORAL at 17:51

## 2025-04-24 RX ADMIN — LEVOTHYROXINE SODIUM 100 MCG: 0.1 TABLET ORAL at 05:09

## 2025-04-24 ASSESSMENT — COGNITIVE AND FUNCTIONAL STATUS - GENERAL
CLIMB 3 TO 5 STEPS WITH RAILING: A LOT
DAILY ACTIVITIY SCORE: 16
MOVING FROM LYING ON BACK TO SITTING ON SIDE OF FLAT BED WITH BEDRAILS: A LITTLE
STANDING UP FROM CHAIR USING ARMS: A LOT
DRESSING REGULAR UPPER BODY CLOTHING: A LITTLE
TOILETING: A LOT
MOVING TO AND FROM BED TO CHAIR: A LOT
DAILY ACTIVITIY SCORE: 19
DRESSING REGULAR LOWER BODY CLOTHING: A LITTLE
HELP NEEDED FOR BATHING: A LOT
WALKING IN HOSPITAL ROOM: A LOT
CLIMB 3 TO 5 STEPS WITH RAILING: A LOT
PERSONAL GROOMING: A LITTLE
MOBILITY SCORE: 17
MOBILITY SCORE: 14
HELP NEEDED FOR BATHING: A LOT
WALKING IN HOSPITAL ROOM: A LOT
TOILETING: A LITTLE
STANDING UP FROM CHAIR USING ARMS: A LITTLE
DRESSING REGULAR LOWER BODY CLOTHING: A LOT
DRESSING REGULAR UPPER BODY CLOTHING: A LITTLE
TURNING FROM BACK TO SIDE WHILE IN FLAT BAD: A LITTLE
MOVING TO AND FROM BED TO CHAIR: A LITTLE
TURNING FROM BACK TO SIDE WHILE IN FLAT BAD: A LITTLE

## 2025-04-24 ASSESSMENT — ENCOUNTER SYMPTOMS
CONFUSION: 0
DIFFICULTY URINATING: 0
DIARRHEA: 0
TROUBLE SWALLOWING: 0
BACK PAIN: 1
SHORTNESS OF BREATH: 0
WEAKNESS: 1
CONSTIPATION: 0
FATIGUE: 1

## 2025-04-24 ASSESSMENT — PAIN SCALES - GENERAL
PAINLEVEL_OUTOF10: 0 - NO PAIN

## 2025-04-24 ASSESSMENT — PAIN - FUNCTIONAL ASSESSMENT
PAIN_FUNCTIONAL_ASSESSMENT: 0-10

## 2025-04-24 ASSESSMENT — ACTIVITIES OF DAILY LIVING (ADL): HOME_MANAGEMENT_TIME_ENTRY: 27

## 2025-04-24 NOTE — PROGRESS NOTES
Jennifer Magana is a 89 y.o. female on day 2 of admission presenting with Sepsis (Multi).      Assessment/Plan:    #Sepsis  #Group B strep bacteremia  Unclear source.  Repeat blood cultures x 2 in AM.  Currently on Zosyn, Doxy    #CKD  Estimated Creatinine Clearance: 11.8 mL/min (A) (by C-G formula based on SCr of 2.21 mg/dL (H)).    CHF  GERD  Gall stone      Recommendations:    -Dc zosyn, doxy  -Start ancef 2gm q12h  -Renally dose abx  -Repeat blood cx AM  -TTE  -Will continue to follow      Fabricio Fuentes MD  Date of service: 4/24/2025  Time of service: 2:51 PM      Subjective   Interval History: No acute events overnight.  Patient denies any new complaint.        Review of Systems  Denies: fever, chills, nausea, vomiting, diarrhea    Objective   Range of Vitals (last 24 hours)  Heart Rate:  []   Temp:  [35.8 °C (96.4 °F)-36.2 °C (97.2 °F)]   Resp:  [8-30]   BP: ()/(53-94)   Weight:  [59.2 kg (130 lb 8.2 oz)]   SpO2:  [93 %-100 %]  Daily Weight  04/23/25 : 59.2 kg (130 lb 8.2 oz)   Body mass index is 23.87 kg/m².      General: alert, oriented, NAD  HEENT: No conjunctival pallor, no scleral icterus  Abdomen: soft, non tender, non distended, BS+  Neuro: AAO x 3  Extremities: no edema, no cyanosis  Skin: No rashes or ulcers  MSK: No joint inflammation       Antibiotics  doxycycline - 100 mg, 100 mg/100 mL  piperacillin-tazobactam - 2.25 gram/50 mL      Relevant Results  Labs  Results from last 72 hours   Lab Units 04/24/25  0301 04/23/25  0415 04/22/25  1537   WBC AUTO x10*3/uL 12.2* 16.7* 25.4*   HEMOGLOBIN g/dL 11.7* 12.5 15.1   HEMATOCRIT % 37.7 40.1 49.0*   PLATELETS AUTO x10*3/uL 195 239 340   NEUTROS PCT AUTO % 82.3 87.5  --    LYMPHO PCT MAN %  --   --  2.0   LYMPHS PCT AUTO % 7.4 4.9  --    MONO PCT MAN %  --   --  3.0   MONOS PCT AUTO % 8.8 6.5  --    EOSINO PCT MAN %  --   --  0.0   EOS PCT AUTO % 0.5 0.0  --      Results from last 72 hours   Lab Units 04/24/25  0301 04/23/25  0417  "04/22/25  1637 04/22/25  1537   SODIUM mmol/L 137 141  --  141   POTASSIUM mmol/L 3.7 3.7  --  4.2   CHLORIDE mmol/L 104 106  --  103   CO2 mmol/L 19* 25  --  26   BUN mg/dL 33* 25*  --  27*   CREATININE mg/dL 2.21* 1.76*  --  1.93*   GLUCOSE mg/dL 157* 136*  --  160*   CALCIUM mg/dL 8.5* 8.7  --  10.1   ANION GAP mmol/L 18 14  --  16   EGFR mL/min/1.73m*2 20* 26*  --  23*   PHOSPHORUS mg/dL 3.3 2.9 3.2  --      Results from last 72 hours   Lab Units 04/24/25  0301 04/23/25  0415 04/22/25  1537   ALK PHOS U/L 72 79 91   BILIRUBIN TOTAL mg/dL 0.8 0.9 1.2   PROTEIN TOTAL g/dL 5.3* 5.6* 7.3   ALT U/L 15 20 13   AST U/L 15 20 16   ALBUMIN g/dL 3.1* 3.5 4.5     Estimated Creatinine Clearance: 11.8 mL/min (A) (by C-G formula based on SCr of 2.21 mg/dL (H)).  No results found for: \"CRP\"    Microbiology  Susceptibility data from last 14 days.  Collected Specimen Info Organism Clindamycin Erythromycin Penicillin Tetracycline   04/22/25 Blood culture from Peripheral Venipuncture Streptococcus agalactiae (Group B Streptococcus)       04/22/25 Blood culture from Peripheral Venipuncture Streptococcus agalactiae (Group B Streptococcus)  R  R  S  R       Imaging    XR abdomen 3+ views  Result Date: 4/23/2025  1.   Nonspecific nonobstructive bowel gas pattern.   MACRO: None   Signed by: Salma Garsia 4/23/2025 7:33 PM Dictation workstation:   [id number]    CT chest abdomen pelvis wo IV contrast  Result Date: 4/22/2025  Interstitial prominence with smooth interlobular septal thickening suggests acute pulmonary interstitial edema/CHF. Dependent lower lung airspace opacities with associated parenchymal volume loss most likely relate to atelectasis. Small pleural effusions. No pneumothorax.   Mild peripancreatic fat stranding predominating about the tail (series 2, images ), suggesting possible acute interstitial pancreatitis versus retroperitoneal edema secondary to volume overload. Suggest correlation with pancreatic enzymes. " No focal fluid collection.   Urinary bladder is decompressed, not well evaluated. Perivesical fat stranding is concerning for acute cystitis although some of this appearance could be technical in etiology. Suggest clinical correlation and correlation with urinalysis.   Hepatomegaly.   Suspected cholelithiasis without convincing evidence of acute cholecystitis.   Cardiomegaly, with disproportionate atrial distension. Mitral annular and aortic root calcifications. Atherosclerosis, including coronary artery disease.   Fluid in the esophagus compatible with gastroesophageal reflux. No definite abnormal esophageal mural thickening.   Additional findings as discussed above.   MACRO: None   Signed by: Merrick Johnson 4/22/2025 6:34 PM Dictation workstation:   [id number]    XR chest 1 view  Result Date: 4/22/2025  1.  Similar appearance of bibasilar airspace opacities prior exam, which may represent atelectasis versus developing infiltrates with layering pleural effusions not excluded bilaterally.       MACRO: None   Signed by: Jose Garcia 4/22/2025 4:02 PM Dictation workstation:   [id number]    XR chest 2 views  Result Date: 4/16/2025  Diffuse interstitial infiltrates, bilateral pleural effusions and bibasilar airspace consolidations, as above. Clinical correlation and continued follow-up until clearing is recommended.   MACRO: None.   Signed by: Brijesh Puri 4/16/2025 6:11 PM Dictation workstation:   [id number]    XR chest 2 views  Result Date: 3/29/2025  Diffuse interstitial infiltrates bilaterally, as above. Clinical correlation and continued follow-up until clearing is recommended.   MACRO: None.   Signed by: Brijesh Puri 3/29/2025 1:36 PM Dictation workstation:   [id number]

## 2025-04-24 NOTE — PROGRESS NOTES
Physical Therapy    Physical Therapy Treatment    Patient Name: Jennifer Magana  MRN: [id number]  Department: [facility] NONV1  Room: 12/12-A  Today's Date: 4/24/2025  Time Calculation  Start Time: 1412  Stop Time: 1435  Time Calculation (min): 23 min         Assessment/Plan   PT Assessment  PT Assessment Results: Decreased strength, Decreased endurance, Decreased mobility  Rehab Prognosis: Good  Barriers to Discharge Home: Physical needs  Physical Needs: Stair navigation into home limited by function/safety, Ambulating household distances limited by function/safety  End of Session Communication: Bedside nurse  Assessment Comment: Patient tolerated tx session well with no complaints of increased pain. Patient utilized FWW to get from EOB to chair with arms today. Progression for decreased assist with transfers from MIN A x2 to MIN A this tx session with FWW. Patient heart rate elevated 100-138 bpm  range due to chronic a-fib.  End of Session Patient Position: Up in chair, Alarm on  PT Plan  Inpatient/Swing Bed or Outpatient: Inpatient  PT Plan  Treatment/Interventions: Bed mobility, Transfer training, Gait training, Neuromuscular re-education, Therapeutic exercise, Therapeutic activity, Stair training  PT Plan: Ongoing PT  PT Frequency: 5 times per week  PT Discharge Recommendations: Low intensity level of continued care  PT - OK to Discharge: Yes      General Visit Information:   PT  Visit  PT Received On: 04/24/25  Response to Previous Treatment: Patient with no complaints from previous session.  General  Reason for Referral: abd pain, N/V. Recent adm 3/29-4/3/25, per dtr-ever since pt not feeling well/increased abd pain/reduced oral intake.  Referred By: PT FOR IMPAIRED MOBILITY/GAIT TRAINING  Past Medical History Relevant to Rehab: atrial fibrillation on warfarin, chronic diastolic heart failure (EF 45-50%), CKD, hypertension, severe aortic stenosis, hypothyroidism,  Prior to Session Communication: Bedside  nurse  Patient Position Received: Bed, 3 rail up, Alarm on  General Comment: pt seen in ICU with monitor lines. pleasant and cooperative, gave max effor t for therapy    Subjective   Precautions:  Precautions  Medical Precautions: Fall precautions  Precautions Comment: watch BP (low on eval) and HR (AF RVR)     Date/Time Vitals Session Patient Position Pulse Resp SpO2 BP MAP (mmHg)    04/24/25 1412 --  --  138  --  --  --  --           Vital Signs Comment: BP within normal limits, heart rate elevated due to chronic a-fib range of 100-138 bpm, nursing okay for patient therapy     Objective   Pain:  Pain Assessment  Pain Assessment: 0-10  0-10 (Numeric) Pain Score: 0 - No pain  Cognition:  Cognition  Orientation Level: Oriented X4  Coordination:     Postural Control:  Static Sitting Balance  Static Sitting-Balance Support: Feet supported, Bilateral upper extremity supported  Static Sitting-Level of Assistance: Close supervision  Static Sitting-Comment/Number of Minutes: Pt seated EOB x1  Dynamic Sitting Balance  Dynamic Sitting-Balance Support: Bilateral upper extremity supported, Feet unsupported  Dynamic Sitting-Level of Assistance: Close supervision  Dynamic Sitting-Balance:  (Performing theraputic exercises x7 mins in chair with arms.)  Dynamic Sitting-Comments: Performing theraputic exercises x7 mins in chair with arms.  Static Standing Balance  Static Standing-Balance Support: Bilateral upper extremity supported  Static Standing-Level of Assistance: Contact guard  Static Standing-Comment/Number of Minutes: Standing within FWW x1 min.    Activity Tolerance:  Activity Tolerance  Endurance: Endurance does not limit participation in activity  Treatments:  Therapeutic Exercise  Therapeutic Exercise Performed: Yes  Therapeutic Exercise Activity 1: Performed seated exercises for casimiro LE's alternating hip flexion x10, LAQ x10, hip abduction x10, hip adduction with pillow squeezes 3 secs hold x10.    Bed Mobility  Bed  Mobility: Yes  Bed Mobility 1  Bed Mobility 1: Supine to sitting  Level of Assistance 1: Close supervision    Ambulation/Gait Training  Ambulation/Gait Training Performed: Yes  Ambulation/Gait Training 1  Surface 1: Level tile  Device 1: Rolling walker  Assistance 1: Minimum assistance  Quality of Gait 1: Narrow base of support, Decreased step length, Forward flexed posture  Comments/Distance (ft) 1: Gait trg; 2 steps towards the chair.  Transfers  Transfer: Yes  Transfer 1  Transfer From 1: Bed to  Transfer to 1: Chair with arms  Technique 1: Stand pivot  Transfer Device 1: Walker  Transfer Level of Assistance 1: Minimum assistance    Outcome Measures:  [facility] Basic Mobility  Turning from your back to your side while in a flat bed without using bedrails: None  Moving from lying on your back to sitting on the side of a flat bed without using bedrails: A little  Moving to and from bed to chair (including a wheelchair): A little  Standing up from a chair using your arms (e.g. wheelchair or bedside chair): A little  To walk in hospital room: A lot  Climbing 3-5 steps with railing: A lot  Basic Mobility - Total Score: 17    Education Documentation  Mobility Training, taught by Gianna D'Amico, S-PTA at 4/24/2025  3:05 PM.  Learner: Patient  Readiness: Acceptance  Method: Explanation  Response: Verbalizes Understanding    Body Mechanics, taught by Gianna D'Amico, S-PTA at 4/24/2025  3:05 PM.  Learner: Patient  Readiness: Acceptance  Method: Explanation  Response: Verbalizes Understanding    ADL Training, taught by Gianna D'Amico, S-PTA at 4/24/2025  3:05 PM.  Learner: Patient  Readiness: Acceptance  Method: Explanation  Response: Verbalizes Understanding    Precautions, taught by Gianna D'Amico, S-PTA at 4/24/2025  3:05 PM.  Learner: Patient  Readiness: Acceptance  Method: Explanation  Response: Verbalizes Understanding    Education Comments  No comments found.           Encounter Problems       Encounter Problems (Active)        Mobility       STG - Patient will ambulate household distance using cane and no more than SUPV assist (Progressing)       Start:  04/23/25            STG - Patient will navigate 3-5 steps with rails/device, no more than MIN Ax1 (Not Progressing)       Start:  04/23/25               PT Transfers       using cane and no more than SUPV assistSTG - Patient will demonstrate safe transfer techniques  (Progressing)       Start:  04/23/25               Pain - Adult          Strengthening        Pt will perform 10+ reps AAROM/AROM/RROM BLE to improve functional strength needed for improved mobility.  (Progressing)       Start:  04/23/25                 Gianna D'Amico, S-PTA

## 2025-04-24 NOTE — ASSESSMENT & PLAN NOTE
Wbc is consistently trending down.  Blood culture positive for strep agalactiae. This is sensitive to zosyn.  MRSA is negative and vanco is discontinued  ID following  Defer abx decision/recommendation to ID.

## 2025-04-24 NOTE — PROGRESS NOTES
Jennifer Magana is a 89 y.o. female on day 2 of admission presenting with Sepsis (Multi).      Subjective   Mrs. Magana reports that she is feeling better. She denied fever and chills. No chest pain. No nausea or vomiting. No diarrhea. She is aware of plans to continue IV abx for blood stream infection and will wait for ID final abx recommendation.    Objective     Last Recorded Vitals  /82   Pulse (!) 121   Temp 35.8 °C (96.4 °F) (Temporal)   Resp (!) 8   Wt 59.2 kg (130 lb 8.2 oz)   SpO2 100%   Intake/Output last 3 Shifts:    Intake/Output Summary (Last 24 hours) at 4/24/2025 0907  Last data filed at 4/24/2025 0509  Gross per 24 hour   Intake 695.9 ml   Output --   Net 695.9 ml       Admission Weight  Weight: 55.8 kg (123 lb) (04/22/25 1509)    Daily Weight  04/23/25 : 59.2 kg (130 lb 8.2 oz)    Image Results  XR abdomen 3+ views  Narrative: Interpreted By:  Salma Garsia,   STUDY:  XR ABDOMEN 3+ VIEWS;  4/15/2025 1:05 pm      INDICATION:  Signs/Symptoms:abdominal pain, diarrhea.      ,R68.89 Other general symptoms and signs      COMPARISON:  07/24/2020      ACCESSION NUMBER(S):  [id number]      ORDERING CLINICIAN:  GIULIANA WINTER      FINDINGS:  Nonobstructive bowel gas pattern. Limited evaluation of  pneumoperitoneum on supine imaging, however no gross evidence of free  air is noted.      Visualized lungs are clear.      Osseous structures demonstrate no acute bony changes.      Impression: 1.   Nonspecific nonobstructive bowel gas pattern.      MACRO:  None      Signed by: Salma Garsia 4/23/2025 7:33 PM  Dictation workstation:   [id number]  ECG 12 lead  Atrial fibrillation  Probable LVH with secondary repol abnrm  Anterior Q waves, possibly due to LVH    See ED provider note for full interpretation and clinical correlation  Confirmed by Trish Suarez ([id number]) on 4/23/2025 4:22:11 PM      Physical Exam  Constitutional:       Appearance: Normal appearance.   HENT:      Head: Normocephalic  and atraumatic.      Nose: Nose normal.      Mouth/Throat:      Mouth: Mucous membranes are moist.   Eyes:      Extraocular Movements: Extraocular movements intact.      Pupils: Pupils are equal, round, and reactive to light.   Cardiovascular:      Rate and Rhythm: Normal rate and regular rhythm.      Pulses: Normal pulses.      Heart sounds: Normal heart sounds.   Pulmonary:      Effort: Pulmonary effort is normal.      Breath sounds: Normal breath sounds.   Abdominal:      General: Bowel sounds are normal.      Palpations: Abdomen is soft.   Musculoskeletal:         General: Normal range of motion.      Cervical back: Neck supple.   Skin:     General: Skin is warm.   Neurological:      General: No focal deficit present.      Mental Status: She is alert and oriented to person, place, and time.   Psychiatric:         Mood and Affect: Mood normal.         Behavior: Behavior normal.         Relevant Results               Assessment & Plan  Sepsis (Multi)  Wbc is consistently trending down.  Blood culture positive for strep agalactiae. This is sensitive to zosyn.  MRSA is negative and vanco is discontinued  ID following  Defer abx decision/recommendation to ID.  Abdominal pain, unspecified abdominal location  Abdomen is non tender on evaluation.  Continue current management  Acute on chronic diastolic CHF (congestive heart failure)  Doesn't appear overload  Continue current management    Stage 4 chronic kidney disease (Multi)  Overall kidney function is stable  Creatinine is 2.2 today  Continue to monitor  Avoid nephrotoxic agents  Continue amio as ordered  Defer additional recommendation to cardiology  Rapid atrial fibrillation (Multi)  Continue coumadin  Gastroesophageal reflux disease with esophagitis  On PPI  Calculus of gallbladder without cholecystitis without obstruction  Monitor LFTs      Hypothyroidism: continue synthroid.      Victor Hugo Almaraz MD

## 2025-04-24 NOTE — PROGRESS NOTES
Pharmacy Consult for Warfarin (Coumadin) Management - Daily Progress Note     Jennifer Magana is a 89 y.o. female admitted for Sepsis (Multi). Pharmacy was consulted for warfarin dosing and monitoring.       Labs  INR External   Date Value Ref Range Status   04/10/2025 2.00 2.00 - 3.00 Final   2025 1.90 (A) 2.00 - 3.00 Final   2025 2.30 2.00 - 3.00 Final     INR   Date Value Ref Range Status   2025 2.1 (H) 0.9 - 1.1 Final   2025 1.9 (H) 0.9 - 1.1 Final   2025 2.0 (H) 0.9 - 1.1 Final     Review  Warfarin Indication: Atrial fibrillation or flutter  Target INR: 2 - 3     Home dosin.25mg TTH, 2.5mg all other days     INR at 2.1. Patient on doxycycline 100mg BID, zosyn 2.25g q6. Will give home dose of 1.25mg tonight. Recheck INR tomorrow.    Orders placed per pharmacy consult. Pharmacy will continue to monitor and adjust therapy as needed.     Danika Ocampo, PharmD

## 2025-04-24 NOTE — PROGRESS NOTES
Occupational Therapy    OT Treatment    Patient Name: Jennifer Magana  MRN: [id number]  Department: [facility] NONV1  Room: 12/12-A  Today's Date: 4/24/2025  Time Calculation  Start Time: 1339  Stop Time: 1406  Time Calculation (min): 27 min        Assessment:  End of Session Communication: Bedside nurse  End of Session Patient Position: Bed, 3 rail up, Alarm on     Plan:  Treatment Interventions: ADL retraining, Functional transfer training, UE strengthening/ROM, Endurance training, Patient/family training, Equipment evaluation/education, Compensatory technique education  OT Frequency: 3 times per week  OT Discharge Recommendations: Low intensity level of continued care  Equipment Recommended upon Discharge:  (FWW? TBD)  OT Recommended Transfer Status: Assist of 1  OT - OK to Discharge: Yes  Treatment Interventions: ADL retraining, Functional transfer training, UE strengthening/ROM, Endurance training, Patient/family training, Equipment evaluation/education, Compensatory technique education    Subjective   Previous Visit Info:  OT Last Visit  OT Received On: 04/24/25  General:  General  Prior to Session Communication: Bedside nurse  Patient Position Received: Bed, 3 rail up, Alarm on  Precautions:  Medical Precautions: Fall precautions        Pain:  Pain Assessment  0-10 (Numeric) Pain Score: 0 - No pain    Objective    Cognition:  Cognition  Orientation Level: Oriented X4     Activities of Daily Living: Toileting  Toileting Level of Assistance: Close supervision  Where Assessed: Bedside commode     Bed Mobility/Transfers: Bed Mobility  Bed Mobility: Yes  Bed Mobility 1  Bed Mobility 1: Supine to sitting, Sitting to supine  Level of Assistance 1: Close supervision    Transfers  Transfer: Yes  Transfer 1  Technique 1: Sit to stand, Stand to sit  Transfer Device 1: Cane  Transfer Level of Assistance 1: Minimum assistance (reaching for environmental support)  Transfers 2  Technique 2: Sit to stand, Stand to sit  Transfer  Device 2: Walker  Transfer Level of Assistance 2: Contact guard  Trials/Comments 2: improved performance with transfers when using walker  Transfers 3  Transfer From 3:  (side steps)  Transfer Device 3: Walker  Transfer Level of Assistance 3: Contact guard  Transfers 4  Transfer From 4:  (side steps)  Transfer Device 4: Cane  Transfer Level of Assistance 4: Minimum assistance (reaching for environmental support)    Toilet Transfers  Toilet Transfer From: Bed  Toilet Transfer Type: To and from  Toilet Transfer to: Standard bedside commode  Toilet Transfers: Minimal assistance (with cane. unsteady and reaching for environmental support. Located walker for patient.)    Outcome Measures:[facility] [address] [facility] Daily Activity  Putting on and taking off regular lower body clothing: A little  Bathing (including washing, rinsing, drying): A lot  Putting on and taking off regular upper body clothing: A little  Toileting, which includes using toilet, bedpan or urinal: A little  Taking care of personal grooming such as brushing teeth: None  Eating Meals: None  Daily Activity - Total Score: 19    Education Documentation  Body Mechanics, taught by SHAMA Hemphill at 4/24/2025  4:02 PM.  Learner: Patient  Readiness: Acceptance  Method: Explanation  Response: Needs Reinforcement    ADL Training, taught by SHAMA Hemphill at 4/24/2025  4:02 PM.  Learner: Patient  Readiness: Acceptance  Method: Explanation  Response: Needs Reinforcement    Education Comments  No comments found.           Goals:  Encounter Problems       Encounter Problems (Active)       ADLs       Patient with complete lower body dressing with stand by assist level of assistance donning and doffing all LE clothes  with PRN adaptive equipment while edge of bed  (Progressing)       Start:  04/23/25    Expected End:  05/07/25            Patient will complete toileting including hygiene clothing management/hygiene with stand by assist level of assistance.  (Progressing)       Start:  04/23/25    Expected End:  05/07/25               MOBILITY       Patient will perform Functional mobility  Household distances/Community Distances with stand by assist level of assistance and least restrictive device in order to improve safety and functional mobility. (Progressing)       Start:  04/23/25    Expected End:  05/07/25               TRANSFERS       Patient will complete functional transfers with least restrictive device with modified independent level of assistance. (Progressing)       Start:  04/23/25    Expected End:  05/07/25

## 2025-04-24 NOTE — CARE PLAN
The patient's goals for the shift include      The clinical goals for the shift include pt will remain hemodynamically stable throughout shift      Problem: Pain - Adult  Goal: Verbalizes/displays adequate comfort level or baseline comfort level  Outcome: Progressing     Problem: Safety - Adult  Goal: Free from fall injury  Outcome: Progressing     Problem: Discharge Planning  Goal: Discharge to home or other facility with appropriate resources  Outcome: Progressing     Problem: Chronic Conditions and Co-morbidities  Goal: Patient's chronic conditions and co-morbidity symptoms are monitored and maintained or improved  Outcome: Progressing     Problem: Nutrition  Goal: Nutrient intake appropriate for maintaining nutritional needs  Outcome: Progressing     Problem: Arrythmia/Dysrhythmia  Goal: Lab values return to normal range  Outcome: Progressing  Goal: No evidence of post procedure complications  Outcome: Progressing  Goal: Promote self management  Outcome: Progressing  Goal: Serial ECG will return to baseline  Outcome: Progressing  Goal: Verbalize understanding of procedures/devices  Outcome: Progressing  Goal: Vital signs return to baseline  Outcome: Progressing  Goal: Care and maintenance of device (specify)  Outcome: Progressing     Problem: Skin  Goal: Participates in plan/prevention/treatment measures  Outcome: Progressing  Flowsheets (Taken 4/23/2025 0052 by Chasity Regan RN)  Participates in plan/prevention/treatment measures:   Elevate heels   Increase activity/out of bed for meals  Goal: Prevent/manage excess moisture  Outcome: Progressing  Flowsheets (Taken 4/23/2025 0052 by Chasity Regan RN)  Prevent/manage excess moisture:   Cleanse incontinence/protect with barrier cream   Moisturize dry skin  Goal: Prevent/minimize sheer/friction injuries  Outcome: Progressing  Flowsheets (Taken 4/23/2025 0052 by Chasity Regan RN)  Prevent/minimize sheer/friction injuries: Increase activity/out of bed for  meals  Goal: Promote/optimize nutrition  Outcome: Progressing  Flowsheets (Taken 4/23/2025 0052 by Chasity Regan RN)  Promote/optimize nutrition:   Offer water/supplements/favorite foods   Monitor/record intake including meals     Problem: Heart Failure  Goal: Improved gas exchange this shift  Outcome: Progressing  Goal: Improved urinary output this shift  Outcome: Progressing  Goal: Reduction in peripheral edema within 24 hours  Outcome: Progressing  Goal: Report improvement of dyspnea/breathlessness this shift  Outcome: Progressing  Goal: Weight from fluid excess reduced over 2-3 days, then stabilize  Outcome: Progressing  Goal: Increase self care and/or family involvement in 24 hours  Outcome: Progressing

## 2025-04-24 NOTE — PROGRESS NOTES
Critical Care Daily Progress Notes        Subjective   Patient is a 89 y.o. female admitted on 4/22/2025  3:14 PM with the following indication(s) for ICU care: Atrial fibrillation with RVR, severe sepsis.     Interval History:  Jennifer Magana is a 89 y.o. female with past medical history of atrial fibrillation on warfarin, chronic diastolic heart failure (EF 45-50%), CKD, hypertension, severe aortic stenosis, hypothyroidism, and GERD presented to [facility] ED with complaints of abdominal pain, nausea, and vomiting.  Admitted to the ICU because of atrial fibrillation with RVR and severe sepsis    04/24/2025: Patient continued to be in atrial fibrillation, but rate is better controlled.  On amiodarone 0.5.  Hemodynamically stable, not requiring any vasopressors.  Alert oriented x 3. She denies any shortness of breath, chest pain, cough or wheezing. She states her lower abdominal pain is better and felt like occasional cramp. She noted improvement in diarrhea, nausea and vomiting.    D/w RN.     Scheduled Medications:   Scheduled Medications[1]     Continuous Medications:   Continuous Medications[2]     PRN Medications:   PRN Medications[3]    Objective   Vitals:  Most Recent:  Vitals:    04/24/25 0700   BP:    Pulse: (!) 121   Resp: (!) 8   Temp:    SpO2: 100%       24hr Min/Max:  Temp  Min: 35.8 °C (96.4 °F)  Max: 36.4 °C (97.5 °F)  Pulse  Min: 82  Max: 124  BP  Min: 75/63  Max: 123/82  Resp  Min: 8  Max: 36  SpO2  Min: 93 %  Max: 100 %    LDA:         Vent settings:       Hemodynamic parameters for last 24 hours:       I/O:    Intake/Output Summary (Last 24 hours) at 4/24/2025 0749  Last data filed at 4/24/2025 0509  Gross per 24 hour   Intake 985.9 ml   Output --   Net 985.9 ml       Physical Exam:   Physical Exam     Constitutional:       General: She is not in acute distress.     Appearance: Normal appearance. She is not ill-appearing.   HENT:      Head: Normocephalic.      Nose: Nose normal.       Mouth/Throat:      Mouth: Mucous membranes are dry.   Eyes:      Extraocular Movements: Extraocular movements intact.      Conjunctiva/sclera: Conjunctivae normal.      Pupils: Pupils are equal, round, and reactive to light.   Cardiovascular:      Rate and Rhythm: Tachycardia present. Rhythm irregular.      Pulses: Normal pulses.      Heart sounds: Murmur heard.      Systolic murmur is present.      Comments: Atrial fibrillation with RVR on telemetry  Pulmonary:      Effort: Pulmonary effort is normal. No respiratory distress.      Breath sounds: Normal breath sounds. No stridor. No wheezing, rhonchi or rales.      Comments: Diminished breath sounds to bilateral bases.   Chest:      Chest wall: No tenderness.   Abdominal:      General: A surgical scar is present. Bowel sounds are normal. There is no distension.      Palpations: Abdomen is soft.      Tenderness: There is abdominal tenderness in the suprapubic area. There is no guarding or rebound.      Comments: Suprapubic tenderness with deep palpation.   Musculoskeletal:         General: Normal range of motion.      Cervical back: Normal range of motion.      Right lower leg: No edema.      Left lower leg: No edema.   Skin:     General: Skin is warm and dry.      Capillary Refill: Capillary refill takes less than 2 seconds.      Findings: Rash (MASD) present.      Comments: MASD bilateral groin, perineum   Neurological:      General: No focal deficit present.      Mental Status: She is alert and oriented to person, place, and time. Mental status is at baseline.      Cranial Nerves: Cranial nerves 2-12 are intact.      Sensory: Sensation is intact.      Motor: Motor function is intact.      Comments: Alert and oriented to person, place, situation but not month or year.    Psychiatric:         Attention and Perception: Attention normal.         Mood and Affect: Mood normal.         Speech: Speech normal.         Behavior: Behavior normal.         Judgment: Judgment  normal.      Lab/Radiology/Diagnostic Review:  Results for orders placed or performed during the hospital encounter of 04/22/25 (from the past 24 hours)   Protime-INR   Result Value Ref Range    Protime 22.8 (H) 9.8 - 12.4 seconds    INR 2.1 (H) 0.9 - 1.1   CBC and Auto Differential   Result Value Ref Range    WBC 12.2 (H) 4.4 - 11.3 x10*3/uL    nRBC 0.0 0.0 - 0.0 /100 WBCs    RBC 3.98 (L) 4.00 - 5.20 x10*6/uL    Hemoglobin 11.7 (L) 12.0 - 16.0 g/dL    Hematocrit 37.7 36.0 - 46.0 %    MCV 95 80 - 100 fL    MCH 29.4 26.0 - 34.0 pg    MCHC 31.0 (L) 32.0 - 36.0 g/dL    RDW 15.4 (H) 11.5 - 14.5 %    Platelets 195 150 - 450 x10*3/uL    Neutrophils % 82.3 40.0 - 80.0 %    Immature Granulocytes %, Automated 0.7 0.0 - 0.9 %    Lymphocytes % 7.4 13.0 - 44.0 %    Monocytes % 8.8 2.0 - 10.0 %    Eosinophils % 0.5 0.0 - 6.0 %    Basophils % 0.3 0.0 - 2.0 %    Neutrophils Absolute 10.04 (H) 1.60 - 5.50 x10*3/uL    Immature Granulocytes Absolute, Automated 0.09 0.00 - 0.50 x10*3/uL    Lymphocytes Absolute 0.90 0.80 - 3.00 x10*3/uL    Monocytes Absolute 1.08 (H) 0.05 - 0.80 x10*3/uL    Eosinophils Absolute 0.06 0.00 - 0.40 x10*3/uL    Basophils Absolute 0.04 0.00 - 0.10 x10*3/uL   Comprehensive Metabolic Panel   Result Value Ref Range    Glucose 157 (H) 74 - 99 mg/dL    Sodium 137 136 - 145 mmol/L    Potassium 3.7 3.5 - 5.3 mmol/L    Chloride 104 98 - 107 mmol/L    Bicarbonate 19 (L) 21 - 32 mmol/L    Anion Gap 18 10 - 20 mmol/L    Urea Nitrogen 33 (H) 6 - 23 mg/dL    Creatinine 2.21 (H) 0.50 - 1.05 mg/dL    eGFR 20 (L) >60 mL/min/1.73m*2    Calcium 8.5 (L) 8.6 - 10.3 mg/dL    Albumin 3.1 (L) 3.4 - 5.0 g/dL    Alkaline Phosphatase 72 33 - 136 U/L    Total Protein 5.3 (L) 6.4 - 8.2 g/dL    AST 15 9 - 39 U/L    Bilirubin, Total 0.8 0.0 - 1.2 mg/dL    ALT 15 7 - 45 U/L   Magnesium   Result Value Ref Range    Magnesium 1.96 1.60 - 2.40 mg/dL   Phosphorus   Result Value Ref Range    Phosphorus 3.3 2.5 - 4.9 mg/dL   Vancomycin   Result  Value Ref Range    Vancomycin 12.0 5.0 - 20.0 ug/mL     Imaging  CT chest abdomen pelvis wo IV contrast  Result Date: 4/22/2025  Interstitial prominence with smooth interlobular septal thickening suggests acute pulmonary interstitial edema/CHF. Dependent lower lung airspace opacities with associated parenchymal volume loss most likely relate to atelectasis. Small pleural effusions. No pneumothorax.   Mild peripancreatic fat stranding predominating about the tail (series 2, images ), suggesting possible acute interstitial pancreatitis versus retroperitoneal edema secondary to volume overload. Suggest correlation with pancreatic enzymes. No focal fluid collection.   Urinary bladder is decompressed, not well evaluated. Perivesical fat stranding is concerning for acute cystitis although some of this appearance could be technical in etiology. Suggest clinical correlation and correlation with urinalysis.   Hepatomegaly.   Suspected cholelithiasis without convincing evidence of acute cholecystitis.   Cardiomegaly, with disproportionate atrial distension. Mitral annular and aortic root calcifications. Atherosclerosis, including coronary artery disease.   Fluid in the esophagus compatible with gastroesophageal reflux. No definite abnormal esophageal mural thickening.   Additional findings as discussed above.   MACRO: None   Signed by: Merrick Johnson 4/22/2025 6:34 PM Dictation workstation:   [id number]    XR chest 1 view  Result Date: 4/22/2025  1.  Similar appearance of bibasilar airspace opacities prior exam, which may represent atelectasis versus developing infiltrates with layering pleural effusions not excluded bilaterally.       MACRO: None   Signed by: Jose Garcia 4/22/2025 4:02 PM Dictation workstation:   [id number]      Cardiology, Vascular, and Other Imaging  ECG 12 lead  Result Date: 4/23/2025  Atrial fibrillation Probable LVH with secondary repol abnrm Anterior Q waves, possibly due to LVH See ED  provider note for full interpretation and clinical correlation Confirmed by Trish Suarez ([id number]) on 4/23/2025 4:22:11 PM                       Assessment/Plan   Assessment & Plan  Sepsis (Multi)    Acute on chronic diastolic CHF (congestive heart failure)    Stage 4 chronic kidney disease (Multi)    Rapid atrial fibrillation (Multi)    Abdominal pain, unspecified abdominal location    Gastroesophageal reflux disease with esophagitis    Calculus of gallbladder without cholecystitis without obstruction    Jennifer Magana is a 89 y.o. female with past medical history of atrial fibrillation on warfarin, chronic diastolic heart failure (EF 45-50%), CKD, hypertension, severe aortic stenosis, hypothyroidism, and GERD presented to [facility] ED with complaints of abdominal pain, nausea, and vomiting.  Admitted to the ICU because of atrial fibrillation with RVR and severe sepsis    Severe sepsis without septic shock suspect likely secondary to UTI and bacteremia  - Presented with complains of abdominal pain and decreased appetite. Abdominal pain located to suprapubic region and no pain to upper quadrants  - WBC 25.4  - Lactate 2.1>>2.6>>2.0  - Chest x-ray results as above  - CT chest/abdomen/pelvis results reviewed.  - SIRS criteria 2/4 met: heart rate and WBC count  - Suspected source: likely complicated UTI, less likely acute cholecystitis or pancreatitis given normal LFTs, lipase, and amylase.   - s/p 2 L NS bolus  and zosyn 4.5gm IV x1  - Continue zosyn for empiric coverage, de-escalate as appropriate  - Will avoid additional IV fluids at this time given MAP greater than 65mmHg and acute on chronic CHF and severe aortic stenosis  - Strict intake and output  - Maintain MAP greater than 65mmHg, consider vasopressor support if unable to maintain MAP goal despite fluid resuscitation  04/24/2025: Hemodynamically stable, not requiring any vasopressors.  Alert oriented x 3.   Blood culture growing a gram-positive  cocci, pairs and chains  MRSA screen negative  Initially on vancomycin, Zosyn and doxycycline. Atbx adjusted per ID and now on Cefazolin.     Abdominal pain  -?gastroenteritis vs ?suprapubic pain due to UTI  -     Cholelithiasis without cholecystitis  - No sung's sign and no RUQ tenderness  - CT chest/abdomen/pelvis revealed suspected cholelithiasis without convincing evidence of cholecystitis  - Normal LFTs and serum lipase         Atrial fibrillation with RVR  - History of atrial fibrillation on warfarin at home  - Presented with atrial fibrillation with heart rates sustaining in 130-140s  - Suspect likely provoked by sepsis syndrome and poor oral intake  -S/p IVF. Resumed Toprol- XL per cardio  -On amiodarone 0.5 mg gtt.         Acute on chronic diastolic heart failure  - TTE 3/31/25 revealed left ventricular systolic function is mildly decreased with a visually estimated ejection fraction 45 to 50%, no regional wall motion abnormalities, spectral Doppler shows a grade 3 (restrictive pattern) of left ventricular diastolic filling with an elevated left atrial pressure, left ventricular cavity size is decreased, there is normal right ventricular global systolic function, the left atrial size is moderately to severely dilated, moderate mitral valve regurgitation, mild to moderate tricuspid regurgitation, severe aortic valve stenosis, mild aortic valve regurgitation, the inferior vena cava appears normal in size with IVC inspiratory collapse greater than 50%, and there is severely increased septal and severely increased posterior left ventricular wall thickness.  -DC IVF and resume home meds if BP remains in WNL.     CKD stage IV  - Creatinine 1.94, at baseline  - Follow BMP  - Monitor intake and output  - If renal function worsens, consider nephrology consult     MASABBY bilateral groin  - Cleanse groin with soap and water, pat dry and apply Triad twice daily and prn     GERD with esophagitis  - Continue home  protonix     DVT prophylaxis  - Continue home warfarin  - Pharmacy consult to dose warfarin     Advanced care planning  Patient alert and oriented to person, place, situation, but not month or year. She does not have capacity to make code status decision at this time. Discussed goals of care and codes status with patient's daughter/POA, Goldy Damico. Explained differences between Full code and DNR. Goldy stated she would like to continue Full code status for now and possibly discuss further tomorrow about code status. Will continue with Full code status as per family's wishes.   Palliative care following. D/w EVELIN Tyler    Patient is clinically doing better with BP within normal range, remains on RA and symptoms have improved. A.fib is rate controlled while on amio gtt and since metoprolol initiated may be able to come off the drip. Patient did participate in PT and OT without difficulty. Monitor in ICU until off of amio gtt.    I spent 42 minutes of cumulative critical care time with the patient.  Greater than 50% of that time was spent in the direct collaboration and or coordination of care of the patient.     Dragon dictation software was used to dictate this note and thus there may be minor errors in translation/transcription including garbled speech or misspellings. Please contact for clarification if needed.          [1] doxycycline, 100 mg, intravenous, q12h  latanoprost, 1 drop, Both Eyes, Nightly  levothyroxine, 100 mcg, oral, Daily  magnesium oxide, 400 mg, oral, Once  pantoprazole, 40 mg, oral, Daily before breakfast   Or  pantoprazole, 40 mg, intravenous, Daily before breakfast  piperacillin-tazobactam, 2.25 g, intravenous, q6h  polyethylene glycol, 17 g, oral, Daily  potassium chloride CR, 20 mEq, oral, Once     [2] amiodarone, 0.5 mg/min, Last Rate: 0.5 mg/min (04/24/25 0245)  sodium chloride 0.9%, 100 mL/hr, Last Rate: 100 mL/hr (04/24/25 0301)     [3] PRN medications: acetaminophen, bisacodyl,  guaiFENesin, melatonin, morphine, ondansetron **OR** ondansetron

## 2025-04-24 NOTE — CARE PLAN
The patient's goals for the shift include      The clinical goals for the shift include pt will remain hemodynamically stable throughout shift      Problem: Pain - Adult  Goal: Verbalizes/displays adequate comfort level or baseline comfort level  4/23/2025 2013 by Josep Baptiste RN  Outcome: Progressing  4/23/2025 2012 by Josep Baptiste RN  Outcome: Progressing     Problem: Safety - Adult  Goal: Free from fall injury  4/23/2025 2013 by Josep Bpatiste RN  Outcome: Progressing  4/23/2025 2012 by Josep Baptiste RN  Outcome: Progressing     Problem: Discharge Planning  Goal: Discharge to home or other facility with appropriate resources  4/23/2025 2013 by Josep Baptiste RN  Outcome: Progressing  4/23/2025 2012 by Josep Baptiste RN  Outcome: Progressing     Problem: Chronic Conditions and Co-morbidities  Goal: Patient's chronic conditions and co-morbidity symptoms are monitored and maintained or improved  4/23/2025 2013 by Josep Baptiste RN  Outcome: Progressing  4/23/2025 2012 by Josep Baptiste RN  Outcome: Progressing     Problem: Nutrition  Goal: Nutrient intake appropriate for maintaining nutritional needs  4/23/2025 2013 by Josep Baptiste RN  Outcome: Progressing  4/23/2025 2012 by Josep Baptiste RN  Outcome: Progressing     Problem: Arrythmia/Dysrhythmia  Goal: Lab values return to normal range  4/23/2025 2013 by Josep Baptiste RN  Outcome: Progressing  4/23/2025 2012 by Josep Baptiste RN  Outcome: Progressing  Goal: No evidence of post procedure complications  4/23/2025 2013 by Josep Baptiste RN  Outcome: Progressing  4/23/2025 2012 by Josep Baptiste RN  Outcome: Progressing  Goal: Promote self management  4/23/2025 2013 by Josep Baptiste RN  Outcome: Progressing  4/23/2025 2012 by Josep Baptiste RN  Outcome: Progressing  Goal: Serial ECG will return to baseline  4/23/2025 2013 by Josep Baptiste RN  Outcome: Progressing  4/23/2025 2012 by Josep Baptiste RN  Outcome: Progressing  Goal: Verbalize  understanding of procedures/devices  4/23/2025 2013 by Josep Baptiste RN  Outcome: Progressing  4/23/2025 2012 by Josep Baptiste RN  Outcome: Progressing  Goal: Vital signs return to baseline  4/23/2025 2013 by Josep Baptiste RN  Outcome: Progressing  4/23/2025 2012 by Josep Baptiste RN  Outcome: Progressing  Goal: Care and maintenance of device (specify)  4/23/2025 2013 by Josep Baptiste RN  Outcome: Progressing  4/23/2025 2012 by Josep Baptiste RN  Outcome: Progressing     Problem: Skin  Goal: Participates in plan/prevention/treatment measures  4/23/2025 2013 by Josep Baptiste RN  Outcome: Progressing  Flowsheets (Taken 4/23/2025 0052 by Chasity Regan RN)  Participates in plan/prevention/treatment measures:   Elevate heels   Increase activity/out of bed for meals  4/23/2025 2012 by Josep Baptiste RN  Outcome: Progressing  Flowsheets (Taken 4/23/2025 0052 by Chasity Regan RN)  Participates in plan/prevention/treatment measures:   Elevate heels   Increase activity/out of bed for meals  Goal: Prevent/manage excess moisture  4/23/2025 2013 by Josep Baptiste RN  Outcome: Progressing  Flowsheets (Taken 4/23/2025 0052 by Chasity Regan RN)  Prevent/manage excess moisture:   Cleanse incontinence/protect with barrier cream   Moisturize dry skin  4/23/2025 2012 by Josep Baptiste RN  Outcome: Progressing  Flowsheets (Taken 4/23/2025 0052 by Chasity Regan RN)  Prevent/manage excess moisture:   Cleanse incontinence/protect with barrier cream   Moisturize dry skin  Goal: Prevent/minimize sheer/friction injuries  4/23/2025 2013 by Josep Baptiste RN  Outcome: Progressing  Flowsheets (Taken 4/23/2025 0052 by Chasity Regan RN)  Prevent/minimize sheer/friction injuries: Increase activity/out of bed for meals  4/23/2025 2012 by Josep Baptiste RN  Outcome: Progressing  Flowsheets (Taken 4/23/2025 0052 by Chasity Regan RN)  Prevent/minimize sheer/friction injuries: Increase activity/out of bed for meals  Goal:  Promote/optimize nutrition  4/23/2025 2013 by Josep Baptiste RN  Outcome: Progressing  Flowsheets (Taken 4/23/2025 0052 by Chasity Regan RN)  Promote/optimize nutrition:   Offer water/supplements/favorite foods   Monitor/record intake including meals  4/23/2025 2012 by Josep Baptiste RN  Outcome: Progressing  Flowsheets (Taken 4/23/2025 0052 by Chasity Regan RN)  Promote/optimize nutrition:   Offer water/supplements/favorite foods   Monitor/record intake including meals     Problem: Heart Failure  Goal: Improved gas exchange this shift  4/23/2025 2013 by Josep Baptiste RN  Outcome: Progressing  4/23/2025 2012 by Josep Baptiste RN  Outcome: Progressing  Goal: Improved urinary output this shift  4/23/2025 2013 by Josep Baptiste RN  Outcome: Progressing  4/23/2025 2012 by Josep Baptiste RN  Outcome: Progressing  Goal: Reduction in peripheral edema within 24 hours  4/23/2025 2013 by Josep Baptiste RN  Outcome: Progressing  4/23/2025 2012 by Josep Baptiste RN  Outcome: Progressing  Goal: Report improvement of dyspnea/breathlessness this shift  4/23/2025 2013 by Josep Baptiste RN  Outcome: Progressing  4/23/2025 2012 by Josep Baptiste RN  Outcome: Progressing  Goal: Weight from fluid excess reduced over 2-3 days, then stabilize  4/23/2025 2013 by Josep Baptiste RN  Outcome: Progressing  4/23/2025 2012 by Josep Baptiste RN  Outcome: Progressing  Goal: Increase self care and/or family involvement in 24 hours  4/23/2025 2013 by Josep Baptiste RN  Outcome: Progressing  4/23/2025 2012 by Josep Baptiste RN  Outcome: Progressing

## 2025-04-24 NOTE — CONSULTS
" Palliative Care Consultation    History obtained from: patient and medical records    HPI: Jennifer Magana is a 89 y.o. female with past medical history significant for atrial fibrillation on warfarin, chronic diastolic heart failure (EF 45-50%), CKD stage IV, hypertension, severe aortic stenosis, hypothyroidism, glaucoma, and GERD. Patient presented from home to the ED 4/22 d/t chest pain, shortness of breath, abdominal pain, poor appetitive, nausea, and vomiting. She was found to have severe sepsis, cholelithiasis wihthout cholecystitis, Afib with RVR, bacteremia, and acute on chronic diastolic heart failure.    PSH: hysterectomy     FH: sister CAD, 3 sisters with cancer    Palliative care consulted for goals of care, code status, advanced directive, high risk for readmissions, symptom management and outpatient support.    Met with patient. Discussed PMH, HPI, and current hospitalization. Answered questions, provided support and education.     Social History   Marital Status:   Children: 5 daughters   Occupation: ran an electrical motor business, [employer]   service: never served in the [employer]   Tobacco use: never  Alcohol use: denies  Drug use: denies   Christian/Cultural/Spiritual: Islam   Patient finds luigi and happiness in: \"I've enjoyed everything the kids have done\", watching grandchildren play sports     Living arrangement and functional status prior to admission: Patient lives at home with her daughters Goldy and Floresita.   Recent falls: denies  Cognitive status: no baseline confusion   ADL's dressing/bathing: independently   Assistive devices: cane   Cooking, cleaning, laundry: family  Medication management: daughter fills pillbox   Grocery shopping: family mostly   Transportation: still driving     Prior Hospitalizations: patient has had 2 hospitalizations in the last month   3/29-4/3/25: Afib with RVR, CHF    Patient/family description of QOL over the last 6 months: \"it's been " "alright\"      Goals of Care  Decisional Capacity: yes  Understanding of illness: good  Most important at this time: family  Fears/concerns: denies     Current code status: Full Code   Code status discussed today? Yes, patient wishes for DNR/DNI    Review of Systems   Constitutional:  Positive for fatigue.   HENT:  Negative for trouble swallowing.    Respiratory:  Negative for shortness of breath (improved).    Gastrointestinal:  Negative for constipation and diarrhea.   Genitourinary:  Negative for difficulty urinating.   Musculoskeletal:  Positive for back pain (chronic lower back, improved).   Neurological:  Positive for weakness.   Psychiatric/Behavioral:  Negative for confusion.        Physical Exam  Constitutional:       General: She is not in acute distress.  HENT:      Head: Normocephalic.      Nose: Nose normal.      Mouth/Throat:      Mouth: Mucous membranes are moist.   Eyes:      General: No scleral icterus.  Cardiovascular:      Rate and Rhythm: Tachycardia present.   Pulmonary:      Effort: No respiratory distress.   Musculoskeletal:      Cervical back: Neck supple.   Neurological:      Mental Status: She is alert and oriented to person, place, and time.   Psychiatric:         Mood and Affect: Mood normal.         Thought Content: Thought content normal.       Plan    Consult music therapy and pastoral care   Expectations of treatment/Goals of care: further aggressive treatments and hospitalizations as needed  Advanced directives/Advanced care planning: Not on file, requested copies of [facility] and [facility] power of  or Identified surrogate medical decision maker based on [place] hierarchy: reported Goldy POA   Code status: DNR/DNI [place] DNR: completed [place] DNR CCA     Collaborated with: RN    I spent 60 minutes in the professional and overall care of this patient.      Tyler Mckenzie, APRN-CNP      "

## 2025-04-24 NOTE — ASSESSMENT & PLAN NOTE
Overall kidney function is stable  Creatinine is 2.2 today  Continue to monitor  Avoid nephrotoxic agents  Continue amio as ordered  Defer additional recommendation to cardiology

## 2025-04-24 NOTE — PROGRESS NOTES
[facility]  Patient Name: Jennifer Magana  Patient : [id number]  Room/Bed: -A    HPI:  Jennifer Magana is a 89 y.o. female admitted on 25 with abdominal/suprapubic pain.  She has a history of hypertension, hypothyroidism, chronic atrial fibrillation on warfarin, chronic diastolic CHF.  She is currently admitted and being treated for sepsis.  We are consulted for atrial fibrillation.     Her initial labwork through the ED was significant for BUN 27, Cr 1.93, lactate 2.1, HSTI 14 > 15, WBC 25.4k.  Noncontrast CT chest/abdomen/pelvis was remarkable for acute CHF (pulm edema, small pleural effusions), pancreatic tail mild peripancreatic fat stranding, perivesical fat stranding, cholelithiasis without convincing evidence of acute cholecystitis, fluid in the esophagus compatible with GERD.  Patient received 2 L NS IV fluid bolus per sepsis 30 mL/kg IV fluid bolus protocol, as well as IV Zosyn.       On presentation she was in Afib with RVR up to rate of 140's despite IVF bolus and lopressor 5mg IV x1.  She was then initiated on amiodarone infusion.  The patient tells me she has chronic Afib and is on rate control with metoprolol succinate XL 25mg daily, with jantoven for CVA prophylaxis - tells me her INR's have been in the therapeutic range and are managed by her PCP at [facility].     Currently she is on amiodarone infusion 0.5mg/min, with resting HR's ranging in the 's.  She is not requiring pressors.  She denies any current chest pain/pressure, palps, LH/dizziness, and appears unaware of the Afib.    Allergies:  Patient has no known allergies.    Subjective Data:  25: Denies any chest pain/pressure, SOB or palpitations.  Palliative care NP at bedside as well.  AF on telemetry with HR  bpm.    Overnight Events:  None    Objective Data:  Vitals:    25 0500 25 0600 25 0700 25 0800   BP:  123/82     Pulse: (!) 112 (!) 124 (!) 121    Resp:  "18 15 (!) 8    Temp:   35.8 °C (96.4 °F)    TempSrc:   Temporal    SpO2: 100%  100% 100%   Weight:       Height:           Reviewed the following Labs:  Results from last 7 days   Lab Units 04/24/25 0301 04/23/25 0415 04/22/25  1537   WBC AUTO x10*3/uL 12.2* 16.7* 25.4*   HEMOGLOBIN g/dL 11.7* 12.5 15.1   HEMATOCRIT % 37.7 40.1 49.0*   PLATELETS AUTO x10*3/uL 195 239 340       Results from last 7 days   Lab Units 04/24/25  0301 04/23/25 0415 04/22/25  1537   SODIUM mmol/L 137 141 141   POTASSIUM mmol/L 3.7 3.7 4.2   CHLORIDE mmol/L 104 106 103   CO2 mmol/L 19* 25 26   BUN mg/dL 33* 25* 27*   CREATININE mg/dL 2.21* 1.76* 1.93*   CALCIUM mg/dL 8.5* 8.7 10.1   PROTEIN TOTAL g/dL 5.3* 5.6* 7.3   BILIRUBIN TOTAL mg/dL 0.8 0.9 1.2   ALK PHOS U/L 72 79 91   ALT U/L 15 20 13   AST U/L 15 20 16   GLUCOSE mg/dL 157* 136* 160*       Results from last 7 days   Lab Units 04/24/25 0301 04/23/25 0415 04/22/25  1537   MAGNESIUM mg/dL 1.96 1.73 1.98       No results found for: \"LDLF\"     Lab Results   Component Value Date     (H) 04/22/2025       Lab Results   Component Value Date    TROPHS 15 (H) 04/22/2025    TROPHS 14 (H) 04/22/2025        Lab Results   Component Value Date    TSH 1.26 04/22/2025       Results from last 7 days   Lab Units 04/24/25 0301 04/23/25 0415   INR  2.1* 1.9*     No results found for: \"HGBA1C\"    Intake/Output    Intake/Output Summary (Last 24 hours) at 4/24/2025 0943  Last data filed at 4/24/2025 0900  Gross per 24 hour   Intake 815.9 ml   Output 300 ml   Net 515.9 ml      I/O last 2 completed shifts:  In: 1185.9 (20 mL/kg) [P.O.:240; I.V.:445.9 (7.5 mL/kg); IV Piggyback:500]  Out: - (0 mL/kg)   Weight: 59.2 kg     Past Medical History:  She has a past medical history of (HFpEF) heart failure with preserved ejection fraction, Aortic stenosis, CKD (chronic kidney disease), Essential (primary) hypertension, GERD (gastroesophageal reflux disease), Hypothyroidism, and Paroxysmal atrial " fibrillation (Multi).    Past Surgical History:  She has a past surgical history that includes MR angio head wo IV contrast (07/21/2020); MR angio neck wo IV contrast (07/21/2020); and Hysterectomy.      Social History:  She reports that she has never smoked. She has never used smokeless tobacco. She reports that she does not drink alcohol and does not use drugs.    Family History:  Family History[1]    Outpatient Medications  Medications Ordered Prior to Encounter[2]    Scheduled medications  Scheduled Medications[3]  Continuous medications  Continuous Medications[4]  PRN medications  PRN Medications[5]   Prescriptions Prior to Admission[6]    Reviewed the following cardiology tests:    Echo 3/31/25:   1. The left ventricular systolic function is mildly decreased, with a visually estimated ejection fraction of 45-50%.   2. No regional wall motion abnormalities.   3. Spectral Doppler shows a a Grade III (restrictive pattern) of left ventricular diastolic filling with an elevated left atrial pressure.   4. Left ventricular cavity size is decreased.   5. There is normal right ventricular global systolic function.   6. The left atrial size is moderate to severely dilated.   7. There is moderate mitral annular calcification.   8. Moderate mitral valve regurgitation.   9. Mild to moderate tricuspid regurgitation.  10. Severe aortic valve stenosis.  11. Mild aortic valve regurgitation.  12. The inferior vena cava appears normal in size, with IVC inspiratory collapse greater than 50%.  13. There is severely increased septal and severely increased posterior left ventricular wall thickness.  EF   Date/Time Value Ref Range Status   03/31/2025 02:35 PM 48 %    01/04/2024 02:26 PM 54          Physical Exam:  Vitals reviewed.   Constitutional:       Appearance: Not in distress.   Neck:      Vascular: No carotid bruit.   Pulmonary:      Effort: Pulmonary effort is normal.      Breath sounds: Normal breath sounds.  "  Cardiovascular:      PMI at left midclavicular line. Tachycardia present. Irregularly irregular rhythm. S1 with normal intensity. S2 with normal intensity.       Murmurs: There is a systolic murmur.   Edema:     Peripheral edema absent.   Abdominal:      General: Bowel sounds are normal.   Neurological:      Mental Status: Alert and oriented to person, place and time.         Assessment/Plan:   Afib with RVR:  she has chronic Afib and is normally rate controlled with BB.  Presentation of Afib with RVR in setting of sepsis and bacteremia.  She was initiated on amiodarone infusion in ED for rate control (of note, upon chart review, her INR's have been in the range of 1.9 - 2.0 /2.3 since 3/29/25).  -tele monitoring  -at this point would continue amiodarone for rate control purposes to achieve HR <=110  -reinitiate metop succinate XL 25mg daily when BP improves  -continue jantoven, check daily INR\"s - if she becomes subtherapeutic, start heparin infusion  -keep K ~ 4, Mg ~ 2  4/24/25: AF on telemetry with HR  bpm.  INR 2.1 and is on warfarin.  Around 4am BP improved to 120/80's. If BP remains stable, recommend restarting metoprolol succinate 25 mg daily.  Continue on Amiodarone infusion.       Sepsis:  unclear source, suspect urinary source vs ? Cholecystitis  -continue Abx (zosyn, doxy) per primary service  -follow cultures  4/24/25: Blood culture positive for gram positive cocci.  Management per ID.     Acute on chronic combined systolic and diastolic HF:  TTE 3/25 with LVEF 45-50% and grade III DD.  Currently favoring fluid administration due to sepsis with low BP's.  -serial volume monitoring  -diurese if appropriate (see below)  4/24/25:  She does not appear volume overloaded on exam. Around 4am BP improved to 120/80's. If BP remains stable, recommend restarting metoprolol succinate 25 mg daily.     Severe aortic stenosis, moderate mitral regurgitation:  Per recent prior cardiology notes from last " admission, with progression of aortic stenosis plan was for outpatient follow up and conservative management given her advanced age (see cardio note 4/2/25) - per this note, patient's family was udpated  -would avoid overdiuresis in setting of her severe aortic stenosis     Dispo:    -palliative care mtg scheduled for 4/25/25 6065    Code Status  Full Code      Yesenia PETIT Erica, APRN-CNP          [1]   Family History  Problem Relation Name Age of Onset    Other (Half sister with CAD) Sister      Coronary artery disease Other Children    [2]   No current facility-administered medications on file prior to encounter.     Current Outpatient Medications on File Prior to Encounter   Medication Sig Dispense Refill    doxycycline (Vibramycin) 100 mg capsule Take 1 capsule (100 mg) by mouth 2 times a day for 10 days. Take with at least 8 ounces (large glass) of water, do not lie down for 30 minutes after 20 capsule 0    famotidine (Pepcid) 20 mg tablet Take 0.5 tablets (10 mg) by mouth once daily. Start on April 4th 30 tablet 1    furosemide (Lasix) 20 mg tablet Take 1 tablet (20 mg) by mouth once daily for 3 days. (Patient not taking: Reported on 4/23/2025) 3 tablet 0    Jantoven 2.5 mg tablet Take 1.25mg (0.5 tablets) by mouth on Tues,Thurs and 2.5mg (1 tablet) all other days or as directed by Coumadin clinic. 72 tablet 3    levothyroxine (Synthroid, Levoxyl) 100 mcg tablet Take 1 tablet (100 mcg) by mouth once daily.      metoprolol succinate XL (Toprol-XL) 25 mg 24 hr tablet Take 2 tablets (50 mg) by mouth once daily. 60 tablet 1    pantoprazole (ProtoNix) 40 mg EC tablet Take 1 tablet (40 mg) by mouth once daily in the morning. Take before meals. 90 tablet 3    Simbrinza 1-0.2 % drops,suspension ophthalmic suspension Administer 1 drop into both eyes 2 times a day.      travoprost (Travatan Z) 0.004 % drops ophthalmic solution Administer 1 drop into both eyes once daily at bedtime.      [DISCONTINUED] dicyclomine  (Bentyl) 20 mg tablet Take 1 tablet (20 mg) by mouth 4 times a day before meals.     [3] doxycycline, 100 mg, intravenous, q12h  latanoprost, 1 drop, Both Eyes, Nightly  levothyroxine, 100 mcg, oral, Daily  pantoprazole, 40 mg, oral, Daily before breakfast   Or  pantoprazole, 40 mg, intravenous, Daily before breakfast  piperacillin-tazobactam, 2.25 g, intravenous, q6h  polyethylene glycol, 17 g, oral, Daily  warfarin, 1.25 mg, oral, Once    [4] amiodarone, 0.5 mg/min, Last Rate: Stopped (04/24/25 0829)  sodium chloride 0.9%, 50 mL/hr, Last Rate: Stopped (04/24/25 0830)    [5] PRN medications: acetaminophen, bisacodyl, guaiFENesin, melatonin, morphine, ondansetron **OR** ondansetron  [6]   Medications Prior to Admission   Medication Sig Dispense Refill Last Dose/Taking    doxycycline (Vibramycin) 100 mg capsule Take 1 capsule (100 mg) by mouth 2 times a day for 10 days. Take with at least 8 ounces (large glass) of water, do not lie down for 30 minutes after 20 capsule 0     famotidine (Pepcid) 20 mg tablet Take 0.5 tablets (10 mg) by mouth once daily. Start on April 4th 30 tablet 1     furosemide (Lasix) 20 mg tablet Take 1 tablet (20 mg) by mouth once daily for 3 days. (Patient not taking: Reported on 4/23/2025) 3 tablet 0 Not Taking    Jantoven 2.5 mg tablet Take 1.25mg (0.5 tablets) by mouth on Tues,Thurs and 2.5mg (1 tablet) all other days or as directed by Coumadin clinic. 72 tablet 3     levothyroxine (Synthroid, Levoxyl) 100 mcg tablet Take 1 tablet (100 mcg) by mouth once daily.       metoprolol succinate XL (Toprol-XL) 25 mg 24 hr tablet Take 2 tablets (50 mg) by mouth once daily. 60 tablet 1     pantoprazole (ProtoNix) 40 mg EC tablet Take 1 tablet (40 mg) by mouth once daily in the morning. Take before meals. 90 tablet 3     Simbrinza 1-0.2 % drops,suspension ophthalmic suspension Administer 1 drop into both eyes 2 times a day.       travoprost (Travatan Z) 0.004 % drops ophthalmic solution Administer 1  drop into both eyes once daily at bedtime.

## 2025-04-24 NOTE — PROGRESS NOTES
Vancomycin Dosing by Pharmacy- Cessation of Therapy    Consult to pharmacy for vancomycin dosing has been discontinued by the prescriber, pharmacy will sign off at this time.    Please call pharmacy if there are further questions or re-enter a consult if vancomycin is resumed.     Aldair Ibarra, PharmD

## 2025-04-24 NOTE — PROGRESS NOTES
Vancomycin Dosing by Pharmacy- FOLLOW UP    Jennifer Magana is a 89 y.o. year old female who Pharmacy has been consulted for vancomycin dosing for other bacteremia . Based on the patient's indication and renal status this patient is being dosed based on a goal AUC of 500-600.     Renal function is currently declining.    Current vancomycin dose: 500 mg given every 24 hours, will obtain level tomorrow morning before dose is due d/t worsening CrCl.    Estimated vancomycin AUC on current dose: 605 mg/L.hr     Visit Vitals  /82   Pulse (!) 121   Temp 35.8 °C (96.4 °F) (Temporal)   Resp (!) 8        Lab Results   Component Value Date    CREATININE 2.21 (H) 2025    CREATININE 1.76 (H) 2025    CREATININE 1.93 (H) 2025    CREATININE 1.76 (H) 04/15/2025    CREATININE 2.04 (H) 2025    CREATININE 1.76 (H) 2025        Patient weight is as follows:   Vitals:    25   Weight: 59.2 kg (130 lb 8.2 oz)       Cultures:  Susceptibility data for the encounter in last 14 days.  Collected Specimen Info Organism   25 Blood culture from Peripheral Venipuncture Streptococcus agalactiae (Group B Streptococcus)   25 Blood culture from Peripheral Venipuncture Streptococcus agalactiae (Group B Streptococcus)        I/O last 3 completed shifts:  In: 2934.3 (49.6 mL/kg) [P.O.:240; I.V.:794.3 (13.4 mL/kg); IV Piggyback:1900]  Out: 200 (3.4 mL/kg) [Urine:200 (0.1 mL/kg/hr)]  Weight: 59.2 kg   I/O during current shift:  No intake/output data recorded.    Temp (24hrs), Av.1 °C (97 °F), Min:35.8 °C (96.4 °F), Max:36.4 °C (97.5 °F)      Assessment/Plan    Within goal AUC range. Continue current vancomycin regimen.    This dosing regimen is predicted by [employer] to result in the following pharmacokinetic parameters:  Loading dose: N/A  Regimen: 500 mg IV every 24 hours.  Start time: 06:12 on 2025  Exposure target: AUC24 (range)400-600 mg/L.hr   EEF00-29: 465 mg/L.hr  AUC24,ss: 605  mg/L.hr  Probability of AUC24 > 400: 97 %  Ctrough,ss: 22.1 mg/L  Probability of Ctrough,ss > 20: 65 %  /// already had dose this morning, will check tomorrow before any dose is given. ///    The next level will be obtained on 4/25 at 0500. May be obtained sooner if clinically indicated.   Will continue to monitor renal function daily while on vancomycin and order serum creatinine at least every 48 hours if not already ordered.  Follow for continued vancomycin needs, clinical response, and signs/symptoms of toxicity.       Aldair Ibarra, PharmD

## 2025-04-24 NOTE — PROGRESS NOTES
Music Therapy Note    Jennifer Magana was referred by Tyler Mckenzie, MARCELO    Therapy Session  Referral Type: New referral this admission  Visit Type: New visit  Session Start Time: 1136  Session End Time: 1138  Intervention Delivery: In-person  Conflict of Service: Declined treatment  Number of family members present: 2  Family Present for Session: Child, Other (Comment)  Family Participation: Supportive     Pre-assessment  Unable to Assess Reason: Outcomes not applicable         Treatment/Interventions  Music Therapy Interventions: Assessment    Post-assessment  Total Session Time (min): 2 minutes    Narrative  Assessment Detail: Pt. was sitting in bed with family members looking over lunch menu when MT arrived  Plan: MT introduced music therapy services to pt. and pts. family for during her hospital stay  Evaluation: Pt. and family were receptive to music therapy but stated they probably would not be interested since she doesn't really listen to music much  Follow-up: No plan to follow up as pt. declined    Education Documentation  Coping Strategies, taught by Zari Lane at 4/24/2025 12:08 PM.  Learner: Family, Patient  Readiness: Acceptance  Method: Explanation  Response: Demonstrated Understanding    Relaxation, taught by Zari Lane at 4/24/2025 12:08 PM.  Learner: Family, Patient  Readiness: Acceptance  Method: Explanation  Response: Demonstrated Understanding    Pain Management, taught by Zari Lane at 4/24/2025 12:08 PM.  Learner: Family, Patient  Readiness: Acceptance  Method: Explanation  Response: Demonstrated Understanding

## 2025-04-24 NOTE — PROGRESS NOTES
[facility] is able to accept. [facility] were sent for review. Patient not ready for DC as of yet. Will continue to follow for DC readiness.

## 2025-04-24 NOTE — CARE PLAN
Problem: Pain - Adult  Goal: Verbalizes/displays adequate comfort level or baseline comfort level  4/23/2025 2014 by Josep Baptiste RN  Outcome: Progressing  4/23/2025 2013 by Josep Baptiste RN  Outcome: Progressing  4/23/2025 2012 by Josep Baptiste RN  Outcome: Progressing     Problem: Safety - Adult  Goal: Free from fall injury  4/23/2025 2014 by Josep Baptiste RN  Outcome: Progressing  4/23/2025 2013 by Josep Baptiste RN  Outcome: Progressing  4/23/2025 2012 by Josep Baptiste RN  Outcome: Progressing     Problem: Discharge Planning  Goal: Discharge to home or other facility with appropriate resources  4/23/2025 2014 by Josep Baptiste RN  Outcome: Progressing  4/23/2025 2013 by Josep Baptiste RN  Outcome: Progressing  4/23/2025 2012 by Josep Baptiste RN  Outcome: Progressing     Problem: Chronic Conditions and Co-morbidities  Goal: Patient's chronic conditions and co-morbidity symptoms are monitored and maintained or improved  4/23/2025 2014 by Jospe Baptiste RN  Outcome: Progressing  4/23/2025 2013 by Josep Baptiste RN  Outcome: Progressing  4/23/2025 2012 by Josep Baptiste RN  Outcome: Progressing     Problem: Nutrition  Goal: Nutrient intake appropriate for maintaining nutritional needs  4/23/2025 2014 by Josep Baptiste RN  Outcome: Progressing  4/23/2025 2013 by Josep Baptiste RN  Outcome: Progressing  4/23/2025 2012 by Josep Baptiste RN  Outcome: Progressing     Problem: Arrythmia/Dysrhythmia  Goal: Lab values return to normal range  4/23/2025 2014 by Josep Baptiste RN  Outcome: Progressing  4/23/2025 2013 by Josep Baptiste RN  Outcome: Progressing  4/23/2025 2012 by Josep Baptiste RN  Outcome: Progressing  Goal: No evidence of post procedure complications  4/23/2025 2014 by Josep Baptiste RN  Outcome: Progressing  4/23/2025 2013 by Josep Baptiste RN  Outcome: Progressing  4/23/2025 2012 by Josep Baptiste RN  Outcome: Progressing  Goal: Promote self management  4/23/2025 2014 by Josep  EDGARD Baptiste  Outcome: Progressing  4/23/2025 2013 by Josep Baptiste RN  Outcome: Progressing  4/23/2025 2012 by Josep Baptiste RN  Outcome: Progressing  Goal: Serial ECG will return to baseline  4/23/2025 2014 by Josep Baptiste RN  Outcome: Progressing  4/23/2025 2013 by Josep Baptiste RN  Outcome: Progressing  4/23/2025 2012 by Josep Baptiste RN  Outcome: Progressing  Goal: Verbalize understanding of procedures/devices  4/23/2025 2014 by Josep Baptiste RN  Outcome: Progressing  4/23/2025 2013 by Josep Baptiste RN  Outcome: Progressing  4/23/2025 2012 by Josep Baptiste RN  Outcome: Progressing  Goal: Vital signs return to baseline  4/23/2025 2014 by Josep Baptiste RN  Outcome: Progressing  4/23/2025 2013 by Josep Baptiste RN  Outcome: Progressing  4/23/2025 2012 by Josep Baptiste RN  Outcome: Progressing  Goal: Care and maintenance of device (specify)  4/23/2025 2014 by Josep Baptiste RN  Outcome: Progressing  4/23/2025 2013 by Josep Baptiste RN  Outcome: Progressing  4/23/2025 2012 by Josep Baptiste RN  Outcome: Progressing     Problem: Skin  Goal: Participates in plan/prevention/treatment measures  4/23/2025 2014 by Josep Baptiste RN  Outcome: Progressing  Flowsheets (Taken 4/23/2025 0052 by Chasity Regan RN)  Participates in plan/prevention/treatment measures:   Elevate heels   Increase activity/out of bed for meals  4/23/2025 2013 by Josep Baptiste RN  Outcome: Progressing  Flowsheets (Taken 4/23/2025 0052 by Chasity Regan RN)  Participates in plan/prevention/treatment measures:   Elevate heels   Increase activity/out of bed for meals  4/23/2025 2012 by Josep Baptiste RN  Outcome: Progressing  Flowsheets (Taken 4/23/2025 0052 by Chasity Regan RN)  Participates in plan/prevention/treatment measures:   Elevate heels   Increase activity/out of bed for meals  Goal: Prevent/manage excess moisture  4/23/2025 2014 by Josep Baptiste RN  Outcome: Progressing  Flowsheets (Taken 4/23/2025  2014)  Prevent/manage excess moisture:   Cleanse incontinence/protect with barrier cream   Moisturize dry skin   Monitor for/manage infection if present  4/23/2025 2013 by Josep Baptiste RN  Outcome: Progressing  Flowsheets (Taken 4/23/2025 0052 by Chasity Regan RN)  Prevent/manage excess moisture:   Cleanse incontinence/protect with barrier cream   Moisturize dry skin  4/23/2025 2012 by Josep Baptiste RN  Outcome: Progressing  Flowsheets (Taken 4/23/2025 0052 by Chasity Regan RN)  Prevent/manage excess moisture:   Cleanse incontinence/protect with barrier cream   Moisturize dry skin  Goal: Prevent/minimize sheer/friction injuries  4/23/2025 2014 by Josep Baptiste RN  Outcome: Progressing  Flowsheets (Taken 4/23/2025 0052 by Chasity Regan RN)  Prevent/minimize sheer/friction injuries: Increase activity/out of bed for meals  4/23/2025 2013 by Josep Baptiste RN  Outcome: Progressing  Flowsheets (Taken 4/23/2025 0052 by Chasity Regan RN)  Prevent/minimize sheer/friction injuries: Increase activity/out of bed for meals  4/23/2025 2012 by Josep Baptiste RN  Outcome: Progressing  Flowsheets (Taken 4/23/2025 0052 by Chasity Regan RN)  Prevent/minimize sheer/friction injuries: Increase activity/out of bed for meals  Goal: Promote/optimize nutrition  4/23/2025 2014 by Josep Baptiste RN  Outcome: Progressing  Flowsheets (Taken 4/23/2025 2014)  Promote/optimize nutrition: Consume > 50% meals/supplements  4/23/2025 2013 by Josep Baptiste RN  Outcome: Progressing  Flowsheets (Taken 4/23/2025 0052 by Chasity Regan RN)  Promote/optimize nutrition:   Offer water/supplements/favorite foods   Monitor/record intake including meals  4/23/2025 2012 by Josep Baptiste RN  Outcome: Progressing  Flowsheets (Taken 4/23/2025 0052 by Chasity Regan RN)  Promote/optimize nutrition:   Offer water/supplements/favorite foods   Monitor/record intake including meals     Problem: Heart Failure  Goal: Improved gas exchange this shift  4/23/2025  2014 by Josep Baptiste RN  Outcome: Progressing  4/23/2025 2013 by Josep Baptiste RN  Outcome: Progressing  4/23/2025 2012 by Josep Baptiste RN  Outcome: Progressing  Goal: Improved urinary output this shift  4/23/2025 2014 by Josep Baptiste RN  Outcome: Progressing  4/23/2025 2013 by Josep Baptiste RN  Outcome: Progressing  4/23/2025 2012 by Josep Baptiste RN  Outcome: Progressing  Goal: Reduction in peripheral edema within 24 hours  4/23/2025 2014 by Josep Baptiste RN  Outcome: Progressing  4/23/2025 2013 by Josep Baptiste RN  Outcome: Progressing  4/23/2025 2012 by Josep Baptiste RN  Outcome: Progressing  Goal: Report improvement of dyspnea/breathlessness this shift  4/23/2025 2014 by Josep Baptiste RN  Outcome: Progressing  4/23/2025 2013 by Josep Baptiste RN  Outcome: Progressing  4/23/2025 2012 by Josep Baptiste RN  Outcome: Progressing  Goal: Weight from fluid excess reduced over 2-3 days, then stabilize  4/23/2025 2014 by Josep Baptiste RN  Outcome: Progressing  4/23/2025 2013 by Josep Baptiste RN  Outcome: Progressing  4/23/2025 2012 by Josep Baptiste RN  Outcome: Progressing  Goal: Increase self care and/or family involvement in 24 hours  4/23/2025 2014 by Josep Baptiste RN  Outcome: Progressing  4/23/2025 2013 by Josep Baptiste RN  Outcome: Progressing  4/23/2025 2012 by Josep Baptiste RN  Outcome: Progressing   The patient's goals for the shift include      The clinical goals for the shift include pt will remain hemodynamically stable throughout shift

## 2025-04-25 LAB
ANION GAP SERPL CALC-SCNC: 14 MMOL/L (ref 10–20)
BACTERIA BLD AEROBE CULT: ABNORMAL
BACTERIA BLD AEROBE CULT: ABNORMAL
BACTERIA BLD CULT: ABNORMAL
BACTERIA BLD CULT: ABNORMAL
BUN SERPL-MCNC: 28 MG/DL (ref 6–23)
CALCIUM SERPL-MCNC: 8.9 MG/DL (ref 8.6–10.3)
CHLORIDE SERPL-SCNC: 108 MMOL/L (ref 98–107)
CO2 SERPL-SCNC: 24 MMOL/L (ref 21–32)
CREAT SERPL-MCNC: 1.89 MG/DL (ref 0.5–1.05)
EGFRCR SERPLBLD CKD-EPI 2021: 24 ML/MIN/1.73M*2
ERYTHROCYTE [DISTWIDTH] IN BLOOD BY AUTOMATED COUNT: 15.5 % (ref 11.5–14.5)
GLUCOSE SERPL-MCNC: 97 MG/DL (ref 74–99)
GRAM STN SPEC: ABNORMAL
HCT VFR BLD AUTO: 37.5 % (ref 36–46)
HGB BLD-MCNC: 11.8 G/DL (ref 12–16)
INR PPP: 1.9 (ref 0.9–1.1)
MAGNESIUM SERPL-MCNC: 2.04 MG/DL (ref 1.6–2.4)
MCH RBC QN AUTO: 29.6 PG (ref 26–34)
MCHC RBC AUTO-ENTMCNC: 31.5 G/DL (ref 32–36)
MCV RBC AUTO: 94 FL (ref 80–100)
NRBC BLD-RTO: 0 /100 WBCS (ref 0–0)
PHOSPHATE SERPL-MCNC: 2.9 MG/DL (ref 2.5–4.9)
PLATELET # BLD AUTO: 206 X10*3/UL (ref 150–450)
POTASSIUM SERPL-SCNC: 3.8 MMOL/L (ref 3.5–5.3)
PROTHROMBIN TIME: 21.5 SECONDS (ref 9.8–12.4)
RBC # BLD AUTO: 3.98 X10*6/UL (ref 4–5.2)
SODIUM SERPL-SCNC: 142 MMOL/L (ref 136–145)
UFH PPP CHRO-ACNC: 0.4 IU/ML (ref ?–1.1)
UFH PPP CHRO-ACNC: 0.5 IU/ML (ref ?–1.1)
WBC # BLD AUTO: 8 X10*3/UL (ref 4.4–11.3)

## 2025-04-25 PROCEDURE — 85520 HEPARIN ASSAY: CPT | Performed by: NURSE PRACTITIONER

## 2025-04-25 PROCEDURE — 2500000002 HC RX 250 W HCPCS SELF ADMINISTERED DRUGS (ALT 637 FOR MEDICARE OP, ALT 636 FOR OP/ED)

## 2025-04-25 PROCEDURE — 83735 ASSAY OF MAGNESIUM: CPT

## 2025-04-25 PROCEDURE — 97116 GAIT TRAINING THERAPY: CPT | Mod: GP,CQ

## 2025-04-25 PROCEDURE — 2500000001 HC RX 250 WO HCPCS SELF ADMINISTERED DRUGS (ALT 637 FOR MEDICARE OP): Performed by: INTERNAL MEDICINE

## 2025-04-25 PROCEDURE — 2500000004 HC RX 250 GENERAL PHARMACY W/ HCPCS (ALT 636 FOR OP/ED): Performed by: INTERNAL MEDICINE

## 2025-04-25 PROCEDURE — 2500000004 HC RX 250 GENERAL PHARMACY W/ HCPCS (ALT 636 FOR OP/ED): Mod: JZ | Performed by: STUDENT IN AN ORGANIZED HEALTH CARE EDUCATION/TRAINING PROGRAM

## 2025-04-25 PROCEDURE — 97535 SELF CARE MNGMENT TRAINING: CPT | Mod: GO,CO

## 2025-04-25 PROCEDURE — 2500000004 HC RX 250 GENERAL PHARMACY W/ HCPCS (ALT 636 FOR OP/ED): Mod: JZ | Performed by: NURSE PRACTITIONER

## 2025-04-25 PROCEDURE — 82374 ASSAY BLOOD CARBON DIOXIDE: CPT

## 2025-04-25 PROCEDURE — 99233 SBSQ HOSP IP/OBS HIGH 50: CPT | Performed by: INTERNAL MEDICINE

## 2025-04-25 PROCEDURE — 2500000001 HC RX 250 WO HCPCS SELF ADMINISTERED DRUGS (ALT 637 FOR MEDICARE OP): Performed by: NURSE PRACTITIONER

## 2025-04-25 PROCEDURE — 2500000001 HC RX 250 WO HCPCS SELF ADMINISTERED DRUGS (ALT 637 FOR MEDICARE OP)

## 2025-04-25 PROCEDURE — 2020000001 HC ICU ROOM DAILY

## 2025-04-25 PROCEDURE — 2500000002 HC RX 250 W HCPCS SELF ADMINISTERED DRUGS (ALT 637 FOR MEDICARE OP, ALT 636 FOR OP/ED): Performed by: INTERNAL MEDICINE

## 2025-04-25 PROCEDURE — 99232 SBSQ HOSP IP/OBS MODERATE 35: CPT | Performed by: NURSE PRACTITIONER

## 2025-04-25 PROCEDURE — 36415 COLL VENOUS BLD VENIPUNCTURE: CPT

## 2025-04-25 PROCEDURE — 87040 BLOOD CULTURE FOR BACTERIA: CPT | Mod: PORLAB

## 2025-04-25 PROCEDURE — 85027 COMPLETE CBC AUTOMATED: CPT

## 2025-04-25 PROCEDURE — 85610 PROTHROMBIN TIME: CPT | Performed by: INTERNAL MEDICINE

## 2025-04-25 PROCEDURE — 97110 THERAPEUTIC EXERCISES: CPT | Mod: GP,CQ

## 2025-04-25 PROCEDURE — 99291 CRITICAL CARE FIRST HOUR: CPT | Performed by: INTERNAL MEDICINE

## 2025-04-25 PROCEDURE — 84100 ASSAY OF PHOSPHORUS: CPT

## 2025-04-25 RX ORDER — WARFARIN 2 MG/1
4 TABLET ORAL ONCE
Status: COMPLETED | OUTPATIENT
Start: 2025-04-25 | End: 2025-04-25

## 2025-04-25 RX ORDER — METOPROLOL SUCCINATE 25 MG/1
25 TABLET, EXTENDED RELEASE ORAL 2 TIMES DAILY
Status: DISCONTINUED | OUTPATIENT
Start: 2025-04-25 | End: 2025-04-26

## 2025-04-25 RX ORDER — POTASSIUM CHLORIDE 750 MG/1
20 TABLET, FILM COATED, EXTENDED RELEASE ORAL ONCE
Status: COMPLETED | OUTPATIENT
Start: 2025-04-25 | End: 2025-04-25

## 2025-04-25 RX ORDER — HEPARIN SODIUM 10000 [USP'U]/100ML
0-4000 INJECTION, SOLUTION INTRAVENOUS CONTINUOUS
Status: DISCONTINUED | OUTPATIENT
Start: 2025-04-25 | End: 2025-04-27

## 2025-04-25 RX ADMIN — POTASSIUM CHLORIDE 20 MEQ: 750 TABLET, FILM COATED, EXTENDED RELEASE ORAL at 08:44

## 2025-04-25 RX ADMIN — CEFAZOLIN SODIUM 2 G: 2 SOLUTION INTRAVENOUS at 06:12

## 2025-04-25 RX ADMIN — CEFAZOLIN SODIUM 2 G: 2 SOLUTION INTRAVENOUS at 17:55

## 2025-04-25 RX ADMIN — LATANOPROST 1 DROP: 50 SOLUTION OPHTHALMIC at 20:13

## 2025-04-25 RX ADMIN — POLYETHYLENE GLYCOL 3350 17 G: 17 POWDER, FOR SOLUTION ORAL at 08:46

## 2025-04-25 RX ADMIN — METOPROLOL SUCCINATE 25 MG: 25 TABLET, EXTENDED RELEASE ORAL at 20:13

## 2025-04-25 RX ADMIN — LEVOTHYROXINE SODIUM 100 MCG: 0.1 TABLET ORAL at 08:45

## 2025-04-25 RX ADMIN — AMIODARONE HYDROCHLORIDE 0.5 MG/MIN: 1.8 INJECTION, SOLUTION INTRAVENOUS at 07:23

## 2025-04-25 RX ADMIN — WARFARIN SODIUM 4 MG: 2 TABLET ORAL at 17:53

## 2025-04-25 RX ADMIN — METOPROLOL SUCCINATE 25 MG: 25 TABLET, EXTENDED RELEASE ORAL at 08:45

## 2025-04-25 RX ADMIN — PANTOPRAZOLE SODIUM 40 MG: 40 TABLET, DELAYED RELEASE ORAL at 08:45

## 2025-04-25 RX ADMIN — HEPARIN SODIUM 700 UNITS/HR: 10000 INJECTION, SOLUTION INTRAVENOUS at 09:15

## 2025-04-25 ASSESSMENT — COGNITIVE AND FUNCTIONAL STATUS - GENERAL
TURNING FROM BACK TO SIDE WHILE IN FLAT BAD: A LITTLE
WALKING IN HOSPITAL ROOM: A LOT
DRESSING REGULAR UPPER BODY CLOTHING: A LITTLE
TURNING FROM BACK TO SIDE WHILE IN FLAT BAD: A LITTLE
STANDING UP FROM CHAIR USING ARMS: A LOT
WALKING IN HOSPITAL ROOM: A LITTLE
EATING MEALS: A LITTLE
DRESSING REGULAR UPPER BODY CLOTHING: A LITTLE
MOVING FROM LYING ON BACK TO SITTING ON SIDE OF FLAT BED WITH BEDRAILS: A LITTLE
MOBILITY SCORE: 16
DAILY ACTIVITIY SCORE: 15
DRESSING REGULAR LOWER BODY CLOTHING: A LOT
STANDING UP FROM CHAIR USING ARMS: A LITTLE
MOBILITY SCORE: 18
TOILETING: A LOT
PERSONAL GROOMING: A LITTLE
CLIMB 3 TO 5 STEPS WITH RAILING: A LOT
DAILY ACTIVITIY SCORE: 19
MOBILITY SCORE: 15
MOVING TO AND FROM BED TO CHAIR: A LITTLE
HELP NEEDED FOR BATHING: A LOT
CLIMB 3 TO 5 STEPS WITH RAILING: A LITTLE
MOVING FROM LYING ON BACK TO SITTING ON SIDE OF FLAT BED WITH BEDRAILS: A LITTLE
EATING MEALS: A LITTLE
HELP NEEDED FOR BATHING: A LOT
MOVING FROM LYING ON BACK TO SITTING ON SIDE OF FLAT BED WITH BEDRAILS: A LITTLE
PERSONAL GROOMING: A LITTLE
TOILETING: A LITTLE
MOVING TO AND FROM BED TO CHAIR: A LITTLE
HELP NEEDED FOR BATHING: A LOT
STANDING UP FROM CHAIR USING ARMS: A LITTLE
TOILETING: A LOT
DRESSING REGULAR UPPER BODY CLOTHING: A LITTLE
DRESSING REGULAR LOWER BODY CLOTHING: A LOT
WALKING IN HOSPITAL ROOM: A LOT
TURNING FROM BACK TO SIDE WHILE IN FLAT BAD: A LITTLE
DRESSING REGULAR LOWER BODY CLOTHING: A LITTLE
MOVING TO AND FROM BED TO CHAIR: A LITTLE
DAILY ACTIVITIY SCORE: 15
CLIMB 3 TO 5 STEPS WITH RAILING: A LOT

## 2025-04-25 ASSESSMENT — ENCOUNTER SYMPTOMS
WEAKNESS: 1
ACTIVITY CHANGE: 1
NAUSEA: 1
ABDOMINAL PAIN: 1
FATIGUE: 1
SHORTNESS OF BREATH: 1
CONFUSION: 0
TROUBLE SWALLOWING: 0
APPETITE CHANGE: 1

## 2025-04-25 ASSESSMENT — PAIN SCALES - GENERAL
PAINLEVEL_OUTOF10: 0 - NO PAIN

## 2025-04-25 ASSESSMENT — PAIN - FUNCTIONAL ASSESSMENT
PAIN_FUNCTIONAL_ASSESSMENT: 0-10

## 2025-04-25 ASSESSMENT — ACTIVITIES OF DAILY LIVING (ADL): HOME_MANAGEMENT_TIME_ENTRY: 17

## 2025-04-25 NOTE — PROGRESS NOTES
Patient remains on amio gtt in ICU, HR is 110s, Pall Care Meeting pending for this AM at 930. Patient and daughter plan to discharge home with [facility] when medically ready. Possible weekend DC pending improvement. IMM completed with patient. Care Transitions to follow for additional needs if they arise.     Received update, patient and family are choosing [facility] on discharge. Will cancel [facility] referral.

## 2025-04-25 NOTE — PROGRESS NOTES
[facility] V[facility] Cardiology  Patient Name: Jennifer Magana  Patient : [id number]  Room/Bed: -A    HPI:  Jennifer Magana is a 89 y.o. female admitted on 25 with abdominal/suprapubic pain.  She has a history of hypertension, hypothyroidism, chronic atrial fibrillation on warfarin, chronic diastolic CHF.  She is currently admitted and being treated for sepsis.  We are consulted for atrial fibrillation.     Her initial labwork through the ED was significant for BUN 27, Cr 1.93, lactate 2.1, HSTI 14 > 15, WBC 25.4k.  Noncontrast CT chest/abdomen/pelvis was remarkable for acute CHF (pulm edema, small pleural effusions), pancreatic tail mild peripancreatic fat stranding, perivesical fat stranding, cholelithiasis without convincing evidence of acute cholecystitis, fluid in the esophagus compatible with GERD.  Patient received 2 L NS IV fluid bolus per sepsis 30 mL/kg IV fluid bolus protocol, as well as IV Zosyn.       On presentation she was in Afib with RVR up to rate of 140's despite IVF bolus and lopressor 5mg IV x1.  She was then initiated on amiodarone infusion.  The patient tells me she has chronic Afib and is on rate control with metoprolol succinate XL 25mg daily, with jantoven for CVA prophylaxis - tells me her INR's have been in the therapeutic range and are managed by [facility] PCP at [facility].     Currently she is on amiodarone infusion 0.5mg/min, with resting HR's ranging in the 's.  She is not requiring pressors.  She denies any current chest pain/pressure, palps, LH/dizziness, and appears unaware of the Afib.    Allergies:  Patient has no known allergies.    Subjective Data:  25: Denies any chest pain/pressure, SOB or palpitations.  Palliative care NP at bedside as well.  AF on telemetry with HR  bpm.  25: Daughter Sandip at bedside. Denies any chest pain/pressure, SOB or palpitations. +fatigue. Up on BSC.  AF on telemetry with 's.     Overnight  "Events:  None    Objective Data:  Vitals:    04/25/25 0400 04/25/25 0500 04/25/25 0600 04/25/25 0700   BP: 112/67 120/86 (!) 132/95    Pulse: 98 85 (!) 115    Resp: 11 20 14    Temp:    35.6 °C (96 °F)   TempSrc:    Temporal   SpO2: 100%  100%    Weight:       Height:           Reviewed the following Labs:  Results from last 7 days   Lab Units 04/25/25  0555 04/24/25  0301 04/23/25  0415   WBC AUTO x10*3/uL 8.0 12.2* 16.7*   HEMOGLOBIN g/dL 11.8* 11.7* 12.5   HEMATOCRIT % 37.5 37.7 40.1   PLATELETS AUTO x10*3/uL 206 195 239       Results from last 7 days   Lab Units 04/25/25  0555 04/24/25  0301 04/23/25  0415 04/22/25  1537   SODIUM mmol/L 142 137 141 141   POTASSIUM mmol/L 3.8 3.7 3.7 4.2   CHLORIDE mmol/L 108* 104 106 103   CO2 mmol/L 24 19* 25 26   BUN mg/dL 28* 33* 25* 27*   CREATININE mg/dL 1.89* 2.21* 1.76* 1.93*   CALCIUM mg/dL 8.9 8.5* 8.7 10.1   PROTEIN TOTAL g/dL  --  5.3* 5.6* 7.3   BILIRUBIN TOTAL mg/dL  --  0.8 0.9 1.2   ALK PHOS U/L  --  72 79 91   ALT U/L  --  15 20 13   AST U/L  --  15 20 16   GLUCOSE mg/dL 97 157* 136* 160*       Results from last 7 days   Lab Units 04/25/25  0555 04/24/25  0301 04/23/25  0415   MAGNESIUM mg/dL 2.04 1.96 1.73       No results found for: \"LDLF\"     Lab Results   Component Value Date     (H) 04/22/2025       Lab Results   Component Value Date    TROPHS 15 (H) 04/22/2025    TROPHS 14 (H) 04/22/2025        Lab Results   Component Value Date    TSH 1.26 04/22/2025       Results from last 7 days   Lab Units 04/25/25  0555 04/24/25  0301 04/23/25  0415   INR  1.9* 2.1* 1.9*     No results found for: \"HGBA1C\"    Intake/Output    Intake/Output Summary (Last 24 hours) at 4/25/2025 0843  Last data filed at 4/24/2025 1848  Gross per 24 hour   Intake 1285.77 ml   Output 475 ml   Net 810.77 ml      I/O last 2 completed shifts:  In: 1285.8 (21.6 mL/kg) [P.O.:240; I.V.:995.8 (16.8 mL/kg); IV Piggyback:50]  Out: 775 (13 mL/kg) [Urine:775 (0.5 mL/kg/hr)]  Weight: 59.4 kg "     Past Medical History:  She has a past medical history of (HFpEF) heart failure with preserved ejection fraction, Aortic stenosis, CKD (chronic kidney disease), Essential (primary) hypertension, GERD (gastroesophageal reflux disease), Hypothyroidism, and Paroxysmal atrial fibrillation (Multi).    Past Surgical History:  She has a past surgical history that includes MR angio head wo IV contrast (07/21/2020); MR angio neck wo IV contrast (07/21/2020); and Hysterectomy.      Social History:  She reports that she has never smoked. She has never used smokeless tobacco. She reports that she does not drink alcohol and does not use drugs.    Family History:  Family History[1]    Outpatient Medications  Medications Ordered Prior to Encounter[2]    Scheduled medications  Scheduled Medications[3]  Continuous medications  Continuous Medications[4]  PRN medications  PRN Medications[5]   Prescriptions Prior to Admission[6]    Reviewed the following cardiology tests:    Echo 3/31/25:   1. The left ventricular systolic function is mildly decreased, with a visually estimated ejection fraction of 45-50%.   2. No regional wall motion abnormalities.   3. Spectral Doppler shows a a Grade III (restrictive pattern) of left ventricular diastolic filling with an elevated left atrial pressure.   4. Left ventricular cavity size is decreased.   5. There is normal right ventricular global systolic function.   6. The left atrial size is moderate to severely dilated.   7. There is moderate mitral annular calcification.   8. Moderate mitral valve regurgitation.   9. Mild to moderate tricuspid regurgitation.  10. Severe aortic valve stenosis.  11. Mild aortic valve regurgitation.  12. The inferior vena cava appears normal in size, with IVC inspiratory collapse greater than 50%.  13. There is severely increased septal and severely increased posterior left ventricular wall thickness.  EF   Date/Time Value Ref Range Status   03/31/2025 02:35 PM 48  "%    01/04/2024 02:26 PM 54          Physical Exam:  Vitals reviewed.   Constitutional:       Appearance: Not in distress.   Neck:      Vascular: No carotid bruit.   Pulmonary:      Effort: Pulmonary effort is normal.      Breath sounds: Normal breath sounds.   Cardiovascular:      PMI at left midclavicular line. Tachycardia present. Irregularly irregular rhythm. S1 with normal intensity. S2 with normal intensity.       Murmurs: There is a systolic murmur.   Edema:     Peripheral edema absent.   Abdominal:      General: Bowel sounds are normal.   Neurological:      Mental Status: Alert and oriented to person, place and time.       Assessment/Plan:   Afib with RVR:  she has chronic Afib and is normally rate controlled with BB.  Presentation of Afib with RVR in setting of sepsis and bacteremia.  She was initiated on amiodarone infusion in ED for rate control (of note, upon chart review, her INR's have been in the range of 1.9 - 2.0 /2.3 since 3/29/25).  -tele monitoring  -at this point would continue amiodarone for rate control purposes to achieve HR <=110  -reinitiate metop succinate XL 25mg daily when BP improves  -continue jantoven, check daily INR\"s - if she becomes subtherapeutic, start heparin infusion  -keep K ~ 4, Mg ~ 2  4/24/25: AF on telemetry with HR  bpm.  INR 2.1 and is on warfarin.  Around 4am BP improved to 120/80's. If BP remains stable, recommend restarting metoprolol succinate 25 mg daily.  Continue on Amiodarone infusion.    4/25/25:  AF on telemetry with 's.  INR 1.9 and secure chat message sent to pharmacist managing anticoagulation.  Recommend heparin infusion while INR is subtherapeutic. Stop heparin infusion once INR 2.0 or greater. BP stable.  Increase metoprolol succinate 25 mg BID. Discussed with Dr. Dedrick Gutierres and will stop amiodarone infusion.     Sepsis:  unclear source, suspect urinary source vs ? Cholecystitis  -continue Abx (zosyn, doxy) per primary service  -follow " cultures  4/24/25: Blood culture positive for gram positive cocci.  Management per ID.     Acute on chronic combined systolic and diastolic HF:  TTE 3/25 with LVEF 45-50% and grade III DD.  Currently favoring fluid administration due to sepsis with low BP's.  -serial volume monitoring  -diurese if appropriate (see below)  4/24/25:  She does not appear volume overloaded on exam. Around 4am BP improved to 120/80's. If BP remains stable, recommend restarting metoprolol succinate 25 mg daily.  4/25/25: BP stable.  Increasing metoprolol succinate 25 mg BID for further HR control. Daughter, Goldy, states she was taking metoprolol succinate 50 mg daily at home.     Severe aortic stenosis, moderate mitral regurgitation:  Per recent prior cardiology notes from last admission, with progression of aortic stenosis plan was for outpatient follow up and conservative management given her advanced age (see cardio note 4/2/25) - per this note, patient's family was udpated  -would avoid overdiuresis in setting of her severe aortic stenosis     Dispo:    -palliative care mtg scheduled for 4/25/25 8356    Code Status  DNR and No Intubation      Yesenia Maldonado, APRN-CNP          [1]   Family History  Problem Relation Name Age of Onset    Other (Half sister with CAD) Sister      Coronary artery disease Other Children    [2]   No current facility-administered medications on file prior to encounter.     Current Outpatient Medications on File Prior to Encounter   Medication Sig Dispense Refill    doxycycline (Vibramycin) 100 mg capsule Take 1 capsule (100 mg) by mouth 2 times a day for 10 days. Take with at least 8 ounces (large glass) of water, do not lie down for 30 minutes after 20 capsule 0    famotidine (Pepcid) 20 mg tablet Take 0.5 tablets (10 mg) by mouth once daily. Start on April 4th 30 tablet 1    furosemide (Lasix) 20 mg tablet Take 1 tablet (20 mg) by mouth once daily for 3 days. (Patient not taking: Reported on 4/23/2025) 3  tablet 0    Jantoven 2.5 mg tablet Take 1.25mg (0.5 tablets) by mouth on Tues,Thurs and 2.5mg (1 tablet) all other days or as directed by Coumadin clinic. 72 tablet 3    levothyroxine (Synthroid, Levoxyl) 100 mcg tablet Take 1 tablet (100 mcg) by mouth once daily.      metoprolol succinate XL (Toprol-XL) 25 mg 24 hr tablet Take 2 tablets (50 mg) by mouth once daily. 60 tablet 1    pantoprazole (ProtoNix) 40 mg EC tablet Take 1 tablet (40 mg) by mouth once daily in the morning. Take before meals. 90 tablet 3    Simbrinza 1-0.2 % drops,suspension ophthalmic suspension Administer 1 drop into both eyes 2 times a day.      travoprost (Travatan Z) 0.004 % drops ophthalmic solution Administer 1 drop into both eyes once daily at bedtime.      [DISCONTINUED] dicyclomine (Bentyl) 20 mg tablet Take 1 tablet (20 mg) by mouth 4 times a day before meals.     [3] ceFAZolin, 2 g, intravenous, q12h  latanoprost, 1 drop, Both Eyes, Nightly  levothyroxine, 100 mcg, oral, Daily  metoprolol succinate XL, 25 mg, oral, BID  pantoprazole, 40 mg, oral, Daily before breakfast   Or  pantoprazole, 40 mg, intravenous, Daily before breakfast  polyethylene glycol, 17 g, oral, Daily  potassium chloride CR, 20 mEq, oral, Once     [4] amiodarone, 0.5 mg/min, Last Rate: 0.5 mg/min (04/25/25 0723)     [5] PRN medications: acetaminophen, bisacodyl, guaiFENesin, melatonin, morphine, ondansetron **OR** ondansetron  [6]   Medications Prior to Admission   Medication Sig Dispense Refill Last Dose/Taking    doxycycline (Vibramycin) 100 mg capsule Take 1 capsule (100 mg) by mouth 2 times a day for 10 days. Take with at least 8 ounces (large glass) of water, do not lie down for 30 minutes after 20 capsule 0     famotidine (Pepcid) 20 mg tablet Take 0.5 tablets (10 mg) by mouth once daily. Start on April 4th 30 tablet 1     furosemide (Lasix) 20 mg tablet Take 1 tablet (20 mg) by mouth once daily for 3 days. (Patient not taking: Reported on 4/23/2025) 3 tablet  0 Not Taking    Jantoven 2.5 mg tablet Take 1.25mg (0.5 tablets) by mouth on Tues,Thurs and 2.5mg (1 tablet) all other days or as directed by Coumadin clinic. 72 tablet 3     levothyroxine (Synthroid, Levoxyl) 100 mcg tablet Take 1 tablet (100 mcg) by mouth once daily.       metoprolol succinate XL (Toprol-XL) 25 mg 24 hr tablet Take 2 tablets (50 mg) by mouth once daily. 60 tablet 1     pantoprazole (ProtoNix) 40 mg EC tablet Take 1 tablet (40 mg) by mouth once daily in the morning. Take before meals. 90 tablet 3     Simbrinza 1-0.2 % drops,suspension ophthalmic suspension Administer 1 drop into both eyes 2 times a day.       travoprost (Travatan Z) 0.004 % drops ophthalmic solution Administer 1 drop into both eyes once daily at bedtime.

## 2025-04-25 NOTE — PROGRESS NOTES
Per NAE WILLIAMSON, palliative care coordinator: Met with pt and/or family to discuss goals of care, palliative and hospice services. Pt and/or family chose Hospice services. Discussed [facility] (with financial disclosure) and community Hospice options. Pt and/or family chose [facility]. Social work placed referral, care team notified, agency will arrange meeting with family to discuss services.     Nabeel Henson, MSW, LSW ([id number])   Care Transitions

## 2025-04-25 NOTE — PROGRESS NOTES
Occupational Therapy    OT Treatment    Patient Name: Jennifer Magana  MRN: [id number]  Department: [facility] ICU  Room: 12/12-A  Today's Date: 4/25/2025  Time Calculation  Start Time: 1445  Stop Time: 1502  Time Calculation (min): 17 min        Assessment:  End of Session Patient Position: Bed, 3 rail up, Alarm on     Plan:  Treatment Interventions: ADL retraining, Functional transfer training, UE strengthening/ROM, Endurance training, Patient/family training, Equipment evaluation/education, Compensatory technique education  OT Frequency: 3 times per week  OT Discharge Recommendations: Low intensity level of continued care  Equipment Recommended upon Discharge:  (FWW? TBD)  OT Recommended Transfer Status: Assist of 1  OT - OK to Discharge: Yes  Treatment Interventions: ADL retraining, Functional transfer training, UE strengthening/ROM, Endurance training, Patient/family training, Equipment evaluation/education, Compensatory technique education    Subjective   Previous Visit Info:  OT Last Visit  OT Received On: 04/25/25  General:  General  Prior to Session Communication: Bedside nurse  Patient Position Received: Bed, 3 rail up, Alarm off, not on at start of session  Precautions:  Medical Precautions: Fall precautions        Pain:  Pain Assessment  0-10 (Numeric) Pain Score: 0 - No pain    Objective    Cognition:  Cognition  Orientation Level: Oriented X4     Activities of Daily Living: Toileting  Toileting Level of Assistance: Close supervision  Where Assessed: Bedside commode    Patient completed donning of socks with Mod assist  while in bed with HOB elevated.      Bed Mobility/Transfers: Bed Mobility  Bed Mobility: Yes  Bed Mobility 1  Bed Mobility 1: Supine to sitting, Sitting to supine  Level of Assistance 1: Close supervision    Transfers  Transfer: Yes  Transfer 1  Technique 1: Sit to stand, Stand to sit  Transfer Device 1: Walker  Transfer Level of Assistance 1: Contact guard    Toilet Transfers  Toilet Transfer  From: Bed  Toilet Transfer Type: To and from  Toilet Transfer to: Standard bedside commode  Toilet Transfer Technique: Ambulating  Toilet Transfers: Contact guard    Outcome Measures:[facility] Daily Activity  Putting on and taking off regular lower body clothing: A little  Bathing (including washing, rinsing, drying): A lot  Putting on and taking off regular upper body clothing: A little  Toileting, which includes using toilet, bedpan or urinal: A little  Taking care of personal grooming such as brushing teeth: None  Eating Meals: None  Daily Activity - Total Score: 19    Education Documentation  ADL Training, taught by SHAMA Hemphill at 4/25/2025  4:08 PM.  Learner: Patient  Readiness: Acceptance  Method: Explanation  Response: Needs Reinforcement    Education Comments  No comments found.           Goals:  Encounter Problems       Encounter Problems (Active)       ADLs       Patient with complete lower body dressing with stand by assist level of assistance donning and doffing all LE clothes  with PRN adaptive equipment while edge of bed  (Progressing)       Start:  04/23/25    Expected End:  05/07/25            Patient will complete toileting including hygiene clothing management/hygiene with stand by assist level of assistance. (Progressing)       Start:  04/23/25    Expected End:  05/07/25               MOBILITY       Patient will perform Functional mobility  Household distances/Community Distances with stand by assist level of assistance and least restrictive device in order to improve safety and functional mobility. (Progressing)       Start:  04/23/25    Expected End:  05/07/25               TRANSFERS       Patient will complete functional transfers with least restrictive device with modified independent level of assistance. (Progressing)       Start:  04/23/25    Expected End:  05/07/25

## 2025-04-25 NOTE — PROGRESS NOTES
Jennifer Magana is a 89 y.o. female on day 3 of admission presenting with Sepsis (Multi).      Assessment/Plan:     #Sepsis - POA  #Group B strep bacteremia, unclear source  Unclear source.  Pending repeat blood cultures.  Zosyn, Doxy discontinued and now on cefazolin.  Patient does have a rash under left breast which is not that impressive. TTE negative for any vegetation     #CKD  Estimated Creatinine Clearance: 13.8 mL/min (A) (by C-G formula based on SCr of 1.89 mg/dL (H)).     CHF  GERD  Gall stone        Recommendations:     -Cont ancef 2gm q12h  -Renally dose abx  -Will continue to follow    NOTE: Can switch to Keflex 1 g every 12 hour through 5/5/2025 to finish 2-week course      Fabricio Fuentes MD  Date of service: 4/25/2025  Time of service: 12:44 PM      Subjective   Interval History: No acute events overnight.  Patient denies any complaint.        Review of Systems  Denies: fever, chills, nausea, vomiting, diarrhea, dysuria    Objective   Range of Vitals (last 24 hours)  Heart Rate:  []   Temp:  [35.6 °C (96 °F)-36.4 °C (97.5 °F)]   Resp:  [0-20]   BP: ()/()   Weight:  [59.4 kg (130 lb 15.3 oz)]   SpO2:  [99 %-100 %]  Daily Weight  04/24/25 : 59.4 kg (130 lb 15.3 oz)   Body mass index is 23.95 kg/m².    General: alert, oriented, NAD  HEENT: No conjunctival pallor, no scleral icterus  Abdomen: soft, non tender, non distended, BS+  Neuro: AAO x 3  Extremities: no edema, no cyanosis  Skin: Improving rash under left breast as per patient  MSK: No joint inflammation    Antibiotics  ceFAZolin - 2 gram/50 mL  doxycycline - 100 mg      Relevant Results  Labs  Results from last 72 hours   Lab Units 04/25/25  0555 04/24/25  0301 04/23/25  0415 04/22/25  1537   WBC AUTO x10*3/uL 8.0 12.2* 16.7* 25.4*   HEMOGLOBIN g/dL 11.8* 11.7* 12.5 15.1   HEMATOCRIT % 37.5 37.7 40.1 49.0*   PLATELETS AUTO x10*3/uL 206 195 239 340   NEUTROS PCT AUTO %  --  82.3 87.5  --    LYMPHO PCT MAN %  --   --   --  2.0  "  LYMPHS PCT AUTO %  --  7.4 4.9  --    MONO PCT MAN %  --   --   --  3.0   MONOS PCT AUTO %  --  8.8 6.5  --    EOSINO PCT MAN %  --   --   --  0.0   EOS PCT AUTO %  --  0.5 0.0  --      Results from last 72 hours   Lab Units 04/25/25  0555 04/24/25  0301 04/23/25  0415   SODIUM mmol/L 142 137 141   POTASSIUM mmol/L 3.8 3.7 3.7   CHLORIDE mmol/L 108* 104 106   CO2 mmol/L 24 19* 25   BUN mg/dL 28* 33* 25*   CREATININE mg/dL 1.89* 2.21* 1.76*   GLUCOSE mg/dL 97 157* 136*   CALCIUM mg/dL 8.9 8.5* 8.7   ANION GAP mmol/L 14 18 14   EGFR mL/min/1.73m*2 24* 20* 26*   PHOSPHORUS mg/dL 2.9 3.3 2.9     Results from last 72 hours   Lab Units 04/24/25  0301 04/23/25  0415 04/22/25  1537   ALK PHOS U/L 72 79 91   BILIRUBIN TOTAL mg/dL 0.8 0.9 1.2   PROTEIN TOTAL g/dL 5.3* 5.6* 7.3   ALT U/L 15 20 13   AST U/L 15 20 16   ALBUMIN g/dL 3.1* 3.5 4.5     Estimated Creatinine Clearance: 13.8 mL/min (A) (by C-G formula based on SCr of 1.89 mg/dL (H)).  No results found for: \"CRP\"    Microbiology  Susceptibility data from last 14 days.  Collected Specimen Info Organism Clindamycin Erythromycin Penicillin Tetracycline   04/22/25 Blood culture from Peripheral Venipuncture Streptococcus agalactiae (Group B Streptococcus)       04/22/25 Blood culture from Peripheral Venipuncture Streptococcus agalactiae (Group B Streptococcus)  R  R  S  R       Imaging    XR abdomen 3+ views  Result Date: 4/23/2025  1.   Nonspecific nonobstructive bowel gas pattern.   MACRO: None   Signed by: Salma Garsia 4/23/2025 7:33 PM Dictation workstation:   [id number]    CT chest abdomen pelvis wo IV contrast  Result Date: 4/22/2025  Interstitial prominence with smooth interlobular septal thickening suggests acute pulmonary interstitial edema/CHF. Dependent lower lung airspace opacities with associated parenchymal volume loss most likely relate to atelectasis. Small pleural effusions. No pneumothorax.   Mild peripancreatic fat stranding predominating about the " tail (series 2, images ), suggesting possible acute interstitial pancreatitis versus retroperitoneal edema secondary to volume overload. Suggest correlation with pancreatic enzymes. No focal fluid collection.   Urinary bladder is decompressed, not well evaluated. Perivesical fat stranding is concerning for acute cystitis although some of this appearance could be technical in etiology. Suggest clinical correlation and correlation with urinalysis.   Hepatomegaly.   Suspected cholelithiasis without convincing evidence of acute cholecystitis.   Cardiomegaly, with disproportionate atrial distension. Mitral annular and aortic root calcifications. Atherosclerosis, including coronary artery disease.   Fluid in the esophagus compatible with gastroesophageal reflux. No definite abnormal esophageal mural thickening.   Additional findings as discussed above.   MACRO: None   Signed by: Merrick Johnson 4/22/2025 6:34 PM Dictation workstation:   [id number]    XR chest 1 view  Result Date: 4/22/2025  1.  Similar appearance of bibasilar airspace opacities prior exam, which may represent atelectasis versus developing infiltrates with layering pleural effusions not excluded bilaterally.       MACRO: None   Signed by: Jose Garcia 4/22/2025 4:02 PM Dictation workstation:   [id number]    XR chest 2 views  Result Date: 4/16/2025  Diffuse interstitial infiltrates, bilateral pleural effusions and bibasilar airspace consolidations, as above. Clinical correlation and continued follow-up until clearing is recommended.   MACRO: None.   Signed by: Brijesh Puri 4/16/2025 6:11 PM Dictation workstation:   [id number]    XR chest 2 views  Result Date: 3/29/2025  Diffuse interstitial infiltrates bilaterally, as above. Clinical correlation and continued follow-up until clearing is recommended.   MACRO: None.   Signed by: Brijesh Puri 3/29/2025 1:36 PM Dictation workstation:   [id number]

## 2025-04-25 NOTE — PROGRESS NOTES
Palliative Care Follow-Up Progress Note    Jennifer Magana is a 89 y.o. female on day 3 of admission presenting from home to the ED 4/22 d/t chest pain, shortness of breath, abdominal pain, poor appetitive, nausea, and vomiting. She was found to have severe sepsis, cholelithiasis wihthout cholecystitis, Afib with RVR, bacteremia, and acute on chronic diastolic heart failure.      Past medical history includes atrial fibrillation on warfarin, chronic diastolic heart failure (EF 45-50%), CKD stage IV, hypertension, severe aortic stenosis, hypothyroidism, glaucoma, and GERD.     Patient lives at home with her daughters Goldy and Floresita. She is independent in her ADL's, utilizes a cane and family perform household duties and fill her pillbox. She has had 2 hospitalizations in the last month.     4/25/2025: Family meeting with patient and 4 daughters Floresita Winslow, Hortencia, and Oralia. Discussed PMH, baseline functional level and HPI. Discussed hospice vs outpatient palliative care. Patient and family would like to speak to [facility] regarding both options, order placed. Answered questions and provided support and education.     Review of Systems   Constitutional:  Positive for activity change, appetite change (loss) and fatigue.   HENT:  Negative for trouble swallowing.    Respiratory:  Positive for shortness of breath (JACOB).    Gastrointestinal:  Positive for abdominal pain (improved) and nausea (improved).   Neurological:  Positive for weakness.   Psychiatric/Behavioral:  Negative for confusion.        Physical Exam  HENT:      Head: Normocephalic.      Nose: Nose normal.      Mouth/Throat:      Mouth: Mucous membranes are moist.   Eyes:      General: No scleral icterus.  Cardiovascular:      Rate and Rhythm: Normal rate.   Pulmonary:      Effort: No respiratory distress.   Musculoskeletal:      Cervical back: Neck supple.   Neurological:      Mental Status: She is alert and oriented to person, place, and time.        Plan     Expectations of treatment/Goals of care: continue current hospitalization and treatment plan, considering home hospice or palliative care on discharge  Advanced directives/Advanced care planning: Not on file, [facility]  Health care power of  or Identified surrogate medical decision maker based on [place] hierarchy: daughter Goldy is reported POA   Code status: DNR/DNI [place] DNR: completed [place] DNR CCA 4/24 and provided to family today   Outpatient services: [facility] outpatient palliative care and hospice referral placed     Collaborated with: Dr. Almaraz, RN, TCC and SW    I spent 50 minutes in the professional and overall care of this patient.    Tyler Mckenzie, APRN-CNP

## 2025-04-25 NOTE — PROGRESS NOTES
Pharmacy Consult for Warfarin (Coumadin) Management - Daily Progress Note     Jennifer Magana is a 89 y.o. female admitted for Sepsis (Multi). Pharmacy was consulted for warfarin dosing and monitoring.    Labs  INR External   Date Value Ref Range Status   04/10/2025 2.00 2.00 - 3.00 Final   04/03/2025 1.90 (A) 2.00 - 3.00 Final   03/21/2025 2.30 2.00 - 3.00 Final     INR   Date Value Ref Range Status   04/25/2025 1.9 (H) 0.9 - 1.1 Final   04/24/2025 2.1 (H) 0.9 - 1.1 Final   04/23/2025 1.9 (H) 0.9 - 1.1 Final     Review  Warfarin Indication: Atrial fibrillation or flutter  Target INR: 2 - 3    Home regimen: 1.25mg TTH, 2.5mg all other days      INR today at 1.9, despite patient being on antibiotics and amiodarone drip, the INR did drop subtherapeutic today. As discussed with cardiology given still in atrial fibrillation will continue bridge with heparin drip, will boost today with 4mg. Recheck INR tomorrow.      Orders placed per pharmacy consult. Pharmacy will continue to monitor and adjust therapy as needed.     Danika Ocampo, PharmD

## 2025-04-25 NOTE — PROGRESS NOTES
Quentin Coppola is a 89 y.o. female on day 3 of admission presenting with Sepsis (Multi).      Subjective    Mrs. Coppola was seen and evaluated. She denied fever and chills. No chest pain. She is aware that heart rate this morning was elevated and need for better control before initiating discharge.     Objective     Last Recorded Vitals  /90   Pulse 89   Temp 36.2 °C (97.1 °F) (Temporal)   Resp 24   Wt 59.4 kg (130 lb 15.3 oz)   SpO2 98%   Intake/Output last 3 Shifts:    Intake/Output Summary (Last 24 hours) at 4/25/2025 1445  Last data filed at 4/25/2025 1000  Gross per 24 hour   Intake 320.89 ml   Output 750 ml   Net -429.11 ml       Admission Weight  Weight: 55.8 kg (123 lb) (04/22/25 1509)    Daily Weight  04/24/25 : 59.4 kg (130 lb 15.3 oz)    Image Results  Transthoracic Echo (TTE) Limited                [place] [id number]       Phone [phone] Fax [phone]    TRANSTHORACIC ECHOCARDIOGRAM REPORT    Patient Name:       QUENTIN COPPOLA   Reading Physician:    [id number] Tomy Issa DO  Study Date:         4/24/2025            Ordering Provider:    [id number] SOL FRANKS  MRN/PID:            [id number]             Fellow:  Accession#:         [id number]         Nurse:  Date of Birth/Age:  6/5/1928 / 89 years  Sonographer:          Floresita Doyle RDCS  Gender Assigned at  F                    Additional Staff:  Birth:  Height:             157.48 cm            Admit Date:           4/22/2025  Weight:             58.97 kg             Admission Status:     Inpatient -                                                                 Routine  BSA / BMI:          1.59 m2 / 23.78      Department Location:  [facility]                      kg/m2  Blood Pressure: 115 /83 mmHg    Study Type:    TRANSTHORACIC ECHO (TTE) LIMITED  Diagnosis/ICD: Bacteremia-R78.81  Indication:    Bacteremia  CPT Codes:      Echo Limited-[id number]   Study Detail: The following Echo studies were performed: 2D, Doppler and color                flow.       PHYSICIAN INTERPRETATION:  Left Ventricle: There are no regional wall motion abnormalities. The left ventricular cavity size was not assessed. Left ventricular diastolic filling was not assessed.  Left Atrium: The left atrial size was not assessed.  Right Ventricle: The right ventricle was not assessed. Right ventricular systolic function not assessed.  Right Atrium: The right atrial size was not assessed.  Aortic Valve: There is no visualized aortic valve vegetation. The aortic valve is probably trileaflet. There is mild aortic valve cusp calcification. There is no evidence of aortic valve regurgitation.  Mitral Valve: The mitral valve is mildly thickened. There was no mitral valve vegetation visualized. There is mild to moderate mitral annular calcification. There is mild mitral valve regurgitation.  Tricuspid Valve: No vegetation is seen on the tricuspid valve. The tricuspid valve is structurally normal. There is mild tricuspid regurgitation.  Pulmonic Valve: No pulmonic valve vegetation visualized. The pulmonic valve is structurally normal. There is trace to mild pulmonic valve regurgitation.  Pericardium: Pericardial effusion was not assessed.  Aorta: The aortic root was not assessed.       CONCLUSIONS:   1. No mitral valve vegetation visualized.   2. Mild mitral valve regurgitation.   3. Mild tricuspid regurgitation is visualized.   4. No tricuspid valve vegetation.   5. No aortic valve vegetation visualized.   6. No pulmonic valve vegetation visualized.    QUANTITATIVE DATA SUMMARY:     TRICUSPID VALVE/RVSP:          Normal Ranges:  Peak TR Velocity:     2.79 m/s  RV Syst Pressure:     34 mmHg  (< 30mmHg)       [id number] Tomy Issa DO  Electronically signed on 4/24/2025 at 4:20:36 PM       ** Final **      Physical Exam    Relevant Results               Assessment & Plan  Sepsis  (Multi)  Continue current abx as ordered.  Wbc now normalized  Abx per Id recommendation  Currently on cefazoline  Abdominal pain, unspecified abdominal location  resolved  Acute on chronic diastolic CHF (congestive heart failure)  Continue current management    Stage 4 chronic kidney disease (Multi)  Overall renal function is better today  Continue to monitor  Avoid nephrotoxic agents  Rapid atrial fibrillation (Multi)  Continue coumadin  Rate is better controlled now  Continue metoprolol  Gastroesophageal reflux disease with esophagitis  On PPI  Calculus of gallbladder without cholecystitis without obstruction  Monitor LFTs      Hypothyroidism: continue synthroid.        Victor Hugo Almaraz MD

## 2025-04-25 NOTE — PROGRESS NOTES
Physical Therapy    Physical Therapy Treatment    Patient Name: Jennifer Magana  MRN: [id number]  Department: [facility] ICU  Room: 12/12-A  Today's Date: 4/25/2025  Time Calculation  Start Time: 1535  Stop Time: 1559  Time Calculation (min): 24 min    Assessment/Plan   PT Assessment  PT Assessment Results: Decreased strength, Decreased endurance, Decreased mobility  Rehab Prognosis: Good  Barriers to Discharge Home: Physical needs  Physical Needs: Stair navigation into home limited by function/safety, Ambulating household distances limited by function/safety  End of Session Communication: Bedside nurse  Assessment Comment: Pt tolerated tx session well but with increased tiredness this session. Educated pt on how to properly perform bed mobility during supine > sit. Pt requires increased verbal cueing during gait trg and transfers to keep FWW within CHAZ. Pt able to perform dynamic stance balance x7 mins this tx session.  End of Session Patient Position: Up in chair, Alarm on  PT Plan  Inpatient/Swing Bed or Outpatient: Inpatient  PT Plan  Treatment/Interventions: Bed mobility, Transfer training, Gait training, Neuromuscular re-education, Therapeutic exercise, Therapeutic activity, Stair training  PT Plan: Ongoing PT  PT Frequency: 5 times per week  PT Discharge Recommendations: Low intensity level of continued care  PT - OK to Discharge: Yes    General Visit Information:   PT  Visit  PT Received On: 04/25/25  Response to Previous Treatment: Patient with no complaints from previous session.  General  Reason for Referral: abd pain, N/V. Recent adm 3/29-4/3/25, per dtr-ever since pt not feeling well/increased abd pain/reduced oral intake.  Referred By: PT FOR IMPAIRED MOBILITY/GAIT TRAINING  Past Medical History Relevant to Rehab: atrial fibrillation on warfarin, chronic diastolic heart failure (EF 45-50%), CKD, hypertension, severe aortic stenosis, hypothyroidism,  Prior to Session Communication: Bedside nurse  Patient  Position Received: Bed, 3 rail up, Alarm on  General Comment: Pt alert and agreeable to therapy session.    Subjective   Precautions:  Precautions  Medical Precautions: Fall precautions  Precautions Comment: watch BP (low on eval) and HR (AF RVR)     Date/Time Vitals Session Patient Position Pulse Resp SpO2 BP MAP (mmHg)    04/25/25 1600 --  --  105  17  98 %  135/90  106           Vital Signs Comment: Vitals within normal limits   Objective   Pain:  Pain Assessment  Pain Assessment: 0-10  0-10 (Numeric) Pain Score: 0 - No pain  Cognition:  Cognition  Orientation Level: Oriented X4  Coordination:     Postural Control:  Static Sitting Balance  Static Sitting-Balance Support: Feet supported, Bilateral upper extremity supported  Static Sitting-Level of Assistance: Close supervision  Static Sitting-Comment/Number of Minutes: Pt sitting EOB x3 mins.  Dynamic Sitting Balance  Dynamic Sitting-Balance Support: Bilateral upper extremity supported, Feet unsupported  Dynamic Sitting-Level of Assistance: Close supervision  Dynamic Sitting-Balance:  (Ther ex)  Dynamic Sitting-Comments: Performing theraputic exercises x3 mins EOB, x7 mins in chair with arms.  Static Standing Balance  Static Standing-Balance Support: No upper extremity supported  Static Standing-Level of Assistance: Contact guard, Close supervision  Static Standing-Comment/Number of Minutes: Pt was instructed to perform static standing with narrow CHAZ within FWW and no casimiro UE support with CGA > close SUP from therapist x1 min.  Dynamic Standing Balance  Dynamic Standing-Balance Support: No upper extremity supported  Dynamic Standing-Level of Assistance: Close supervision  Dynamic Standing-Balance: Forward lean, Reaching across midline, Turning  Dynamic Standing-Comments: Therapist instructed pt to perform multidirectional reaching out of CHAZ within FWW alternating R/L casimiro UE's x7 mins.    Activity Tolerance:  Activity Tolerance  Endurance: Endurance does not limit  participation in activity  Treatments:  Therapeutic Exercise  Therapeutic Exercise Performed: Yes  Therapeutic Exercise Activity 1: performed seated casimiro LE exercises with therapist manual resistance; hip flexion x10, hip abduction 2x10, hip adduction 2x10, ankle pumps x20, LAQ 2x10.    Bed Mobility  Bed Mobility: Yes  Bed Mobility 1  Bed Mobility 1: Supine to sitting  Level of Assistance 1: Close supervision    Ambulation/Gait Training  Ambulation/Gait Training Performed: Yes  Ambulation/Gait Training 1  Surface 1: Level tile  Device 1: Rolling walker  Assistance 1: Minimum assistance  Quality of Gait 1: Narrow base of support, Decreased step length, Forward flexed posture  Comments/Distance (ft) 1: Gait trg; x3 steps to chair with arms within FWW.  Transfers  Transfer: Yes  Transfer 1  Transfer From 1: Bed to  Transfer to 1: Stand  Technique 1: Sit to stand  Transfer Device 1: Walker  Transfer Level of Assistance 1: Contact guard  Transfers 2  Transfer From 2: Stand to  Transfer to 2: Chair with arms  Technique 2: Stand to sit  Transfer Device 2: Walker  Transfer Level of Assistance 2: Contact guard    Outcome Measures:  [facility] Basic Mobility  Turning from your back to your side while in a flat bed without using bedrails: A little  Moving from lying on your back to sitting on the side of a flat bed without using bedrails: A little  Moving to and from bed to chair (including a wheelchair): A little  Standing up from a chair using your arms (e.g. wheelchair or bedside chair): A little  To walk in hospital room: A lot (Pt required increased VC to keep walker within CHAZ.)  Climbing 3-5 steps with railing: A lot  Basic Mobility - Total Score: 16    Education Documentation  Mobility Training, taught by Gianna D'Amico, S-PTA at 4/25/2025  4:55 PM.  Learner: Patient  Readiness: Acceptance  Method: Explanation  Response: Verbalizes Understanding    Body Mechanics, taught by Gianna D'Amico, S-PTA at 4/25/2025  4:55  PM.  Learner: Patient  Readiness: Acceptance  Method: Explanation  Response: Verbalizes Understanding    ADL Training, taught by Gianna D'Amico, S-PTA at 4/25/2025  4:55 PM.  Learner: Patient  Readiness: Acceptance  Method: Explanation  Response: Verbalizes Understanding    Precautions, taught by Gianna D'Amico, S-PTA at 4/25/2025  4:55 PM.  Learner: Patient  Readiness: Acceptance  Method: Explanation  Response: Verbalizes Understanding    Education Comments  No comments found.         Encounter Problems       Encounter Problems (Active)       Mobility       STG - Patient will ambulate household distance using cane and no more than SUPV assist (Progressing)       Start:  04/23/25            STG - Patient will navigate 3-5 steps with rails/device, no more than MIN Ax1 (Not Progressing)       Start:  04/23/25               PT Transfers       using cane and no more than SUPV assistSTG - Patient will demonstrate safe transfer techniques  (Progressing)       Start:  04/23/25               Pain - Adult          Strengthening        Pt will perform 10+ reps AAROM/AROM/RROM BLE to improve functional strength needed for improved mobility.  (Progressing)       Start:  04/23/25                 Gianna D'Amico, S-PTA

## 2025-04-25 NOTE — PROGRESS NOTES
Critical Care Daily Progress Notes        Subjective   Patient is a 89 y.o. female admitted on 4/22/2025  3:14 PM with the following indication(s) for ICU care: Atrial fibrillation with RVR, severe sepsis.     Interval History:  Jennifer Magana is a 89 y.o. female with past medical history of atrial fibrillation on warfarin, chronic diastolic heart failure (EF 45-50%), CKD, hypertension, severe aortic stenosis, hypothyroidism, and GERD presented to [facility] ED with complaints of abdominal pain, nausea, and vomiting.  Admitted to the ICU because of atrial fibrillation with RVR and severe sepsis    04/25/2025: Patient is laying comfortably in bed. Remained on amio gtt overnight but is going to be stopped today AM per cardio.  Hemodynamically stable, not requiring any vasopressors.  Alert oriented x 3. She denies any shortness of breath, chest pain, cough or wheezing. She denies recurrence of lower abdominal pain and denies diarrhea, nausea and vomiting.    D/w RN.     Scheduled Medications:   Scheduled Medications[1]     Continuous Medications:   Continuous Medications[2]     PRN Medications:   PRN Medications[3]    Objective   Vitals:  Most Recent:  Vitals:    04/25/25 0700   BP:    Pulse:    Resp:    Temp: 35.6 °C (96 °F)   SpO2:        24hr Min/Max:  Temp  Min: 35.6 °C (96 °F)  Max: 36.4 °C (97.5 °F)  Pulse  Min: 85  Max: 138  BP  Min: 98/87  Max: 138/104  Resp  Min: 0  Max: 23  SpO2  Min: 99 %  Max: 100 %    LDA:         Vent settings:       Hemodynamic parameters for last 24 hours:       I/O:    Intake/Output Summary (Last 24 hours) at 4/25/2025 0720  Last data filed at 4/24/2025 1848  Gross per 24 hour   Intake 1285.77 ml   Output 775 ml   Net 510.77 ml       Physical Exam:   Physical Exam     Constitutional:       General: She is not in acute distress.     Appearance: Normal appearance. She is not ill-appearing.   HENT:      Head: Normocephalic.      Nose: Nose normal.      Mouth/Throat:      Mouth: Mucous  membranes are dry.   Eyes:      Extraocular Movements: Extraocular movements intact.      Conjunctiva/sclera: Conjunctivae normal.      Pupils: Pupils are equal, round, and reactive to light.   Cardiovascular:      Rate and Rhythm: Tachycardia present. Rhythm irregular.      Pulses: Normal pulses.      Heart sounds: Murmur heard.      Systolic murmur is present.      Comments: Atrial fibrillation with RVR on telemetry  Pulmonary:      Effort: Pulmonary effort is normal. No respiratory distress.      Breath sounds: Normal breath sounds. No stridor. No wheezing, rhonchi or rales.      Comments: Diminished breath sounds to bilateral bases.   Chest:      Chest wall: No tenderness.   Abdominal:      General: A surgical scar is present. Bowel sounds are normal. There is no distension.      Palpations: Abdomen is soft.      Tenderness: There is abdominal tenderness in the suprapubic area. There is no guarding or rebound.      Comments: Suprapubic tenderness with deep palpation.   Musculoskeletal:         General: Normal range of motion.      Cervical back: Normal range of motion.      Right lower leg: No edema.      Left lower leg: No edema.   Skin:     General: Skin is warm and dry.      Capillary Refill: Capillary refill takes less than 2 seconds.      Findings: Rash (MASD) present.      Comments: MASD bilateral groin, perineum   Neurological:      General: No focal deficit present.      Mental Status: She is alert and oriented to person, place, and time. Mental status is at baseline.      Cranial Nerves: Cranial nerves 2-12 are intact.      Sensory: Sensation is intact.      Motor: Motor function is intact.      Comments: Alert and oriented to person, place, situation but not month or year.    Psychiatric:         Attention and Perception: Attention normal.         Mood and Affect: Mood normal.         Speech: Speech normal.         Behavior: Behavior normal.         Judgment: Judgment normal.       Lab/Radiology/Diagnostic Review:  Results for orders placed or performed during the hospital encounter of 04/22/25 (from the past 24 hours)   Transthoracic Echo (TTE) Limited   Result Value Ref Range    RVSP 34.1 mmHg   Protime-INR   Result Value Ref Range    Protime 21.5 (H) 9.8 - 12.4 seconds    INR 1.9 (H) 0.9 - 1.1   CBC   Result Value Ref Range    WBC 8.0 4.4 - 11.3 x10*3/uL    nRBC 0.0 0.0 - 0.0 /100 WBCs    RBC 3.98 (L) 4.00 - 5.20 x10*6/uL    Hemoglobin 11.8 (L) 12.0 - 16.0 g/dL    Hematocrit 37.5 36.0 - 46.0 %    MCV 94 80 - 100 fL    MCH 29.6 26.0 - 34.0 pg    MCHC 31.5 (L) 32.0 - 36.0 g/dL    RDW 15.5 (H) 11.5 - 14.5 %    Platelets 206 150 - 450 x10*3/uL   Basic Metabolic Panel   Result Value Ref Range    Glucose 97 74 - 99 mg/dL    Sodium 142 136 - 145 mmol/L    Potassium 3.8 3.5 - 5.3 mmol/L    Chloride 108 (H) 98 - 107 mmol/L    Bicarbonate 24 21 - 32 mmol/L    Anion Gap 14 10 - 20 mmol/L    Urea Nitrogen 28 (H) 6 - 23 mg/dL    Creatinine 1.89 (H) 0.50 - 1.05 mg/dL    eGFR 24 (L) >60 mL/min/1.73m*2    Calcium 8.9 8.6 - 10.3 mg/dL   Magnesium   Result Value Ref Range    Magnesium 2.04 1.60 - 2.40 mg/dL   Phosphorus   Result Value Ref Range    Phosphorus 2.9 2.5 - 4.9 mg/dL     Imaging  CT chest abdomen pelvis wo IV contrast  Result Date: 4/22/2025  Interstitial prominence with smooth interlobular septal thickening suggests acute pulmonary interstitial edema/CHF. Dependent lower lung airspace opacities with associated parenchymal volume loss most likely relate to atelectasis. Small pleural effusions. No pneumothorax.   Mild peripancreatic fat stranding predominating about the tail (series 2, images ), suggesting possible acute interstitial pancreatitis versus retroperitoneal edema secondary to volume overload. Suggest correlation with pancreatic enzymes. No focal fluid collection.   Urinary bladder is decompressed, not well evaluated. Perivesical fat stranding is concerning for acute cystitis  although some of this appearance could be technical in etiology. Suggest clinical correlation and correlation with urinalysis.   Hepatomegaly.   Suspected cholelithiasis without convincing evidence of acute cholecystitis.   Cardiomegaly, with disproportionate atrial distension. Mitral annular and aortic root calcifications. Atherosclerosis, including coronary artery disease.   Fluid in the esophagus compatible with gastroesophageal reflux. No definite abnormal esophageal mural thickening.   Additional findings as discussed above.   MACRO: None   Signed by: Merrick Johnson 4/22/2025 6:34 PM Dictation workstation:   [id number]    XR chest 1 view  Result Date: 4/22/2025  1.  Similar appearance of bibasilar airspace opacities prior exam, which may represent atelectasis versus developing infiltrates with layering pleural effusions not excluded bilaterally.       MACRO: None   Signed by: Jose Garcia 4/22/2025 4:02 PM Dictation workstation:   [id number]      Cardiology, Vascular, and Other Imaging  Transthoracic Echo (TTE) Limited  Result Date: 4/24/2025              [place] [id number]      Phone [phone] Fax [phone] TRANSTHORACIC ECHOCARDIOGRAM REPORT Patient Name:       QUENTINBETY COPPOLA   Reading Physician:    [id number] Tomy Issa DO Study Date:         4/24/2025            Ordering Provider:    [id number] SOL FRANKS MRN/PID:            [id number]             Fellow: Accession#:         [id number]         Nurse: Date of Birth/Age:  6/5/1928 / 89 years  Sonographer:          Floresita Doyle RDCS Gender Assigned at  F                    Additional Staff: Birth: Height:             157.48 cm            Admit Date:           4/22/2025 Weight:             58.97 kg             Admission Status:     Inpatient -                                                                Routine BSA / BMI:          1.59 m2 /  23.78      Department Location:  [place] ICU                     kg/m2 Blood Pressure: 115 /83 mmHg Study Type:    TRANSTHORACIC ECHO (TTE) LIMITED Diagnosis/ICD: Bacteremia-R78.81 Indication:    Bacteremia CPT Codes:     Echo Limited-03338  Study Detail: The following Echo studies were performed: 2D, Doppler and color               flow.  PHYSICIAN INTERPRETATION: Left Ventricle: There are no regional wall motion abnormalities. The left ventricular cavity size was not assessed. Left ventricular diastolic filling was not assessed. Left Atrium: The left atrial size was not assessed. Right Ventricle: The right ventricle was not assessed. Right ventricular systolic function not assessed. Right Atrium: The right atrial size was not assessed. Aortic Valve: There is no visualized aortic valve vegetation. The aortic valve is probably trileaflet. There is mild aortic valve cusp calcification. There is no evidence of aortic valve regurgitation. Mitral Valve: The mitral valve is mildly thickened. There was no mitral valve vegetation visualized. There is mild to moderate mitral annular calcification. There is mild mitral valve regurgitation. Tricuspid Valve: No vegetation is seen on the tricuspid valve. The tricuspid valve is structurally normal. There is mild tricuspid regurgitation. Pulmonic Valve: No pulmonic valve vegetation visualized. The pulmonic valve is structurally normal. There is trace to mild pulmonic valve regurgitation. Pericardium: Pericardial effusion was not assessed. Aorta: The aortic root was not assessed.  CONCLUSIONS:  1. No mitral valve vegetation visualized.  2. Mild mitral valve regurgitation.  3. Mild tricuspid regurgitation is visualized.  4. No tricuspid valve vegetation.  5. No aortic valve vegetation visualized.  6. No pulmonic valve vegetation visualized. QUANTITATIVE DATA SUMMARY:  TRICUSPID VALVE/RVSP:          Normal Ranges: Peak TR Velocity:     2.79 m/s RV Syst Pressure:     34 mmHg  (<  30mmHg)  [id number] Tomy Issa DO Electronically signed on 4/24/2025 at 4:20:36 PM  ** Final **     ECG 12 lead  Result Date: 4/23/2025  Atrial fibrillation Probable LVH with secondary repol abnrm Anterior Q waves, possibly due to LVH See ED provider note for full interpretation and clinical correlation Confirmed by Trish Suarez ([id number]) on 4/23/2025 4:22:11 PM                       Assessment/Plan   Assessment & Plan  Sepsis (Multi)    Acute on chronic diastolic CHF (congestive heart failure)    Stage 4 chronic kidney disease (Multi)    Rapid atrial fibrillation (Multi)    Abdominal pain, unspecified abdominal location    Gastroesophageal reflux disease with esophagitis    Calculus of gallbladder without cholecystitis without obstruction    Jennifer Magana is a 89 y.o. female with past medical history of atrial fibrillation on warfarin, chronic diastolic heart failure (EF 45-50%), CKD, hypertension, severe aortic stenosis, hypothyroidism, and GERD presented to [facility] ED with complaints of abdominal pain, nausea, and vomiting.  Admitted to the ICU because of atrial fibrillation with RVR and severe sepsis    Severe sepsis without septic shock suspect likely secondary to UTI and bacteremia  - Presented with complains of abdominal pain and decreased appetite. Abdominal pain located to suprapubic region and no pain to upper quadrants  - WBC 25.4 on presentation and today normalized to 8.  - Lactate 2.1>>2.6>>2.0  - Chest x-ray results as above  - CT chest/abdomen/pelvis results reviewed.  - SIRS criteria 2/4 met: heart rate and WBC count  - Suspected source: likely complicated UTI, less likely acute cholecystitis or pancreatitis given normal LFTs, lipase, and amylase.   - s/p 2 L NS bolus  and zosyn 4.5gm IV x1  - Strict intake and output    04/25/2025: Patient's BP did improve and stabilized, not needed pressors.  Blood culture + Group B streptococcus  MRSA screen negative  Initially started on vancomycin,  Zosyn and doxycycline. Atbx adjusted per ID and now on Cefazolin. WBC normalized and no recurrence of fever. Follow ID recs and repeat bld c/s.     Abdominal pain  -?gastroenteritis vs ?suprapubic pain due to UTI  -resolved now     Cholelithiasis without cholecystitis  - No sung's sign and no RUQ tenderness  - CT chest/abdomen/pelvis revealed suspected cholelithiasis without convincing evidence of cholecystitis  - Normal LFTs and serum lipase         Atrial fibrillation with RVR  - History of atrial fibrillation on warfarin at home  - Presented with atrial fibrillation with heart rates sustaining in 130-140s  - Suspect likely provoked by sepsis syndrome and poor oral intake  -S/p IVF. Resumed Toprol- XL per cardio  -On amiodarone 0.5 mg gtt. It is going be stopped per cardio.  -anticoagulation managed per cardio.        Acute on chronic diastolic heart failure  - TTE 3/31/25 revealed left ventricular systolic function is mildly decreased with a visually estimated ejection fraction 45 to 50%, no regional wall motion abnormalities, spectral Doppler shows a grade 3 (restrictive pattern) of left ventricular diastolic filling with an elevated left atrial pressure, left ventricular cavity size is decreased, there is normal right ventricular global systolic function, the left atrial size is moderately to severely dilated, moderate mitral valve regurgitation, mild to moderate tricuspid regurgitation, severe aortic valve stenosis, mild aortic valve regurgitation, the inferior vena cava appears normal in size with IVC inspiratory collapse greater than 50%, and there is severely increased septal and severely increased posterior left ventricular wall thickness.  -DCed IVF..     CKD stage IV  - Creatinine 1.94, at baseline and today 1.89  - Monitor intake and output     MASD bilateral groin  - Cleanse groin with soap and water, pat dry and apply Triad twice daily and prn     GERD with esophagitis  - Continue home protonix      DVT prophylaxis  - Continue home warfarin  - Pharmacy consult to dose warfarin     Advanced care planning  Patient alert and oriented to person, place, situation, but not month or year. She does not have capacity to make code status decision at this time. Discussed goals of care and codes status with patient's daughter/POA, Goldy Damico. Explained differences between Full code and DNR. Goldy stated she would like to continue Full code status for now and possibly discuss further tomorrow about code status. Will continue with Full code status as per family's wishes.   Palliative care following. D/w EVELIN Scott  Pt is DNR and DNI    Patient is clinically doing better with BP within normal range, remains on RA and symptoms have improved. A.fib is better rate controlled since metoprolol initiated. Patient did participate in PT and OT without difficulty. Her renal functions have improved back to baseline. ID is managing atbx.     D/w EDGARD Christensen, no other critical care issues at this time.     Critical care team will sign off at this time. Defer further management to hospitalist, cardiology and ID services. Please contact with any questions or if any further need for critical care services arise.    I spent 32 minutes of cumulative critical care time with the patient.  Greater than 50% of that time was spent in the direct collaboration and or coordination of care of the patient.     Dragon dictation software was used to dictate this note and thus there may be minor errors in translation/transcription including garbled speech or misspellings. Please contact for clarification if needed.          [1] ceFAZolin, 2 g, intravenous, q12h  latanoprost, 1 drop, Both Eyes, Nightly  levothyroxine, 100 mcg, oral, Daily  metoprolol succinate XL, 25 mg, oral, Daily  pantoprazole, 40 mg, oral, Daily before breakfast   Or  pantoprazole, 40 mg, intravenous, Daily before breakfast  polyethylene glycol, 17 g, oral, Daily  potassium chloride  CR, 20 mEq, oral, Once     [2] amiodarone, 0.5 mg/min, Last Rate: 0.5 mg/min (04/24/25 5477)     [3] PRN medications: acetaminophen, bisacodyl, guaiFENesin, melatonin, morphine, ondansetron **OR** ondansetron

## 2025-04-25 NOTE — CARE PLAN
The patient's goals for the shift include      The clinical goals for the shift include Pt will remain hemodynamically stable this shift.    Over the shift, the patient did not make progress toward the following goals. Barriers to progression include remains in cardiac arrhythmia. Recommendations to address these barriers include continuation of amio drip.

## 2025-04-25 NOTE — ASSESSMENT & PLAN NOTE
Continue current abx as ordered.  Wbc now normalized  Abx per Id recommendation  Currently on cefazoline

## 2025-04-26 LAB
ANION GAP SERPL CALC-SCNC: 8 MMOL/L (ref 10–20)
BUN SERPL-MCNC: 28 MG/DL (ref 6–23)
CALCIUM SERPL-MCNC: 9.2 MG/DL (ref 8.6–10.3)
CHLORIDE SERPL-SCNC: 113 MMOL/L (ref 98–107)
CO2 SERPL-SCNC: 22 MMOL/L (ref 21–32)
CREAT SERPL-MCNC: 1.69 MG/DL (ref 0.5–1.05)
EGFRCR SERPLBLD CKD-EPI 2021: 28 ML/MIN/1.73M*2
ERYTHROCYTE [DISTWIDTH] IN BLOOD BY AUTOMATED COUNT: 15.4 % (ref 11.5–14.5)
GLUCOSE SERPL-MCNC: 125 MG/DL (ref 74–99)
HCT VFR BLD AUTO: 39 % (ref 36–46)
HGB BLD-MCNC: 11.9 G/DL (ref 12–16)
INR PPP: 1.9 (ref 0.9–1.1)
MAGNESIUM SERPL-MCNC: 2.07 MG/DL (ref 1.6–2.4)
MCH RBC QN AUTO: 28.9 PG (ref 26–34)
MCHC RBC AUTO-ENTMCNC: 30.5 G/DL (ref 32–36)
MCV RBC AUTO: 95 FL (ref 80–100)
NRBC BLD-RTO: 0 /100 WBCS (ref 0–0)
PHOSPHATE SERPL-MCNC: 2.6 MG/DL (ref 2.5–4.9)
PLATELET # BLD AUTO: 221 X10*3/UL (ref 150–450)
POTASSIUM SERPL-SCNC: 4.3 MMOL/L (ref 3.5–5.3)
PROTHROMBIN TIME: 21.5 SECONDS (ref 9.8–12.4)
RBC # BLD AUTO: 4.12 X10*6/UL (ref 4–5.2)
SODIUM SERPL-SCNC: 139 MMOL/L (ref 136–145)
UFH PPP CHRO-ACNC: 0.4 IU/ML (ref ?–1.1)
WBC # BLD AUTO: 7.8 X10*3/UL (ref 4.4–11.3)

## 2025-04-26 PROCEDURE — 85027 COMPLETE CBC AUTOMATED: CPT

## 2025-04-26 PROCEDURE — 84100 ASSAY OF PHOSPHORUS: CPT

## 2025-04-26 PROCEDURE — 85520 HEPARIN ASSAY: CPT | Performed by: NURSE PRACTITIONER

## 2025-04-26 PROCEDURE — 2500000001 HC RX 250 WO HCPCS SELF ADMINISTERED DRUGS (ALT 637 FOR MEDICARE OP): Performed by: STUDENT IN AN ORGANIZED HEALTH CARE EDUCATION/TRAINING PROGRAM

## 2025-04-26 PROCEDURE — 2500000004 HC RX 250 GENERAL PHARMACY W/ HCPCS (ALT 636 FOR OP/ED): Mod: JZ | Performed by: STUDENT IN AN ORGANIZED HEALTH CARE EDUCATION/TRAINING PROGRAM

## 2025-04-26 PROCEDURE — 2500000001 HC RX 250 WO HCPCS SELF ADMINISTERED DRUGS (ALT 637 FOR MEDICARE OP): Performed by: NURSE PRACTITIONER

## 2025-04-26 PROCEDURE — 99232 SBSQ HOSP IP/OBS MODERATE 35: CPT | Performed by: NURSE PRACTITIONER

## 2025-04-26 PROCEDURE — 99233 SBSQ HOSP IP/OBS HIGH 50: CPT | Performed by: INTERNAL MEDICINE

## 2025-04-26 PROCEDURE — 80048 BASIC METABOLIC PNL TOTAL CA: CPT

## 2025-04-26 PROCEDURE — 2060000001 HC INTERMEDIATE ICU ROOM DAILY

## 2025-04-26 PROCEDURE — 2500000001 HC RX 250 WO HCPCS SELF ADMINISTERED DRUGS (ALT 637 FOR MEDICARE OP): Performed by: INTERNAL MEDICINE

## 2025-04-26 PROCEDURE — 85610 PROTHROMBIN TIME: CPT | Performed by: INTERNAL MEDICINE

## 2025-04-26 PROCEDURE — 36415 COLL VENOUS BLD VENIPUNCTURE: CPT | Performed by: NURSE PRACTITIONER

## 2025-04-26 PROCEDURE — 2500000002 HC RX 250 W HCPCS SELF ADMINISTERED DRUGS (ALT 637 FOR MEDICARE OP, ALT 636 FOR OP/ED): Performed by: INTERNAL MEDICINE

## 2025-04-26 PROCEDURE — 83735 ASSAY OF MAGNESIUM: CPT

## 2025-04-26 RX ORDER — WARFARIN SODIUM 5 MG/1
2.5 TABLET ORAL ONCE
Status: COMPLETED | OUTPATIENT
Start: 2025-04-26 | End: 2025-04-26

## 2025-04-26 RX ORDER — METOPROLOL SUCCINATE 50 MG/1
50 TABLET, EXTENDED RELEASE ORAL 2 TIMES DAILY
Status: DISCONTINUED | OUTPATIENT
Start: 2025-04-26 | End: 2025-04-28 | Stop reason: HOSPADM

## 2025-04-26 RX ADMIN — METOPROLOL SUCCINATE 50 MG: 50 TABLET, EXTENDED RELEASE ORAL at 20:35

## 2025-04-26 RX ADMIN — LATANOPROST 1 DROP: 50 SOLUTION OPHTHALMIC at 21:51

## 2025-04-26 RX ADMIN — PANTOPRAZOLE SODIUM 40 MG: 40 TABLET, DELAYED RELEASE ORAL at 05:26

## 2025-04-26 RX ADMIN — CEFAZOLIN SODIUM 2 G: 2 SOLUTION INTRAVENOUS at 05:25

## 2025-04-26 RX ADMIN — METOPROLOL SUCCINATE 25 MG: 25 TABLET, EXTENDED RELEASE ORAL at 08:34

## 2025-04-26 RX ADMIN — CEFAZOLIN SODIUM 2 G: 2 SOLUTION INTRAVENOUS at 17:56

## 2025-04-26 RX ADMIN — WARFARIN SODIUM 2.5 MG: 5 TABLET ORAL at 17:55

## 2025-04-26 RX ADMIN — LEVOTHYROXINE SODIUM 100 MCG: 0.1 TABLET ORAL at 05:26

## 2025-04-26 RX ADMIN — HEPARIN SODIUM 700 UNITS/HR: 10000 INJECTION, SOLUTION INTRAVENOUS at 20:34

## 2025-04-26 ASSESSMENT — COGNITIVE AND FUNCTIONAL STATUS - GENERAL
WALKING IN HOSPITAL ROOM: A LITTLE
DAILY ACTIVITIY SCORE: 21
TOILETING: A LITTLE
CLIMB 3 TO 5 STEPS WITH RAILING: A LITTLE
MOBILITY SCORE: 20
DRESSING REGULAR LOWER BODY CLOTHING: A LITTLE
HELP NEEDED FOR BATHING: A LITTLE
PERSONAL GROOMING: A LITTLE
MOVING TO AND FROM BED TO CHAIR: A LITTLE
STANDING UP FROM CHAIR USING ARMS: A LITTLE
WALKING IN HOSPITAL ROOM: A LITTLE
EATING MEALS: A LITTLE
CLIMB 3 TO 5 STEPS WITH RAILING: A LITTLE
TOILETING: A LITTLE
MOBILITY SCORE: 18
DRESSING REGULAR UPPER BODY CLOTHING: A LITTLE
MOVING FROM LYING ON BACK TO SITTING ON SIDE OF FLAT BED WITH BEDRAILS: A LITTLE
STANDING UP FROM CHAIR USING ARMS: A LITTLE
DAILY ACTIVITIY SCORE: 18
DRESSING REGULAR UPPER BODY CLOTHING: A LITTLE
HELP NEEDED FOR BATHING: A LITTLE
MOVING TO AND FROM BED TO CHAIR: A LITTLE
TURNING FROM BACK TO SIDE WHILE IN FLAT BAD: A LITTLE

## 2025-04-26 ASSESSMENT — PAIN - FUNCTIONAL ASSESSMENT
PAIN_FUNCTIONAL_ASSESSMENT: 0-10

## 2025-04-26 ASSESSMENT — PAIN SCALES - GENERAL
PAINLEVEL_OUTOF10: 0 - NO PAIN

## 2025-04-26 NOTE — CARE PLAN
The patient's goals for the shift include      The clinical goals for the shift include pt will remain hemodynamically stable throughout shift    Problem: Pain - Adult  Goal: Verbalizes/displays adequate comfort level or baseline comfort level  Outcome: Progressing     Problem: Safety - Adult  Goal: Free from fall injury  Outcome: Progressing     Problem: Discharge Planning  Goal: Discharge to home or other facility with appropriate resources  Outcome: Progressing     Problem: Chronic Conditions and Co-morbidities  Goal: Patient's chronic conditions and co-morbidity symptoms are monitored and maintained or improved  Outcome: Progressing     Problem: Nutrition  Goal: Nutrient intake appropriate for maintaining nutritional needs  Outcome: Progressing     Problem: Arrythmia/Dysrhythmia  Goal: Lab values return to normal range  Outcome: Progressing  Goal: No evidence of post procedure complications  Outcome: Progressing  Goal: Promote self management  Outcome: Progressing  Goal: Serial ECG will return to baseline  Outcome: Progressing  Goal: Verbalize understanding of procedures/devices  Outcome: Progressing  Goal: Vital signs return to baseline  Outcome: Progressing  Goal: Care and maintenance of device (specify)  Outcome: Progressing     Problem: Skin  Goal: Participates in plan/prevention/treatment measures  Outcome: Progressing  Goal: Prevent/manage excess moisture  Outcome: Progressing  Goal: Prevent/minimize sheer/friction injuries  Outcome: Progressing  Flowsheets (Taken 4/25/2025 2045)  Prevent/minimize sheer/friction injuries: Turn/reposition every 2 hours/use positioning/transfer devices  Goal: Promote/optimize nutrition  Outcome: Progressing     Problem: Heart Failure  Goal: Improved gas exchange this shift  Outcome: Progressing  Goal: Improved urinary output this shift  Outcome: Progressing  Goal: Reduction in peripheral edema within 24 hours  Outcome: Progressing  Goal: Report improvement of  dyspnea/breathlessness this shift  Outcome: Progressing  Goal: Weight from fluid excess reduced over 2-3 days, then stabilize  Outcome: Progressing  Goal: Increase self care and/or family involvement in 24 hours  Outcome: Progressing

## 2025-04-26 NOTE — ASSESSMENT & PLAN NOTE
INR is almost therapeutic  Continue current management  Cardiology continues to adjust metoprolol  HR is currently not adequately controlled.

## 2025-04-26 NOTE — ASSESSMENT & PLAN NOTE
Not in exacerbation.  Continue current management  Appreciate cardiology recommendation  Last ECHO is normal

## 2025-04-26 NOTE — PROGRESS NOTES
[facility] Heart and Vascular Cardiology  Patient Name: Jennifer Magana  Patient : 1928  Room/Bed: -A    HPI:  Jennifer Magana is a 89 y.o. female admitted on 25 with abdominal/suprapubic pain.  She has a history of hypertension, hypothyroidism, chronic atrial fibrillation on warfarin, chronic diastolic CHF.  She is currently admitted and being treated for sepsis.  We are consulted for atrial fibrillation.     Her initial labwork through the ED was significant for BUN 27, Cr 1.93, lactate 2.1, HSTI 14 > 15, WBC 25.4k.  Noncontrast CT chest/abdomen/pelvis was remarkable for acute CHF (pulm edema, small pleural effusions), pancreatic tail mild peripancreatic fat stranding, perivesical fat stranding, cholelithiasis without convincing evidence of acute cholecystitis, fluid in the esophagus compatible with GERD.  Patient received 2 L NS IV fluid bolus per sepsis 30 mL/kg IV fluid bolus protocol, as well as IV Zosyn.       On presentation she was in Afib with RVR up to rate of 140's despite IVF bolus and lopressor 5mg IV x1.  She was then initiated on amiodarone infusion.  The patient tells me she has chronic Afib and is on rate control with metoprolol succinate XL 25mg daily, with jantoven for CVA prophylaxis - tells me her INR's have been in the therapeutic range and are managed by her PCP at [facility].     Currently she is on amiodarone infusion 0.5mg/min, with resting HR's ranging in the 's.  She is not requiring pressors.  She denies any current chest pain/pressure, palps, LH/dizziness, and appears unaware of the Afib.    Allergies:  Patient has no known allergies.    Subjective Data:  25: Denies any chest pain/pressure, SOB or palpitations.  Palliative care NP at bedside as well.  AF on telemetry with HR  bpm.  25: Daughter Sandip at bedside. Denies any chest pain/pressure, SOB or palpitations. +fatigue. Up on BSC.  AF on telemetry with 's.   25: Family  "at bedside.  No CP/pressure.  Has mild JACOB when getting OOB.  Breathing unlabored on room air at rest.  Monitor; Afib with variable HR's.  BP has been elevated.  K 4.3, creatinine 1.69, INR 1.9. Remains on Heparin gtt for bridging until INR 2 or greater. Pharmacy managing warfarin dose.    Overnight Events:  None    Objective Data:  Vitals:    04/26/25 0740 04/26/25 0800 04/26/25 0900 04/26/25 1100   BP:  (!) 126/109 132/88 (!) 134/112   Pulse:  (!) 117 (!) 130 87   Resp:  10 (!) 28 24   Temp: 36.1 °C (96.9 °F)   36.2 °C (97.1 °F)   TempSrc: Temporal   Temporal   SpO2:  100%  97%   Weight:       Height:           Reviewed the following Labs:  Results from last 7 days   Lab Units 04/26/25  0403 04/25/25  0555 04/24/25  0301   WBC AUTO x10*3/uL 7.8 8.0 12.2*   HEMOGLOBIN g/dL 11.9* 11.8* 11.7*   HEMATOCRIT % 39.0 37.5 37.7   PLATELETS AUTO x10*3/uL 221 206 195       Results from last 7 days   Lab Units 04/26/25  0403 04/25/25  0555 04/24/25  0301 04/23/25  0415 04/22/25  1537   SODIUM mmol/L 139 142 137 141 141   POTASSIUM mmol/L 4.3 3.8 3.7 3.7 4.2   CHLORIDE mmol/L 113* 108* 104 106 103   CO2 mmol/L 22 24 19* 25 26   BUN mg/dL 28* 28* 33* 25* 27*   CREATININE mg/dL 1.69* 1.89* 2.21* 1.76* 1.93*   CALCIUM mg/dL 9.2 8.9 8.5* 8.7 10.1   PROTEIN TOTAL g/dL  --   --  5.3* 5.6* 7.3   BILIRUBIN TOTAL mg/dL  --   --  0.8 0.9 1.2   ALK PHOS U/L  --   --  72 79 91   ALT U/L  --   --  15 20 13   AST U/L  --   --  15 20 16   GLUCOSE mg/dL 125* 97 157* 136* 160*       Results from last 7 days   Lab Units 04/26/25  0403 04/25/25  0555 04/24/25  0301   MAGNESIUM mg/dL 2.07 2.04 1.96       No results found for: \"LDLF\"     No results found for: \"BNP\"      No results found for: \"TROPHS\"       No results found for: \"TSH\", \"FT4\"      Results from last 7 days   Lab Units 04/26/25  0403 04/25/25  0555 04/24/25  0301   INR  1.9* 1.9* 2.1*     No results found for: \"HGBA1C\"    Intake/Output    Intake/Output Summary (Last 24 hours) at " 4/26/2025 1215  Last data filed at 4/26/2025 0800  Gross per 24 hour   Intake 282.85 ml   Output 0 ml   Net 282.85 ml      I/O last 2 completed shifts:  In: 511.3 (8.6 mL/kg) [P.O.:120; I.V.:391.3 (6.6 mL/kg)]  Out: 500 (8.4 mL/kg) [Urine:500 (0.4 mL/kg/hr)]  Weight: 59.3 kg     Past Medical History:  She has a past medical history of (HFpEF) heart failure with preserved ejection fraction, Aortic stenosis, CKD (chronic kidney disease), Essential (primary) hypertension, GERD (gastroesophageal reflux disease), Hypothyroidism, and Paroxysmal atrial fibrillation (Multi).    Past Surgical History:  She has a past surgical history that includes MR angio head wo IV contrast (07/21/2020); MR angio neck wo IV contrast (07/21/2020); and Hysterectomy.      Social History:  She reports that she has never smoked. She has never used smokeless tobacco. She reports that she does not drink alcohol and does not use drugs.    Family History:  Family History[1]    Outpatient Medications  Medications Ordered Prior to Encounter[2]    Scheduled medications  Scheduled Medications[3]  Continuous medications  Continuous Medications[4]  PRN medications  PRN Medications[5]   Prescriptions Prior to Admission[6]    Reviewed the following cardiology tests:    Echo 3/31/25:   1. The left ventricular systolic function is mildly decreased, with a visually estimated ejection fraction of 45-50%.   2. No regional wall motion abnormalities.   3. Spectral Doppler shows a a Grade III (restrictive pattern) of left ventricular diastolic filling with an elevated left atrial pressure.   4. Left ventricular cavity size is decreased.   5. There is normal right ventricular global systolic function.   6. The left atrial size is moderate to severely dilated.   7. There is moderate mitral annular calcification.   8. Moderate mitral valve regurgitation.   9. Mild to moderate tricuspid regurgitation.  10. Severe aortic valve stenosis.  11. Mild aortic valve  "regurgitation.  12. The inferior vena cava appears normal in size, with IVC inspiratory collapse greater than 50%.  13. There is severely increased septal and severely increased posterior left ventricular wall thickness.  EF   Date/Time Value Ref Range Status   03/31/2025 02:35 PM 48 %    01/04/2024 02:26 PM 54          Physical Exam:  Vitals reviewed.   Constitutional:       Appearance: Not in distress.   Neck:      Vascular: No carotid bruit.   Pulmonary:      Effort: Pulmonary effort is normal.      Breath sounds: Normal breath sounds.   Cardiovascular:      PMI at left midclavicular line. Tachycardia present. Irregularly irregular rhythm. S1 with normal intensity. S2 with normal intensity.       Murmurs: There is a systolic murmur.   Edema:     Peripheral edema absent.   Abdominal:      General: Bowel sounds are normal.   Neurological:      Mental Status: Alert and oriented to person, place and time.         Assessment/Plan:   Afib with RVR:  she has chronic Afib and is normally rate controlled with BB.  Presentation of Afib with RVR in setting of sepsis and bacteremia.  She was initiated on amiodarone infusion in ED for rate control (of note, upon chart review, her INR's have been in the range of 1.9 - 2.0 /2.3 since 3/29/25).  -tele monitoring  -at this point would continue amiodarone for rate control purposes to achieve HR <=110  -reinitiate metop succinate XL 25mg daily when BP improves  -continue jantoven, check daily INR\"s - if she becomes subtherapeutic, start heparin infusion  -keep K ~ 4, Mg ~ 2  4/24/25: AF on telemetry with HR  bpm.  INR 2.1 and is on warfarin.  Around 4am BP improved to 120/80's. If BP remains stable, recommend restarting metoprolol succinate 25 mg daily.  Continue on Amiodarone infusion.    4/25/25:  AF on telemetry with 's.  INR 1.9 and secure chat message sent to pharmacist managing anticoagulation.  Recommend heparin infusion while INR is subtherapeutic. Stop heparin " infusion once INR 2.0 or greater. BP stable.  Increase metoprolol succinate 25 mg BID. Discussed with Dr. Dedrick Gutierres and will stop amiodarone infusion.  4-26-25: Remains in Afib with VR's variable but generally 100-120s.  Off of amiodarone.  Will increase BB further as BP is also elevated, hold parameters added.  Continue on heparin gtt until INR 2.0 or greater.  Pharmacy managing warfarin dosing.      Sepsis:  unclear source, suspect urinary source vs ? Cholecystitis  -continue Abx (zosyn, doxy) per primary service  -follow cultures  4/24/25: Blood culture positive for gram positive cocci.  Management per ID.     Acute on chronic combined systolic and diastolic HF:  TTE 3/25 with LVEF 45-50% and grade III DD.  Currently favoring fluid administration due to sepsis with low BP's.  -serial volume monitoring  -diurese if appropriate (see below)  4/24/25:  She does not appear volume overloaded on exam. Around 4am BP improved to 120/80's. If BP remains stable, recommend restarting metoprolol succinate 25 mg daily.  4/25/25: BP stable.  Increasing metoprolol succinate 25 mg BID for further HR control. Daughter, Goldy, states she was taking metoprolol succinate 50 mg daily at home.  4-26-25: Looks compensated.  Will continue on metoprolol succinate but will increase dose for better  HR/BP control.      Severe aortic stenosis, moderate mitral regurgitation:  Per recent prior cardiology notes from last admission, with progression of aortic stenosis plan was for outpatient follow up and conservative management given her advanced age (see cardio note 4/2/25) - per this note, patient's family was udpated  -would avoid overdiuresis in setting of her severe aortic stenosis  4-26-25: Could use better HR/BP control.  Family met with Palliative care and are planning on Palliative vs Hospice care at home on d/c.     Dispo:   Increase BB for better HR/BP control and monitor  Can stop Heparin once INR 2.0 or greater   Family met with  Palliative care and are planning on Palliative vs Hospice care at home on d/c.    Code Status  DNR and No Intubation      Keshia Mansfield, APRN-CNP          [1]   Family History  Problem Relation Name Age of Onset    Other (Half sister with CAD) Sister      Coronary artery disease Other Children    [2]   No current facility-administered medications on file prior to encounter.     Current Outpatient Medications on File Prior to Encounter   Medication Sig Dispense Refill    doxycycline (Vibramycin) 100 mg capsule Take 1 capsule (100 mg) by mouth 2 times a day for 10 days. Take with at least 8 ounces (large glass) of water, do not lie down for 30 minutes after 20 capsule 0    famotidine (Pepcid) 20 mg tablet Take 0.5 tablets (10 mg) by mouth once daily. Start on April 4th 30 tablet 1    furosemide (Lasix) 20 mg tablet Take 1 tablet (20 mg) by mouth once daily for 3 days. (Patient not taking: Reported on 4/23/2025) 3 tablet 0    Jantoven 2.5 mg tablet Take 1.25mg (0.5 tablets) by mouth on Tues,Thurs and 2.5mg (1 tablet) all other days or as directed by Coumadin clinic. 72 tablet 3    levothyroxine (Synthroid, Levoxyl) 100 mcg tablet Take 1 tablet (100 mcg) by mouth once daily.      metoprolol succinate XL (Toprol-XL) 25 mg 24 hr tablet Take 2 tablets (50 mg) by mouth once daily. 60 tablet 1    pantoprazole (ProtoNix) 40 mg EC tablet Take 1 tablet (40 mg) by mouth once daily in the morning. Take before meals. 90 tablet 3    Simbrinza 1-0.2 % drops,suspension ophthalmic suspension Administer 1 drop into both eyes 2 times a day.      travoprost (Travatan Z) 0.004 % drops ophthalmic solution Administer 1 drop into both eyes once daily at bedtime.      [DISCONTINUED] dicyclomine (Bentyl) 20 mg tablet Take 1 tablet (20 mg) by mouth 4 times a day before meals.     [3] ceFAZolin, 2 g, intravenous, q12h  latanoprost, 1 drop, Both Eyes, Nightly  levothyroxine, 100 mcg, oral, Daily  metoprolol succinate XL, 25 mg, oral,  BID  pantoprazole, 40 mg, oral, Daily before breakfast   Or  pantoprazole, 40 mg, intravenous, Daily before breakfast  polyethylene glycol, 17 g, oral, Daily  warfarin, 2.5 mg, oral, Once     [4] heparin, 0-4,000 Units/hr, Last Rate: 700 Units/hr (04/26/25 1027)     [5] PRN medications: acetaminophen, bisacodyl, guaiFENesin, heparin, melatonin, morphine, ondansetron **OR** ondansetron  [6]   Medications Prior to Admission   Medication Sig Dispense Refill Last Dose/Taking    doxycycline (Vibramycin) 100 mg capsule Take 1 capsule (100 mg) by mouth 2 times a day for 10 days. Take with at least 8 ounces (large glass) of water, do not lie down for 30 minutes after 20 capsule 0     famotidine (Pepcid) 20 mg tablet Take 0.5 tablets (10 mg) by mouth once daily. Start on April 4th 30 tablet 1     furosemide (Lasix) 20 mg tablet Take 1 tablet (20 mg) by mouth once daily for 3 days. (Patient not taking: Reported on 4/23/2025) 3 tablet 0 Not Taking    Jantoven 2.5 mg tablet Take 1.25mg (0.5 tablets) by mouth on Tues,Thurs and 2.5mg (1 tablet) all other days or as directed by Coumadin clinic. 72 tablet 3     levothyroxine (Synthroid, Levoxyl) 100 mcg tablet Take 1 tablet (100 mcg) by mouth once daily.       metoprolol succinate XL (Toprol-XL) 25 mg 24 hr tablet Take 2 tablets (50 mg) by mouth once daily. 60 tablet 1     pantoprazole (ProtoNix) 40 mg EC tablet Take 1 tablet (40 mg) by mouth once daily in the morning. Take before meals. 90 tablet 3     Simbrinza 1-0.2 % drops,suspension ophthalmic suspension Administer 1 drop into both eyes 2 times a day.       travoprost (Travatan Z) 0.004 % drops ophthalmic solution Administer 1 drop into both eyes once daily at bedtime.

## 2025-04-26 NOTE — PROGRESS NOTES
Quentin Coppola is a 89 y.o. female on day 4 of admission presenting with Sepsis (Multi).      Subjective     Mrs. Coppola reports that she is doing well. She will like to be discharged, but she understood that her heart rate needs to be under control. She denied fever and chills. No chest pain.  She was worried about infection on her stomach.   She is aware that her meds are being adjusted for heart rate control.  Objective     Last Recorded Vitals  BP (!) 134/112   Pulse 87   Temp 36.2 °C (97.1 °F) (Temporal)   Resp 24   Wt 59.3 kg (130 lb 11.7 oz)   SpO2 97%   Intake/Output last 3 Shifts:    Intake/Output Summary (Last 24 hours) at 4/26/2025 1457  Last data filed at 4/26/2025 0800  Gross per 24 hour   Intake 282.85 ml   Output 0 ml   Net 282.85 ml       Admission Weight  Weight: 55.8 kg (123 lb) (04/22/25 1509)    Daily Weight  04/25/25 : 59.3 kg (130 lb 11.7 oz)    Image Results  Transthoracic Echo (TTE) [place] [address]       Phone [phone] Fax [phone]    TRANSTHORACIC ECHOCARDIOGRAM REPORT    Patient Name:       QUENTIN COPPOLA   Reading Physician:    [id number] Tomy Issa DO  Study Date:         4/24/2025            Ordering Provider:    [id number] SOL FRANKS  MRN/PID:            [id number]             Fellow:  Accession#:         [id number]         Nurse:  Date of Birth/Age:  6/5/1928 / 89 years  Sonographer:          Floresita Doyle RDCS  Gender Assigned at  F                    Additional Staff:  Birth:  Height:             157.48 cm            Admit Date:           4/22/2025  Weight:             58.97 kg             Admission Status:     Inpatient -                                                                 Routine  BSA / BMI:          1.59 m2 / 23.78      Department Location:  [facility]                      kg/m2  Blood Pressure: 115 /83 mmHg    Study  Type:    TRANSTHORACIC ECHO (TTE) LIMITED  Diagnosis/ICD: Bacteremia-R78.81  Indication:    Bacteremia  CPT Codes:     Echo Limited-[id number]   Study Detail: The following Echo studies were performed: 2D, Doppler and color                flow.       PHYSICIAN INTERPRETATION:  Left Ventricle: There are no regional wall motion abnormalities. The left ventricular cavity size was not assessed. Left ventricular diastolic filling was not assessed.  Left Atrium: The left atrial size was not assessed.  Right Ventricle: The right ventricle was not assessed. Right ventricular systolic function not assessed.  Right Atrium: The right atrial size was not assessed.  Aortic Valve: There is no visualized aortic valve vegetation. The aortic valve is probably trileaflet. There is mild aortic valve cusp calcification. There is no evidence of aortic valve regurgitation.  Mitral Valve: The mitral valve is mildly thickened. There was no mitral valve vegetation visualized. There is mild to moderate mitral annular calcification. There is mild mitral valve regurgitation.  Tricuspid Valve: No vegetation is seen on the tricuspid valve. The tricuspid valve is structurally normal. There is mild tricuspid regurgitation.  Pulmonic Valve: No pulmonic valve vegetation visualized. The pulmonic valve is structurally normal. There is trace to mild pulmonic valve regurgitation.  Pericardium: Pericardial effusion was not assessed.  Aorta: The aortic root was not assessed.       CONCLUSIONS:   1. No mitral valve vegetation visualized.   2. Mild mitral valve regurgitation.   3. Mild tricuspid regurgitation is visualized.   4. No tricuspid valve vegetation.   5. No aortic valve vegetation visualized.   6. No pulmonic valve vegetation visualized.    QUANTITATIVE DATA SUMMARY:     TRICUSPID VALVE/RVSP:          Normal Ranges:  Peak TR Velocity:     2.79 m/s  RV Syst Pressure:     34 mmHg  (< 30mmHg)       [id number] Tomy Issa DO  Electronically signed on 4/24/2025  at 4:20:36 PM       ** Final **      Physical Exam  Constitutional:       Appearance: Normal appearance.   HENT:      Head: Normocephalic and atraumatic.      Nose: Nose normal.      Mouth/Throat:      Mouth: Mucous membranes are moist.   Eyes:      Extraocular Movements: Extraocular movements intact.   Cardiovascular:      Rate and Rhythm: Normal rate and regular rhythm.      Pulses: Normal pulses.      Heart sounds: Normal heart sounds.   Pulmonary:      Effort: Pulmonary effort is normal.      Breath sounds: Normal breath sounds.   Abdominal:      General: Bowel sounds are normal.      Palpations: Abdomen is soft.   Neurological:      General: No focal deficit present.      Mental Status: She is alert and oriented to person, place, and time.   Psychiatric:         Mood and Affect: Mood normal.         Behavior: Behavior normal.         Relevant Results               Assessment & Plan  Sepsis (Multi)  This is due to bacteremia,  Continue Cefazolin per recommendation from ID  Continue to monitor    Abdominal pain, unspecified abdominal location  resolved  Acute on chronic diastolic CHF (congestive heart failure)  Not in exacerbation.  Continue current management  Appreciate cardiology recommendation  Last ECHO is normal    Stage 4 chronic kidney disease (Multi)  Renal function continues to improve  Continue current management  Avoid nephrotoxic agents  Rapid atrial fibrillation (Multi)  INR is almost therapeutic  Continue current management  Cardiology continues to adjust metoprolol  HR is currently not adequately controlled.   Gastroesophageal reflux disease with esophagitis  On PPI  Calculus of gallbladder without cholecystitis without obstruction  Monitor LFTs      Hypothyroidism: continue synthroid.    Loose stool:  may be abx related. If continues will get c. diff        Victor Hugo Almaraz MD

## 2025-04-26 NOTE — PROGRESS NOTES
Pharmacy Consult for Warfarin (Coumadin) Management - Daily Progress Note     Labs  INR External   Date Value Ref Range Status   04/10/2025 2.00 2.00 - 3.00 Final   04/03/2025 1.90 (A) 2.00 - 3.00 Final   03/21/2025 2.30 2.00 - 3.00 Final     INR   Date Value Ref Range Status   04/26/2025 1.9 (H) 0.9 - 1.1 Final   04/25/2025 1.9 (H) 0.9 - 1.1 Final   04/24/2025 2.1 (H) 0.9 - 1.1 Final     Review  Warfarin Indication: Atrial fibrillation or flutter  Target INR: 2 - 3     INR 1.9 again today; had boost of 4mg yesterday; on ancef, stopped amio drip yesterday; assume 4mg dose should catch up tomorrow or day after.    Orders placed per pharmacy consult. Pharmacy will continue to monitor and adjust therapy as needed.   Aldair Ibarra, PharmD

## 2025-04-26 NOTE — PROGRESS NOTES
Jennifer Magana is a 89 y.o. female on day 4 of admission presenting with Sepsis (Multi).      Assessment/Plan:     #Sepsis - POA  #Group B strep bacteremia, unclear source  Unclear source.  Pending repeat blood cultures.  Zosyn, Doxy discontinued and now on cefazolin.  Patient does have a rash under left breast which is not that impressive. TTE negative for any vegetation     #CKD  Estimated Creatinine Clearance: 15.4 mL/min (A) (by C-G formula based on SCr of 1.69 mg/dL (H)).     CHF  GERD  Gall stone        Recommendations:  -Cont ancef 2gm q12h  -Renally dose abx  -Will continue to follow    NOTE: Can switch to Keflex 1 g every 12 hour through 5/5/2025 to finish 2-week course      Sukhwinder Tam MD  [phone]  4/26/2025  10 AM      Subjective   Interval History: Diarrhea +. On stool softener.  Patient denies fevers. Feels better.        Review of Systems  Denies: fever, chills, nausea, vomiting, dysuria    Objective   Range of Vitals (last 24 hours)  Heart Rate:  []   Temp:  [35.9 °C (96.6 °F)-36.2 °C (97.2 °F)]   Resp:  [6-28]   BP: (117-165)/()   Weight:  [59.3 kg (130 lb 11.7 oz)]   SpO2:  [97 %-100 %]  Daily Weight  04/25/25 : 59.3 kg (130 lb 11.7 oz)   Body mass index is 23.91 kg/m².    General: alert, oriented, NAD  HEENT: No conjunctival pallor, no scleral icterus  Abdomen: soft, non tender, non distended, BS+  Neuro: AAO x 3  Extremities: no edema, no cyanosis  Skin: Improving rash under left breast as per patient  MSK: No joint inflammation    Antibiotics  ceFAZolin - 2 gram/50 mL  doxycycline - 100 mg      Relevant Results  Labs  Results from last 72 hours   Lab Units 04/26/25  0403 04/25/25  0555 04/24/25  0301   WBC AUTO x10*3/uL 7.8 8.0 12.2*   HEMOGLOBIN g/dL 11.9* 11.8* 11.7*   HEMATOCRIT % 39.0 37.5 37.7   PLATELETS AUTO x10*3/uL 221 206 195   NEUTROS PCT AUTO %  --   --  82.3   LYMPHS PCT AUTO %  --   --  7.4   MONOS PCT AUTO %  --   --  8.8   EOS PCT AUTO %  --   --  0.5  "    Results from last 72 hours   Lab Units 04/26/25  0403 04/25/25  0555 04/24/25  0301   SODIUM mmol/L 139 142 137   POTASSIUM mmol/L 4.3 3.8 3.7   CHLORIDE mmol/L 113* 108* 104   CO2 mmol/L 22 24 19*   BUN mg/dL 28* 28* 33*   CREATININE mg/dL 1.69* 1.89* 2.21*   GLUCOSE mg/dL 125* 97 157*   CALCIUM mg/dL 9.2 8.9 8.5*   ANION GAP mmol/L 8* 14 18   EGFR mL/min/1.73m*2 28* 24* 20*   PHOSPHORUS mg/dL 2.6 2.9 3.3     Results from last 72 hours   Lab Units 04/24/25  0301   ALK PHOS U/L 72   BILIRUBIN TOTAL mg/dL 0.8   PROTEIN TOTAL g/dL 5.3*   ALT U/L 15   AST U/L 15   ALBUMIN g/dL 3.1*     Estimated Creatinine Clearance: 15.4 mL/min (A) (by C-G formula based on SCr of 1.69 mg/dL (H)).  No results found for: \"CRP\"    Microbiology  Susceptibility data from last 14 days.  Collected Specimen Info Organism Clindamycin Erythromycin Penicillin Tetracycline   04/22/25 Blood culture from Peripheral Venipuncture Streptococcus agalactiae (Group B Streptococcus)       04/22/25 Blood culture from Peripheral Venipuncture Streptococcus agalactiae (Group B Streptococcus)  R  R  S  R       Imaging    XR abdomen 3+ views  Result Date: 4/23/2025  1.   Nonspecific nonobstructive bowel gas pattern.   MACRO: None   Signed by: Salma Garsia 4/23/2025 7:33 PM Dictation workstation:   [id number]    CT chest abdomen pelvis wo IV contrast  Result Date: 4/22/2025  Interstitial prominence with smooth interlobular septal thickening suggests acute pulmonary interstitial edema/CHF. Dependent lower lung airspace opacities with associated parenchymal volume loss most likely relate to atelectasis. Small pleural effusions. No pneumothorax.   Mild peripancreatic fat stranding predominating about the tail (series 2, images ), suggesting possible acute interstitial pancreatitis versus retroperitoneal edema secondary to volume overload. Suggest correlation with pancreatic enzymes. No focal fluid collection.   Urinary bladder is decompressed, not well " evaluated. Perivesical fat stranding is concerning for acute cystitis although some of this appearance could be technical in etiology. Suggest clinical correlation and correlation with urinalysis.   Hepatomegaly.   Suspected cholelithiasis without convincing evidence of acute cholecystitis.   Cardiomegaly, with disproportionate atrial distension. Mitral annular and aortic root calcifications. Atherosclerosis, including coronary artery disease.   Fluid in the esophagus compatible with gastroesophageal reflux. No definite abnormal esophageal mural thickening.   Additional findings as discussed above.   MACRO: None   Signed by: Merrick Johnson 4/22/2025 6:34 PM Dictation workstation:   [id number]    XR chest 1 view  Result Date: 4/22/2025  1.  Similar appearance of bibasilar airspace opacities prior exam, which may represent atelectasis versus developing infiltrates with layering pleural effusions not excluded bilaterally.       MACRO: None   Signed by: Jose Garcia 4/22/2025 4:02 PM Dictation workstation:   [id number]    XR chest 2 views  Result Date: 4/16/2025  Diffuse interstitial infiltrates, bilateral pleural effusions and bibasilar airspace consolidations, as above. Clinical correlation and continued follow-up until clearing is recommended.   MACRO: None.   Signed by: Brijesh Puri 4/16/2025 6:11 PM Dictation workstation:   [id number]    XR chest 2 views  Result Date: 3/29/2025  Diffuse interstitial infiltrates bilaterally, as above. Clinical correlation and continued follow-up until clearing is recommended.   MACRO: None.   Signed by: Brijesh Puri 3/29/2025 1:36 PM Dictation workstation:   [id number]

## 2025-04-26 NOTE — CARE PLAN
The patient's goals for the shift include      The clinical goals for the shift include pt will get to chair twice throughout shift    Over the shift, the patient did not make progress toward the following goals. Barriers to progression include pt fatigue. Recommendations to address these barriers include educate and encouraged after nap.

## 2025-04-26 NOTE — ASSESSMENT & PLAN NOTE
Monitor LFTs      Hypothyroidism: continue synthroid.    Loose stool:  may be abx related. If continues will get c. diff

## 2025-04-27 VITALS
HEART RATE: 72 BPM | HEIGHT: 62 IN | TEMPERATURE: 96.8 F | OXYGEN SATURATION: 96 % | SYSTOLIC BLOOD PRESSURE: 131 MMHG | RESPIRATION RATE: 16 BRPM | DIASTOLIC BLOOD PRESSURE: 80 MMHG | BODY MASS INDEX: 22.68 KG/M2 | WEIGHT: 123.24 LBS

## 2025-04-27 LAB
ANION GAP SERPL CALC-SCNC: 9 MMOL/L (ref 10–20)
BACTERIA BLD CULT: NORMAL
BACTERIA BLD CULT: NORMAL
BUN SERPL-MCNC: 25 MG/DL (ref 6–23)
CALCIUM SERPL-MCNC: 9.3 MG/DL (ref 8.6–10.3)
CHLORIDE SERPL-SCNC: 113 MMOL/L (ref 98–107)
CO2 SERPL-SCNC: 22 MMOL/L (ref 21–32)
CREAT SERPL-MCNC: 1.37 MG/DL (ref 0.5–1.05)
EGFRCR SERPLBLD CKD-EPI 2021: 35 ML/MIN/1.73M*2
ERYTHROCYTE [DISTWIDTH] IN BLOOD BY AUTOMATED COUNT: 15.4 % (ref 11.5–14.5)
GLUCOSE SERPL-MCNC: 108 MG/DL (ref 74–99)
HCT VFR BLD AUTO: 36.9 % (ref 36–46)
HGB BLD-MCNC: 11.4 G/DL (ref 12–16)
INR PPP: 2.2 (ref 0.9–1.1)
MAGNESIUM SERPL-MCNC: 2.01 MG/DL (ref 1.6–2.4)
MCH RBC QN AUTO: 29.2 PG (ref 26–34)
MCHC RBC AUTO-ENTMCNC: 30.9 G/DL (ref 32–36)
MCV RBC AUTO: 94 FL (ref 80–100)
NRBC BLD-RTO: 0 /100 WBCS (ref 0–0)
PHOSPHATE SERPL-MCNC: 2.5 MG/DL (ref 2.5–4.9)
PLATELET # BLD AUTO: 222 X10*3/UL (ref 150–450)
POTASSIUM SERPL-SCNC: 3.8 MMOL/L (ref 3.5–5.3)
PROTHROMBIN TIME: 24.6 SECONDS (ref 9.8–12.4)
RBC # BLD AUTO: 3.91 X10*6/UL (ref 4–5.2)
SODIUM SERPL-SCNC: 140 MMOL/L (ref 136–145)
UFH PPP CHRO-ACNC: 0.5 IU/ML (ref ?–1.1)
WBC # BLD AUTO: 7.6 X10*3/UL (ref 4.4–11.3)

## 2025-04-27 PROCEDURE — 85520 HEPARIN ASSAY: CPT | Performed by: STUDENT IN AN ORGANIZED HEALTH CARE EDUCATION/TRAINING PROGRAM

## 2025-04-27 PROCEDURE — 2500000002 HC RX 250 W HCPCS SELF ADMINISTERED DRUGS (ALT 637 FOR MEDICARE OP, ALT 636 FOR OP/ED): Performed by: STUDENT IN AN ORGANIZED HEALTH CARE EDUCATION/TRAINING PROGRAM

## 2025-04-27 PROCEDURE — 2500000001 HC RX 250 WO HCPCS SELF ADMINISTERED DRUGS (ALT 637 FOR MEDICARE OP): Performed by: NURSE PRACTITIONER

## 2025-04-27 PROCEDURE — 80048 BASIC METABOLIC PNL TOTAL CA: CPT

## 2025-04-27 PROCEDURE — 99232 SBSQ HOSP IP/OBS MODERATE 35: CPT | Performed by: NURSE PRACTITIONER

## 2025-04-27 PROCEDURE — 2500000002 HC RX 250 W HCPCS SELF ADMINISTERED DRUGS (ALT 637 FOR MEDICARE OP, ALT 636 FOR OP/ED): Performed by: INTERNAL MEDICINE

## 2025-04-27 PROCEDURE — 2500000004 HC RX 250 GENERAL PHARMACY W/ HCPCS (ALT 636 FOR OP/ED): Mod: JZ | Performed by: STUDENT IN AN ORGANIZED HEALTH CARE EDUCATION/TRAINING PROGRAM

## 2025-04-27 PROCEDURE — 85610 PROTHROMBIN TIME: CPT | Performed by: STUDENT IN AN ORGANIZED HEALTH CARE EDUCATION/TRAINING PROGRAM

## 2025-04-27 PROCEDURE — 2060000001 HC INTERMEDIATE ICU ROOM DAILY

## 2025-04-27 PROCEDURE — 2500000001 HC RX 250 WO HCPCS SELF ADMINISTERED DRUGS (ALT 637 FOR MEDICARE OP): Performed by: STUDENT IN AN ORGANIZED HEALTH CARE EDUCATION/TRAINING PROGRAM

## 2025-04-27 PROCEDURE — 85027 COMPLETE CBC AUTOMATED: CPT

## 2025-04-27 PROCEDURE — 99233 SBSQ HOSP IP/OBS HIGH 50: CPT | Performed by: INTERNAL MEDICINE

## 2025-04-27 PROCEDURE — 84100 ASSAY OF PHOSPHORUS: CPT

## 2025-04-27 PROCEDURE — 83735 ASSAY OF MAGNESIUM: CPT

## 2025-04-27 PROCEDURE — 36415 COLL VENOUS BLD VENIPUNCTURE: CPT | Performed by: STUDENT IN AN ORGANIZED HEALTH CARE EDUCATION/TRAINING PROGRAM

## 2025-04-27 RX ORDER — WARFARIN 2.5 MG/1
2.5 TABLET ORAL ONCE
Status: COMPLETED | OUTPATIENT
Start: 2025-04-27 | End: 2025-04-27

## 2025-04-27 RX ORDER — DIGOXIN 250 MCG
250 TABLET ORAL ONCE
Status: COMPLETED | OUTPATIENT
Start: 2025-04-27 | End: 2025-04-27

## 2025-04-27 RX ADMIN — METOPROLOL SUCCINATE 50 MG: 50 TABLET, EXTENDED RELEASE ORAL at 21:10

## 2025-04-27 RX ADMIN — CEFAZOLIN SODIUM 2 G: 2 SOLUTION INTRAVENOUS at 17:20

## 2025-04-27 RX ADMIN — WARFARIN SODIUM 2.5 MG: 2.5 TABLET ORAL at 17:20

## 2025-04-27 RX ADMIN — CEFAZOLIN SODIUM 2 G: 2 SOLUTION INTRAVENOUS at 06:02

## 2025-04-27 RX ADMIN — LEVOTHYROXINE SODIUM 100 MCG: 0.1 TABLET ORAL at 06:02

## 2025-04-27 RX ADMIN — PANTOPRAZOLE SODIUM 40 MG: 40 TABLET, DELAYED RELEASE ORAL at 06:02

## 2025-04-27 RX ADMIN — LATANOPROST 1 DROP: 50 SOLUTION OPHTHALMIC at 21:11

## 2025-04-27 RX ADMIN — METOPROLOL SUCCINATE 50 MG: 50 TABLET, EXTENDED RELEASE ORAL at 08:10

## 2025-04-27 RX ADMIN — DIGOXIN 250 MCG: 250 TABLET ORAL at 17:20

## 2025-04-27 ASSESSMENT — PAIN - FUNCTIONAL ASSESSMENT
PAIN_FUNCTIONAL_ASSESSMENT: 0-10

## 2025-04-27 ASSESSMENT — COGNITIVE AND FUNCTIONAL STATUS - GENERAL
TOILETING: A LITTLE
HELP NEEDED FOR BATHING: A LITTLE
STANDING UP FROM CHAIR USING ARMS: A LITTLE
DAILY ACTIVITIY SCORE: 21
MOBILITY SCORE: 20
CLIMB 3 TO 5 STEPS WITH RAILING: A LITTLE
MOBILITY SCORE: 22
HELP NEEDED FOR BATHING: A LITTLE
DRESSING REGULAR UPPER BODY CLOTHING: A LITTLE
CLIMB 3 TO 5 STEPS WITH RAILING: A LITTLE
WALKING IN HOSPITAL ROOM: A LITTLE
TOILETING: A LITTLE
DAILY ACTIVITIY SCORE: 21
WALKING IN HOSPITAL ROOM: A LITTLE
DRESSING REGULAR UPPER BODY CLOTHING: A LITTLE
MOVING TO AND FROM BED TO CHAIR: A LITTLE

## 2025-04-27 ASSESSMENT — PAIN SCALES - GENERAL
PAINLEVEL_OUTOF10: 0 - NO PAIN

## 2025-04-27 NOTE — CARE PLAN
The patient's goals for the shift include      The clinical goals for the shift include pts HR will remain within normal limits throughout the shift      Problem: Pain - Adult  Goal: Verbalizes/displays adequate comfort level or baseline comfort level  Outcome: Progressing     Problem: Safety - Adult  Goal: Free from fall injury  Outcome: Progressing     Problem: Discharge Planning  Goal: Discharge to home or other facility with appropriate resources  Outcome: Progressing     Problem: Chronic Conditions and Co-morbidities  Goal: Patient's chronic conditions and co-morbidity symptoms are monitored and maintained or improved  Outcome: Progressing     Problem: Nutrition  Goal: Nutrient intake appropriate for maintaining nutritional needs  Outcome: Progressing     Problem: Arrythmia/Dysrhythmia  Goal: Lab values return to normal range  Outcome: Progressing  Goal: No evidence of post procedure complications  Outcome: Progressing  Goal: Promote self management  Outcome: Progressing  Goal: Serial ECG will return to baseline  Outcome: Progressing  Goal: Verbalize understanding of procedures/devices  Outcome: Progressing  Goal: Vital signs return to baseline  Outcome: Progressing  Goal: Care and maintenance of device (specify)  Outcome: Progressing     Problem: Skin  Goal: Participates in plan/prevention/treatment measures  Outcome: Progressing  Goal: Prevent/manage excess moisture  Outcome: Progressing  Goal: Prevent/minimize sheer/friction injuries  Outcome: Progressing  Goal: Promote/optimize nutrition  Outcome: Progressing     Problem: Heart Failure  Goal: Improved gas exchange this shift  Outcome: Progressing  Goal: Improved urinary output this shift  Outcome: Progressing  Goal: Reduction in peripheral edema within 24 hours  Outcome: Progressing  Goal: Report improvement of dyspnea/breathlessness this shift  Outcome: Progressing  Goal: Weight from fluid excess reduced over 2-3 days, then stabilize  Outcome:  Progressing  Goal: Increase self care and/or family involvement in 24 hours  Outcome: Progressing     Problem: Fall/Injury  Goal: Not fall by end of shift  Outcome: Progressing  Goal: Be free from injury by end of the shift  Outcome: Progressing  Goal: Verbalize understanding of personal risk factors for fall in the hospital  Outcome: Progressing  Goal: Verbalize understanding of risk factor reduction measures to prevent injury from fall in the home  Outcome: Progressing  Goal: Use assistive devices by end of the shift  Outcome: Progressing  Goal: Pace activities to prevent fatigue by end of the shift  Outcome: Progressing

## 2025-04-27 NOTE — CARE PLAN
Pt remained table throughout shift, Remained in A.Fib on the cardiac monitor, no complains of pain.     The clinical goals for the shift include pts HR will remain WDL throughout shift      Problem: Pain - Adult  Goal: Verbalizes/displays adequate comfort level or baseline comfort level  Outcome: Progressing     Problem: Safety - Adult  Goal: Free from fall injury  Outcome: Progressing     Problem: Discharge Planning  Goal: Discharge to home or other facility with appropriate resources  Outcome: Progressing     Problem: Chronic Conditions and Co-morbidities  Goal: Patient's chronic conditions and co-morbidity symptoms are monitored and maintained or improved  Outcome: Progressing     Problem: Nutrition  Goal: Nutrient intake appropriate for maintaining nutritional needs  Outcome: Progressing     Problem: Arrythmia/Dysrhythmia  Goal: Lab values return to normal range  Outcome: Progressing  Goal: No evidence of post procedure complications  Outcome: Progressing  Goal: Promote self management  Outcome: Progressing  Goal: Serial ECG will return to baseline  Outcome: Progressing  Goal: Verbalize understanding of procedures/devices  Outcome: Progressing  Goal: Vital signs return to baseline  Outcome: Progressing  Goal: Care and maintenance of device (specify)  Outcome: Progressing     Problem: Skin  Goal: Participates in plan/prevention/treatment measures  Outcome: Progressing  Goal: Prevent/manage excess moisture  Outcome: Progressing  Goal: Prevent/minimize sheer/friction injuries  Outcome: Progressing  Goal: Promote/optimize nutrition  Outcome: Progressing     Problem: Heart Failure  Goal: Improved gas exchange this shift  Outcome: Progressing  Goal: Improved urinary output this shift  Outcome: Progressing  Goal: Reduction in peripheral edema within 24 hours  Outcome: Progressing  Goal: Report improvement of dyspnea/breathlessness this shift  Outcome: Progressing  Goal: Weight from fluid excess reduced over 2-3 days,  then stabilize  Outcome: Progressing  Goal: Increase self care and/or family involvement in 24 hours  Outcome: Progressing     Problem: Fall/Injury  Goal: Not fall by end of shift  Outcome: Progressing  Goal: Be free from injury by end of the shift  Outcome: Progressing  Goal: Verbalize understanding of personal risk factors for fall in the hospital  Outcome: Progressing  Goal: Verbalize understanding of risk factor reduction measures to prevent injury from fall in the home  Outcome: Progressing  Goal: Use assistive devices by end of the shift  Outcome: Progressing  Goal: Pace activities to prevent fatigue by end of the shift  Outcome: Progressing

## 2025-04-27 NOTE — ASSESSMENT & PLAN NOTE
Continue current management  Defer additional recommendation to cardiology team  Not currently in exacerbation

## 2025-04-27 NOTE — PROGRESS NOTES
Quentin Coppola is a 89 y.o. female on day 5 of admission presenting with Sepsis (Multi).      Subjective   Mrs. Coppola is aware that her heart rate is not well controlled currently and digoxin is being added for better rate control.   She denied fever and chills. No chest pain. No nausea or vomiting. No diarrhea       Objective     Last Recorded Vitals  /86 (BP Location: Left arm, Patient Position: Lying)   Pulse (!) 124   Temp 36.3 °C (97.3 °F) (Temporal)   Resp 18   Wt 55.9 kg (123 lb 3.8 oz)   SpO2 100%   Intake/Output last 3 Shifts:    Intake/Output Summary (Last 24 hours) at 4/27/2025 1314  Last data filed at 4/27/2025 0642  Gross per 24 hour   Intake 1952.83 ml   Output --   Net 1952.83 ml       Admission Weight  Weight: 55.8 kg (123 lb) (04/22/25 1509)    Daily Weight  04/27/25 : 55.9 kg (123 lb 3.8 oz)    Image Results  Transthoracic Echo (TTE) [place] [address]       Phone [phone] Fax [phone]    TRANSTHORACIC ECHOCARDIOGRAM REPORT    Patient Name:       QUENTIN COPPOLA   Reading Physician:    [id number] Tomy Issa DO  Study Date:         4/24/2025            Ordering Provider:    [id number] SOL FRANKS  MRN/PID:            [id number]             Fellow:  Accession#:         [id number]         Nurse:  Date of Birth/Age:  6/5/1928 / 89 years  Sonographer:          Floresita Doyle RDCS  Gender Assigned at  F                    Additional Staff:  Birth:  Height:             157.48 cm            Admit Date:           4/22/2025  Weight:             58.97 kg             Admission Status:     Inpatient -                                                                 Routine  BSA / BMI:          1.59 m2 / 23.78      Department Location:  [facility]                      kg/m2  Blood Pressure: 115 /83 mmHg    Study Type:    TRANSTHORACIC ECHO (TTE)  LIMITED  Diagnosis/ICD: Bacteremia-R78.81  Indication:    Bacteremia  CPT Codes:     Echo Limited-13233   Study Detail: The following Echo studies were performed: 2D, Doppler and color                flow.       PHYSICIAN INTERPRETATION:  Left Ventricle: There are no regional wall motion abnormalities. The left ventricular cavity size was not assessed. Left ventricular diastolic filling was not assessed.  Left Atrium: The left atrial size was not assessed.  Right Ventricle: The right ventricle was not assessed. Right ventricular systolic function not assessed.  Right Atrium: The right atrial size was not assessed.  Aortic Valve: There is no visualized aortic valve vegetation. The aortic valve is probably trileaflet. There is mild aortic valve cusp calcification. There is no evidence of aortic valve regurgitation.  Mitral Valve: The mitral valve is mildly thickened. There was no mitral valve vegetation visualized. There is mild to moderate mitral annular calcification. There is mild mitral valve regurgitation.  Tricuspid Valve: No vegetation is seen on the tricuspid valve. The tricuspid valve is structurally normal. There is mild tricuspid regurgitation.  Pulmonic Valve: No pulmonic valve vegetation visualized. The pulmonic valve is structurally normal. There is trace to mild pulmonic valve regurgitation.  Pericardium: Pericardial effusion was not assessed.  Aorta: The aortic root was not assessed.       CONCLUSIONS:   1. No mitral valve vegetation visualized.   2. Mild mitral valve regurgitation.   3. Mild tricuspid regurgitation is visualized.   4. No tricuspid valve vegetation.   5. No aortic valve vegetation visualized.   6. No pulmonic valve vegetation visualized.    QUANTITATIVE DATA SUMMARY:     TRICUSPID VALVE/RVSP:          Normal Ranges:  Peak TR Velocity:     2.79 m/s  RV Syst Pressure:     34 mmHg  (< 30mmHg)       [id number] Tomy Issa DO  Electronically signed on 4/24/2025 at 4:20:36 PM       ** Final  **      Physical Exam  Constitutional:       Appearance: Normal appearance.   HENT:      Head: Normocephalic and atraumatic.      Nose: Nose normal.   Eyes:      Extraocular Movements: Extraocular movements intact.      Pupils: Pupils are equal, round, and reactive to light.   Cardiovascular:      Rate and Rhythm: Tachycardia present. Rhythm irregular.      Pulses: Normal pulses.      Heart sounds: Normal heart sounds.   Pulmonary:      Effort: Pulmonary effort is normal.      Breath sounds: Normal breath sounds.   Abdominal:      General: Bowel sounds are normal.      Palpations: Abdomen is soft.   Musculoskeletal:         General: Normal range of motion.   Skin:     General: Skin is warm.   Neurological:      General: No focal deficit present.      Mental Status: She is alert and oriented to person, place, and time.   Psychiatric:         Mood and Affect: Mood normal.         Behavior: Behavior normal.         Relevant Results                              Assessment & Plan  Sepsis (Multi)  Abx per ID recommendation  Currently on Cefazolin  Repeat culture is negative and no vegetation on Echo    Abdominal pain, unspecified abdominal location  resolved  Acute on chronic diastolic CHF (congestive heart failure)  Continue current management  Defer additional recommendation to cardiology team  Not currently in exacerbation    Stage 4 chronic kidney disease (Multi)  Renal function continues to improve.  Creatinine currently at 1.3  Continue to monitor  Rapid atrial fibrillation (Multi)  HR still not controlled  Digoxin initiated.   Can stop heparin drip  INR is therapeutic currently  Gastroesophageal reflux disease with esophagitis  On PPI  Calculus of gallbladder without cholecystitis without obstruction  Monitor LFTs      Hypothyroidism: continue synthroid.    Loose stool:  may be abx related. If continues will get c. diff      Victor Hugo Almaraz MD

## 2025-04-27 NOTE — PROGRESS NOTES
Pharmacy Consult for Warfarin (Coumadin) Management - Daily Progress Note     Labs  INR External   Date Value Ref Range Status   04/10/2025 2.00 2.00 - 3.00 Final   04/03/2025 1.90 (A) 2.00 - 3.00 Final   03/21/2025 2.30 2.00 - 3.00 Final     INR   Date Value Ref Range Status   04/27/2025 2.2 (H) 0.9 - 1.1 Final   04/26/2025 1.9 (H) 0.9 - 1.1 Final   04/25/2025 1.9 (H) 0.9 - 1.1 Final     Review  Warfarin Indication: Atrial fibrillation or flutter  Target INR: 2 - 3     INR 2.2, pt. Still in active a-fib; on ancef 2g q12h; will continue heparin drip x24h, ok'd to discontinue if INR remains above 2 on next lab.    Orders placed per pharmacy consult. Pharmacy will continue to monitor and adjust therapy as needed.   Aldair Ibarra, PharmD

## 2025-04-27 NOTE — PROGRESS NOTES
[facility]  Patient Name: Jennifer Magana  Patient : 1928  Room/Bed: -A    HPI:  Jennifer Magana is a 89 y.o. female admitted on 25 with abdominal/suprapubic pain.  She has a history of hypertension, hypothyroidism, chronic atrial fibrillation on warfarin, chronic diastolic CHF.  She is currently admitted and being treated for sepsis.  We are consulted for atrial fibrillation.     Her initial labwork through the ED was significant for BUN 27, Cr 1.93, lactate 2.1, HSTI 14 > 15, WBC 25.4k.  Noncontrast CT chest/abdomen/pelvis was remarkable for acute CHF (pulm edema, small pleural effusions), pancreatic tail mild peripancreatic fat stranding, perivesical fat stranding, cholelithiasis without convincing evidence of acute cholecystitis, fluid in the esophagus compatible with GERD.  Patient received 2 L NS IV fluid bolus per sepsis 30 mL/kg IV fluid bolus protocol, as well as IV Zosyn.       On presentation she was in Afib with RVR up to rate of 140's despite IVF bolus and lopressor 5mg IV x1.  She was then initiated on amiodarone infusion.  The patient tells me she has chronic Afib and is on rate control with metoprolol succinate XL 25mg daily, with jantoven for CVA prophylaxis - tells me her INR's have been in the therapeutic range and are managed by her PCP at [facility].     Currently she is on amiodarone infusion 0.5mg/min, with resting HR's ranging in the 's.  She is not requiring pressors.  She denies any current chest pain/pressure, palps, LH/dizziness, and appears unaware of the Afib.    Allergies:  Patient has no known allergies.    Subjective Data:  25: Denies any chest pain/pressure, SOB or palpitations.  Palliative care NP at bedside as well.  AF on telemetry with HR  bpm.  25: Daughter Sandip at bedside. Denies any chest pain/pressure, SOB or palpitations. +fatigue. Up on BSC.  AF on telemetry with 's.   25:  Family at bedside.  No CP/pressure.  Has mild JACOB when getting OOB.  Breathing unlabored on room air at rest.  Monitor; Afib with variable HR's.  BP has been elevated.  K 4.3, creatinine 1.69, INR 1.9. Remains on Heparin gtt for bridging until INR 2 or greater. Pharmacy managing warfarin dose.  4-27-25: Family at bedside.  No CP/pressure.  + for JACOB but stable at rest.  Moved to SDU last evening.  Remains Afib with RVR, HR's in the 120-130's.  K 3.8, creatinine 1.37, INR 2.2.  I d/w IMS and Pharmacist, OK to d/c Heparin and continue on warfarin.     Overnight Events:  None    Objective Data:  Vitals:    04/26/25 2337 04/27/25 0354 04/27/25 0436 04/27/25 0825   BP: (!) 146/103 140/88  127/71   BP Location: Right arm Right arm  Right arm   Patient Position:  Lying  Sitting   Pulse: (!) 139 (!) 128  (!) 126   Resp: 20 18  18   Temp: 36.3 °C (97.3 °F) 36.2 °C (97.2 °F)  36.2 °C (97.2 °F)   TempSrc: Temporal Temporal  Temporal   SpO2: 97% 95%  98%   Weight:   55.9 kg (123 lb 3.8 oz)    Height:           Reviewed the following Labs:  Results from last 7 days   Lab Units 04/27/25  0415 04/26/25  0403 04/25/25  0555   WBC AUTO x10*3/uL 7.6 7.8 8.0   HEMOGLOBIN g/dL 11.4* 11.9* 11.8*   HEMATOCRIT % 36.9 39.0 37.5   PLATELETS AUTO x10*3/uL 222 221 206       Results from last 7 days   Lab Units 04/27/25  0415 04/26/25  0403 04/25/25  0555 04/24/25  0301 04/23/25  0415 04/22/25  1537   SODIUM mmol/L 140 139 142 137 141 141   POTASSIUM mmol/L 3.8 4.3 3.8 3.7 3.7 4.2   CHLORIDE mmol/L 113* 113* 108* 104 106 103   CO2 mmol/L 22 22 24 19* 25 26   BUN mg/dL 25* 28* 28* 33* 25* 27*   CREATININE mg/dL 1.37* 1.69* 1.89* 2.21* 1.76* 1.93*   CALCIUM mg/dL 9.3 9.2 8.9 8.5* 8.7 10.1   PROTEIN TOTAL g/dL  --   --   --  5.3* 5.6* 7.3   BILIRUBIN TOTAL mg/dL  --   --   --  0.8 0.9 1.2   ALK PHOS U/L  --   --   --  72 79 91   ALT U/L  --   --   --  15 20 13   AST U/L  --   --   --  15 20 16   GLUCOSE mg/dL 108* 125* 97 157* 136* 160*  "      Results from last 7 days   Lab Units 04/27/25  0415 04/26/25  0403 04/25/25  0555   MAGNESIUM mg/dL 2.01 2.07 2.04       No results found for: \"LDLF\"     No results found for: \"BNP\"      No results found for: \"TROPHS\"       No results found for: \"TSH\", \"FT4\"      Results from last 7 days   Lab Units 04/27/25  0415 04/26/25  0403 04/25/25  0555   INR  2.2* 1.9* 1.9*     No results found for: \"HGBA1C\"    Intake/Output    Intake/Output Summary (Last 24 hours) at 4/27/2025 1012  Last data filed at 4/27/2025 0642  Gross per 24 hour   Intake 1952.83 ml   Output --   Net 1952.83 ml      I/O last 2 completed shifts:  In: 1961.1 (35.1 mL/kg) [P.O.:1500; I.V.:161.1 (2.9 mL/kg); IV Piggyback:300]  Out: - (0 mL/kg)   Weight: 55.9 kg     Past Medical History:  She has a past medical history of (HFpEF) heart failure with preserved ejection fraction, Aortic stenosis, CKD (chronic kidney disease), Essential (primary) hypertension, GERD (gastroesophageal reflux disease), Hypothyroidism, and Paroxysmal atrial fibrillation (Multi).    Past Surgical History:  She has a past surgical history that includes MR angio head wo IV contrast (07/21/2020); MR angio neck wo IV contrast (07/21/2020); and Hysterectomy.      Social History:  She reports that she has never smoked. She has never used smokeless tobacco. She reports that she does not drink alcohol and does not use drugs.    Family History:  Family History[1]    Outpatient Medications  Medications Ordered Prior to Encounter[2]    Scheduled medications  Scheduled Medications[3]  Continuous medications  Continuous Medications[4]  PRN medications  PRN Medications[5]   Prescriptions Prior to Admission[6]    Reviewed the following cardiology tests:    Echo 3/31/25:   1. The left ventricular systolic function is mildly decreased, with a visually estimated ejection fraction of 45-50%.   2. No regional wall motion abnormalities.   3. Spectral Doppler shows a a Grade III (restrictive " "pattern) of left ventricular diastolic filling with an elevated left atrial pressure.   4. Left ventricular cavity size is decreased.   5. There is normal right ventricular global systolic function.   6. The left atrial size is moderate to severely dilated.   7. There is moderate mitral annular calcification.   8. Moderate mitral valve regurgitation.   9. Mild to moderate tricuspid regurgitation.  10. Severe aortic valve stenosis.  11. Mild aortic valve regurgitation.  12. The inferior vena cava appears normal in size, with IVC inspiratory collapse greater than 50%.  13. There is severely increased septal and severely increased posterior left ventricular wall thickness.  EF   Date/Time Value Ref Range Status   03/31/2025 02:35 PM 48 %    01/04/2024 02:26 PM 54          Physical Exam:  Vitals reviewed.   Constitutional:       Appearance: Not in distress.   Neck:      Vascular: No carotid bruit.   Pulmonary:      Effort: Pulmonary effort is normal.      Breath sounds: Normal breath sounds.   Cardiovascular:      PMI at left midclavicular line. Tachycardia present. Irregularly irregular rhythm. S1 with normal intensity. S2 with normal intensity.       Murmurs: There is a systolic murmur.   Edema:     Peripheral edema absent.   Abdominal:      General: Bowel sounds are normal.   Neurological:      Mental Status: Alert and oriented to person, place and time.       Assessment/Plan:   Afib with RVR:  she has chronic Afib and is normally rate controlled with BB.  Presentation of Afib with RVR in setting of sepsis and bacteremia.  She was initiated on amiodarone infusion in ED for rate control (of note, upon chart review, her INR's have been in the range of 1.9 - 2.0 /2.3 since 3/29/25).  -tele monitoring  -at this point would continue amiodarone for rate control purposes to achieve HR <=110  -reinitiate metop succinate XL 25mg daily when BP improves  -continue jantoven, check daily INR\"s - if she becomes subtherapeutic, " start heparin infusion  -keep K ~ 4, Mg ~ 2  4/24/25: AF on telemetry with HR  bpm.  INR 2.1 and is on warfarin.  Around 4am BP improved to 120/80's. If BP remains stable, recommend restarting metoprolol succinate 25 mg daily.  Continue on Amiodarone infusion.    4/25/25:  AF on telemetry with 's.  INR 1.9 and secure chat message sent to pharmacist managing anticoagulation.  Recommend heparin infusion while INR is subtherapeutic. Stop heparin infusion once INR 2.0 or greater. BP stable.  Increase metoprolol succinate 25 mg BID. Discussed with Dr. Dedrick Gutierres and will stop amiodarone infusion.  4-26-25: Remains in Afib with VR's variable but generally 100-120s.  Off of amiodarone.  Will increase BB further as BP is also elevated, hold parameters added.  Continue on heparin gtt until INR 2.0 or greater.  Pharmacy managing warfarin dosing.   4-27-25: Afib on tele and HR's have been higher today.  She is not drinking much so I recommended that she increase her intake some.  Will also gently add some digoxin for now and monitor response.  Continue on metoprolol 50 mg 2 times per day.      Sepsis:  unclear source, suspect urinary source vs ? Cholecystitis  -continue Abx (zosyn, doxy) per primary service  -follow cultures  4/24/25: Blood culture positive for gram positive cocci.  Management per ID.     Acute on chronic combined systolic and diastolic HF:  TTE 3/25 with LVEF 45-50% and grade III DD.  Currently favoring fluid administration due to sepsis with low BP's.  -serial volume monitoring  -diurese if appropriate (see below)  4/24/25:  She does not appear volume overloaded on exam. Around 4am BP improved to 120/80's. If BP remains stable, recommend restarting metoprolol succinate 25 mg daily.  4/25/25: BP stable.  Increasing metoprolol succinate 25 mg BID for further HR control. Daughter, Goldy, states she was taking metoprolol succinate 50 mg daily at home.  4-26-25: Looks compensated.  Will continue on  metoprolol succinate but will increase dose for better  HR/BP control.   4-27-25: Continue on metoprolol succinate, add digoxin.       Severe aortic stenosis, moderate mitral regurgitation:  Per recent prior cardiology notes from last admission, with progression of aortic stenosis plan was for outpatient follow up and conservative management given her advanced age (see cardio note 4/2/25) - per this note, patient's family was udpated  -would avoid overdiuresis in setting of her severe aortic stenosis  4-26-25: Could use better HR/BP control.  Family met with Palliative care and are planning on Palliative vs Hospice care at home on d/c.  4-27-25: Rate control continues to be an issue, add low dose digoxin.      Dispo:   Add low dose digoxin  Stop Heparin  Continue on Warfarin, pharmacist managing with INR goal 2.0 to 3.0   Family met with Palliative care and are planning on Palliative vs Hospice care at home on d/c.    Code Status  DNR and No Intubation      Keshia Mansfield, APRN-CNP          [1]   Family History  Problem Relation Name Age of Onset    Other (Half sister with CAD) Sister      Coronary artery disease Other Children    [2]   No current facility-administered medications on file prior to encounter.     Current Outpatient Medications on File Prior to Encounter   Medication Sig Dispense Refill    doxycycline (Vibramycin) 100 mg capsule Take 1 capsule (100 mg) by mouth 2 times a day for 10 days. Take with at least 8 ounces (large glass) of water, do not lie down for 30 minutes after 20 capsule 0    famotidine (Pepcid) 20 mg tablet Take 0.5 tablets (10 mg) by mouth once daily. Start on April 4th 30 tablet 1    furosemide (Lasix) 20 mg tablet Take 1 tablet (20 mg) by mouth once daily for 3 days. (Patient not taking: Reported on 4/23/2025) 3 tablet 0    Jantoven 2.5 mg tablet Take 1.25mg (0.5 tablets) by mouth on Tues,Thurs and 2.5mg (1 tablet) all other days or as directed by Coumadin clinic. 72 tablet 3     levothyroxine (Synthroid, Levoxyl) 100 mcg tablet Take 1 tablet (100 mcg) by mouth once daily.      metoprolol succinate XL (Toprol-XL) 25 mg 24 hr tablet Take 2 tablets (50 mg) by mouth once daily. 60 tablet 1    pantoprazole (ProtoNix) 40 mg EC tablet Take 1 tablet (40 mg) by mouth once daily in the morning. Take before meals. 90 tablet 3    Simbrinza 1-0.2 % drops,suspension ophthalmic suspension Administer 1 drop into both eyes 2 times a day.      travoprost (Travatan Z) 0.004 % drops ophthalmic solution Administer 1 drop into both eyes once daily at bedtime.      [DISCONTINUED] dicyclomine (Bentyl) 20 mg tablet Take 1 tablet (20 mg) by mouth 4 times a day before meals.     [3] ceFAZolin, 2 g, intravenous, q12h  latanoprost, 1 drop, Both Eyes, Nightly  levothyroxine, 100 mcg, oral, Daily  metoprolol succinate XL, 50 mg, oral, BID  pantoprazole, 40 mg, oral, Daily before breakfast   Or  pantoprazole, 40 mg, intravenous, Daily before breakfast  polyethylene glycol, 17 g, oral, Daily  warfarin, 2.5 mg, oral, Once     [4] heparin, 0-4,000 Units/hr, Last Rate: 700 Units/hr (04/27/25 0550)     [5] PRN medications: acetaminophen, bisacodyl, guaiFENesin, heparin, melatonin, morphine, ondansetron **OR** ondansetron  [6]   Medications Prior to Admission   Medication Sig Dispense Refill Last Dose/Taking    doxycycline (Vibramycin) 100 mg capsule Take 1 capsule (100 mg) by mouth 2 times a day for 10 days. Take with at least 8 ounces (large glass) of water, do not lie down for 30 minutes after 20 capsule 0     famotidine (Pepcid) 20 mg tablet Take 0.5 tablets (10 mg) by mouth once daily. Start on April 4th 30 tablet 1     furosemide (Lasix) 20 mg tablet Take 1 tablet (20 mg) by mouth once daily for 3 days. (Patient not taking: Reported on 4/23/2025) 3 tablet 0 Not Taking    Jantoven 2.5 mg tablet Take 1.25mg (0.5 tablets) by mouth on Tues,Thurs and 2.5mg (1 tablet) all other days or as directed by Coumadin clinic. 72 tablet  3     levothyroxine (Synthroid, Levoxyl) 100 mcg tablet Take 1 tablet (100 mcg) by mouth once daily.       metoprolol succinate XL (Toprol-XL) 25 mg 24 hr tablet Take 2 tablets (50 mg) by mouth once daily. 60 tablet 1     pantoprazole (ProtoNix) 40 mg EC tablet Take 1 tablet (40 mg) by mouth once daily in the morning. Take before meals. 90 tablet 3     Simbrinza 1-0.2 % drops,suspension ophthalmic suspension Administer 1 drop into both eyes 2 times a day.       travoprost (Travatan Z) 0.004 % drops ophthalmic solution Administer 1 drop into both eyes once daily at bedtime.

## 2025-04-27 NOTE — PROGRESS NOTES
Jennifer Magana is a 89 y.o. female on day 5 of admission presenting with Sepsis (Multi).      Assessment/Plan:     #Sepsis - POA  #Group B strep bacteremia, unclear source  Unclear source.  Pending repeat blood cultures.  Zosyn, Doxy discontinued and now on cefazolin.  Patient does have a rash under left breast which is not that impressive. TTE negative for any vegetation     #CKD  Estimated Creatinine Clearance: 19 mL/min (A) (by C-G formula based on SCr of 1.37 mg/dL (H)).     CHF  GERD  Gall stone        Recommendations:  -Cont ancef 2gm q12h  -Renally dose abx  -Will continue to follow    NOTE: Can switch to Keflex 1 g every 12 hour through 5/5/2025 to finish 2-week course      Codi De La Fuente, RHONDA  [phone]  4/27/2025  9:25 AM       I personally saw and evaluated the patient. I reviewed the NP Hx, exam and MDM and I agree with the NP assessment and plan unless otherwise addended above.     Sukhwinder Tam MD  [phone]  4/27/2025        Subjective   Interval History:  OOB in chair. Afib with RVR this am. On heparin drip.     Review of Systems  Denies: fever, chills, nausea, vomiting, dysuria    Objective   Range of Vitals (last 24 hours)  Heart Rate:  []   Temp:  [36.2 °C (97.1 °F)-36.3 °C (97.3 °F)]   Resp:  [6-24]   BP: (125-165)/()   Weight:  [55.9 kg (123 lb 3.8 oz)]   SpO2:  [95 %-100 %]  Daily Weight  04/27/25 : 55.9 kg (123 lb 3.8 oz)   Body mass index is 22.54 kg/m².    General: alert, oriented, NAD, OOB in chair  HEENT: No conjunctival pallor, no scleral icterus  Abdomen: soft, non tender, non distended, BS+  Heart: Tachy and irregular  Neuro: AAO x 3  Extremities: no edema, no cyanosis  Skin: Improving rash under left breast as per patient  MSK: No joint inflammation    Antibiotics  ceFAZolin - 2 gram/50 mL  doxycycline - 100 mg      Relevant Results  Labs  Results from last 72 hours   Lab Units 04/27/25  0415 04/26/25  0403 04/25/25  0555   WBC AUTO x10*3/uL 7.6 7.8 8.0  "  HEMOGLOBIN g/dL 11.4* 11.9* 11.8*   HEMATOCRIT % 36.9 39.0 37.5   PLATELETS AUTO x10*3/uL 222 221 206     Results from last 72 hours   Lab Units 04/27/25  0415 04/26/25  0403 04/25/25  0555   SODIUM mmol/L 140 139 142   POTASSIUM mmol/L 3.8 4.3 3.8   CHLORIDE mmol/L 113* 113* 108*   CO2 mmol/L 22 22 24   BUN mg/dL 25* 28* 28*   CREATININE mg/dL 1.37* 1.69* 1.89*   GLUCOSE mg/dL 108* 125* 97   CALCIUM mg/dL 9.3 9.2 8.9   ANION GAP mmol/L 9* 8* 14   EGFR mL/min/1.73m*2 35* 28* 24*   PHOSPHORUS mg/dL 2.5 2.6 2.9           Estimated Creatinine Clearance: 19 mL/min (A) (by C-G formula based on SCr of 1.37 mg/dL (H)).  No results found for: \"CRP\"    Microbiology  Susceptibility data from last 14 days.  Collected Specimen Info Organism Clindamycin Erythromycin Penicillin Tetracycline   04/22/25 Blood culture from Peripheral Venipuncture Streptococcus agalactiae (Group B Streptococcus)       04/22/25 Blood culture from Peripheral Venipuncture Streptococcus agalactiae (Group B Streptococcus)  R  R  S  R       Imaging    XR abdomen 3+ views  Result Date: 4/23/2025  1.   Nonspecific nonobstructive bowel gas pattern.   MACRO: None   Signed by: Salma Garsia 4/23/2025 7:33 PM Dictation workstation:   [id number]    CT chest abdomen pelvis wo IV contrast  Result Date: 4/22/2025  Interstitial prominence with smooth interlobular septal thickening suggests acute pulmonary interstitial edema/CHF. Dependent lower lung airspace opacities with associated parenchymal volume loss most likely relate to atelectasis. Small pleural effusions. No pneumothorax.   Mild peripancreatic fat stranding predominating about the tail (series 2, images ), suggesting possible acute interstitial pancreatitis versus retroperitoneal edema secondary to volume overload. Suggest correlation with pancreatic enzymes. No focal fluid collection.   Urinary bladder is decompressed, not well evaluated. Perivesical fat stranding is concerning for acute cystitis " although some of this appearance could be technical in etiology. Suggest clinical correlation and correlation with urinalysis.   Hepatomegaly.   Suspected cholelithiasis without convincing evidence of acute cholecystitis.   Cardiomegaly, with disproportionate atrial distension. Mitral annular and aortic root calcifications. Atherosclerosis, including coronary artery disease.   Fluid in the esophagus compatible with gastroesophageal reflux. No definite abnormal esophageal mural thickening.   Additional findings as discussed above.   MACRO: None   Signed by: Merrick Johnson 4/22/2025 6:34 PM Dictation workstation:   [id number]    XR chest 1 view  Result Date: 4/22/2025  1.  Similar appearance of bibasilar airspace opacities prior exam, which may represent atelectasis versus developing infiltrates with layering pleural effusions not excluded bilaterally.       MACRO: None   Signed by: Jose Garcia 4/22/2025 4:02 PM Dictation workstation:   [id number]    XR chest 2 views  Result Date: 4/16/2025  Diffuse interstitial infiltrates, bilateral pleural effusions and bibasilar airspace consolidations, as above. Clinical correlation and continued follow-up until clearing is recommended.   MACRO: None.   Signed by: Brijesh Puri 4/16/2025 6:11 PM Dictation workstation:   [id number]    XR chest 2 views  Result Date: 3/29/2025  Diffuse interstitial infiltrates bilaterally, as above. Clinical correlation and continued follow-up until clearing is recommended.   MACRO: None.   Signed by: Brijesh Puri 3/29/2025 1:36 PM Dictation workstation:   [id number]

## 2025-04-27 NOTE — ASSESSMENT & PLAN NOTE
Abx per ID recommendation  Currently on Cefazolin  Repeat culture is negative and no vegetation on Echo

## 2025-04-28 VITALS
SYSTOLIC BLOOD PRESSURE: 137 MMHG | HEIGHT: 62 IN | DIASTOLIC BLOOD PRESSURE: 77 MMHG | HEART RATE: 86 BPM | TEMPERATURE: 95.5 F | WEIGHT: 123.24 LBS | OXYGEN SATURATION: 94 % | BODY MASS INDEX: 22.68 KG/M2 | RESPIRATION RATE: 16 BRPM

## 2025-04-28 LAB
INR PPP: 2.6 (ref 0.9–1.1)
PROTHROMBIN TIME: 28.9 SECONDS (ref 9.8–12.4)

## 2025-04-28 PROCEDURE — 2500000002 HC RX 250 W HCPCS SELF ADMINISTERED DRUGS (ALT 637 FOR MEDICARE OP, ALT 636 FOR OP/ED): Performed by: STUDENT IN AN ORGANIZED HEALTH CARE EDUCATION/TRAINING PROGRAM

## 2025-04-28 PROCEDURE — 36415 COLL VENOUS BLD VENIPUNCTURE: CPT | Performed by: STUDENT IN AN ORGANIZED HEALTH CARE EDUCATION/TRAINING PROGRAM

## 2025-04-28 PROCEDURE — 2500000001 HC RX 250 WO HCPCS SELF ADMINISTERED DRUGS (ALT 637 FOR MEDICARE OP): Performed by: STUDENT IN AN ORGANIZED HEALTH CARE EDUCATION/TRAINING PROGRAM

## 2025-04-28 PROCEDURE — 99239 HOSP IP/OBS DSCHRG MGMT >30: CPT | Performed by: INTERNAL MEDICINE

## 2025-04-28 PROCEDURE — 2500000001 HC RX 250 WO HCPCS SELF ADMINISTERED DRUGS (ALT 637 FOR MEDICARE OP): Performed by: NURSE PRACTITIONER

## 2025-04-28 PROCEDURE — 2500000004 HC RX 250 GENERAL PHARMACY W/ HCPCS (ALT 636 FOR OP/ED): Mod: JZ | Performed by: STUDENT IN AN ORGANIZED HEALTH CARE EDUCATION/TRAINING PROGRAM

## 2025-04-28 PROCEDURE — 85610 PROTHROMBIN TIME: CPT | Performed by: STUDENT IN AN ORGANIZED HEALTH CARE EDUCATION/TRAINING PROGRAM

## 2025-04-28 PROCEDURE — 99232 SBSQ HOSP IP/OBS MODERATE 35: CPT | Performed by: NURSE PRACTITIONER

## 2025-04-28 RX ORDER — DIGOXIN 125 MCG
62.5 TABLET ORAL DAILY
Status: DISCONTINUED | OUTPATIENT
Start: 2025-04-28 | End: 2025-04-28 | Stop reason: HOSPADM

## 2025-04-28 RX ORDER — METOPROLOL SUCCINATE 50 MG/1
50 TABLET, EXTENDED RELEASE ORAL 2 TIMES DAILY
Qty: 60 TABLET | Refills: 1 | Status: SHIPPED | OUTPATIENT
Start: 2025-04-28

## 2025-04-28 RX ORDER — WARFARIN 2.5 MG/1
2.5 TABLET ORAL ONCE
Status: DISCONTINUED | OUTPATIENT
Start: 2025-04-28 | End: 2025-04-28 | Stop reason: HOSPADM

## 2025-04-28 RX ORDER — DIGOXIN 0.06 MG/1
62.5 TABLET ORAL DAILY
Qty: 30 TABLET | Refills: 1 | Status: SHIPPED | OUTPATIENT
Start: 2025-04-29

## 2025-04-28 RX ORDER — CEPHALEXIN 500 MG/1
1000 CAPSULE ORAL 2 TIMES DAILY
Qty: 28 CAPSULE | Refills: 0 | Status: SHIPPED | OUTPATIENT
Start: 2025-04-28 | End: 2025-05-05

## 2025-04-28 RX ADMIN — PANTOPRAZOLE SODIUM 40 MG: 40 TABLET, DELAYED RELEASE ORAL at 06:15

## 2025-04-28 RX ADMIN — LEVOTHYROXINE SODIUM 100 MCG: 0.1 TABLET ORAL at 06:15

## 2025-04-28 RX ADMIN — DIGOXIN 62.5 MCG: 125 TABLET ORAL at 09:51

## 2025-04-28 RX ADMIN — CEFAZOLIN SODIUM 2 G: 2 SOLUTION INTRAVENOUS at 06:15

## 2025-04-28 RX ADMIN — METOPROLOL SUCCINATE 50 MG: 50 TABLET, EXTENDED RELEASE ORAL at 08:43

## 2025-04-28 ASSESSMENT — ENCOUNTER SYMPTOMS
FATIGUE: 1
WEAKNESS: 1
APPETITE CHANGE: 1
ARTHRALGIAS: 0

## 2025-04-28 ASSESSMENT — PAIN SCALES - GENERAL
PAINLEVEL_OUTOF10: 0 - NO PAIN
PAINLEVEL_OUTOF10: 0 - NO PAIN

## 2025-04-28 NOTE — DISCHARGE SUMMARY
Discharge Diagnosis  Sepsis (Multi)  Atrial fibrillation: Paroxysmal  Acute on chronic Kidney disease stage 3  Hypertension  Acute on chronic congestive heart failure with diastolic dysfunction  GERD  Hypothyroidism  Debility/deconditioning    Issues Requiring Follow-Up      Discharge Meds     Medication List      START taking these medications     cephalexin 500 mg capsule; Commonly known as: Keflex; Take 2 capsules   (1,000 mg) by mouth 2 times a day for 7 days.   digoxin 62.5 MCG tablet; Commonly known as: Lanoxin; Take 1 tablet (62.5   mcg) by mouth once daily.; Start taking on: April 29, 2025     CHANGE how you take these medications     metoprolol succinate XL 50 mg 24 hr tablet; Commonly known as:   Toprol-XL; Take 1 tablet (50 mg) by mouth 2 times a day. Do not crush or   chew.; What changed: medication strength, when to take this, additional   instructions     CONTINUE taking these medications     famotidine 20 mg tablet; Commonly known as: Pepcid; Take 0.5 tablets (10   mg) by mouth once daily. Start on April 4th Jantoven 2.5 mg tablet; Generic drug: warfarin; Take as directed. If you   are unsure how to take this medication, talk to your nurse or doctor.;   Original instructions: Take 1.25mg (0.5 tablets) by mouth on Tues,Thurs   and 2.5mg (1 tablet) all other days or as directed by Coumadin clinic.   levothyroxine 100 mcg tablet; Commonly known as: Synthroid, Levoxyl   pantoprazole 40 mg EC tablet; Commonly known as: ProtoNix; Take 1 tablet   (40 mg) by mouth once daily in the morning. Take before meals.   Simbrinza 1-0.2 % drops,suspension ophthalmic suspension; Generic drug:   brinzolamide-brimonidine   travoprost 0.004 % drops ophthalmic solution; Commonly known as:   Travatan Z     STOP taking these medications     doxycycline 100 mg capsule; Commonly known as: Vibramycin   furosemide 20 mg tablet; Commonly known as: Lasix       Test Results Pending At Discharge  Pending Labs       Order Current  "Status    Blood Culture Preliminary result    Blood Culture Preliminary result            Hospital Course  Mrs. Magana presented to the hospital with abdominal pain, nausea and vomiting, she was found to have UTI and sepsis. Additional work up showed bacteremia, which grew group B strep. Abx was tailored to sensitivity. She was discharged to home with 1 Gm of Keflex BID for additional 7 days.   She was noted to be in A. Fib with rvr. She was initially treated with amiodarone drip without success. Her metoprolol dose was increased to 50 mg XL BID and heart rate remains poorly controlled. Digoxin was added and Heart rate was better controlled at the time of discharge.   INR was subtherapeutic on admission and was bridged with heparin until therapeutic range is achieved      Visit Vitals  /77 (BP Location: Left arm, Patient Position: Lying)   Pulse 86   Temp 35.3 °C (95.5 °F) (Temporal)   Resp 15   Ht 1.575 m (5' 2\")   Wt 55.9 kg (123 lb 3.8 oz)   SpO2 94%   BMI 22.54 kg/m²   Smoking Status Never   BSA 1.56 m²       Pertinent Physical Exam At Time of Discharge  Physical Exam  Constitutional:       Appearance: Normal appearance.   HENT:      Head: Normocephalic and atraumatic.      Nose: Nose normal.   Eyes:      Extraocular Movements: Extraocular movements intact.      Pupils: Pupils are equal, round, and reactive to light.   Cardiovascular:      Rate and Rhythm: Normal rate and regular rhythm.      Pulses: Normal pulses.      Heart sounds: Normal heart sounds.   Pulmonary:      Effort: Pulmonary effort is normal.      Breath sounds: Normal breath sounds.   Abdominal:      General: Bowel sounds are normal.      Palpations: Abdomen is soft.   Musculoskeletal:         General: Normal range of motion.   Neurological:      Mental Status: She is alert and oriented to person, place, and time.   Psychiatric:         Mood and Affect: Mood normal.         Behavior: Behavior normal.         Outpatient Follow-Up  Future " Appointments   Date Time Provider Department Center   5/6/2025 10:00 AM POR ECHO 1 PORNIC1 [facility]   5/7/2025  8:00 AM Anton Blank MD [id number] [facility]   5/19/2025 10:00 AM Dedrick Gutierres MD [id number] [facility]     Time spent in discharge of patient is greater than 30 minutes.     Victor Hugo Almaraz MD

## 2025-04-28 NOTE — PROGRESS NOTES
Jennifer Magana is a 89 y.o. female on day 6 of admission presenting with Sepsis (Multi).      Assessment/Plan:     #Sepsis - POA  #Group B strep bacteremia, unclear source  Unclear source.  Pending repeat blood cultures.  Zosyn, Doxy discontinued and now on cefazolin.  Patient does have a rash under left breast which is not that impressive. TTE negative for any vegetation     #CKD  Estimated Creatinine Clearance: 19 mL/min (A) (by C-G formula based on SCr of 1.37 mg/dL (H)).     CHF  GERD  Gall stone        Recommendations:  -Cont ancef 2gm q12h  -Renally dose abx  -Will continue to follow    NOTE: Can switch to Keflex 1 g every 12 hour through 5/5/2025 to finish 2-week course      Sukhwinder Tam MD  [phone]  4/28/2025        Subjective   Interval History:  OOB in chair. Afib with RVR this am. On heparin drip.     Review of Systems  Denies: fever, chills, nausea, vomiting, dysuria    Objective   Range of Vitals (last 24 hours)  Heart Rate:  []   Temp:  [35.3 °C (95.5 °F)-36.2 °C (97.2 °F)]   Resp:  [15-16]   BP: (125-137)/(77-86)   SpO2:  [94 %-98 %]  Daily Weight  04/27/25 : 55.9 kg (123 lb 3.8 oz)   Body mass index is 22.54 kg/m².    General: alert, oriented, NAD, OOB in chair  HEENT: No conjunctival pallor, no scleral icterus  Abdomen: soft, non tender, non distended, BS+  Heart: Tachy and irregular  Neuro: AAO x 3  Extremities: no edema, no cyanosis  Skin: Improving rash under left breast as per patient  MSK: No joint inflammation    Antibiotics  cephalexin - 500 mg      Relevant Results  Labs  Results from last 72 hours   Lab Units 04/27/25  0415 04/26/25  0403   WBC AUTO x10*3/uL 7.6 7.8   HEMOGLOBIN g/dL 11.4* 11.9*   HEMATOCRIT % 36.9 39.0   PLATELETS AUTO x10*3/uL 222 221     Results from last 72 hours   Lab Units 04/27/25  0415 04/26/25  0403   SODIUM mmol/L 140 139   POTASSIUM mmol/L 3.8 4.3   CHLORIDE mmol/L 113* 113*   CO2 mmol/L 22 22   BUN mg/dL 25* 28*   CREATININE mg/dL 1.37* 1.69*  "  GLUCOSE mg/dL 108* 125*   CALCIUM mg/dL 9.3 9.2   ANION GAP mmol/L 9* 8*   EGFR mL/min/1.73m*2 35* 28*   PHOSPHORUS mg/dL 2.5 2.6           Estimated Creatinine Clearance: 19 mL/min (A) (by C-G formula based on SCr of 1.37 mg/dL (H)).  No results found for: \"CRP\"    Microbiology  Susceptibility data from last 14 days.  Collected Specimen Info Organism Clindamycin Erythromycin Penicillin Tetracycline   04/22/25 Blood culture from Peripheral Venipuncture Streptococcus agalactiae (Group B Streptococcus)       04/22/25 Blood culture from Peripheral Venipuncture Streptococcus agalactiae (Group B Streptococcus)  R  R  S  R       Imaging    XR abdomen 3+ views  Result Date: 4/23/2025  1.   Nonspecific nonobstructive bowel gas pattern.   MACRO: None   Signed by: Salma Garsia 4/23/2025 7:33 PM Dictation workstation:   [id number]    CT chest abdomen pelvis wo IV contrast  Result Date: 4/22/2025  Interstitial prominence with smooth interlobular septal thickening suggests acute pulmonary interstitial edema/CHF. Dependent lower lung airspace opacities with associated parenchymal volume loss most likely relate to atelectasis. Small pleural effusions. No pneumothorax.   Mild peripancreatic fat stranding predominating about the tail (series 2, images ), suggesting possible acute interstitial pancreatitis versus retroperitoneal edema secondary to volume overload. Suggest correlation with pancreatic enzymes. No focal fluid collection.   Urinary bladder is decompressed, not well evaluated. Perivesical fat stranding is concerning for acute cystitis although some of this appearance could be technical in etiology. Suggest clinical correlation and correlation with urinalysis.   Hepatomegaly.   Suspected cholelithiasis without convincing evidence of acute cholecystitis.   Cardiomegaly, with disproportionate atrial distension. Mitral annular and aortic root calcifications. Atherosclerosis, including coronary artery disease.   " Fluid in the esophagus compatible with gastroesophageal reflux. No definite abnormal esophageal mural thickening.   Additional findings as discussed above.   MACRO: None   Signed by: Merrick Johnson 4/22/2025 6:34 PM Dictation workstation:   [id number]    XR chest 1 view  Result Date: 4/22/2025  1.  Similar appearance of bibasilar airspace opacities prior exam, which may represent atelectasis versus developing infiltrates with layering pleural effusions not excluded bilaterally.       MACRO: None   Signed by: Jose Garcia 4/22/2025 4:02 PM Dictation workstation:   [id number]    XR chest 2 views  Result Date: 4/16/2025  Diffuse interstitial infiltrates, bilateral pleural effusions and bibasilar airspace consolidations, as above. Clinical correlation and continued follow-up until clearing is recommended.   MACRO: None.   Signed by: Brijesh Puri 4/16/2025 6:11 PM Dictation workstation:   [id number]    XR chest 2 views  Result Date: 3/29/2025  Diffuse interstitial infiltrates bilaterally, as above. Clinical correlation and continued follow-up until clearing is recommended.   MACRO: None.   Signed by: Brijesh Puri 3/29/2025 1:36 PM Dictation workstation:   [id number]

## 2025-04-28 NOTE — PROGRESS NOTES
Pharmacy Consult for Warfarin (Coumadin) Management - Daily Progress Note     Jennifer Magana is a 89 y.o. female admitted for Sepsis (Multi). Pharmacy was consulted for warfarin dosing and monitoring.    Labs  INR External   Date Value Ref Range Status   04/10/2025 2.00 2.00 - 3.00 Final   04/03/2025 1.90 (A) 2.00 - 3.00 Final   03/21/2025 2.30 2.00 - 3.00 Final     INR   Date Value Ref Range Status   04/28/2025 2.6 (H) 0.9 - 1.1 Final   04/27/2025 2.2 (H) 0.9 - 1.1 Final   04/26/2025 1.9 (H) 0.9 - 1.1 Final     Review  Warfarin Indication: Atrial fibrillation or flutter  Target INR: 2 - 3    Home regimen: 1.25mg TTH, 2.5mg all other days     INR at 2.6 today. Patient still on cefazolin. Will give home dose of 2.5mg tonight. Recheck INR tomorrow.     Orders placed per pharmacy consult. Pharmacy will continue to monitor and adjust therapy as needed.     Danika Ocampo, PharmD

## 2025-04-28 NOTE — NURSING NOTE
1130:  Discharge order in place. All discharge instructions printed and reviewed with patient including medications, follow up appointment, diet, and activity. IV and tele removed. Medications delivered. No questions at this time. Patient discharged home with family at this time.    No Repair - Repaired With Adjacent Surgical Defect Text (Leave Blank If You Do Not Want): After obtaining clear surgical margins the defect was repaired concurrently with another surgical defect which was in close approximation.

## 2025-04-28 NOTE — CARE PLAN
Pt remained stable throughout shift, no complains of pain. Pt remained A.Fib on tele. Pt sleeping in bed, bed alarm is on, call light is within reach.     The clinical goals for the shift include pr HR brandin remain within normal limits      Problem: Pain - Adult  Goal: Verbalizes/displays adequate comfort level or baseline comfort level  Outcome: Progressing     Problem: Safety - Adult  Goal: Free from fall injury  Outcome: Progressing     Problem: Discharge Planning  Goal: Discharge to home or other facility with appropriate resources  Outcome: Progressing     Problem: Chronic Conditions and Co-morbidities  Goal: Patient's chronic conditions and co-morbidity symptoms are monitored and maintained or improved  Outcome: Progressing     Problem: Nutrition  Goal: Nutrient intake appropriate for maintaining nutritional needs  Outcome: Progressing     Problem: Arrythmia/Dysrhythmia  Goal: Lab values return to normal range  Outcome: Progressing  Goal: No evidence of post procedure complications  Outcome: Progressing  Goal: Promote self management  Outcome: Progressing  Goal: Serial ECG will return to baseline  Outcome: Progressing  Goal: Verbalize understanding of procedures/devices  Outcome: Progressing  Goal: Vital signs return to baseline  Outcome: Progressing  Goal: Care and maintenance of device (specify)  Outcome: Progressing     Problem: Skin  Goal: Participates in plan/prevention/treatment measures  Outcome: Progressing  Goal: Prevent/manage excess moisture  Outcome: Progressing  Goal: Prevent/minimize sheer/friction injuries  Outcome: Progressing  Goal: Promote/optimize nutrition  Outcome: Progressing     Problem: Heart Failure  Goal: Improved gas exchange this shift  Outcome: Progressing  Goal: Improved urinary output this shift  Outcome: Progressing  Goal: Reduction in peripheral edema within 24 hours  Outcome: Progressing  Goal: Report improvement of dyspnea/breathlessness this shift  Outcome: Progressing  Goal:  Weight from fluid excess reduced over 2-3 days, then stabilize  Outcome: Progressing  Goal: Increase self care and/or family involvement in 24 hours  Outcome: Progressing     Problem: Fall/Injury  Goal: Not fall by end of shift  Outcome: Progressing  Goal: Be free from injury by end of the shift  Outcome: Progressing  Goal: Verbalize understanding of personal risk factors for fall in the hospital  Outcome: Progressing  Goal: Verbalize understanding of risk factor reduction measures to prevent injury from fall in the home  Outcome: Progressing  Goal: Use assistive devices by end of the shift  Outcome: Progressing  Goal: Pace activities to prevent fatigue by end of the shift  Outcome: Progressing

## 2025-04-28 NOTE — DOCUMENTATION CLARIFICATION NOTE
"    PATIENT:               QUENTIN COPPOLA  ACCT #:                  [id number]  MRN:                       [id number]  :                       1928  ADMIT DATE:       2025 3:14 PM  DISCH DATE:  RESPONDING PROVIDER #:        [id number]          PROVIDER RESPONSE TEXT:    Sepsis with cardiac organ dysfunction of A-fib RVR    CDI QUERY TEXT:    Clarification    Instruction:    Based on your assessment of the patient and the clinical information, please provide the requested documentation by clicking on the appropriate radio button and enter any additional information if prompted.    Question: Please further clarify if a relationship exists between the Sepsis and acute organ dysfunction    When answering this query, please exercise your independent professional judgment. The fact that a question is being asked, does not imply that any particular answer is desired or expected.    The patient's clinical indicators include:  Clinical Information: Patient with noted sepsis and suspected UTI with noted a-fib RVR and elevated lactic acid level    Clinical Indicators: Per H and P, \"Sepsis  Markedly elevated WBC count with constitutional symptoms & low BP.  Etiology of sepsis unclear but most likely due to complicated UTI; acute cholecystitis & pancreatitis unlikely given normal LFTs & serum lipase respectively.  Patient admitted to the ICU due to rapid atrial fibrillation (likely sepsis related) requiring amiodarone drip.\"    Per  crit care consult, \"Severe sepsis without septic shock suspect likely 2/2 UTI  - WBC 25.4  - Lactate 2.1>>2.6>>2.0  - SIRS criteria 2/4 met: heart rate and WBC count  - Suspected source: likely complicated UTI, less likely acute cholecystitis or pancreatitis given normal LFTs, lipase, and amylase.  - Organ dysfunction: elevated lactic acid level.\"    Treatment: 1000cc NS bolus, 500cc NS bolus x2, IV doxycycline- active, IV zosyn- active, IV vanco- completed, monitoring " labs/vitals    Risk Factors: 89yof with noted sepsis and suspected UTI with noted a-fib RVR and elevated lactic acid level  Options provided:  -- Sepsis with cardiac organ dysfunction of A-fib RVR  -- Sepsis with metabolic organ dysfunction of elevated lactate level  -- Sepsis with multi-system organ dysfunction of cardiac organ dysfunction with A-fib RVR and metabolic organ dysfunction of elevated lactate level  -- Sepsis with other organ dysfunction, Please specify sepsis associated organ dysfunction below  -- Other - I will add my own diagnosis  -- Refer to Clinical Documentation Reviewer    Query created by: Aziza Daniel on 4/24/2025 1:30 PM      Electronically signed by:  MARICHUY LLAMAS MD 4/28/2025 11:12 AM

## 2025-04-28 NOTE — PROGRESS NOTES
04/28/25 1100   Discharge Planning   Living Arrangements Children   Home or Post Acute Services Community services   Type of Home Care Services Hospice   Expected Discharge Disposition HospiceHome     Pt going home with [facility] today. Attempted to notify daughter Goldy left voicemail. Then called pt room and daughter Floresita answered we discussed transportation and she plans to take the pt home. No transportation arrangements needed. Notified [facility] via careport, they are meeting the family at 1230 at their home. CT to follow. Jessica Alaniz BSN/RN-TCC

## 2025-04-28 NOTE — PROGRESS NOTES
Palliative Care Follow-Up Progress Note    Jennifer Magana is a 89 y.o. female on day 6 of admission presenting from home to the ED 4/22 d/t chest pain, shortness of breath, abdominal pain, poor appetitive, nausea, and vomiting. She was found to have severe sepsis, cholelithiasis wihthout cholecystitis, Afib with RVR, bacteremia, and acute on chronic diastolic heart failure.       Past medical history includes atrial fibrillation on warfarin, chronic diastolic heart failure (EF 45-50%), CKD stage IV, hypertension, severe aortic stenosis, hypothyroidism, glaucoma, and GERD.      Patient lives at home with her daughters Goldy and Floresita. She is independent in her ADL's, utilizes a cane and family perform household duties and fill her pillbox. She has had 2 hospitalizations in the last month.      4/25/2025: Family meeting with patient and 4 daughters Floresita Winslow, Hortencia, and Oralia. Discussed PMH, baseline functional level and HPI. Discussed hospice vs outpatient palliative care. Patient and family would like to speak to [facility] regarding both options, order placed. Answered questions and provided support and education.    4/28/2025: Patient seen and examined, daughter Floresita at the bedside. Plan remains to discharge home with [facility] hospice. Cardiology/outpatient anticoagulation clinic will continue to follow home INR's per RHONDA Patterson.     Review of Systems   Constitutional:  Positive for appetite change and fatigue.   Musculoskeletal:  Negative for arthralgias.   Neurological:  Positive for weakness.       Physical Exam  HENT:      Head: Normocephalic.      Nose: Nose normal.      Mouth/Throat:      Mouth: Mucous membranes are moist.   Eyes:      General: No scleral icterus.  Cardiovascular:      Rate and Rhythm: Normal rate.   Pulmonary:      Effort: No respiratory distress.   Neurological:      Mental Status: She is alert and oriented to person, place, and time.       Plan     Interventions for symptom  management:   Bowels: no constipation reported, stop miralax   Expectations of treatment/Goals of care: home with hospice  Advanced directives/Advanced care planning: Not on file, [facility]  Health care power of  or Identified surrogate medical decision maker based on [place] hierarchy: daughter Goldy is reported POA   Code status: DNR/DNI [place] DNR: completed [place] DNR CCA 4/24 and provided to family today-will likely transition to comfort care once enrolled into hospice  Outpatient services: [facility] hosp[facility]     Collaborated with: cardiology and [facility] hosp[facility]    I spent 20 minutes in the professional and overall care of this patient.    Tyler Mckenzie, APRN-CNP

## 2025-04-28 NOTE — PROGRESS NOTES
[facility]  Patient Name: Jennifer Magana  Patient : [id number]  Room/Bed: -A    HPI:  Jeninfer Magana is a 89 y.o. female admitted on 25 with abdominal/suprapubic pain.  She has a history of hypertension, hypothyroidism, chronic atrial fibrillation on warfarin, chronic diastolic CHF.  She is currently admitted and being treated for sepsis.  We are consulted for atrial fibrillation.     Her initial labwork through the ED was significant for BUN 27, Cr 1.93, lactate 2.1, HSTI 14 > 15, WBC 25.4k.  Noncontrast CT chest/abdomen/pelvis was remarkable for acute CHF (pulm edema, small pleural effusions), pancreatic tail mild peripancreatic fat stranding, perivesical fat stranding, cholelithiasis without convincing evidence of acute cholecystitis, fluid in the esophagus compatible with GERD.  Patient received 2 L NS IV fluid bolus per sepsis 30 mL/kg IV fluid bolus protocol, as well as IV Zosyn.       On presentation she was in Afib with RVR up to rate of 140's despite IVF bolus and lopressor 5mg IV x1.  She was then initiated on amiodarone infusion.  The patient tells me she has chronic Afib and is on rate control with metoprolol succinate XL 25mg daily, with jantoven for CVA prophylaxis - tells me her INR's have been in the therapeutic range and are managed by her PCP at [facility].     Currently she is on amiodarone infusion 0.5mg/min, with resting HR's ranging in the 's.  She is not requiring pressors.  She denies any current chest pain/pressure, palps, LH/dizziness, and appears unaware of the Afib.    Allergies:  Patient has no known allergies.    Subjective Data:  25: Denies any chest pain/pressure, SOB or palpitations.  Palliative care NP at bedside as well.  AF on telemetry with HR  bpm.  25: Daughter Sandip at bedside. Denies any chest pain/pressure, SOB or palpitations. +fatigue. Up on BSC.  AF on telemetry with 's.   25:  Family at bedside.  No CP/pressure.  Has mild JACOB when getting OOB.  Breathing unlabored on room air at rest.  Monitor; Afib with variable HR's.  BP has been elevated.  K 4.3, creatinine 1.69, INR 1.9. Remains on Heparin gtt for bridging until INR 2 or greater. Pharmacy managing warfarin dose.  4-27-25: Family at bedside.  No CP/pressure.  + for JACOB but stable at rest.  Moved to SDU last evening.  Remains Afib with RVR, HR's in the 120-130's.  K 3.8, creatinine 1.37, INR 2.2.  I d/w IMS and Pharmacist, OK to d/c Heparin and continue on warfarin.   4/28/25: Denies any chest pain/pressure.  + JACOB. No BLE edema.  AF on telemetry with HR 80-90's.     Overnight Events:  None    Objective Data:  Vitals:    04/26/25 2337 04/27/25 0354 04/27/25 0436 04/27/25 0825   BP: (!) 146/103 140/88  127/71   BP Location: Right arm Right arm  Right arm   Patient Position:  Lying  Sitting   Pulse: (!) 139 (!) 128  (!) 126   Resp: 20 18  18   Temp: 36.3 °C (97.3 °F) 36.2 °C (97.2 °F)  36.2 °C (97.2 °F)   TempSrc: Temporal Temporal  Temporal   SpO2: 97% 95%  98%   Weight:   55.9 kg (123 lb 3.8 oz)    Height:           Reviewed the following Labs:  Results from last 7 days   Lab Units 04/27/25  0415 04/26/25  0403 04/25/25  0555   WBC AUTO x10*3/uL 7.6 7.8 8.0   HEMOGLOBIN g/dL 11.4* 11.9* 11.8*   HEMATOCRIT % 36.9 39.0 37.5   PLATELETS AUTO x10*3/uL 222 221 206       Results from last 7 days   Lab Units 04/27/25  0415 04/26/25  0403 04/25/25  0555 04/24/25  0301 04/23/25  0415 04/22/25  1537   SODIUM mmol/L 140 139 142 137 141 141   POTASSIUM mmol/L 3.8 4.3 3.8 3.7 3.7 4.2   CHLORIDE mmol/L 113* 113* 108* 104 106 103   CO2 mmol/L 22 22 24 19* 25 26   BUN mg/dL 25* 28* 28* 33* 25* 27*   CREATININE mg/dL 1.37* 1.69* 1.89* 2.21* 1.76* 1.93*   CALCIUM mg/dL 9.3 9.2 8.9 8.5* 8.7 10.1   PROTEIN TOTAL g/dL  --   --   --  5.3* 5.6* 7.3   BILIRUBIN TOTAL mg/dL  --   --   --  0.8 0.9 1.2   ALK PHOS U/L  --   --   --  72 79 91   ALT U/L  --   --    "--  15 20 13   AST U/L  --   --   --  15 20 16   GLUCOSE mg/dL 108* 125* 97 157* 136* 160*       Results from last 7 days   Lab Units 04/27/25 0415 04/26/25  0403 04/25/25  0555   MAGNESIUM mg/dL 2.01 2.07 2.04       No results found for: \"LDLF\"     No results found for: \"BNP\"      No results found for: \"TROPHS\"       No results found for: \"TSH\", \"FT4\"      Results from last 7 days   Lab Units 04/27/25  0415 04/26/25  0403 04/25/25  0555   INR  2.2* 1.9* 1.9*     No results found for: \"HGBA1C\"    Intake/Output    Intake/Output Summary (Last 24 hours) at 4/27/2025 1012  Last data filed at 4/27/2025 0642  Gross per 24 hour   Intake 1952.83 ml   Output --   Net 1952.83 ml      I/O last 2 completed shifts:  In: 1961.1 (35.1 mL/kg) [P.O.:1500; I.V.:161.1 (2.9 mL/kg); IV Piggyback:300]  Out: - (0 mL/kg)   Weight: 55.9 kg     Past Medical History:  She has a past medical history of (HFpEF) heart failure with preserved ejection fraction, Aortic stenosis, CKD (chronic kidney disease), Essential (primary) hypertension, GERD (gastroesophageal reflux disease), Hypothyroidism, and Paroxysmal atrial fibrillation (Multi).    Past Surgical History:  She has a past surgical history that includes MR angio head wo IV contrast (07/21/2020); MR angio neck wo IV contrast (07/21/2020); and Hysterectomy.      Social History:  She reports that she has never smoked. She has never used smokeless tobacco. She reports that she does not drink alcohol and does not use drugs.    Family History:  Family History[1]    Outpatient Medications  Medications Ordered Prior to Encounter[2]    Scheduled medications  Scheduled Medications[3]  Continuous medications  Continuous Medications[4]  PRN medications  PRN Medications[5]   Prescriptions Prior to Admission[6]    Reviewed the following cardiology tests:    Echo 3/31/25:   1. The left ventricular systolic function is mildly decreased, with a visually estimated ejection fraction of 45-50%.   2. No " regional wall motion abnormalities.   3. Spectral Doppler shows a a Grade III (restrictive pattern) of left ventricular diastolic filling with an elevated left atrial pressure.   4. Left ventricular cavity size is decreased.   5. There is normal right ventricular global systolic function.   6. The left atrial size is moderate to severely dilated.   7. There is moderate mitral annular calcification.   8. Moderate mitral valve regurgitation.   9. Mild to moderate tricuspid regurgitation.  10. Severe aortic valve stenosis.  11. Mild aortic valve regurgitation.  12. The inferior vena cava appears normal in size, with IVC inspiratory collapse greater than 50%.  13. There is severely increased septal and severely increased posterior left ventricular wall thickness.  EF   Date/Time Value Ref Range Status   03/31/2025 02:35 PM 48 %    01/04/2024 02:26 PM 54          Physical Exam:  Vitals reviewed.   Constitutional:       Appearance: Not in distress.   Neck:      Vascular: No carotid bruit.   Pulmonary:      Effort: Pulmonary effort is normal.      Breath sounds: Normal breath sounds.   Cardiovascular:      PMI at left midclavicular line. Normal rate. Irregularly irregular rhythm. S1 with normal intensity. S2 with normal intensity.       Murmurs: There is a systolic murmur.   Edema:     Peripheral edema absent.   Abdominal:      General: Bowel sounds are normal.   Neurological:      Mental Status: Alert and oriented to person, place and time.       Assessment/Plan:   Afib with RVR:  she has chronic Afib and is normally rate controlled with BB.  Presentation of Afib with RVR in setting of sepsis and bacteremia.  She was initiated on amiodarone infusion in ED for rate control (of note, upon chart review, her INR's have been in the range of 1.9 - 2.0 /2.3 since 3/29/25).  -tele monitoring  -at this point would continue amiodarone for rate control purposes to achieve HR <=110  -reinitiate metop succinate XL 25mg daily when BP  "improves  -continue jantoven, check daily INR\"s - if she becomes subtherapeutic, start heparin infusion  -keep K ~ 4, Mg ~ 2  4/24/25: AF on telemetry with HR  bpm.  INR 2.1 and is on warfarin.  Around 4am BP improved to 120/80's. If BP remains stable, recommend restarting metoprolol succinate 25 mg daily.  Continue on Amiodarone infusion.    4/25/25:  AF on telemetry with 's.  INR 1.9 and secure chat message sent to pharmacist managing anticoagulation.  Recommend heparin infusion while INR is subtherapeutic. Stop heparin infusion once INR 2.0 or greater. BP stable.  Increase metoprolol succinate 25 mg BID. Discussed with Dr. Dedrick Gutierres and will stop amiodarone infusion.  4-26-25: Remains in Afib with VR's variable but generally 100-120s.  Off of amiodarone.  Will increase BB further as BP is also elevated, hold parameters added.  Continue on heparin gtt until INR 2.0 or greater.  Pharmacy managing warfarin dosing.   4-27-25: Afib on tele and HR's have been higher today.  She is not drinking much so I recommended that she increase her intake some.  Will also gently add some digoxin for now and monitor response.  Continue on metoprolol 50 mg 2 times per day.   4/28/25: AF on telemetry with HR 80-90's.  Continue metoprolol succinate. She received one time dose of digoxin 250 mcg yesterday.  Will add digoxin 62.5 mcg daily.     Sepsis:  unclear source, suspect urinary source vs ? Cholecystitis  -continue Abx (zosyn, doxy) per primary service  -follow cultures  4/24/25: Blood culture positive for gram positive cocci.  Management per ID.     Acute on chronic combined systolic and diastolic HF:  TTE 3/25 with LVEF 45-50% and grade III DD.  Currently favoring fluid administration due to sepsis with low BP's.  -serial volume monitoring  -diurese if appropriate (see below)  4/24/25:  She does not appear volume overloaded on exam. Around 4am BP improved to 120/80's. If BP remains stable, recommend restarting " metoprolol succinate 25 mg daily.  4/25/25: BP stable.  Increasing metoprolol succinate 25 mg BID for further HR control. Daughter, Goldy, states she was taking metoprolol succinate 50 mg daily at home.  4-26-25: Looks compensated.  Will continue on metoprolol succinate but will increase dose for better  HR/BP control.   4-27-25: Continue on metoprolol succinate, add digoxin.    4/28/25: She does not appear volume overloaded on exam. Continue metoprolol succinate.     Severe aortic stenosis, moderate mitral regurgitation:  Per recent prior cardiology notes from last admission, with progression of aortic stenosis plan was for outpatient follow up and conservative management given her advanced age (see cardio note 4/2/25) - per this note, patient's family was udpated  -would avoid overdiuresis in setting of her severe aortic stenosis  4-26-25: Could use better HR/BP control.  Family met with Palliative care and are planning on Palliative vs Hospice care at home on d/c.  4-27-25: Rate control continues to be an issue, add low dose digoxin.   4/28/25: Rate controlled AF on telemetry.      Dispo:   She received one time dose of digoxin 250 mcg yesterday.  Will add digoxin 62.5 mcg daily.  Continue on Warfarin, pharmacist managing with INR goal 2.0 to 3.0   Family met with Palliative care and are considering Palliative vs Hospice care at home on d/c.   Discussed with palliative care and anticoagulation clinic will need to continue to monitor INR's in collaboration with Dr. Dedrick Gutierres. Discussed with Dr. Dedrick Gutierres and he will continue to sign off on anticoagulation management per anticoagulation clinic. No further office visits required.  Cardiology team is signing off.  Please call for questions or if further follow-up is needed.     Code Status  DNR and No Intubation      Keshia Mansfield, APRN-CNP          [1]   Family History  Problem Relation Name Age of Onset    Other (Half sister with CAD) Sister      Coronary  artery disease Other Children    [2]   No current facility-administered medications on file prior to encounter.     Current Outpatient Medications on File Prior to Encounter   Medication Sig Dispense Refill    doxycycline (Vibramycin) 100 mg capsule Take 1 capsule (100 mg) by mouth 2 times a day for 10 days. Take with at least 8 ounces (large glass) of water, do not lie down for 30 minutes after 20 capsule 0    famotidine (Pepcid) 20 mg tablet Take 0.5 tablets (10 mg) by mouth once daily. Start on April 4th 30 tablet 1    furosemide (Lasix) 20 mg tablet Take 1 tablet (20 mg) by mouth once daily for 3 days. (Patient not taking: Reported on 4/23/2025) 3 tablet 0    Jantoven 2.5 mg tablet Take 1.25mg (0.5 tablets) by mouth on Tues,Thurs and 2.5mg (1 tablet) all other days or as directed by Coumadin clinic. 72 tablet 3    levothyroxine (Synthroid, Levoxyl) 100 mcg tablet Take 1 tablet (100 mcg) by mouth once daily.      metoprolol succinate XL (Toprol-XL) 25 mg 24 hr tablet Take 2 tablets (50 mg) by mouth once daily. 60 tablet 1    pantoprazole (ProtoNix) 40 mg EC tablet Take 1 tablet (40 mg) by mouth once daily in the morning. Take before meals. 90 tablet 3    Simbrinza 1-0.2 % drops,suspension ophthalmic suspension Administer 1 drop into both eyes 2 times a day.      travoprost (Travatan Z) 0.004 % drops ophthalmic solution Administer 1 drop into both eyes once daily at bedtime.      [DISCONTINUED] dicyclomine (Bentyl) 20 mg tablet Take 1 tablet (20 mg) by mouth 4 times a day before meals.     [3] ceFAZolin, 2 g, intravenous, q12h  latanoprost, 1 drop, Both Eyes, Nightly  levothyroxine, 100 mcg, oral, Daily  metoprolol succinate XL, 50 mg, oral, BID  pantoprazole, 40 mg, oral, Daily before breakfast   Or  pantoprazole, 40 mg, intravenous, Daily before breakfast  polyethylene glycol, 17 g, oral, Daily  warfarin, 2.5 mg, oral, Once     [4] heparin, 0-4,000 Units/hr, Last Rate: 700 Units/hr (04/27/25 0550)     [5] PRN  medications: acetaminophen, bisacodyl, guaiFENesin, heparin, melatonin, morphine, ondansetron **OR** ondansetron  [6]   Medications Prior to Admission   Medication Sig Dispense Refill Last Dose/Taking    doxycycline (Vibramycin) 100 mg capsule Take 1 capsule (100 mg) by mouth 2 times a day for 10 days. Take with at least 8 ounces (large glass) of water, do not lie down for 30 minutes after 20 capsule 0     famotidine (Pepcid) 20 mg tablet Take 0.5 tablets (10 mg) by mouth once daily. Start on April 4th 30 tablet 1     furosemide (Lasix) 20 mg tablet Take 1 tablet (20 mg) by mouth once daily for 3 days. (Patient not taking: Reported on 4/23/2025) 3 tablet 0 Not Taking    Jantoven 2.5 mg tablet Take 1.25mg (0.5 tablets) by mouth on Tues,Thurs and 2.5mg (1 tablet) all other days or as directed by Coumadin clinic. 72 tablet 3     levothyroxine (Synthroid, Levoxyl) 100 mcg tablet Take 1 tablet (100 mcg) by mouth once daily.       metoprolol succinate XL (Toprol-XL) 25 mg 24 hr tablet Take 2 tablets (50 mg) by mouth once daily. 60 tablet 1     pantoprazole (ProtoNix) 40 mg EC tablet Take 1 tablet (40 mg) by mouth once daily in the morning. Take before meals. 90 tablet 3     Simbrinza 1-0.2 % drops,suspension ophthalmic suspension Administer 1 drop into both eyes 2 times a day.       travoprost (Travatan Z) 0.004 % drops ophthalmic solution Administer 1 drop into both eyes once daily at bedtime.

## 2025-04-29 LAB
BACTERIA BLD CULT: NORMAL
BACTERIA BLD CULT: NORMAL

## 2025-05-07 ENCOUNTER — APPOINTMENT (OUTPATIENT)
Dept: NEPHROLOGY | Facility: CLINIC | Age: OVER 89
End: 2025-05-07
Payer: MEDICARE

## 2025-05-19 ENCOUNTER — APPOINTMENT (OUTPATIENT)
Dept: CARDIOLOGY | Facility: CLINIC | Age: OVER 89
End: 2025-05-19
Payer: MEDICARE

## 2025-06-19 ENCOUNTER — ANTICOAGULATION - WARFARIN VISIT (OUTPATIENT)
Dept: PHARMACY | Facility: HOSPITAL | Age: OVER 89
End: 2025-06-19
Payer: MEDICARE

## 2025-06-19 DIAGNOSIS — I48.19 PERSISTENT ATRIAL FIBRILLATION (MULTI): Primary | ICD-10-CM

## 2025-06-19 NOTE — PROGRESS NOTES
We received 2 faxes from [facility] with an INR of 6.13 with 1.8 and another from 6.6 with an INR of 2.1. I called and spoke with Goldy. She tested Jennifer today with an INR of 1.9. She reported that her mom has been in and out of the hospitals and is currently home with hospice. No signs or symptoms of bleeding/bruising. No extra/missing doses of warfarin. Given her INR is at 1.9, we will have her take 2.5mg tonight and then continue with her regimen of 1.25mg Tues, Thurs and 2.5mg all other days. We will follow up next week.

## 2025-06-19 NOTE — PATIENT INSTRUCTIONS
Your INR today was low at 1.9. Please take 2.5mg tonight and then continue your weekly regimen of 1.25mg Tues & Thurs, and 2.5mg all other days. We will follow up next week.  If any questions arise do not hesitate to call us at [phone] M-F from 8:30am-4:30pm or at   [phone] after 5pm or on the weekends. Continue to monitor for any excess bleeding or bruising, especially for blood in your stool.

## 2025-07-10 ENCOUNTER — HOSPITAL ENCOUNTER (INPATIENT)
Facility: HOSPITAL | Age: OVER 89
LOS: 2 days | Discharge: HOSPICE | DRG: 291 | End: 2025-07-12
Attending: STUDENT IN AN ORGANIZED HEALTH CARE EDUCATION/TRAINING PROGRAM | Admitting: INTERNAL MEDICINE
Payer: MEDICARE

## 2025-07-10 ENCOUNTER — APPOINTMENT (OUTPATIENT)
Dept: CARDIOLOGY | Facility: HOSPITAL | Age: OVER 89
DRG: 291 | End: 2025-07-10
Payer: MEDICARE

## 2025-07-10 ENCOUNTER — APPOINTMENT (OUTPATIENT)
Dept: RADIOLOGY | Facility: HOSPITAL | Age: OVER 89
DRG: 291 | End: 2025-07-10
Payer: MEDICARE

## 2025-07-10 DIAGNOSIS — I50.43 ACUTE ON CHRONIC COMBINED SYSTOLIC AND DIASTOLIC CONGESTIVE HEART FAILURE: ICD-10-CM

## 2025-07-10 DIAGNOSIS — R79.1 ELEVATED INR: Primary | ICD-10-CM

## 2025-07-10 DIAGNOSIS — R07.9 CHEST PAIN, UNSPECIFIED TYPE: ICD-10-CM

## 2025-07-10 DIAGNOSIS — I48.0 PAROXYSMAL ATRIAL FIBRILLATION (MULTI): ICD-10-CM

## 2025-07-10 DIAGNOSIS — E87.70 HYPERVOLEMIA, UNSPECIFIED HYPERVOLEMIA TYPE: ICD-10-CM

## 2025-07-10 DIAGNOSIS — I48.19 PERSISTENT ATRIAL FIBRILLATION (MULTI): ICD-10-CM

## 2025-07-10 LAB
ALBUMIN SERPL BCP-MCNC: 3.7 G/DL (ref 3.4–5)
ALP SERPL-CCNC: 112 U/L (ref 33–136)
ALT SERPL W P-5'-P-CCNC: 19 U/L (ref 7–45)
ANION GAP SERPL CALC-SCNC: 16 MMOL/L (ref 10–20)
APTT PPP: 87 SECONDS (ref 26–36)
AST SERPL W P-5'-P-CCNC: 22 U/L (ref 9–39)
ATRIAL RATE: 0 BPM
BASOPHILS # BLD AUTO: 0.04 X10*3/UL (ref 0–0.1)
BASOPHILS NFR BLD AUTO: 0.6 %
BILIRUB SERPL-MCNC: 0.8 MG/DL (ref 0–1.2)
BNP SERPL-MCNC: 548 PG/ML (ref 0–99)
BUN SERPL-MCNC: 26 MG/DL (ref 6–23)
CALCIUM SERPL-MCNC: 10.1 MG/DL (ref 8.6–10.3)
CARDIAC TROPONIN I PNL SERPL HS: 17 NG/L (ref 0–13)
CARDIAC TROPONIN I PNL SERPL HS: 17 NG/L (ref 0–13)
CHLORIDE SERPL-SCNC: 110 MMOL/L (ref 98–107)
CO2 SERPL-SCNC: 25 MMOL/L (ref 21–32)
CREAT SERPL-MCNC: 1.63 MG/DL (ref 0.5–1.05)
DIGOXIN SERPL-MCNC: <0.3 NG/ML (ref 0.8–?)
EGFRCR SERPLBLD CKD-EPI 2021: 29 ML/MIN/1.73M*2
EOSINOPHIL # BLD AUTO: 0.11 X10*3/UL (ref 0–0.4)
EOSINOPHIL NFR BLD AUTO: 1.7 %
ERYTHROCYTE [DISTWIDTH] IN BLOOD BY AUTOMATED COUNT: 15.8 % (ref 11.5–14.5)
GLUCOSE SERPL-MCNC: 133 MG/DL (ref 74–99)
HCT VFR BLD AUTO: 42.3 % (ref 36–46)
HGB BLD-MCNC: 13 G/DL (ref 12–16)
IMM GRANULOCYTES # BLD AUTO: 0.03 X10*3/UL (ref 0–0.5)
IMM GRANULOCYTES NFR BLD AUTO: 0.5 % (ref 0–0.9)
INR PPP: ABNORMAL
LYMPHOCYTES # BLD AUTO: 0.81 X10*3/UL (ref 0.8–3)
LYMPHOCYTES NFR BLD AUTO: 12.8 %
MAGNESIUM SERPL-MCNC: 2.09 MG/DL (ref 1.6–2.4)
MCH RBC QN AUTO: 29.3 PG (ref 26–34)
MCHC RBC AUTO-ENTMCNC: 30.7 G/DL (ref 32–36)
MCV RBC AUTO: 96 FL (ref 80–100)
MONOCYTES # BLD AUTO: 0.63 X10*3/UL (ref 0.05–0.8)
MONOCYTES NFR BLD AUTO: 9.9 %
NEUTROPHILS # BLD AUTO: 4.72 X10*3/UL (ref 1.6–5.5)
NEUTROPHILS NFR BLD AUTO: 74.5 %
NRBC BLD-RTO: 0 /100 WBCS (ref 0–0)
PLATELET # BLD AUTO: 243 X10*3/UL (ref 150–450)
POTASSIUM SERPL-SCNC: 4.2 MMOL/L (ref 3.5–5.3)
PR INTERVAL: 132 MS
PROT SERPL-MCNC: 5.8 G/DL (ref 6.4–8.2)
PROTHROMBIN TIME: >100 SECONDS (ref 9.8–12.4)
Q ONSET: 253 MS
QRS COUNT: 14 BEATS
QRS DURATION: 90 MS
QT INTERVAL: 374 MS
QTC CALCULATION(BAZETT): 448 MS
QTC FREDERICIA: 421 MS
R AXIS: -13 DEGREES
RBC # BLD AUTO: 4.43 X10*6/UL (ref 4–5.2)
SODIUM SERPL-SCNC: 147 MMOL/L (ref 136–145)
T AXIS: 183 DEGREES
T OFFSET: 440 MS
VENTRICULAR RATE: 86 BPM
WBC # BLD AUTO: 6.3 X10*3/UL (ref 4.4–11.3)

## 2025-07-10 PROCEDURE — 80162 ASSAY OF DIGOXIN TOTAL: CPT | Performed by: STUDENT IN AN ORGANIZED HEALTH CARE EDUCATION/TRAINING PROGRAM

## 2025-07-10 PROCEDURE — 2500000001 HC RX 250 WO HCPCS SELF ADMINISTERED DRUGS (ALT 637 FOR MEDICARE OP): Performed by: INTERNAL MEDICINE

## 2025-07-10 PROCEDURE — 99222 1ST HOSP IP/OBS MODERATE 55: CPT | Performed by: NURSE PRACTITIONER

## 2025-07-10 PROCEDURE — 99222 1ST HOSP IP/OBS MODERATE 55: CPT | Performed by: INTERNAL MEDICINE

## 2025-07-10 PROCEDURE — 83880 ASSAY OF NATRIURETIC PEPTIDE: CPT | Performed by: PHYSICIAN ASSISTANT

## 2025-07-10 PROCEDURE — 84484 ASSAY OF TROPONIN QUANT: CPT | Performed by: PHYSICIAN ASSISTANT

## 2025-07-10 PROCEDURE — 93005 ELECTROCARDIOGRAM TRACING: CPT

## 2025-07-10 PROCEDURE — 36415 COLL VENOUS BLD VENIPUNCTURE: CPT | Performed by: PHYSICIAN ASSISTANT

## 2025-07-10 PROCEDURE — 1200000002 HC GENERAL ROOM WITH TELEMETRY DAILY

## 2025-07-10 PROCEDURE — 80053 COMPREHEN METABOLIC PANEL: CPT | Performed by: PHYSICIAN ASSISTANT

## 2025-07-10 PROCEDURE — 85610 PROTHROMBIN TIME: CPT | Performed by: PHYSICIAN ASSISTANT

## 2025-07-10 PROCEDURE — 85025 COMPLETE CBC W/AUTO DIFF WBC: CPT | Performed by: PHYSICIAN ASSISTANT

## 2025-07-10 PROCEDURE — 71045 X-RAY EXAM CHEST 1 VIEW: CPT

## 2025-07-10 PROCEDURE — 99291 CRITICAL CARE FIRST HOUR: CPT | Performed by: PHYSICIAN ASSISTANT

## 2025-07-10 PROCEDURE — 2500000001 HC RX 250 WO HCPCS SELF ADMINISTERED DRUGS (ALT 637 FOR MEDICARE OP): Performed by: NURSE PRACTITIONER

## 2025-07-10 PROCEDURE — 71045 X-RAY EXAM CHEST 1 VIEW: CPT | Performed by: RADIOLOGY

## 2025-07-10 PROCEDURE — 2500000004 HC RX 250 GENERAL PHARMACY W/ HCPCS (ALT 636 FOR OP/ED): Performed by: PHYSICIAN ASSISTANT

## 2025-07-10 PROCEDURE — 96365 THER/PROPH/DIAG IV INF INIT: CPT

## 2025-07-10 PROCEDURE — 96375 TX/PRO/DX INJ NEW DRUG ADDON: CPT

## 2025-07-10 PROCEDURE — 99285 EMERGENCY DEPT VISIT HI MDM: CPT | Performed by: STUDENT IN AN ORGANIZED HEALTH CARE EDUCATION/TRAINING PROGRAM

## 2025-07-10 PROCEDURE — 83735 ASSAY OF MAGNESIUM: CPT | Performed by: PHYSICIAN ASSISTANT

## 2025-07-10 PROCEDURE — 85730 THROMBOPLASTIN TIME PARTIAL: CPT | Performed by: PHYSICIAN ASSISTANT

## 2025-07-10 RX ORDER — FUROSEMIDE 10 MG/ML
40 INJECTION INTRAMUSCULAR; INTRAVENOUS ONCE
Status: COMPLETED | OUTPATIENT
Start: 2025-07-10 | End: 2025-07-10

## 2025-07-10 RX ORDER — MELOXICAM 7.5 MG/1
7.5 TABLET ORAL 2 TIMES DAILY
COMMUNITY
End: 2025-07-12 | Stop reason: HOSPADM

## 2025-07-10 RX ORDER — BISACODYL 5 MG
10 TABLET, DELAYED RELEASE (ENTERIC COATED) ORAL DAILY PRN
Status: DISCONTINUED | OUTPATIENT
Start: 2025-07-10 | End: 2025-07-12 | Stop reason: HOSPADM

## 2025-07-10 RX ORDER — LEVOTHYROXINE SODIUM 100 UG/1
100 TABLET ORAL DAILY
Status: DISCONTINUED | OUTPATIENT
Start: 2025-07-10 | End: 2025-07-12 | Stop reason: HOSPADM

## 2025-07-10 RX ORDER — FUROSEMIDE 10 MG/ML
20 INJECTION INTRAMUSCULAR; INTRAVENOUS EVERY 12 HOURS
Status: COMPLETED | OUTPATIENT
Start: 2025-07-11 | End: 2025-07-11

## 2025-07-10 RX ORDER — LATANOPROST 50 UG/ML
1 SOLUTION/ DROPS OPHTHALMIC NIGHTLY
Status: DISCONTINUED | OUTPATIENT
Start: 2025-07-10 | End: 2025-07-12 | Stop reason: HOSPADM

## 2025-07-10 RX ORDER — ONDANSETRON HYDROCHLORIDE 2 MG/ML
4 INJECTION, SOLUTION INTRAVENOUS EVERY 8 HOURS PRN
Status: DISCONTINUED | OUTPATIENT
Start: 2025-07-10 | End: 2025-07-12 | Stop reason: HOSPADM

## 2025-07-10 RX ORDER — ONDANSETRON 4 MG/1
4 TABLET, ORALLY DISINTEGRATING ORAL EVERY 8 HOURS PRN
Status: DISCONTINUED | OUTPATIENT
Start: 2025-07-10 | End: 2025-07-12 | Stop reason: HOSPADM

## 2025-07-10 RX ORDER — GUAIFENESIN 600 MG/1
600 TABLET, EXTENDED RELEASE ORAL EVERY 12 HOURS PRN
Status: DISCONTINUED | OUTPATIENT
Start: 2025-07-10 | End: 2025-07-12 | Stop reason: HOSPADM

## 2025-07-10 RX ORDER — DIGOXIN 125 MCG
62.5 TABLET ORAL DAILY
Status: DISCONTINUED | OUTPATIENT
Start: 2025-07-10 | End: 2025-07-10

## 2025-07-10 RX ORDER — PANTOPRAZOLE SODIUM 40 MG/1
40 TABLET, DELAYED RELEASE ORAL
Status: DISCONTINUED | OUTPATIENT
Start: 2025-07-11 | End: 2025-07-12 | Stop reason: HOSPADM

## 2025-07-10 RX ORDER — PANTOPRAZOLE SODIUM 40 MG/10ML
40 INJECTION, POWDER, LYOPHILIZED, FOR SOLUTION INTRAVENOUS
Status: DISCONTINUED | OUTPATIENT
Start: 2025-07-11 | End: 2025-07-12 | Stop reason: HOSPADM

## 2025-07-10 RX ORDER — POLYETHYLENE GLYCOL 3350 17 G/17G
17 POWDER, FOR SOLUTION ORAL DAILY
Status: DISCONTINUED | OUTPATIENT
Start: 2025-07-10 | End: 2025-07-12 | Stop reason: HOSPADM

## 2025-07-10 RX ORDER — TALC
3 POWDER (GRAM) TOPICAL NIGHTLY PRN
Status: DISCONTINUED | OUTPATIENT
Start: 2025-07-10 | End: 2025-07-12 | Stop reason: HOSPADM

## 2025-07-10 RX ORDER — METOPROLOL SUCCINATE 50 MG/1
50 TABLET, EXTENDED RELEASE ORAL 2 TIMES DAILY
Status: DISCONTINUED | OUTPATIENT
Start: 2025-07-10 | End: 2025-07-12 | Stop reason: HOSPADM

## 2025-07-10 RX ORDER — SACUBITRIL AND VALSARTAN 24; 26 MG/1; MG/1
0.5 TABLET ORAL 2 TIMES DAILY
Status: DISCONTINUED | OUTPATIENT
Start: 2025-07-10 | End: 2025-07-12 | Stop reason: HOSPADM

## 2025-07-10 RX ADMIN — METOPROLOL SUCCINATE 50 MG: 50 TABLET, EXTENDED RELEASE ORAL at 20:46

## 2025-07-10 RX ADMIN — SACUBITRIL AND VALSARTAN 0.5 TABLET: 24; 26 TABLET, FILM COATED ORAL at 20:46

## 2025-07-10 RX ADMIN — LATANOPROST 1 DROP: 50 SOLUTION OPHTHALMIC at 20:46

## 2025-07-10 RX ADMIN — PHYTONADIONE 5 MG: 10 INJECTION, EMULSION INTRAMUSCULAR; INTRAVENOUS; SUBCUTANEOUS at 12:06

## 2025-07-10 RX ADMIN — FUROSEMIDE 40 MG: 10 INJECTION, SOLUTION INTRAMUSCULAR; INTRAVENOUS at 13:06

## 2025-07-10 SDOH — ECONOMIC STABILITY: FOOD INSECURITY
WITHIN THE PAST 12 MONTHS, YOU WORRIED THAT YOUR FOOD WOULD RUN OUT BEFORE YOU GOT THE MONEY TO BUY MORE.: PATIENT DECLINED

## 2025-07-10 SDOH — ECONOMIC STABILITY: HOUSING INSECURITY: IN THE PAST 12 MONTHS, HOW MANY TIMES HAVE YOU MOVED WHERE YOU WERE LIVING?: 0

## 2025-07-10 SDOH — HEALTH STABILITY: PHYSICAL HEALTH
HOW OFTEN DO YOU NEED TO HAVE SOMEONE HELP YOU WHEN YOU READ INSTRUCTIONS, PAMPHLETS, OR OTHER WRITTEN MATERIAL FROM YOUR DOCTOR OR PHARMACY?: NEVER

## 2025-07-10 SDOH — ECONOMIC STABILITY: INCOME INSECURITY: IN THE PAST 12 MONTHS HAS THE ELECTRIC, GAS, OIL, OR WATER COMPANY THREATENED TO SHUT OFF SERVICES IN YOUR HOME?: NO

## 2025-07-10 SDOH — HEALTH STABILITY: MENTAL HEALTH
DO YOU FEEL STRESS - TENSE, RESTLESS, NERVOUS, OR ANXIOUS, OR UNABLE TO SLEEP AT NIGHT BECAUSE YOUR MIND IS TROUBLED ALL THE TIME - THESE DAYS?: PATIENT DECLINED

## 2025-07-10 SDOH — SOCIAL STABILITY: SOCIAL INSECURITY
WITHIN THE LAST YEAR, HAVE YOU BEEN HUMILIATED OR EMOTIONALLY ABUSED IN OTHER WAYS BY YOUR PARTNER OR EX-PARTNER?: PATIENT DECLINED

## 2025-07-10 SDOH — HEALTH STABILITY: MENTAL HEALTH
DO YOU FEEL STRESS - TENSE, RESTLESS, NERVOUS, OR ANXIOUS, OR UNABLE TO SLEEP AT NIGHT BECAUSE YOUR MIND IS TROUBLED ALL THE TIME - THESE DAYS?: NOT AT ALL

## 2025-07-10 SDOH — HEALTH STABILITY: PHYSICAL HEALTH: ON AVERAGE, HOW MANY MINUTES DO YOU ENGAGE IN EXERCISE AT THIS LEVEL?: 0 MIN

## 2025-07-10 SDOH — HEALTH STABILITY: PHYSICAL HEALTH
HOW OFTEN DO YOU NEED TO HAVE SOMEONE HELP YOU WHEN YOU READ INSTRUCTIONS, PAMPHLETS, OR OTHER WRITTEN MATERIAL FROM YOUR DOCTOR OR PHARMACY?: RARELY

## 2025-07-10 SDOH — ECONOMIC STABILITY: HOUSING INSECURITY: IN THE LAST 12 MONTHS, WAS THERE A TIME WHEN YOU WERE NOT ABLE TO PAY THE MORTGAGE OR RENT ON TIME?: PATIENT DECLINED

## 2025-07-10 SDOH — HEALTH STABILITY: PHYSICAL HEALTH: ON AVERAGE, HOW MANY DAYS PER WEEK DO YOU ENGAGE IN MODERATE TO STRENUOUS EXERCISE (LIKE A BRISK WALK)?: 0 DAYS

## 2025-07-10 SDOH — ECONOMIC STABILITY: FOOD INSECURITY: WITHIN THE PAST 12 MONTHS, THE FOOD YOU BOUGHT JUST DIDN'T LAST AND YOU DIDN'T HAVE MONEY TO GET MORE.: NEVER TRUE

## 2025-07-10 SDOH — SOCIAL STABILITY: SOCIAL NETWORK: IN A TYPICAL WEEK, HOW MANY TIMES DO YOU TALK ON THE PHONE WITH FAMILY, FRIENDS, OR NEIGHBORS?: PATIENT DECLINED

## 2025-07-10 SDOH — ECONOMIC STABILITY: TRANSPORTATION INSECURITY: IN THE PAST 12 MONTHS, HAS LACK OF TRANSPORTATION KEPT YOU FROM MEDICAL APPOINTMENTS OR FROM GETTING MEDICATIONS?: NO

## 2025-07-10 SDOH — SOCIAL STABILITY: SOCIAL INSECURITY
WITHIN THE LAST YEAR, HAVE YOU BEEN KICKED, HIT, SLAPPED, OR OTHERWISE PHYSICALLY HURT BY YOUR PARTNER OR EX-PARTNER?: PATIENT DECLINED

## 2025-07-10 SDOH — ECONOMIC STABILITY: HOUSING INSECURITY: AT ANY TIME IN THE PAST 12 MONTHS, WERE YOU HOMELESS OR LIVING IN A SHELTER (INCLUDING NOW)?: PATIENT DECLINED

## 2025-07-10 SDOH — SOCIAL STABILITY: SOCIAL NETWORK: HOW OFTEN DO YOU ATTEND MEETINGS OF THE CLUBS OR ORGANIZATIONS YOU BELONG TO?: PATIENT DECLINED

## 2025-07-10 SDOH — SOCIAL STABILITY: SOCIAL INSECURITY: WITHIN THE LAST YEAR, HAVE YOU BEEN AFRAID OF YOUR PARTNER OR EX-PARTNER?: NO

## 2025-07-10 SDOH — SOCIAL STABILITY: SOCIAL INSECURITY: ARE YOU MARRIED, WIDOWED, DIVORCED, SEPARATED, NEVER MARRIED, OR LIVING WITH A PARTNER?: PATIENT DECLINED

## 2025-07-10 SDOH — SOCIAL STABILITY: SOCIAL INSECURITY
WITHIN THE LAST YEAR, HAVE YOU BEEN RAPED OR FORCED TO HAVE ANY KIND OF SEXUAL ACTIVITY BY YOUR PARTNER OR EX-PARTNER?: PATIENT DECLINED

## 2025-07-10 SDOH — SOCIAL STABILITY: SOCIAL NETWORK: HOW OFTEN DO YOU GET TOGETHER WITH FRIENDS OR RELATIVES?: PATIENT DECLINED

## 2025-07-10 SDOH — HEALTH STABILITY: MENTAL HEALTH: HOW OFTEN DO YOU HAVE A DRINK CONTAINING ALCOHOL?: NEVER

## 2025-07-10 SDOH — ECONOMIC STABILITY: FOOD INSECURITY: HOW HARD IS IT FOR YOU TO PAY FOR THE VERY BASICS LIKE FOOD, HOUSING, MEDICAL CARE, AND HEATING?: PATIENT DECLINED

## 2025-07-10 SDOH — ECONOMIC STABILITY: HOUSING INSECURITY: AT ANY TIME IN THE PAST 12 MONTHS, WERE YOU HOMELESS OR LIVING IN A SHELTER (INCLUDING NOW)?: NO

## 2025-07-10 SDOH — SOCIAL STABILITY: SOCIAL NETWORK: HOW OFTEN DO YOU ATTEND CHURCH OR RELIGIOUS SERVICES?: PATIENT DECLINED

## 2025-07-10 SDOH — ECONOMIC STABILITY: FOOD INSECURITY: WITHIN THE PAST 12 MONTHS, THE FOOD YOU BOUGHT JUST DIDN'T LAST AND YOU DIDN'T HAVE MONEY TO GET MORE.: PATIENT DECLINED

## 2025-07-10 SDOH — SOCIAL STABILITY: SOCIAL NETWORK
DO YOU BELONG TO ANY CLUBS OR ORGANIZATIONS SUCH AS CHURCH GROUPS, UNIONS, FRATERNAL OR ATHLETIC GROUPS, OR SCHOOL GROUPS?: PATIENT DECLINED

## 2025-07-10 SDOH — HEALTH STABILITY: MENTAL HEALTH: HOW OFTEN DO YOU HAVE SIX OR MORE DRINKS ON ONE OCCASION?: NEVER

## 2025-07-10 SDOH — HEALTH STABILITY: MENTAL HEALTH: HOW MANY DRINKS CONTAINING ALCOHOL DO YOU HAVE ON A TYPICAL DAY WHEN YOU ARE DRINKING?: PATIENT DOES NOT DRINK

## 2025-07-10 SDOH — ECONOMIC STABILITY: FOOD INSECURITY: WITHIN THE PAST 12 MONTHS, YOU WORRIED THAT YOUR FOOD WOULD RUN OUT BEFORE YOU GOT THE MONEY TO BUY MORE.: NEVER TRUE

## 2025-07-10 SDOH — ECONOMIC STABILITY: HOUSING INSECURITY: IN THE LAST 12 MONTHS, WAS THERE A TIME WHEN YOU WERE NOT ABLE TO PAY THE MORTGAGE OR RENT ON TIME?: NO

## 2025-07-10 SDOH — SOCIAL STABILITY: SOCIAL INSECURITY: WITHIN THE LAST YEAR, HAVE YOU BEEN HUMILIATED OR EMOTIONALLY ABUSED IN OTHER WAYS BY YOUR PARTNER OR EX-PARTNER?: NO

## 2025-07-10 SDOH — ECONOMIC STABILITY: FOOD INSECURITY: HOW HARD IS IT FOR YOU TO PAY FOR THE VERY BASICS LIKE FOOD, HOUSING, MEDICAL CARE, AND HEATING?: NOT HARD AT ALL

## 2025-07-10 SDOH — SOCIAL STABILITY: SOCIAL INSECURITY: WITHIN THE LAST YEAR, HAVE YOU BEEN AFRAID OF YOUR PARTNER OR EX-PARTNER?: PATIENT DECLINED

## 2025-07-10 SDOH — SOCIAL STABILITY: SOCIAL INSECURITY: HAVE YOU HAD THOUGHTS OF HARMING ANYONE ELSE?: NO

## 2025-07-10 SDOH — SOCIAL STABILITY: SOCIAL INSECURITY: WERE YOU ABLE TO COMPLETE ALL THE BEHAVIORAL HEALTH SCREENINGS?: YES

## 2025-07-10 SDOH — ECONOMIC STABILITY: TRANSPORTATION INSECURITY
IN THE PAST 12 MONTHS, HAS LACK OF TRANSPORTATION KEPT YOU FROM MEDICAL APPOINTMENTS OR FROM GETTING MEDICATIONS?: PATIENT DECLINED

## 2025-07-10 ASSESSMENT — ACTIVITIES OF DAILY LIVING (ADL)
ADEQUATE_TO_COMPLETE_ADL: YES
DRESSING YOURSELF: INDEPENDENT
LACK_OF_TRANSPORTATION: NO
FEEDING YOURSELF: INDEPENDENT
WALKS IN HOME: INDEPENDENT
PATIENT'S MEMORY ADEQUATE TO SAFELY COMPLETE DAILY ACTIVITIES?: YES
TOILETING: INDEPENDENT
GROOMING: INDEPENDENT
JUDGMENT_ADEQUATE_SAFELY_COMPLETE_DAILY_ACTIVITIES: YES
LACK_OF_TRANSPORTATION: NO
LACK_OF_TRANSPORTATION: NO
BATHING: INDEPENDENT
HEARING - LEFT EAR: DIFFICULTY WITH NOISE
LACK_OF_TRANSPORTATION: PATIENT DECLINED
ASSISTIVE_DEVICE: CANE;DENTURES UPPER;DENTURES LOWER;EYEGLASSES
HEARING - RIGHT EAR: DIFFICULTY WITH NOISE

## 2025-07-10 ASSESSMENT — PAIN DESCRIPTION - LOCATION: LOCATION: CHEST

## 2025-07-10 ASSESSMENT — COGNITIVE AND FUNCTIONAL STATUS - GENERAL
DRESSING REGULAR LOWER BODY CLOTHING: A LITTLE
MOBILITY SCORE: 24
MOBILITY SCORE: 19
WALKING IN HOSPITAL ROOM: A LITTLE
STANDING UP FROM CHAIR USING ARMS: A LITTLE
PATIENT BASELINE BEDBOUND: NO
DAILY ACTIVITIY SCORE: 24
DAILY ACTIVITIY SCORE: 20
DRESSING REGULAR UPPER BODY CLOTHING: A LITTLE
HELP NEEDED FOR BATHING: A LITTLE
CLIMB 3 TO 5 STEPS WITH RAILING: A LOT
MOVING TO AND FROM BED TO CHAIR: A LITTLE
TOILETING: A LITTLE

## 2025-07-10 ASSESSMENT — HEART SCORE
ECG: NON-SPECIFIC REPOLARIZATION DISTURBANCE
AGE: 65+
HISTORY: MODERATELY SUSPICIOUS
TROPONIN: 1-3 TIMES NORMAL LIMIT
RISK FACTORS: 1-2 RISK FACTORS
HEART SCORE: 6

## 2025-07-10 ASSESSMENT — LIFESTYLE VARIABLES
SKIP TO QUESTIONS 9-10: 1
EVER FELT BAD OR GUILTY ABOUT YOUR DRINKING: NO
PRESCIPTION_ABUSE_PAST_12_MONTHS: NO
HOW MANY STANDARD DRINKS CONTAINING ALCOHOL DO YOU HAVE ON A TYPICAL DAY: PATIENT DOES NOT DRINK
HOW OFTEN DO YOU HAVE A DRINK CONTAINING ALCOHOL: NEVER
EVER HAD A DRINK FIRST THING IN THE MORNING TO STEADY YOUR NERVES TO GET RID OF A HANGOVER: NO
AUDIT-C TOTAL SCORE: 0
SKIP TO QUESTIONS 9-10: 1
SUBSTANCE_ABUSE_PAST_12_MONTHS: NO
HAVE PEOPLE ANNOYED YOU BY CRITICIZING YOUR DRINKING: NO
TOTAL SCORE: 0
AUDIT-C TOTAL SCORE: 0
HAVE YOU EVER FELT YOU SHOULD CUT DOWN ON YOUR DRINKING: NO
AUDIT-C TOTAL SCORE: 0
HOW OFTEN DO YOU HAVE 6 OR MORE DRINKS ON ONE OCCASION: NEVER

## 2025-07-10 ASSESSMENT — PAIN - FUNCTIONAL ASSESSMENT
PAIN_FUNCTIONAL_ASSESSMENT: 0-10

## 2025-07-10 ASSESSMENT — PATIENT HEALTH QUESTIONNAIRE - PHQ9
2. FEELING DOWN, DEPRESSED OR HOPELESS: NOT AT ALL
1. LITTLE INTEREST OR PLEASURE IN DOING THINGS: NOT AT ALL
SUM OF ALL RESPONSES TO PHQ9 QUESTIONS 1 & 2: 0

## 2025-07-10 ASSESSMENT — PAIN SCALES - GENERAL
PAINLEVEL_OUTOF10: 7
PAINLEVEL_OUTOF10: 0 - NO PAIN
PAINLEVEL_OUTOF10: 0 - NO PAIN
PAINLEVEL_OUTOF10: 5 - MODERATE PAIN

## 2025-07-10 ASSESSMENT — PAIN DESCRIPTION - PAIN TYPE: TYPE: ACUTE PAIN

## 2025-07-10 ASSESSMENT — ENCOUNTER SYMPTOMS: SHORTNESS OF BREATH: 1

## 2025-07-10 ASSESSMENT — PAIN DESCRIPTION - DESCRIPTORS: DESCRIPTORS: PRESSURE

## 2025-07-10 NOTE — PROGRESS NOTES
Occupational Therapy                 Therapy Communication Note    Patient Name: Jennifer Magana  MRN: [id number]  Department: [address]  Room: [address] [place] [address]  Today's Date: 7/10/2025     Discipline: Occupational Therapy    Missed Visit: OT Missed Visit: Yes     Missed Visit Reason: Missed Visit Reason: Patient placed on medical hold    Missed Time: Attempted OT eval. Pt being admitted today. Elevated INR, too high to calculate. Defer evaluation until more medically stable and INR improved.      Comment:

## 2025-07-10 NOTE — PROGRESS NOTES
Jennifer Magana is a 89 y.o. female admitted for Elevated INR. Pharmacy reviewed the patient's [id number] medications and allergies for accuracy.    The list below reflects the PTA list prior to pharmacy medication history. A summary a changes to the PTA medication list has been listed below. Please review each medication in order reconciliation for additional clarification and justification.    Source of information:  [employer], talked to patient and daughter.   Medications added:  Meloxicam 7.5 mg BID  Medications modified:    Medications to be removed:  Digoxin 62.5 mcg  Medications of concern:      Prior to Admission Medications   Prescriptions Last Dose Informant Patient Reported? Taking?   Jantoven 2.5 mg tablet   No No   Sig: Take 1.25mg (0.5 tablets) by mouth on Tues,Thurs and 2.5mg (1 tablet) all other days or as directed by Coumadin clinic.   Simbrinza 1-0.2 % drops,suspension ophthalmic suspension   Yes No   Sig: Administer 1 drop into both eyes 2 times a day.   digoxin (Lanoxin) 62.5 MCG tablet   No No   Sig: Take 1 tablet (62.5 mcg) by mouth once daily.   famotidine (Pepcid) 20 mg tablet   No No   Sig: Take 0.5 tablets (10 mg) by mouth once daily. Start on April 4th   levothyroxine (Synthroid, Levoxyl) 100 mcg tablet   Yes No   Sig: Take 1 tablet (100 mcg) by mouth once daily.   metoprolol succinate XL (Toprol-XL) 50 mg 24 hr tablet   No No   Sig: Take 1 tablet (50 mg) by mouth 2 times a day. Do not crush or chew.   pantoprazole (ProtoNix) 40 mg EC tablet   No No   Sig: Take 1 tablet (40 mg) by mouth once daily in the morning. Take before meals.   travoprost (Travatan Z) 0.004 % drops ophthalmic solution   Yes No   Sig: Administer 1 drop into both eyes once daily at bedtime.      Facility-Administered Medications: None       MALINDA WORKMAN

## 2025-07-10 NOTE — H&P
[facility] - GENERAL MEDICINE HISTORY AND PHYSICAL    HISTORY OF PRESENT ILLNESS     History Obtained From (Primary Source): The patient  Collateral History (Secondary Sources): D/w ED    History Of Present Illness (HPI):  Jennifer Magana is a 89 y.o. female with PMHx s/f combined systolic and diastolic heart failure EF 45 to 50%, atrial fibrillation, aortic stenosis, chronic kidney disease stage IV, hypothyroidism, long-term anticoagulation therapy with warfarin presenting with shortness of breath and chest pressure.   For the last couple days patient has had increasing shortness of breath.  Weeping from her lower extremities and orthopnea.  Her shortness of breath exacerbated with activity.  No fevers chills no purulent sputum production no hemoptysis.  In emergency department patient had BN peptide that was elevated chest x-ray consistent with pulmonary edema.  Patient was subsequently given IV furosemide.  Incidental finding and her lab work showed that she has coagulopathy with INR on calculable.  Patient was given 5 mg of IV vitamin K.  Case was discussed with the ED provider plan is to admit patient for further workup and evaluation length of stay is likely to exceed 2 midnights.    ED Course:   Vitals on presentation: T 36.3 °C (97.3 °F)  HR 76  /81  RR 16  O2 98 % None (Room air)  Labs:   CBC with WBC 6.3, Hgb 13.0, Plts 243.   CMP with glucose 133, Na 147, K 4.2, BUN 26, sCr 1.63, alk phos 112, ALT 19, AST 22, bilirubin 0.8. Magnesium 2.09.   . Trop 17.   Lactate 2.0  EKG: Atrial fibrillation with controlled ventricular response, QTc interval is normal, no ST segment elevation to suggest acute ischemia  Imaging - agree with radiology interpretation(s):   Interventions: Furosemide, vitamin K    12-point ROS reviewed and found to be negative aside from aforementioned positives in HPI and/or noted in dedicated ROS section below.     Decision made to admit the patient to the  hospitalist service after evaluation of the patient, review of the above, and discussion with ED provider.     LABS AND IMAGING     I have personally reviewed the following labs from 07/10/25: CBC and CMP BN peptide  ersonal interpretations as documented in ED course above.   I have personally reviewed any obtained EKGs on 07/10/25, with my interpretation as listed above in the ED summary course.   I have personally reviewed the patient's vitals on presentation to the ED and any/all changes through to time of admission (on 07/10/25).     ED Course (From ED Provider):  ED Course as of 07/10/25 1307   Thu Jul 10, 2025   1137 Went and discussed with the patient.  She is not having any GI bleed symptoms she is not any blood in her stool, no blood in her urine, she has not fallen or hit her head. Daughter reports shes taking her normal dose of Coumadin Coumadin last INR was within normal limits. Agreeable to admission [CJ]   1141 Discussed the case with pharmacist Aldair.  He agrees patient would require IV vitamin K secondary to an uncalculable INR.  He recommends 5 mg to start with. [CJ]   1215 EKG, interpreted by me: atrial fibrillation, rate 86. Normal axis. No acute ST elevation or depression.  . No evidence of acute STEMI at this time.     [JG]   1220 EKG performed 7/10/2025 at 10:15 AM.  Atrial fibrillation with a ventricular rate of 80 bpm.  QT/QTc of 372/430 ms.  No STEMI.  Interpreted by myself reviewed by attending physician. [CJ]   1300 Discussed case with the hospitalist DWAYNE who agreed to admit under Dr. Moreland.  He states that he does feel the patient can go to a medical floor with telemetry.  Patient does have a normal CBC CMP with chronic elevated creatinine.  Patient's INR was undetectable today which was treated with IV potassium.  Patient's BNP was elevated at 550 and her chest x-ray reflects fluid overload which she was given IV Lasix for.  Patient's troponin stable at 17. [CJ]      ED Course User  Index  [CJ] Erasmo Wood PA-C  [JG] Yenifer Hawkins MD         Diagnoses as of 07/10/25 1307   Elevated INR   Chest pain, unspecified type   Hypervolemia, unspecified hypervolemia type     Relevant Results  Results for orders placed or performed during the hospital encounter of 07/10/25 (from the past 24 hours)   CBC and Auto Differential   Result Value Ref Range    WBC 6.3 4.4 - 11.3 x10*3/uL    nRBC 0.0 0.0 - 0.0 /100 WBCs    RBC 4.43 4.00 - 5.20 x10*6/uL    Hemoglobin 13.0 12.0 - 16.0 g/dL    Hematocrit 42.3 36.0 - 46.0 %    MCV 96 80 - 100 fL    MCH 29.3 26.0 - 34.0 pg    MCHC 30.7 (L) 32.0 - 36.0 g/dL    RDW 15.8 (H) 11.5 - 14.5 %    Platelets 243 150 - 450 x10*3/uL    Neutrophils % 74.5 40.0 - 80.0 %    Immature Granulocytes %, Automated 0.5 0.0 - 0.9 %    Lymphocytes % 12.8 13.0 - 44.0 %    Monocytes % 9.9 2.0 - 10.0 %    Eosinophils % 1.7 0.0 - 6.0 %    Basophils % 0.6 0.0 - 2.0 %    Neutrophils Absolute 4.72 1.60 - 5.50 x10*3/uL    Immature Granulocytes Absolute, Automated 0.03 0.00 - 0.50 x10*3/uL    Lymphocytes Absolute 0.81 0.80 - 3.00 x10*3/uL    Monocytes Absolute 0.63 0.05 - 0.80 x10*3/uL    Eosinophils Absolute 0.11 0.00 - 0.40 x10*3/uL    Basophils Absolute 0.04 0.00 - 0.10 x10*3/uL   Comprehensive Metabolic Panel   Result Value Ref Range    Glucose 133 (H) 74 - 99 mg/dL    Sodium 147 (H) 136 - 145 mmol/L    Potassium 4.2 3.5 - 5.3 mmol/L    Chloride 110 (H) 98 - 107 mmol/L    Bicarbonate 25 21 - 32 mmol/L    Anion Gap 16 10 - 20 mmol/L    Urea Nitrogen 26 (H) 6 - 23 mg/dL    Creatinine 1.63 (H) 0.50 - 1.05 mg/dL    eGFR 29 (L) >60 mL/min/1.73m*2    Calcium 10.1 8.6 - 10.3 mg/dL    Albumin 3.7 3.4 - 5.0 g/dL    Alkaline Phosphatase 112 33 - 136 U/L    Total Protein 5.8 (L) 6.4 - 8.2 g/dL    AST 22 9 - 39 U/L    Bilirubin, Total 0.8 0.0 - 1.2 mg/dL    ALT 19 7 - 45 U/L   Magnesium   Result Value Ref Range    Magnesium 2.09 1.60 - 2.40 mg/dL   Troponin I, High Sensitivity, Initial   Result Value  Ref Range    Troponin I, High Sensitivity 17 (H) 0 - 13 ng/L   Protime-INR   Result Value Ref Range    Protime >100.0 (HH) 9.8 - 12.4 seconds    INR     aPTT   Result Value Ref Range    aPTT 87 (H) 26 - 36 seconds   B-Type Natriuretic Peptide   Result Value Ref Range     (H) 0 - 99 pg/mL   Digoxin level   Result Value Ref Range    Digoxin  <0.30 (L) 0.80 - <2.00 ng/mL   Troponin, High Sensitivity, 1 Hour   Result Value Ref Range    Troponin I, High Sensitivity 17 (H) 0 - 13 ng/L      Imaging  XR chest 1 view  Result Date: 7/10/2025  Limited study. Mild cardiomegaly, bilateral infiltrates and effusions, most likely due to congestive heart failure. Superimposed left lower lobe pneumonia can not be completely excluded; follow-up as needed.   Signed by: Aditya Ghosh 7/10/2025 11:44 AM Dictation workstation:   [id number]      Cardiology, Vascular, and Other Imaging  No other imaging results found for the past 2 days       PAST HISTORIES AND ALLERGIES     Past Medical History  She has a past medical history of (HFpEF) heart failure with preserved ejection fraction, Aortic stenosis, CKD (chronic kidney disease), Essential (primary) hypertension, GERD (gastroesophageal reflux disease), Hypothyroidism, and Paroxysmal atrial fibrillation (Multi).    Surgical History  She has a past surgical history that includes MR angio head wo IV contrast (07/21/2020); MR angio neck wo IV contrast (07/21/2020); and Hysterectomy.     Social History  She reports that she has never smoked. She has never used smokeless tobacco. She reports that she does not drink alcohol and does not use drugs.    Family History  Family History[1]    Allergies  Patient has no known allergies.    MEDICATIONS     Scheduled Medications:  Scheduled Medications[2]  Continuous Medications:  Continuous Medications[3]  PRN Medications:  PRN Medications[4]     REVIEW OF SYSTEMS     Review of Systems   Respiratory:  Positive for shortness of breath.     Cardiovascular:  Positive for chest pain and leg swelling.       OBJECTIVE     Last Recorded Vitals  /72   Pulse 77   Temp 36.3 °C (97.3 °F) (Temporal)   Resp (!) 22   Wt 55.8 kg (123 lb)   SpO2 97%      Physical Exam:  Vital signs and nursing notes reviewed.   Constitutional: Pleasant and cooperative. Laying in bed in no acute distress. Conversant.   Skin: Warm and dry; no obvious lesions, rashes, pallor, or jaundice.   Eyes: EOMI. Anicteric sclera.   ENT: Mucous membranes moist; no obvious injury or deformity appreciated.   Head and Neck: Normocephalic, atraumatic. ROM preserved. Trachea midline. No appreciable JVD.   Respiratory: Nonlabored on RA. Lungs clear to auscultation bilaterally without obvious adventitious sounds. Chest rise is equal.  Cardiovascular: IRR IRR. No gross murmur, gallop, or rub. Extremities are warm and well-perfused with good capillary refill (< 3 seconds). No chest wall tenderness.   GI: Abdomen soft, nontender, nondistended. No obvious organomegaly appreciated. Bowel sounds are present.  : No CVA tenderness.   MSK: No gross abnormalities appreciated. No limitations to AROM/PROM appreciated.   Extremities: No cyanosis, edema, or clubbing evident. Neurovascularly intact.   Neuro: A&Ox3. CN 2-12 grossly intact. Able to respond to questions appropriately and clearly. No acute focal neurologic deficits appreciated.  Psych: Appropriate mood and behavior.    ASSESSMENT AND PLAN   Assessment/Plan     89 y.o. female with PMHx s/f combined systolic and diastolic heart failure EF 45 to 50%, atrial fibrillation, aortic stenosis, chronic kidney disease stage IV, hypothyroidism, long-term anticoagulation therapy with warfarin presenting with shortness of breath.  For the last couple days patient has had increasing shortness of breath.    Acute on chronic combined systolic and diastolic heart failure, aortic stenosis  Continue patient on moderate dose of IV furosemide as well as her  daily metoprolol succinate  Consult to cardiology,  Patient is had 2 echo in the last year we will defer if patient needs an additional echocardiogram to cardiology  Monitor renal function closely    Coagulopathy induced by warfarin  Patient given vitamin K in the emergency department  Repeat INR in the morning  Nutrition consult    Chronic kidney disease stage IV  Monitor patient closely on diuretic    Hypothyroidism  Consult patient's home medications         Soto Cotter, APRN-CNP    Dragon dictation software was used to dictate this note and thus there may be minor errors in translation/transcription including garbled speech or misspellings. Please contact for clarification if needed.         [1]   Family History  Problem Relation Name Age of Onset    Other (Half sister with CAD) Sister      Coronary artery disease Other Children    [2] [3] [4]

## 2025-07-10 NOTE — NURSING NOTE
End of shift note  Call bell in reach and bed alarm on and functioning, pleasant, disliked legs being elevated, purewick working properly, no complaints at present

## 2025-07-10 NOTE — ED NOTES
Pt arrives to ED via private vehicle from home.    Code Status:  DNR and No Intubation    HPI     Chief Complaint   Patient presents with    Chest Pain     Shortness of breath and chest pressure for the past few days. Difficulty breathing while trying to sleep last night. Known CHF patient     Jennifer Magana is a 89 y.o. female presents with a chief complaint of multiple medical complaints.  Patient reports last 2 days she has had a bit of a chest pressure as well as some shortness of breath.  She states this really only when she is walking around.  She states that she knows when she is going doing her tasks throughout the day she has gotten a little bit more short of breath and a lot of tightness in her chest.  She does have a history of CHF and she is not currently on any Lasix secondary to history of issues with her kidneys.  Patient reports she does take Coumadin secondary to history of atrial fibrillation.  Patient states that she currently has not experienced any chest pain or shortness of breath as long as she is sitting still.     /72   Pulse 77   Temp 36.3 °C (97.3 °F) (Temporal)   Resp (!) 22   Wt 55.8 kg (123 lb)   SpO2 97%     No data recorded    LDA:   Peripheral IV 07/10/25 20 G Left Antecubital (Active)   Placement Date/Time: 07/10/25 1112   Size (Gauge): 20 G  Orientation: Left  Location: Antecubital  Site Prep: Chlorhexidine   Placed by: yesenia tomas  Insertion attempts: 1   Number of days: 0       External Urinary Catheter Female (Active)   Placement Date/Time: 07/10/25 1306   Hand Hygiene Completed: Yes  External Catheter Type: Female   Number of days: 0        BACKGROUND  Medical History[1]  Surgical History[2]  Medications Ordered Prior to Encounter[3]     ASSESSMENT  ED Course as of 07/10/25 1319   Thu Jul 10, 2025   1137 Went and discussed with the patient.  She is not having any GI bleed symptoms she is not any blood in her stool, no blood in her urine, she has not fallen or hit  her head. Daughter reports shes taking her normal dose of Coumadin Coumadin last INR was within normal limits. Agreeable to admission [CJ]   1141 Discussed the case with pharmacist Aldair.  He agrees patient would require IV vitamin K secondary to an uncalculable INR.  He recommends 5 mg to start with. [CJ]   1215 EKG, interpreted by me: atrial fibrillation, rate 86. Normal axis. No acute ST elevation or depression.  . No evidence of acute STEMI at this time.     [JG]   1220 EKG performed 7/10/2025 at 10:15 AM.  Atrial fibrillation with a ventricular rate of 80 bpm.  QT/QTc of 372/430 ms.  No STEMI.  Interpreted by myself reviewed by attending physician. [CJ]   1300 Discussed case with the hospitalist DWAYNE who agreed to admit under Dr. Moreland.  He states that he does feel the patient can go to a medical floor with telemetry.  Patient does have a normal CBC CMP with chronic elevated creatinine.  Patient's INR was undetectable today which was treated with IV potassium.  Patient's BNP was elevated at 550 and her chest x-ray reflects fluid overload which she was given IV Lasix for.  Patient's troponin stable at 17. [CJ]      ED Course User Index  [CJ] Erasmo Wood PA-C  [JG] Yenifer Hawkins MD         Diagnoses as of 07/10/25 1319   Elevated INR   Chest pain, unspecified type   Hypervolemia, unspecified hypervolemia type       Medications Currently Running:  Continuous Medications[4]     Medications Given:  ED Medication Administration from 07/10/2025 0954 to 07/10/2025 1319         Date/Time Order Dose Route Action Action by     07/10/2025 1206 EDT phytonadione (Vitamin K) 5 mg in dextrose 5% 50 mL IV 5 mg intravenous New Bag DAMASO Galvez     07/10/2025 1303 EDT phytonadione (Vitamin K) 5 mg in dextrose 5% 50 mL IV 0 mg intravenous Stopped DAMASO Galvez     07/10/2025 1306 EDT furosemide (Lasix) injection 40 mg 40 mg intravenous Given DAMASO Galvez                 RESULTS    Imaging:  XR chest 1 view   Final Result    Limited study. Mild cardiomegaly, bilateral infiltrates and   effusions, most likely due to congestive heart failure. Superimposed   left lower lobe pneumonia can not be completely excluded; follow-up   as needed.        Signed by: Aditya Ghosh 7/10/2025 11:44 AM   Dictation workstation:   [id number]         }  Labs ::99  Abnormal Labs Reviewed   CBC WITH AUTO DIFFERENTIAL - Abnormal; Notable for the following components:       Result Value    MCHC 30.7 (*)     RDW 15.8 (*)     All other components within normal limits   COMPREHENSIVE METABOLIC PANEL - Abnormal; Notable for the following components:    Glucose 133 (*)     Sodium 147 (*)     Chloride 110 (*)     Urea Nitrogen 26 (*)     Creatinine 1.63 (*)     eGFR 29 (*)     Total Protein 5.8 (*)     All other components within normal limits   SERIAL TROPONIN-INITIAL - Abnormal; Notable for the following components:    Troponin I, High Sensitivity 17 (*)     All other components within normal limits    Narrative:     Less than 99th percentile of normal range cutoff-                  Female and children under 18 years old <14 ng/L; Male <21 ng/L: Negative                  Repeat testing should be performed if clinically indicated.                                     Female and children under 18 years old 14-50 ng/L; Male 21-50 ng/L:                  Consistent with possible cardiac damage and possible increased clinical                   risk. Serial measurements may help to assess extent of myocardial damage.                                     >50 ng/L: Consistent with cardiac damage, increased clinical risk and                  myocardial infarction. Serial measurements may help assess extent of                   myocardial damage.                                      NOTE: Children less than 1 year old may have higher baseline troponin                   levels and results should be interpreted in conjunction with the overall                   clinical  context.                                     NOTE: Troponin I testing is performed using a different                   testing methodology at [facility] than at other                   [facility]. Direct result comparisons should only                   be made within the same method.   PROTIME-INR - Abnormal; Notable for the following components:    Protime >100.0 (*)     All other components within normal limits   APTT - Abnormal; Notable for the following components:    aPTT 87 (*)     All other components within normal limits    Narrative:     The APTT is no longer used for monitoring Unfractionated Heparin Therapy. For monitoring Heparin Therapy, use the Heparin Assay.   B-TYPE NATRIURETIC PEPTIDE - Abnormal; Notable for the following components:     (*)     All other components within normal limits    Narrative:        <100 pg/mL - Heart failure unlikely                  100-299 pg/mL - Intermediate probability of acute heart                                  failure exacerbation. Correlate with clinical                                  context and patient history.                    >=300 pg/mL - Heart Failure likely. Correlate with clinical                                  context and patient history.                                    BNP testing is performed using different testing methodology at [facility] than at other [facility]. Direct result comparisons should only be made within the same method.                      SERIAL TROPONIN, 1 HOUR - Abnormal; Notable for the following components:    Troponin I, High Sensitivity 17 (*)     All other components within normal limits    Narrative:     Less than 99th percentile of normal range cutoff-                  Female and children under 18 years old <14 ng/L; Male <21 ng/L: Negative                  Repeat testing should be performed if clinically indicated.                                     Female and  children under 18 years old 14-50 ng/L; Male 21-50 ng/L:                  Consistent with possible cardiac damage and possible increased clinical                   risk. Serial measurements may help to assess extent of myocardial damage.                                     >50 ng/L: Consistent with cardiac damage, increased clinical risk and                  myocardial infarction. Serial measurements may help assess extent of                   myocardial damage.                                      NOTE: Children less than 1 year old may have higher baseline troponin                   levels and results should be interpreted in conjunction with the overall                   clinical context.                                     NOTE: Troponin I testing is performed using a different                   testing methodology at [facility] than at other                   [facility]. Direct result comparisons should only                   be made within the same method.   DIGOXIN - Abnormal; Notable for the following components:    Digoxin  <0.30 (*)     All other components within normal limits    Narrative:     Digoxin measurements can be falsely increased or decreased if patient is receiving Digoxin-binding antibody therapy or Digoxin-like immunoreactive factors are present.                                    [1]   Past Medical History:  Diagnosis Date    (HFpEF) heart failure with preserved ejection fraction     Aortic stenosis     CKD (chronic kidney disease)     Essential (primary) hypertension     GERD (gastroesophageal reflux disease)     Hypothyroidism     Paroxysmal atrial fibrillation (Multi)    [2]   Past Surgical History:  Procedure Laterality Date    HYSTERECTOMY      MR HEAD ANGIO WO IV CONTRAST  07/21/2020    MR NECK ANGIO WO IV CONTRAST  07/21/2020   [3]   No current facility-administered medications on file prior to encounter.     Current Outpatient Medications on File Prior to  Encounter   Medication Sig Dispense Refill    digoxin (Lanoxin) 62.5 MCG tablet Take 1 tablet (62.5 mcg) by mouth once daily. 30 tablet 1    famotidine (Pepcid) 20 mg tablet Take 0.5 tablets (10 mg) by mouth once daily. Start on April 4th 30 tablet 1    Jantoven 2.5 mg tablet Take 1.25mg (0.5 tablets) by mouth on Tues,Thurs and 2.5mg (1 tablet) all other days or as directed by Coumadin clinic. 72 tablet 3    levothyroxine (Synthroid, Levoxyl) 100 mcg tablet Take 1 tablet (100 mcg) by mouth once daily.      metoprolol succinate XL (Toprol-XL) 50 mg 24 hr tablet Take 1 tablet (50 mg) by mouth 2 times a day. Do not crush or chew. 60 tablet 1    pantoprazole (ProtoNix) 40 mg EC tablet Take 1 tablet (40 mg) by mouth once daily in the morning. Take before meals. 90 tablet 3    Simbrinza 1-0.2 % drops,suspension ophthalmic suspension Administer 1 drop into both eyes 2 times a day.      travoprost (Travatan Z) 0.004 % drops ophthalmic solution Administer 1 drop into both eyes once daily at bedtime.     [4]      Sandhya Galvez RN  07/10/25 [id number]

## 2025-07-10 NOTE — CONSULTS
Formerly [facility]    Patient Name: Jennifer Magana  Patient : 1928  Room/Bed: 2321/2321-A    Date: 07/10/25  Time: 2:33 PM    Referred by Dr. Severino ref. provider found for Chest Pain (Shortness of breath and chest pressure for the past few days. Difficulty breathing while trying to sleep last night. Known CHF patient)     History Of Present Illness:    Jennifer Magana is a 89 y.o. female who presented to the emergency department with shortness of breath and lower extremity edema worsening for approximately 2 days.  Patient states that she had been taking all of her medications as prescribed.  Given her worsening symptoms she came to the ED for evaluation.  BMP shows a serum sodium of 147, serum potassium of 4.2, serum creatinine of 1.67.  Serum magnesium was 2.09.  BNP was 548.  Troponin was 17 x 2.  PT was greater than 100.  CBC showed hemoglobin of 13.0.  Digoxin level was less than 0.30.  Chest x-ray showed cardiomegaly with bilateral infiltrates and effusions.  Echocardiogram done 3/31/2025 showed mildly reduced left ventricular systolic function with an ejection fraction of 45 to 50%, grade 3 diastolic dysfunction, normal right ventricular systolic function, moderate mitral valve regurgitation, mild to moderate tricuspid valve regurgitation, mild aortic valve regurgitation, severe low-flow/low gradient aortic valve stenosis with a mean aortic valve pressure gradient of 25 mmHg and a dimensionless index of 0.17.  During my exam the patient was resting comfortably in bed.    Assessment/Plan:   1.  Acute on chronic HFmrEF  Patient presenting with increased shortness of breath, lower extremity edema, found to have elevated BNP of 548, and chest x-ray showing bilateral infiltrates/effusions.  Patient was started on IV furosemide for diuresis.  Continue metoprolol succinate 50 mg daily.  Will add a small dose of Entresto although we will need to closely monitor renal function given  her known CKD.  Patient was also noted to have mild hyponatremia on admission of 147.  Patient states she had not been eating/drinking well prior to admission.  Continue to monitor urine output, serum electrolytes, and renal function while here.    2.  Permanent atrial fibrillation  Patient has a history of permanent atrial fibrillation and has been on warfarin for anticoagulation and digoxin/metoprolol for heart rate control.  INR was found to be supratherapeutic with a PTT of greater than 100.  Patient was given 5 mg of vitamin K.  Telemetry currently showing atrial fibrillation with heart rate in the 100s.  Can continue digoxin/metoprolol for heart rate control.  No plans for rhythm control strategy at this time.    3.  Hypertension  The patient has a history of hypertension which appears under moderate control on exam today.  Continue to monitor and adjust antihypertensive medical therapy as necessary.    4.  Valvular heart disease  Echocardiogram done 3/31/2025 showed mildly reduced left ventricular systolic function with an ejection fraction of 45 to 50%, grade 3 diastolic dysfunction, normal right ventricular systolic function, moderate mitral valve regurgitation, mild to moderate tricuspid valve regurgitation, mild aortic valve regurgitation, severe low-flow/low gradient aortic valve stenosis with a mean aortic valve pressure gradient of 25 mmHg and a dimensionless index of 0.17.    5.  CKD  Serum creatinine of 1.63 on admission.  Continue to monitor with diuresis and addition of small dose of Entresto.    6.  Hypernatremia  Serum sodium of 147 on admission.  Continue to monitor with medication adjustments as noted above.  Patient states she had not been eating/drinking well prior to admission.    Past Medical History:  She has a past medical history of (HFpEF) heart failure with preserved ejection fraction, Aortic stenosis, CKD (chronic kidney disease), Essential (primary) hypertension, GERD  (gastroesophageal reflux disease), Hypothyroidism, and Paroxysmal atrial fibrillation (Multi).    Past Surgical History:  She has a past surgical history that includes MR angio head wo IV contrast (07/21/2020); MR angio neck wo IV contrast (07/21/2020); and Hysterectomy.      Social History:  She reports that she has never smoked. She has never used smokeless tobacco. She reports that she does not drink alcohol and does not use drugs.    Family History:  Family History[1]     Allergies:  Patient has no known allergies.    Outpatient Medications:  Current Outpatient Medications   Medication Instructions    digoxin (LANOXIN) 62.5 mcg, oral, Daily    famotidine (PEPCID) 10 mg, oral, Daily, Start on April 4th    Jantoven 2.5 mg tablet Take 1.25mg (0.5 tablets) by mouth on Tues,Thurs and 2.5mg (1 tablet) all other days or as directed by Coumadin clinic.    levothyroxine (SYNTHROID, LEVOXYL) 100 mcg, Daily    meloxicam (MOBIC) 7.5 mg, oral, 2 times daily    metoprolol succinate XL (TOPROL-XL) 50 mg, oral, 2 times daily, Do not crush or chew.    pantoprazole (PROTONIX) 40 mg, oral, Daily before breakfast    Simbrinza 1-0.2 % drops,suspension ophthalmic suspension 1 drop, 2 times daily    travoprost (Travatan Z) 0.004 % drops ophthalmic solution 1 drop, Nightly        ROS:  A 14 point review of systems was done and is negative other than as stated in HPI    Vitals:  Vitals:    07/10/25 1159 07/10/25 1200 07/10/25 1300 07/10/25 1408   BP: 133/80 (!) 148/97 133/72 130/80   BP Location:    Right arm   Patient Position:    Lying   Pulse: 76 66 77 93   Resp: 16 (!) 21 (!) 22 20   Temp:    35.9 °C (96.7 °F)   TempSrc:    Temporal   SpO2: 99% 98% 97% 99%   Weight:       Height:           Physical Exam:     Constitutional: Cooperative, in no acute distress, alert, appears stated age.  Skin: Skin color, texture, turgor normal. No rashes or lesions.  Head: Normocephalic. No masses, lesions, tenderness or abnormalities  Eyes:  Extraocular movements are grossly intact.  Mouth and throat: Mucous membranes moist  Neck: Neck supple, no carotid bruits, no JVD  Respiratory: Lungs clear to auscultation, no wheezing or rhonchi, no use of accessory muscles  Chest wall: No scars, normal excursion with respiration  Cardiovascular: Irregular rhythm with + murmur  Gastrointestinal: Abdomen soft, nontender. Bowel sounds normal.  Musculoskeletal: Strength equal in upper extremities  Extremities: 1+ pitting edema  Neurologic: Sensation grossly intact, alert and oriented ×3    Intake/Output:   No intake/output data recorded.    Outpatient Medications  Medications Ordered Prior to Encounter[2]    Scheduled medications  Scheduled Medications[3]  Continuous medications  Continuous Medications[4]  PRN medications  PRN Medications[5]   Prescriptions Prior to Admission[6]    Recent Labs: (past 2 days)  Recent Results (from the past 48 hours)   ECG 12 lead    Collection Time: 07/10/25 10:29 AM   Result Value Ref Range    Ventricular Rate 86 BPM    Atrial Rate 0 BPM    WV Interval 132 ms    QRS Duration 90 ms    QT Interval 374 ms    QTC Calculation(Bazett) 448 ms    R Axis -13 degrees    T Axis 183 degrees    QRS Count 14 beats    Q Onset 253 ms    T Offset 440 ms    QTC Fredericia 421 ms   CBC and Auto Differential    Collection Time: 07/10/25 10:54 AM   Result Value Ref Range    WBC 6.3 4.4 - 11.3 x10*3/uL    nRBC 0.0 0.0 - 0.0 /100 WBCs    RBC 4.43 4.00 - 5.20 x10*6/uL    Hemoglobin 13.0 12.0 - 16.0 g/dL    Hematocrit 42.3 36.0 - 46.0 %    MCV 96 80 - 100 fL    MCH 29.3 26.0 - 34.0 pg    MCHC 30.7 (L) 32.0 - 36.0 g/dL    RDW 15.8 (H) 11.5 - 14.5 %    Platelets 243 150 - 450 x10*3/uL    Neutrophils % 74.5 40.0 - 80.0 %    Immature Granulocytes %, Automated 0.5 0.0 - 0.9 %    Lymphocytes % 12.8 13.0 - 44.0 %    Monocytes % 9.9 2.0 - 10.0 %    Eosinophils % 1.7 0.0 - 6.0 %    Basophils % 0.6 0.0 - 2.0 %    Neutrophils Absolute 4.72 1.60 - 5.50 x10*3/uL     Immature Granulocytes Absolute, Automated 0.03 0.00 - 0.50 x10*3/uL    Lymphocytes Absolute 0.81 0.80 - 3.00 x10*3/uL    Monocytes Absolute 0.63 0.05 - 0.80 x10*3/uL    Eosinophils Absolute 0.11 0.00 - 0.40 x10*3/uL    Basophils Absolute 0.04 0.00 - 0.10 x10*3/uL   Comprehensive Metabolic Panel    Collection Time: 07/10/25 10:54 AM   Result Value Ref Range    Glucose 133 (H) 74 - 99 mg/dL    Sodium 147 (H) 136 - 145 mmol/L    Potassium 4.2 3.5 - 5.3 mmol/L    Chloride 110 (H) 98 - 107 mmol/L    Bicarbonate 25 21 - 32 mmol/L    Anion Gap 16 10 - 20 mmol/L    Urea Nitrogen 26 (H) 6 - 23 mg/dL    Creatinine 1.63 (H) 0.50 - 1.05 mg/dL    eGFR 29 (L) >60 mL/min/1.73m*2    Calcium 10.1 8.6 - 10.3 mg/dL    Albumin 3.7 3.4 - 5.0 g/dL    Alkaline Phosphatase 112 33 - 136 U/L    Total Protein 5.8 (L) 6.4 - 8.2 g/dL    AST 22 9 - 39 U/L    Bilirubin, Total 0.8 0.0 - 1.2 mg/dL    ALT 19 7 - 45 U/L   Magnesium    Collection Time: 07/10/25 10:54 AM   Result Value Ref Range    Magnesium 2.09 1.60 - 2.40 mg/dL   Troponin I, High Sensitivity, Initial    Collection Time: 07/10/25 10:54 AM   Result Value Ref Range    Troponin I, High Sensitivity 17 (H) 0 - 13 ng/L   Protime-INR    Collection Time: 07/10/25 10:54 AM   Result Value Ref Range    Protime >100.0 (HH) 9.8 - 12.4 seconds    INR     aPTT    Collection Time: 07/10/25 10:54 AM   Result Value Ref Range    aPTT 87 (H) 26 - 36 seconds   B-Type Natriuretic Peptide    Collection Time: 07/10/25 10:54 AM   Result Value Ref Range     (H) 0 - 99 pg/mL   Digoxin level    Collection Time: 07/10/25 10:54 AM   Result Value Ref Range    Digoxin  <0.30 (L) 0.80 - <2.00 ng/mL   Troponin, High Sensitivity, 1 Hour    Collection Time: 07/10/25 12:04 PM   Result Value Ref Range    Troponin I, High Sensitivity 17 (H) 0 - 13 ng/L       CV Studies:  EKG:  Encounter Date: 07/10/25   ECG 12 lead   Result Value    Ventricular Rate 86    Atrial Rate 0    IL Interval 132    QRS Duration 90    QT  Interval 374    QTC Calculation(Bazett) 448    R Axis -13    T Axis 183    QRS Count 14    Q Onset 253    T Offset 440    QTC Fredericia 421    Narrative    Atrial fibrillation  Probable anterior infarct, age indeterminate     Echocardiogram:   [place] [address]  Phone [phone] Fax [phone]    TRANSTHORACIC ECHOCARDIOGRAM REPORT      Patient Name:     QUENTIN COPPOLA  Reading Physician:   [id number] Tomy Issa DO  Study Date:       12/19/2022          Referring Physician: ELODIA VINES  MRN/PID:          [id number]            PCP:  Accession/Order#: [id number]           Department Location: [facility]  YOB: 1928            Fellow:  Gender:           F                   Nurse:  Admit Date:       12/17/2022          Sonographer:         Mitali Guzman RDCS  Admission Status: Inpatient - Routine Additional Staff:    Sabrina Doll RDCS  Height:           157.48 cm           CC Report to:  Weight:           59.88 kg            Study Type:          Echocardiogram  BSA:              1.60 m2  Blood Pressure: 165 /84 mmHg    Diagnosis/ICD: I50.40-Unspecified combined systolic (congestive) and diastolic  (congestive) heart failure (CHF)  Indication:    Congestive Heart Failure  Procedure/CPT: Echo Complete w Full Doppler-[id number]    Patient History:  Pertinent History: CHF, AF, CKD, GERD.    Study Detail: The following Echo studies were performed: 2D, M-Mode, Doppler and  color flow.      PHYSICIAN INTERPRETATION:  Left Ventricle: Left ventricular systolic function is normal, with an estimated ejection fraction of 60-65%. There are no regional wall motion abnormalities. The left ventricular cavity size is normal. Spectral Doppler shows a pseudonormal pattern of left ventricular diastolic filling.  Left Atrium: The left atrium is moderately dilated.  Right Ventricle: The right ventricle is normal in size. There is normal  right ventricular global systolic function.  Right Atrium: The right atrium is upper limits of normal in size.  Aortic Valve: The aortic valve is trileaflet. There is mild aortic valve cusp calcification. There is evidence of moderate aortic valve stenosis.  There is trivial aortic valve regurgitation. The peak instantaneous gradient of the aortic valve is 26.4 mmHg. The mean gradient of the aortic valve is 15.0 mmHg. LVOT stroke index of 25.6 mL/m2 suggesting low-flow/low gradient aortic valve stenosis.  Mitral Valve: The mitral valve is mildly thickened. There is mild mitral annular calcification. There is trace to mild mitral valve regurgitation.  Tricuspid Valve: The tricuspid valve is structurally normal. There is trace to mild tricuspid regurgitation.  Pulmonic Valve: The pulmonic valve is structurally normal. There is trace to mild pulmonic valve regurgitation.  Pericardium: There is no pericardial effusion noted.  Aorta: The aortic root is normal.      CONCLUSIONS:  1. Left ventricular systolic function is normal with a 60-65% estimated ejection fraction.  2. Spectral Doppler shows a pseudonormal pattern of left ventricular diastolic filling.  3. The left atrium is moderately dilated.  4. Moderate aortic valve stenosis.    QUANTITATIVE DATA SUMMARY:  2D MEASUREMENTS:  Normal Ranges:  Ao Root d:     2.50 cm    (2.0-3.7cm)  LAs:           3.90 cm    (2.7-4.0cm)  IVSd:          1.09 cm    (0.6-1.1cm)  LVPWd:         1.16 cm    (0.6-1.1cm)  LVIDd:         4.75 cm    (3.9-5.9cm)  LVIDs:         3.00 cm  LV Mass Index: 122.8 g/m2  LV % FS        36.8 %    LA VOLUME:  Normal Ranges:  LA Vol A4C:        55.1 ml    (22+/-6mL/m2)  LA Vol A2C:        66.9 ml  LA Vol BP:         64.3 ml  LA Vol Index A4C:  34.4ml/m2  LA Vol Index A2C:  41.8 ml/m2  LA Vol Index BP:   40.1 ml/m2  LA Area A4C:       18.0 cm2  LA Area A2C:       21.0 cm2  LA Major Axis A4C: 5.0 cm  LA Major Axis A2C: 5.6 cm  LA Volume Index:   40.0  ml/m2    RA VOLUME BY A/L METHOD:  Normal Ranges:  RA Area A4C: 12.0 cm2    AORTA MEASUREMENTS:  Normal Ranges:  Ao Sinus, d: 2.50 cm (2.1-3.5cm)  Ao STJ, d:   2.20 cm (1.7-3.4cm)  Asc Ao, d:   2.70 cm (2.1-3.4cm)    LV SYSTOLIC FUNCTION BY 2D PLANIMETRY (MOD):  Normal Ranges:  EF-A4C View: 73.8 % (>=55%)  EF-A2C View: 77.6 %  EF-Biplane:  75.1 %    LV DIASTOLIC FUNCTION:  Normal Ranges:  MV Peak E:        0.70 m/s    (0.7-1.2 m/s)  MV Peak A:        0.68 m/s    (0.42-0.7 m/s)  E/A Ratio:        1.04        (1.0-2.2)  MV e'             0.05 m/s    (>8.0)  MV lateral e'     0.06 m/s  MV medial e'      0.04 m/s  MV A Dur:         144.00 msec  E/e' Ratio:       14.06       (<8.0)  PulmV A Revs Dur: 201.00 msec    AORTIC VALVE:  Normal Ranges:  AoV Vmax:                2.57 m/s  (<=1.7m/s)  AoV Peak P.4 mmHg (<20mmHg)  AoV Mean PG:             15.0 mmHg (1.7-11.5mmHg)  LVOT Max Sudeep:            0.83 m/s  (<=1.1m/s)  AoV VTI:                 59.70 cm  (18-25cm)  LVOT VTI:                19.40 cm  LVOT Diameter:           1.70 cm   (1.8-2.4cm)  AoV Area, VTI:           0.74 cm2  (2.5-5.5cm2)  AoV Area,Vmax:           0.73 cm2  (2.5-4.5cm2)  AoV Dimensionless Index: 0.32    RIGHT VENTRICLE:  RV 1   3.3 cm  RV 2   2.1 cm  RV 3   5.3 cm  TAPSE: 24.8 mm  RV s'  0.14 m/s    TRICUSPID VALVE/RVSP:  Normal Ranges:  Peak TR Velocity: 2.09 m/s  RV Syst Pressure: 20.5 mmHg (< 30mmHg)  TV E Vmax:        0.46 m/s  (0.3-0.7m/s)  TV A Vmax:        0.45 m/s  IVC Diam:         1.12 cm    Pulmonary Veins:  PulmV A Revs Dur: 201.00 msec      [id number] Tomy Issa DO  Electronically signed on 2022 at 2:09:41 PM         Final     Stress Testing IMGRESULT(QVY2382:1:1825): No results found for this or any previous visit from the past 1825 days.    Cardiac Catheterization: No results found for this or any previous visit from the past 1825 days.  No results found for this or any previous visit from the past 3650 days.     Cardiac  Scoring: No results found for this or any previous visit from the past 1825 days.    AAA : No results found for this or any previous visit from the past 1825 days.    OTHER: No results found for this or any previous visit from the past 1825 days.    LAST IMAGING RESULTS  ECG 12 lead  Atrial fibrillation  Probable anterior infarct, age indeterminate  XR chest 1 view  Narrative: Interpreted By:  Aditya Ghosh,   STUDY:  XR CHEST 1 VIEW; 7/10/2025 11:28 am      INDICATION:  Signs/Symptoms:Chest Pain      COMPARISON:  04/22/2025.      ACCESSION NUMBER(S):  [id number]      ORDERING CLINICIAN:  FAITH TERRELL      FINDINGS:  The study is limited due to rotation, respiratory motion and poor  inspiratory effort, with resultant crowding of the pulmonary  vasculature. The cardiac silhouette appears mildly prominent,  exaggerated by the technique. Calcifications involve the mitral  annulus and the aorta. The pleural effusions and bibasilar  atelectatic changes/infiltrates, left more than right. There is no  pneumothorax. Degenerative changes involve the spine and shoulders.      Impression: Limited study. Mild cardiomegaly, bilateral infiltrates and  effusions, most likely due to congestive heart failure. Superimposed  left lower lobe pneumonia can not be completely excluded; follow-up  as needed.      Signed by: Aditya Ghosh 7/10/2025 11:44 AM  Dictation workstation:   [id number]        Tomy Issa DO, [facility], FACOI  7/10/2025  2:33 PM         [1]   Family History  Problem Relation Name Age of Onset    Other (Half sister with CAD) Sister      Coronary artery disease Other Children    [2]   No current facility-administered medications on file prior to encounter.     Current Outpatient Medications on File Prior to Encounter   Medication Sig Dispense Refill    famotidine (Pepcid) 20 mg tablet Take 0.5 tablets (10 mg) by mouth once daily. Start on April 4th 30 tablet 1    Jantoven 2.5 mg tablet Take 1.25mg (0.5 tablets) by mouth  on Tues,Thurs and 2.5mg (1 tablet) all other days or as directed by Coumadin clinic. 72 tablet 3    levothyroxine (Synthroid, Levoxyl) 100 mcg tablet Take 1 tablet (100 mcg) by mouth once daily.      metoprolol succinate XL (Toprol-XL) 50 mg 24 hr tablet Take 1 tablet (50 mg) by mouth 2 times a day. Do not crush or chew. 60 tablet 1    pantoprazole (ProtoNix) 40 mg EC tablet Take 1 tablet (40 mg) by mouth once daily in the morning. Take before meals. 90 tablet 3    Simbrinza 1-0.2 % drops,suspension ophthalmic suspension Administer 1 drop into both eyes 2 times a day.      travoprost (Travatan Z) 0.004 % drops ophthalmic solution Administer 1 drop into both eyes once daily at bedtime.      digoxin (Lanoxin) 62.5 MCG tablet Take 1 tablet (62.5 mcg) by mouth once daily. (Patient not taking: Reported on 7/10/2025) 30 tablet 1    meloxicam (Mobic) 7.5 mg tablet Take 1 tablet (7.5 mg) by mouth 2 times a day.     [3] brinzolamide-brimonidine, 1 drop, Both Eyes, BID  [START ON 7/11/2025] furosemide, 20 mg, intravenous, q12h  latanoprost, 1 drop, Both Eyes, Nightly  levothyroxine, 100 mcg, oral, Daily  metoprolol succinate XL, 50 mg, oral, BID  [START ON 7/11/2025] pantoprazole, 40 mg, oral, Daily before breakfast   Or  [START ON 7/11/2025] pantoprazole, 40 mg, intravenous, Daily before breakfast  polyethylene glycol, 17 g, oral, Daily    [4]    [5] PRN medications: bisacodyl, guaiFENesin, melatonin, ondansetron ODT **OR** ondansetron  [6]   Medications Prior to Admission   Medication Sig Dispense Refill Last Dose/Taking    famotidine (Pepcid) 20 mg tablet Take 0.5 tablets (10 mg) by mouth once daily. Start on April 4th 30 tablet 1 7/10/2025 Morning    Jantoven 2.5 mg tablet Take 1.25mg (0.5 tablets) by mouth on Tues,Thurs and 2.5mg (1 tablet) all other days or as directed by Coumadin clinic. 72 tablet 3 7/10/2025 Morning    levothyroxine (Synthroid, Levoxyl) 100 mcg tablet Take 1 tablet (100 mcg) by mouth once daily.    7/10/2025 Morning    metoprolol succinate XL (Toprol-XL) 50 mg 24 hr tablet Take 1 tablet (50 mg) by mouth 2 times a day. Do not crush or chew. 60 tablet 1 7/10/2025 Morning    pantoprazole (ProtoNix) 40 mg EC tablet Take 1 tablet (40 mg) by mouth once daily in the morning. Take before meals. 90 tablet 3 7/10/2025 Morning    Simbrinza 1-0.2 % drops,suspension ophthalmic suspension Administer 1 drop into both eyes 2 times a day.   7/10/2025 Morning    travoprost (Travatan Z) 0.004 % drops ophthalmic solution Administer 1 drop into both eyes once daily at bedtime.   7/9/2025 Bedtime    digoxin (Lanoxin) 62.5 MCG tablet Take 1 tablet (62.5 mcg) by mouth once daily. (Patient not taking: Reported on 7/10/2025) 30 tablet 1 Not Taking    meloxicam (Mobic) 7.5 mg tablet Take 1 tablet (7.5 mg) by mouth 2 times a day.

## 2025-07-10 NOTE — PROGRESS NOTES
"Pharmacy Consult for Warfarin (Coumadin) Management - Initial Consult Note     Jennifer Magana is a 89 y.o. female admitted for Elevated INR. Pharmacy was consulted for warfarin dosing and monitoring.       Labs  INR External   Date Value Ref Range Status   06/19/2025 1.90 (A) 2.00 - 3.00 Final   06/13/2025 1.80 (A) 2.00 - 3.00 Final   06/06/2025 2.10 2.00 - 3.00 Final     INR   Date Value Ref Range Status   07/10/2025   Final     Comment:     Unable To Calculate INR     Protime   Date Value Ref Range Status   07/10/2025 >100.0 (HH) 9.8 - 12.4 seconds Final   04/28/2025 28.9 (H) 9.8 - 12.4 seconds Final   04/27/2025 24.6 (H) 9.8 - 12.4 seconds Final     Hemoglobin   Date Value Ref Range Status   07/10/2025 13.0 12.0 - 16.0 g/dL Final     HEMOGLOBIN   Date Value Ref Range Status   04/15/2025 12.8 11.7 - 15.5 g/dL Final     Hematocrit   Date Value Ref Range Status   07/10/2025 42.3 36.0 - 46.0 % Final     HEMATOCRIT   Date Value Ref Range Status   04/15/2025 40.1 35.0 - 45.0 % Final     Platelets   Date Value Ref Range Status   07/10/2025 243 150 - 450 x10*3/uL Final     PLATELET COUNT   Date Value Ref Range Status   04/15/2025 303 140 - 400 Thousand/uL Final     No results found for: \"PTT\"  [unfilled]  Warfarin Indication: Atrial fibrillation or flutter  Target INR: 2 - 3  Assessment  INR >>11, given 5mg of IV vitamin K; takes 1.25mg on Tu/Th and 2.5mg on all other days    Plan   Hold today    Orders not placed. Pharmacy will continue to monitor and place anticoagulant orders following future INR's     Aldair Ibarra, PharmD  "

## 2025-07-10 NOTE — CARE PLAN
Problem: Pain - Adult  Goal: Verbalizes/displays adequate comfort level or baseline comfort level  7/10/2025 1449 by Yolanda Manning RN  Outcome: Progressing  7/10/2025 1447 by Yolanda Manning RN  Outcome: Progressing     Problem: Safety - Adult  Goal: Free from fall injury  7/10/2025 1449 by Yolanda Manning RN  Outcome: Progressing  7/10/2025 1447 by Yolanda Manning RN  Outcome: Progressing     Problem: Discharge Planning  Goal: Discharge to home or other facility with appropriate resources  7/10/2025 1449 by Yolanda Manning RN  Outcome: Progressing  7/10/2025 1447 by Yolanda Manning RN  Outcome: Progressing     Problem: Chronic Conditions and Co-morbidities  Goal: Patient's chronic conditions and co-morbidity symptoms are monitored and maintained or improved  7/10/2025 1449 by Yolanda Manning RN  Outcome: Progressing  7/10/2025 1447 by Yolanda Manning RN  Outcome: Progressing     Problem: Nutrition  Goal: Nutrient intake appropriate for maintaining nutritional needs  7/10/2025 1449 by Yolanda Manning RN  Outcome: Progressing  7/10/2025 1447 by Yolanda Manning RN  Outcome: Progressing     Problem: Skin  Goal: Decreased wound size/increased tissue granulation at next dressing change  Outcome: Progressing  Flowsheets (Taken 7/10/2025 1449)  Decreased wound size/increased tissue granulation at next dressing change: Protective dressings over bony prominences  Goal: Participates in plan/prevention/treatment measures  Outcome: Progressing  Goal: Prevent/manage excess moisture  Outcome: Progressing  Goal: Prevent/minimize sheer/friction injuries  Outcome: Progressing  Goal: Promote/optimize nutrition  Outcome: Progressing  Goal: Promote skin healing  Outcome: Progressing     Problem: Fall/Injury  Goal: Not fall by end of shift  Outcome: Progressing  Goal: Be free from injury by end of the shift  Outcome: Progressing  Goal: Verbalize understanding of personal risk factors for fall in the hospital  Outcome: Progressing  Goal:  Verbalize understanding of risk factor reduction measures to prevent injury from fall in the home  Outcome: Progressing  Goal: Use assistive devices by end of the shift  Outcome: Progressing  Goal: Pace activities to prevent fatigue by end of the shift  Outcome: Progressing     Problem: Respiratory  Goal: Clear secretions with interventions this shift  Outcome: Progressing  Goal: Minimize anxiety/maximize coping throughout shift  Outcome: Progressing  Goal: Minimal/no exertional discomfort or dyspnea this shift  Outcome: Progressing  Goal: No signs of respiratory distress (eg. Use of accessory muscles. Peds grunting)  Outcome: Progressing  Goal: Patent airway maintained this shift  Outcome: Progressing  Goal: Tolerate pulmonary toileting this shift  Outcome: Progressing   Throughout shift, nurse will monitor patient for any continued chest pain and:   1. Perform pain assessment: Identify precipitating events, if any, as well as frequency, duration, intensity, and location of the pain.   2. Monitor vital signs  3. Auscultate heart sounds. Monitor heart rate and rhythm.   4. Elevate the head of the bed if the client is short of breath or during nitrates administration.   5. Encourage immediate reporting of pain for prompt administration of medications as indicated.   6. Obtain EKG if needed    Goals for shift: Free from chest painThe patient's goals for the shift include resp even and unlabored and free of injuries    The clinical goals for the shift include pulse ox greater than 90%

## 2025-07-10 NOTE — PROGRESS NOTES
Pharmacy Consult for Warfarin (Coumadin) Management - Initial Consult Note     Jennifer Magana is a 89 y.o. female admitted for Elevated INR. Pharmacy was consulted for warfarin dosing and monitoring.       Labs  INR External   Date Value Ref Range Status   06/19/2025 1.90 (A) 2.00 - 3.00 Final   06/13/2025 1.80 (A) 2.00 - 3.00 Final   06/06/2025 2.10 2.00 - 3.00 Final     INR   Date Value Ref Range Status   07/10/2025   Final     Comment:     Unable To Calculate INR     Protime   Date Value Ref Range Status   07/10/2025 >100.0 (HH) 9.8 - 12.4 seconds Final   04/28/2025 28.9 (H) 9.8 - 12.4 seconds Final   04/27/2025 24.6 (H) 9.8 - 12.4 seconds Final     Hemoglobin   Date Value Ref Range Status   07/10/2025 13.0 12.0 - 16.0 g/dL Final     HEMOGLOBIN   Date Value Ref Range Status   04/15/2025 12.8 11.7 - 15.5 g/dL Final     Hematocrit   Date Value Ref Range Status   07/10/2025 42.3 36.0 - 46.0 % Final     HEMATOCRIT   Date Value Ref Range Status   04/15/2025 40.1 35.0 - 45.0 % Final     Platelets   Date Value Ref Range Status   07/10/2025 243 150 - 450 x10*3/uL Final     PLATELET COUNT   Date Value Ref Range Status   04/15/2025 303 140 - 400 Thousand/uL Final     Warfarin Indication: Atrial fibrillation or flutter  Target INR: 2-3    INR undetectable. IV 5mg vitamin K given. Hold warfarin tonight.    Orders not placed. Pharmacy will continue to monitor and place anticoagulant orders following INR tomorrow.     Danika Ocampo, PharmD

## 2025-07-10 NOTE — ED PROVIDER NOTES
EMERGENCY MEDICINE EVALUATION NOTE    History of Present Illness     Chief Complaint:   Chief Complaint   Patient presents with    Chest Pain     Shortness of breath and chest pressure for the past few days. Difficulty breathing while trying to sleep last night. Known CHF patient       HPI: Jennifer Magana is a 89 y.o. female presents with a chief complaint of multiple medical complaints.  Patient reports last 2 days she has had a bit of a chest pressure as well as some shortness of breath.  She states this really only when she is walking around.  She states that she knows when she is going doing her tasks throughout the day she has gotten a little bit more short of breath and a lot of tightness in her chest.  She does have a history of CHF and she is not currently on any Lasix secondary to history of issues with her kidneys.  Patient reports she does take Coumadin secondary to history of atrial fibrillation.  Patient states that she currently has not experienced any chest pain or shortness of breath as long as she is sitting still.    Previous History   Medical History[1]  Surgical History[2]  Social History[3]  Family History[4]  Allergies[5]  Current Outpatient Medications   Medication Instructions    digoxin (LANOXIN) 62.5 mcg, oral, Daily    famotidine (PEPCID) 10 mg, oral, Daily, Start on April 4th    Jantoven 2.5 mg tablet Take 1.25mg (0.5 tablets) by mouth on Tues,Thurs and 2.5mg (1 tablet) all other days or as directed by Coumadin clinic.    levothyroxine (SYNTHROID, LEVOXYL) 100 mcg, Daily    metoprolol succinate XL (TOPROL-XL) 50 mg, oral, 2 times daily, Do not crush or chew.    pantoprazole (PROTONIX) 40 mg, oral, Daily before breakfast    Simbrinza 1-0.2 % drops,suspension ophthalmic suspension 1 drop, Both Eyes, 2 times daily    travoprost (Travatan Z) 0.004 % drops ophthalmic solution 1 drop, Both Eyes, Nightly       Physical Exam     Appearance: Alert, oriented , cooperative     Skin: Intact,  dry  skin, no lesions, rash, petechiae or purpura.      Eyes: PERRLA, EOMs intact,  Conjunctiva pink      ENT: Hearing grossly intact. Pharynx clear, uvula midline.      Neck: Supple. Trachea at midline.      Pulmonary: Clear bilaterally. No rales, rhonchi or wheezing. No accessory muscle use or stridor.     Cardiac: Irregular rhythm, normal rate     Abdomen: Soft, active bowel sounds.  Epigastric tenderness     Musculoskeletal: Full range of motion.      Neurological:Cranial nerves II through XII are grossly intact, normal sensation, no weakness, no focal findings identified.     Results     Labs Reviewed   CBC WITH AUTO DIFFERENTIAL - Abnormal       Result Value    WBC 6.3      nRBC 0.0      RBC 4.43      Hemoglobin 13.0      Hematocrit 42.3      MCV 96      MCH 29.3      MCHC 30.7 (*)     RDW 15.8 (*)     Platelets 243      Neutrophils % 74.5      Immature Granulocytes %, Automated 0.5      Lymphocytes % 12.8      Monocytes % 9.9      Eosinophils % 1.7      Basophils % 0.6      Neutrophils Absolute 4.72      Immature Granulocytes Absolute, Automated 0.03      Lymphocytes Absolute 0.81      Monocytes Absolute 0.63      Eosinophils Absolute 0.11      Basophils Absolute 0.04     COMPREHENSIVE METABOLIC PANEL - Abnormal    Glucose 133 (*)     Sodium 147 (*)     Potassium 4.2      Chloride 110 (*)     Bicarbonate 25      Anion Gap 16      Urea Nitrogen 26 (*)     Creatinine 1.63 (*)     eGFR 29 (*)     Calcium 10.1      Albumin 3.7      Alkaline Phosphatase 112      Total Protein 5.8 (*)     AST 22      Bilirubin, Total 0.8      ALT 19     SERIAL TROPONIN-INITIAL - Abnormal    Troponin I, High Sensitivity 17 (*)     Narrative:     Less than 99th percentile of normal range cutoff-  Female and children under 18 years old <14 ng/L; Male <21 ng/L: Negative  Repeat testing should be performed if clinically indicated.     Female and children under 18 years old 14-50 ng/L; Male 21-50 ng/L:  Consistent with possible cardiac damage  and possible increased clinical   risk. Serial measurements may help to assess extent of myocardial damage.     >50 ng/L: Consistent with cardiac damage, increased clinical risk and  myocardial infarction. Serial measurements may help assess extent of   myocardial damage.      NOTE: Children less than 1 year old may have higher baseline troponin   levels and results should be interpreted in conjunction with the overall   clinical context.     NOTE: Troponin I testing is performed using a different   testing methodology at [facility] than at other   [facility]. Direct result comparisons should only   be made within the same method.   PROTIME-INR - Abnormal    Protime >100.0 (*)     INR       APTT - Abnormal    aPTT 87 (*)     Narrative:     The APTT is no longer used for monitoring Unfractionated Heparin Therapy. For monitoring Heparin Therapy, use the Heparin Assay.   B-TYPE NATRIURETIC PEPTIDE - Abnormal     (*)     Narrative:        <100 pg/mL - Heart failure unlikely  100-299 pg/mL - Intermediate probability of acute heart                  failure exacerbation. Correlate with clinical                  context and patient history.    >=300 pg/mL - Heart Failure likely. Correlate with clinical                  context and patient history.    BNP testing is performed using different testing methodology at [facility] than at other [facility]. Direct result comparisons should only be made within the same method.      SERIAL TROPONIN, 1 HOUR - Abnormal    Troponin I, High Sensitivity 17 (*)     Narrative:     Less than 99th percentile of normal range cutoff-  Female and children under 18 years old <14 ng/L; Male <21 ng/L: Negative  Repeat testing should be performed if clinically indicated.     Female and children under 18 years old 14-50 ng/L; Male 21-50 ng/L:  Consistent with possible cardiac damage and possible increased clinical   risk. Serial measurements may help to  assess extent of myocardial damage.     >50 ng/L: Consistent with cardiac damage, increased clinical risk and  myocardial infarction. Serial measurements may help assess extent of   myocardial damage.      NOTE: Children less than 1 year old may have higher baseline troponin   levels and results should be interpreted in conjunction with the overall   clinical context.     NOTE: Troponin I testing is performed using a different   testing methodology at [facility] than at other   [facility]. Direct result comparisons should only   be made within the same method.   DIGOXIN - Abnormal    Digoxin  <0.30 (*)     Narrative:     Digoxin measurements can be falsely increased or decreased if patient is receiving Digoxin-binding antibody therapy or Digoxin-like immunoreactive factors are present.     MAGNESIUM - Normal    Magnesium 2.09     TROPONIN SERIES- (INITIAL, 1 HR)    Narrative:     The following orders were created for panel order Troponin I Series, High Sensitivity (0, 1 HR).  Procedure                               Abnormality         Status                     ---------                               -----------         ------                     Troponin I, High Sensiti...[[id number]]  Abnormal            Final result               Troponin, High Sensitivi...[[id number]]  Abnormal            Final result                 Please view results for these tests on the individual orders.     XR chest 1 view   Final Result   Limited study. Mild cardiomegaly, bilateral infiltrates and   effusions, most likely due to congestive heart failure. Superimposed   left lower lobe pneumonia can not be completely excluded; follow-up   as needed.        Signed by: Aditya Ghosh 7/10/2025 11:44 AM   Dictation workstation:   [id number]            ED Course & Medical Decision Making     Medications   furosemide (Lasix) injection 40 mg (has no administration in time range)   phytonadione (Vitamin K) 5 mg in dextrose  "5% 50 mL IV (5 mg intravenous New Bag 7/10/25 1206)     Heart Rate:  []   Temperature:  [36.3 °C (97.3 °F)]   Respirations:  [16-21]   BP: (112-148)/(80-97)   Height:  [157.5 cm (5' 2\")]   Weight:  [55.8 kg (123 lb)]   Pulse Ox:  [98 %-100 %]    ED Course as of 07/10/25 1301   Thu Jul 10, 2025   1137 Went and discussed with the patient.  She is not having any GI bleed symptoms she is not any blood in her stool, no blood in her urine, she has not fallen or hit her head. Daughter reports shes taking her normal dose of Coumadin Coumadin last INR was within normal limits. Agreeable to admission [CJ]   1141 Discussed the case with pharmacist Aldair.  He agrees patient would require IV vitamin K secondary to an uncalculable INR.  He recommends 5 mg to start with. [CJ]   1215 EKG, interpreted by me: atrial fibrillation, rate 86. Normal axis. No acute ST elevation or depression.  . No evidence of acute STEMI at this time.     [JG]   1220 EKG performed 7/10/2025 at 10:15 AM.  Atrial fibrillation with a ventricular rate of 80 bpm.  QT/QTc of 372/430 ms.  No STEMI.  Interpreted by myself reviewed by attending physician. [CJ]   1300 Discussed case with the hospitalist DWAYNE who agreed to admit under Dr. Moreland.  He states that he does feel the patient can go to a medical floor with telemetry.  Patient does have a normal CBC CMP with chronic elevated creatinine.  Patient's INR was undetectable today which was treated with IV potassium.  Patient's BNP was elevated at 550 and her chest x-ray reflects fluid overload which she was given IV Lasix for.  Patient's troponin stable at 17. [CJ]      ED Course User Index  [CJ] Erasmo Wood PA-C  [JG] Yenifer Hawkins MD         Diagnoses as of 07/10/25 1301   Elevated INR   Chest pain, unspecified type   Hypervolemia, unspecified hypervolemia type       Procedures   Critical Care    Performed by: Erasmo Wood PA-C  Authorized by: Yenifer Hawkins MD    Critical care " provider statement:     Critical care time (minutes):  40    Critical care time was exclusive of:  Separately billable procedures and treating other patients    Critical care was necessary to treat or prevent imminent or life-threatening deterioration of the following conditions:  Circulatory failure (Undetectable INR requiring IV vitamin K)    Critical care was time spent personally by me on the following activities:  Blood draw for specimens, examination of patient, evaluation of patient's response to treatment, ordering and performing treatments and interventions, ordering and review of laboratory studies, ordering and review of radiographic studies, pulse oximetry, re-evaluation of patient's condition and review of old charts    Care discussed with: admitting provider        Diagnosis     1. Elevated INR    2. Chest pain, unspecified type    3. Hypervolemia, unspecified hypervolemia type        Disposition   Admit    ED Prescriptions    None         Disclaimer: This note was dictated by speech recognition. Minor errors in transcription may be present. Please call if questions.         [1]   Past Medical History:  Diagnosis Date    (HFpEF) heart failure with preserved ejection fraction     Aortic stenosis     CKD (chronic kidney disease)     Essential (primary) hypertension     GERD (gastroesophageal reflux disease)     Hypothyroidism     Paroxysmal atrial fibrillation (Multi)    [2]   Past Surgical History:  Procedure Laterality Date    HYSTERECTOMY      MR HEAD ANGIO WO IV CONTRAST  07/21/2020    MR NECK ANGIO WO IV CONTRAST  07/21/2020   [3]   Social History  Tobacco Use    Smoking status: Never    Smokeless tobacco: Never   Substance Use Topics    Alcohol use: Never    Drug use: Never   [4]   Family History  Problem Relation Name Age of Onset    Other (Half sister with CAD) Sister      Coronary artery disease Other Children    [5] No Known Allergies       Erasmo Wood PA-C  07/10/25 3203

## 2025-07-10 NOTE — NURSING NOTE
Received from ER with family, alert and oriented, name band verified, 4 eye skin check completed, oriented to room and call bell system, bed alarm on, instructed nit to get out of bed per self and to call for assistance, scab noted on right elbow, no drainage, foam applied and lower extremities seeping, legs elevated. Family member at bedside

## 2025-07-11 LAB
ANION GAP SERPL CALC-SCNC: 11 MMOL/L (ref 10–20)
BASOPHILS # BLD AUTO: 0.04 X10*3/UL (ref 0–0.1)
BASOPHILS NFR BLD AUTO: 0.6 %
BUN SERPL-MCNC: 26 MG/DL (ref 6–23)
CALCIUM SERPL-MCNC: 10 MG/DL (ref 8.6–10.3)
CHLORIDE SERPL-SCNC: 109 MMOL/L (ref 98–107)
CO2 SERPL-SCNC: 29 MMOL/L (ref 21–32)
CREAT SERPL-MCNC: 1.74 MG/DL (ref 0.5–1.05)
EGFRCR SERPLBLD CKD-EPI 2021: 26 ML/MIN/1.73M*2
EOSINOPHIL # BLD AUTO: 0.19 X10*3/UL (ref 0–0.4)
EOSINOPHIL NFR BLD AUTO: 2.9 %
ERYTHROCYTE [DISTWIDTH] IN BLOOD BY AUTOMATED COUNT: 15.8 % (ref 11.5–14.5)
GLUCOSE SERPL-MCNC: 94 MG/DL (ref 74–99)
HCT VFR BLD AUTO: 44.3 % (ref 36–46)
HGB BLD-MCNC: 13.4 G/DL (ref 12–16)
IMM GRANULOCYTES # BLD AUTO: 0.03 X10*3/UL (ref 0–0.5)
IMM GRANULOCYTES NFR BLD AUTO: 0.5 % (ref 0–0.9)
INR PPP: 1.7 (ref 0.9–1.1)
LYMPHOCYTES # BLD AUTO: 1.12 X10*3/UL (ref 0.8–3)
LYMPHOCYTES NFR BLD AUTO: 17 %
MAGNESIUM SERPL-MCNC: 1.99 MG/DL (ref 1.6–2.4)
MCH RBC QN AUTO: 28.8 PG (ref 26–34)
MCHC RBC AUTO-ENTMCNC: 30.2 G/DL (ref 32–36)
MCV RBC AUTO: 95 FL (ref 80–100)
MONOCYTES # BLD AUTO: 0.89 X10*3/UL (ref 0.05–0.8)
MONOCYTES NFR BLD AUTO: 13.5 %
NEUTROPHILS # BLD AUTO: 4.31 X10*3/UL (ref 1.6–5.5)
NEUTROPHILS NFR BLD AUTO: 65.5 %
NRBC BLD-RTO: 0 /100 WBCS (ref 0–0)
PLATELET # BLD AUTO: 236 X10*3/UL (ref 150–450)
POTASSIUM SERPL-SCNC: 4.1 MMOL/L (ref 3.5–5.3)
PROTHROMBIN TIME: 18.9 SECONDS (ref 9.8–12.4)
RBC # BLD AUTO: 4.66 X10*6/UL (ref 4–5.2)
SODIUM SERPL-SCNC: 145 MMOL/L (ref 136–145)
WBC # BLD AUTO: 6.6 X10*3/UL (ref 4.4–11.3)

## 2025-07-11 PROCEDURE — 2500000001 HC RX 250 WO HCPCS SELF ADMINISTERED DRUGS (ALT 637 FOR MEDICARE OP)

## 2025-07-11 PROCEDURE — 99232 SBSQ HOSP IP/OBS MODERATE 35: CPT | Performed by: NURSE PRACTITIONER

## 2025-07-11 PROCEDURE — 97116 GAIT TRAINING THERAPY: CPT | Mod: GP | Performed by: PHYSICAL THERAPIST

## 2025-07-11 PROCEDURE — 85610 PROTHROMBIN TIME: CPT

## 2025-07-11 PROCEDURE — 2500000001 HC RX 250 WO HCPCS SELF ADMINISTERED DRUGS (ALT 637 FOR MEDICARE OP): Performed by: NURSE PRACTITIONER

## 2025-07-11 PROCEDURE — 97165 OT EVAL LOW COMPLEX 30 MIN: CPT | Mod: GO

## 2025-07-11 PROCEDURE — 2500000002 HC RX 250 W HCPCS SELF ADMINISTERED DRUGS (ALT 637 FOR MEDICARE OP, ALT 636 FOR OP/ED): Performed by: NURSE PRACTITIONER

## 2025-07-11 PROCEDURE — 83735 ASSAY OF MAGNESIUM: CPT | Performed by: NURSE PRACTITIONER

## 2025-07-11 PROCEDURE — 2500000004 HC RX 250 GENERAL PHARMACY W/ HCPCS (ALT 636 FOR OP/ED): Performed by: NURSE PRACTITIONER

## 2025-07-11 PROCEDURE — 1200000002 HC GENERAL ROOM WITH TELEMETRY DAILY

## 2025-07-11 PROCEDURE — 2500000001 HC RX 250 WO HCPCS SELF ADMINISTERED DRUGS (ALT 637 FOR MEDICARE OP): Performed by: INTERNAL MEDICINE

## 2025-07-11 PROCEDURE — 97161 PT EVAL LOW COMPLEX 20 MIN: CPT | Mod: GP | Performed by: PHYSICAL THERAPIST

## 2025-07-11 PROCEDURE — 80048 BASIC METABOLIC PNL TOTAL CA: CPT | Performed by: NURSE PRACTITIONER

## 2025-07-11 PROCEDURE — 36415 COLL VENOUS BLD VENIPUNCTURE: CPT

## 2025-07-11 PROCEDURE — 85025 COMPLETE CBC W/AUTO DIFF WBC: CPT | Performed by: NURSE PRACTITIONER

## 2025-07-11 RX ORDER — FUROSEMIDE 20 MG/1
20 TABLET ORAL DAILY
Status: DISCONTINUED | OUTPATIENT
Start: 2025-07-12 | End: 2025-07-12 | Stop reason: HOSPADM

## 2025-07-11 RX ORDER — WARFARIN 2 MG/1
2 TABLET ORAL ONCE
Status: COMPLETED | OUTPATIENT
Start: 2025-07-11 | End: 2025-07-11

## 2025-07-11 RX ADMIN — SACUBITRIL AND VALSARTAN 0.5 TABLET: 24; 26 TABLET, FILM COATED ORAL at 08:26

## 2025-07-11 RX ADMIN — FUROSEMIDE 20 MG: 10 INJECTION, SOLUTION INTRAMUSCULAR; INTRAVENOUS at 20:51

## 2025-07-11 RX ADMIN — LEVOTHYROXINE SODIUM 100 MCG: 0.1 TABLET ORAL at 06:08

## 2025-07-11 RX ADMIN — WARFARIN SODIUM 2 MG: 2 TABLET ORAL at 17:31

## 2025-07-11 RX ADMIN — METOPROLOL SUCCINATE 50 MG: 50 TABLET, EXTENDED RELEASE ORAL at 08:26

## 2025-07-11 RX ADMIN — SACUBITRIL AND VALSARTAN 0.5 TABLET: 24; 26 TABLET, FILM COATED ORAL at 20:51

## 2025-07-11 RX ADMIN — METOPROLOL SUCCINATE 50 MG: 50 TABLET, EXTENDED RELEASE ORAL at 20:51

## 2025-07-11 RX ADMIN — LATANOPROST 1 DROP: 50 SOLUTION OPHTHALMIC at 20:51

## 2025-07-11 RX ADMIN — PANTOPRAZOLE SODIUM 40 MG: 40 TABLET, DELAYED RELEASE ORAL at 06:08

## 2025-07-11 ASSESSMENT — ENCOUNTER SYMPTOMS
SHORTNESS OF BREATH: 0
MEMORY LOSS: 0
PALPITATIONS: 0
GASTROINTESTINAL NEGATIVE: 1
COUGH: 0
LIGHT-HEADEDNESS: 0
IRREGULAR HEARTBEAT: 0
BLURRED VISION: 0
FOCAL WEAKNESS: 0
DYSPNEA ON EXERTION: 0
SYNCOPE: 0
DEPRESSION: 0
ORTHOPNEA: 0
RESPIRATORY NEGATIVE: 1
CONSTITUTIONAL NEGATIVE: 1

## 2025-07-11 ASSESSMENT — COGNITIVE AND FUNCTIONAL STATUS - GENERAL
DAILY ACTIVITIY SCORE: 15
TOILETING: A LITTLE
TURNING FROM BACK TO SIDE WHILE IN FLAT BAD: A LITTLE
WALKING IN HOSPITAL ROOM: A LITTLE
CLIMB 3 TO 5 STEPS WITH RAILING: A LOT
STANDING UP FROM CHAIR USING ARMS: A LITTLE
MOBILITY SCORE: 17
DAILY ACTIVITIY SCORE: 20
HELP NEEDED FOR BATHING: A LOT
STANDING UP FROM CHAIR USING ARMS: A LITTLE
DRESSING REGULAR UPPER BODY CLOTHING: A LITTLE
DRESSING REGULAR LOWER BODY CLOTHING: A LITTLE
HELP NEEDED FOR BATHING: A LITTLE
DRESSING REGULAR LOWER BODY CLOTHING: A LOT
MOBILITY SCORE: 18
WALKING IN HOSPITAL ROOM: A LITTLE
MOVING TO AND FROM BED TO CHAIR: A LITTLE
TURNING FROM BACK TO SIDE WHILE IN FLAT BAD: A LITTLE
TOILETING: A LOT
DRESSING REGULAR UPPER BODY CLOTHING: A LITTLE
MOVING TO AND FROM BED TO CHAIR: A LITTLE
PERSONAL GROOMING: A LITTLE
EATING MEALS: A LITTLE
MOVING FROM LYING ON BACK TO SITTING ON SIDE OF FLAT BED WITH BEDRAILS: A LITTLE
CLIMB 3 TO 5 STEPS WITH RAILING: A LOT

## 2025-07-11 ASSESSMENT — PAIN SCALES - GENERAL
PAINLEVEL_OUTOF10: 0 - NO PAIN

## 2025-07-11 ASSESSMENT — ACTIVITIES OF DAILY LIVING (ADL)
BATHING_ASSISTANCE: MAXIMAL
ADL_ASSISTANCE: INDEPENDENT

## 2025-07-11 ASSESSMENT — PAIN - FUNCTIONAL ASSESSMENT
PAIN_FUNCTIONAL_ASSESSMENT: 0-10

## 2025-07-11 NOTE — PROGRESS NOTES
HPI:  Jennifer Magana is a 89 y.o. female who presented to the emergency department with shortness of breath and lower extremity edema worsening for approximately 2 days.  Patient states that she had been taking all of her medications as prescribed.  Given her worsening symptoms she came to the ED for evaluation.  BMP shows a serum sodium of 147, serum potassium of 4.2, serum creatinine of 1.67.  Serum magnesium was 2.09.  BNP was 548.  Troponin was 17 x 2.  PT was greater than 100.  CBC showed hemoglobin of 13.0.  Digoxin level was less than 0.30.  Chest x-ray showed cardiomegaly with bilateral infiltrates and effusions.  Echocardiogram done 3/31/2025 showed mildly reduced left ventricular systolic function with an ejection fraction of 45 to 50%, grade 3 diastolic dysfunction, normal right ventricular systolic function, moderate mitral valve regurgitation, mild to moderate tricuspid valve regurgitation, mild aortic valve regurgitation, severe low-flow/low gradient aortic valve stenosis with a mean aortic valve pressure gradient of 25 mmHg and a dimensionless index of 0.17.  During my exam the patient was resting comfortably in bed.     Subjective Data:  Feels well today.  Daughter at bedside.  No CP/dyspnea/orthopnea.  Monitor: Afib, rate controlled.  K 4.1, mag 1.99  Creatinine 1.74, INR 1.7.      Overnight Events:    None     Objective Data:  Last Recorded Vitals:  Vitals:    07/10/25 2025 07/11/25 0012 07/11/25 0359 07/11/25 0841   BP: 137/84 132/79 124/85 (!) 126/92   BP Location: Right arm Right arm Right arm Left arm   Patient Position: Lying Lying Lying Lying   Pulse: 88 74 50 91   Resp: 18 18 16 16   Temp: 35.7 °C (96.3 °F) 35.6 °C (96 °F) 36 °C (96.8 °F) 35.8 °C (96.4 °F)   TempSrc: Temporal Temporal Temporal Temporal   SpO2: 97% 95% 94% 94%   Weight:       Height:           Last Labs:    Results from last 7 days   Lab Units 07/11/25  0409 07/10/25  1054   SODIUM mmol/L 145 147*   POTASSIUM mmol/L 4.1  4.2   CHLORIDE mmol/L 109* 110*   CO2 mmol/L 29 25   BUN mg/dL 26* 26*   CREATININE mg/dL 1.74* 1.63*   GLUCOSE mg/dL 94 133*   CALCIUM mg/dL 10.0 10.1        Results from last 7 days   Lab Units 07/11/25  0409 07/10/25  1054   WBC AUTO x10*3/uL 6.6 6.3   HEMOGLOBIN g/dL 13.4 13.0   HEMATOCRIT % 44.3 42.3   PLATELETS AUTO x10*3/uL 236 243               Last I/O:  I/O last 3 completed shifts:  In: 150 (2.7 mL/kg) [P.O.:100; IV Piggyback:50]  Out: 1700 (30.5 mL/kg) [Urine:1700 (0.8 mL/kg/hr)]  Weight: 55.8 kg     Past Cardiology Tests (Last 3 Years):    Echo:4-24-25  CONCLUSIONS:   1. No mitral valve vegetation visualized.   2. Mild mitral valve regurgitation.   3. Mild tricuspid regurgitation is visualized.   4. No tricuspid valve vegetation.   5. No aortic valve vegetation visualized.   6. No pulmonic valve vegetation visualized.    Inpatient Medications:  Scheduled Medications[1]  PRN Medications[2]  Continuous Medications[3]    ROS:  Review of Systems   Constitutional: Negative. Negative for malaise/fatigue.   HENT: Negative.     Eyes:  Negative for blurred vision and visual disturbance.   Cardiovascular:  Negative for chest pain, dyspnea on exertion, irregular heartbeat, leg swelling, orthopnea, palpitations and syncope.   Respiratory: Negative.  Negative for cough and shortness of breath.    Musculoskeletal:  Positive for arthritis.   Gastrointestinal: Negative.    Neurological:  Negative for focal weakness and light-headedness.   Psychiatric/Behavioral:  Negative for depression and memory loss.         Physical Exam:  Physical Exam  Constitutional:       Appearance: Normal appearance.   HENT:      Head: Normocephalic.   Eyes:      Conjunctiva/sclera: Conjunctivae normal.   Cardiovascular:      Rate and Rhythm: Normal rate. Rhythm irregular.      Pulses: Normal pulses.      Heart sounds: S1 normal and S2 normal. Murmur heard.      No friction rub. No gallop.   Pulmonary:      Effort: Pulmonary effort is normal.       Breath sounds: Normal breath sounds.      Comments: Clear with diminished bases  Abdominal:      General: Bowel sounds are normal.      Palpations: Abdomen is soft.      Tenderness: There is no abdominal tenderness.   Musculoskeletal:      Cervical back: Neck supple.      Comments: LE's with trace edema, wrinkled skin   Skin:     General: Skin is warm and dry.   Neurological:      General: No focal deficit present.      Mental Status: She is alert and oriented to person, place, and time.   Psychiatric:         Attention and Perception: Attention normal.         Mood and Affect: Mood normal.          Assessment/Plan   1.  Acute on chronic HFmrEF  Patient presenting with increased shortness of breath, lower extremity edema, found to have elevated BNP of 548, and chest x-ray showing bilateral infiltrates/effusions.  Patient was started on IV furosemide for diuresis.  Continue metoprolol succinate 50 mg daily.  Will add a small dose of Entresto although we will need to closely monitor renal function given her known CKD.  Patient was also noted to have mild hyponatremia on admission of 147.  Patient states she had not been eating/drinking well prior to admission.  Continue to monitor urine output, serum electrolytes, and renal function while here.  7-11-25: Patient starting to eat and drink liquids. Does not appear to be volume overloaded today.  Creatinine is stable at 1.74.  Sodium starting to come down, 145 today.  BP stable.  Continue on IV Lasix today but will change to oral for tomorrow. Continue on metoprolol succinate and low dose Entresto.      2.  Permanent atrial fibrillation  Patient has a history of permanent atrial fibrillation and has been on warfarin for anticoagulation and digoxin/metoprolol for heart rate control.  INR was found to be supratherapeutic with a PTT of greater than 100.  Patient was given 5 mg of vitamin K.  Telemetry currently showing atrial fibrillation with heart rate in the  100s.  Can continue digoxin/metoprolol for heart rate control.  No plans for rhythm control strategy at this time.  7-11-25: Rate controlled Afib.  Continue on metoprolol.  On warfarin for anticoagulation.  INR 1.7 today after vitamin K.  Pharmacy is managing warfarin dosing. INR goal is 2-3     3.  Hypertension  The patient has a history of hypertension which appears under moderate control on exam today.  Continue to monitor and adjust antihypertensive medical therapy as necessary.  7-11-25: BP stable.      4.  Valvular heart disease  Echocardiogram done 3/31/2025 showed mildly reduced left ventricular systolic function with an ejection fraction of 45 to 50%, grade 3 diastolic dysfunction, normal right ventricular systolic function, moderate mitral valve regurgitation, mild to moderate tricuspid valve regurgitation, mild aortic valve regurgitation, severe low-flow/low gradient aortic valve stenosis with a mean aortic valve pressure gradient of 25 mmHg and a dimensionless index of 0.17.     5.  CKD  Serum creatinine of 1.63 on admission.  Continue to monitor with diuresis and addition of small dose of Entresto.  7-11-25: Creatinine stable at 1.74, continue on Entresto.       6.  Hypernatremia  Serum sodium of 147 on admission.  Continue to monitor with medication adjustments as noted above.  Patient states she had not been eating/drinking well prior to admission.  7-11-25: Starting to eat/drink, Na 145 today.        Code Status:  DNR and No Intubation          Keshia Mansfield, EVELIN-CNP           [1]   Scheduled medications   Medication Dose Route Frequency    brinzolamide-brimonidine  1 drop Both Eyes BID    furosemide  20 mg intravenous q12h    latanoprost  1 drop Both Eyes Nightly    levothyroxine  100 mcg oral Daily    metoprolol succinate XL  50 mg oral BID    pantoprazole  40 mg oral Daily before breakfast    Or    pantoprazole  40 mg intravenous Daily before breakfast    polyethylene glycol  17 g oral Daily     sacubitriL-valsartan  0.5 tablet oral BID    warfarin  2 mg oral Once   [2]   PRN medications   Medication    bisacodyl    guaiFENesin    melatonin    ondansetron ODT    Or    ondansetron   [3]   Continuous Medications   Medication Dose Last Rate

## 2025-07-11 NOTE — CARE PLAN
Problem: Pain - Adult  Goal: Verbalizes/displays adequate comfort level or baseline comfort level  Outcome: Progressing     Problem: Safety - Adult  Goal: Free from fall injury  Outcome: Progressing     Problem: Discharge Planning  Goal: Discharge to home or other facility with appropriate resources  Outcome: Progressing     Problem: Chronic Conditions and Co-morbidities  Goal: Patient's chronic conditions and co-morbidity symptoms are monitored and maintained or improved  Outcome: Progressing     Problem: Nutrition  Goal: Nutrient intake appropriate for maintaining nutritional needs  Outcome: Progressing     Problem: Skin  Goal: Decreased wound size/increased tissue granulation at next dressing change  Outcome: Progressing  Goal: Participates in plan/prevention/treatment measures  Outcome: Progressing  Flowsheets (Taken 7/11/2025 1138)  Participates in plan/prevention/treatment measures: Increase activity/out of bed for meals  Goal: Prevent/manage excess moisture  Outcome: Progressing  Goal: Prevent/minimize sheer/friction injuries  Outcome: Progressing  Goal: Promote/optimize nutrition  Outcome: Progressing  Goal: Promote skin healing  Outcome: Progressing     Problem: Fall/Injury  Goal: Not fall by end of shift  Outcome: Progressing  Goal: Be free from injury by end of the shift  Outcome: Progressing  Goal: Verbalize understanding of personal risk factors for fall in the hospital  Outcome: Progressing  Goal: Verbalize understanding of risk factor reduction measures to prevent injury from fall in the home  Outcome: Progressing  Goal: Use assistive devices by end of the shift  Outcome: Progressing  Goal: Pace activities to prevent fatigue by end of the shift  Outcome: Progressing     Problem: Respiratory  Goal: Clear secretions with interventions this shift  Outcome: Progressing  Goal: Minimize anxiety/maximize coping throughout shift  Outcome: Progressing  Goal: Minimal/no exertional discomfort or dyspnea this  shift  Outcome: Progressing  Goal: No signs of respiratory distress (eg. Use of accessory muscles. Peds grunting)  Outcome: Progressing  Goal: Patent airway maintained this shift  Outcome: Progressing  Goal: Tolerate pulmonary toileting this shift  Outcome: Progressing   The patient's goals for the shift include no shortness of breath    The clinical goals for the shift include pulse ox greater than 90%

## 2025-07-11 NOTE — CONSULTS
Nephrology Consult Note                                                                                                                                         Inpatient consult to Nephrology  Consult performed by: Carlyn Figueroa DO  Consult ordered by: Soto Cotter, APRN-RHONDA                                                                                                               HPI  Patient is a 89 y.o. female history of severe aortic valve stenosis, diastolic dysfunction presented to the hospital with lower extremity swelling and shortness of breath.. Nephrology consulted in view of CKD 4.   I saw patient 3 years ago as an inpatient and she never followed up in the office.  Patient actually was placed on hospice services recently but asked to go to the hospital because of the shortness of breath.  She was not apparently on diuretics prior to admission.  Daughter is at bedside.  Daughter says he did not want to give her diuretics because of her acute kidney disease.  She was admitted back in March or 2025 and was not restarted on diuretics during that admission.    Patient is feeling better.  Her legs were apparently seeping and the swelling was all the way up to her abdomen.  Baseline creatinine in has been 1.4-1.7.  Last creatinine was 1.4 in April 2025 in the  system.  Patient was admitted with creatinine of 1.6 and it is up to 1.7 with diuresis    Medical History[1]   Social History     Socioeconomic History    Marital status:      Spouse name: Not on file    Number of children: Not on file    Years of education: Not on file    Highest education level: Not on file   Occupational History    Not on file   Tobacco Use    Smoking status: Never    Smokeless tobacco: Never   Substance and Sexual Activity    Alcohol use: Never    Drug use: Never    Sexual activity:  Not on file   Other Topics Concern    Not on file   Social History Narrative    Not on file     Social Drivers of Health     Financial Resource Strain: Patient Declined (7/10/2025)    Overall Financial Resource Strain (CARDIA)     Difficulty of Paying Living Expenses: Patient declined   Food Insecurity: Patient Declined (7/10/2025)    Hunger Vital Sign     Worried About Running Out of Food in the Last Year: Patient declined     Ran Out of Food in the Last Year: Patient declined   Transportation Needs: Patient Declined (7/10/2025)    PRAPARE - Transportation     Lack of Transportation (Medical): Patient declined     Lack of Transportation (Non-Medical): Patient declined   Physical Activity: Inactive (7/10/2025)    Exercise Vital Sign     Days of Exercise per Week: 0 days     Minutes of Exercise per Session: 0 min   Stress: Patient Declined (7/10/2025)    [employer] - Occupational Stress Questionnaire     Feeling of Stress : Patient declined   Social Connections: Patient Declined (7/10/2025)    Social Connection and Isolation Panel [NHANES]     Frequency of Communication with Friends and Family: Patient declined     Frequency of Social Gatherings with Friends and Family: Patient declined     Attends [employer] Services: Patient declined     Active Member of Clubs or Organizations: Patient declined     Attends Club or Organization Meetings: Patient declined     Marital Status: Patient declined   Intimate Partner Violence: Patient Declined (7/10/2025)    Humiliation, Afraid, Rape, and Kick questionnaire     Fear of Current or Ex-Partner: Patient declined     Emotionally Abused: Patient declined     Physically Abused: Patient declined     Sexually Abused: Patient declined   Housing Stability: Patient Declined (7/10/2025)    Housing Stability Vital Sign     Unable to Pay for Housing in the Last Year: Patient declined     Number of Times Moved in the Last Year: 0     Homeless in the Last Year:  "Patient declined      Family History[2]   Medications Ordered Prior to Encounter[3]   Scheduled medications  Scheduled Medications[4]  Continuous medications  Continuous Medications[5]  PRN medications  PRN Medications[6]     Review of systems  as per HPI otherwise 10 point review systems negative    /63 (BP Location: Right arm, Patient Position: Sitting)   Pulse 74   Temp 36.1 °C (96.9 °F) (Temporal)   Resp 16   Ht 1.575 m (5' 2\")   Wt 55.8 kg (123 lb)   SpO2 94%   BMI 22.50 kg/m²     Input / Output:  24 HR:   Intake/Output Summary (Last 24 hours) at 7/11/2025 1356  Last data filed at 7/11/2025 1320  Gross per 24 hour   Intake 660 ml   Output 1700 ml   Net -1040 ml       Physical Exam   Alert and oriented x 3, NAD elderly  EOMI  OP clear  Neck: supple, No JVD  CV: RRR without m/r/g  Lungs: CTA bilaterally some crackles at bases  Abd: soft NT/ND +BS  Ext: Minimal lower extremity edema   Neuro: grossly intact  Skin: no rashes    Results from last 7 days   Lab Units 07/11/25  0409 07/10/25  1054   SODIUM mmol/L 145 147*   POTASSIUM mmol/L 4.1 4.2   CHLORIDE mmol/L 109* 110*   CO2 mmol/L 29 25   BUN mg/dL 26* 26*   CREATININE mg/dL 1.74* 1.63*   GLUCOSE mg/dL 94 133*   CALCIUM mg/dL 10.0 10.1        Results from last 7 days   Lab Units 07/11/25  0409 07/10/25  1054   SODIUM mmol/L 145 147*   POTASSIUM mmol/L 4.1 4.2   CHLORIDE mmol/L 109* 110*   CO2 mmol/L 29 25   BUN mg/dL 26* 26*   CREATININE mg/dL 1.74* 1.63*   CALCIUM mg/dL 10.0 10.1   PROTEIN TOTAL g/dL  --  5.8*   BILIRUBIN TOTAL mg/dL  --  0.8   ALK PHOS U/L  --  112   ALT U/L  --  19   AST U/L  --  22   GLUCOSE mg/dL 94 133*      Results from last 7 days   Lab Units 07/11/25  0409 07/10/25  1054   MAGNESIUM mg/dL 1.99 2.09      Results from last 7 days   Lab Units 07/11/25  0409 07/10/25  1054   WBC AUTO x10*3/uL 6.6 6.3   HEMOGLOBIN g/dL 13.4 13.0   HEMATOCRIT % 44.3 42.3   PLATELETS AUTO x10*3/uL 236 243        XR chest 1 view   Final Result "   Limited study. Mild cardiomegaly, bilateral infiltrates and   effusions, most likely due to congestive heart failure. Superimposed   left lower lobe pneumonia can not be completely excluded; follow-up   as needed.        Signed by: Aditya Ghosh 7/10/2025 11:44 AM   Dictation workstation:   [id number]           Assessment:   Patient is 89 y.o. female history of severe aortic stenosis, diastolic dysfunction who is admitted to hospital for acute CHF exacerbation. Nephrology consulted in view of CKD stage IV.    CKD stage IV  - Creatinine is at baseline currently with the IV diuresis  - Creatinine 1.7  - Patient presented with volume overload  -Starting to approach euvolemic    Acute on chronic diastolic heart failure  Valvular heart disease -severe aortic stenosis  HTN-controlled    Mild hypernatremia  - On admission sodium was 147 it is down to 145 despite IV diuresis    Recommendations:   -Okay to continue Entresto   - Patient recently came off hospice services to come to the hospital   - Agree with current IV diuresis  - Patient will need diuretics as needed if not daily as an outpatient but at least as needed.  - Patient is not a dialysis candidate if she should worsen      Please message me through [employer] chat with any questions or concerns.     Carlyn Figueroa DO  7/11/2025  1:56 PM         [facility]    [address], [address]   [place] [address]  Office: [phone]                   [1]   Past Medical History:  Diagnosis Date    (HFpEF) heart failure with preserved ejection fraction     Aortic stenosis     CKD (chronic kidney disease)     Essential (primary) hypertension     GERD (gastroesophageal reflux disease)     Hypothyroidism     Paroxysmal atrial fibrillation (Multi)    [2]   Family History  Problem Relation Name Age of Onset    Other (Half sister with CAD) Sister      Coronary artery disease Other Children    [3]   No current facility-administered medications on file  prior to encounter.     Current Outpatient Medications on File Prior to Encounter   Medication Sig Dispense Refill    famotidine (Pepcid) 20 mg tablet Take 0.5 tablets (10 mg) by mouth once daily. Start on April 4th 30 tablet 1    Jantoven 2.5 mg tablet Take 1.25mg (0.5 tablets) by mouth on Tues,Thurs and 2.5mg (1 tablet) all other days or as directed by Coumadin clinic. 72 tablet 3    levothyroxine (Synthroid, Levoxyl) 100 mcg tablet Take 1 tablet (100 mcg) by mouth once daily.      metoprolol succinate XL (Toprol-XL) 50 mg 24 hr tablet Take 1 tablet (50 mg) by mouth 2 times a day. Do not crush or chew. 60 tablet 1    pantoprazole (ProtoNix) 40 mg EC tablet Take 1 tablet (40 mg) by mouth once daily in the morning. Take before meals. 90 tablet 3    Simbrinza 1-0.2 % drops,suspension ophthalmic suspension Administer 1 drop into both eyes 2 times a day.      travoprost (Travatan Z) 0.004 % drops ophthalmic solution Administer 1 drop into both eyes once daily at bedtime.      digoxin (Lanoxin) 62.5 MCG tablet Take 1 tablet (62.5 mcg) by mouth once daily. (Patient not taking: Reported on 7/10/2025) 30 tablet 1    meloxicam (Mobic) 7.5 mg tablet Take 1 tablet (7.5 mg) by mouth 2 times a day.     [4] [Held by provider] brinzolamide-brimonidine, 1 drop, Both Eyes, BID  furosemide, 20 mg, intravenous, q12h  [START ON 7/12/2025] furosemide, 20 mg, oral, Daily  latanoprost, 1 drop, Both Eyes, Nightly  levothyroxine, 100 mcg, oral, Daily  metoprolol succinate XL, 50 mg, oral, BID  pantoprazole, 40 mg, oral, Daily before breakfast   Or  pantoprazole, 40 mg, intravenous, Daily before breakfast  polyethylene glycol, 17 g, oral, Daily  sacubitriL-valsartan, 0.5 tablet, oral, BID  warfarin, 2 mg, oral, Once  [5]    [6] PRN medications: bisacodyl, guaiFENesin, melatonin, ondansetron ODT **OR** ondansetron

## 2025-07-11 NOTE — PROGRESS NOTES
Occupational Therapy  Evaluation    Patient Name: Jennifer Magana  MRN: [id number]  Today's Date: 7/11/2025  Time Calculation  Start Time: 1130  Stop Time: 1158  Time Calculation (min): 28 min    Current Problem:   1. Elevated INR    2. Chest pain, unspecified type    3. Hypervolemia, unspecified hypervolemia type        OT order: OT eval and treat   Referred by: Cyndee  Reason for referral: ADLs, safety assessment  Past medical history related to rehab: PMHx s/f combined systolic and diastolic heart failure EF 45 to 50%, atrial fibrillation, aortic stenosis, chronic kidney disease stage IV, hypothyroidism, long-term anticoagulation therapy with warfarin presenting with shortness of breath and chest pressure    Precautions:   Hearing/Visual Limitations: [place]  Medical Precautions: Fall precautions    ASSESSMENT  OT Assessment: OT eval completed. The patient is functioning below baseline for ADLs and mobility. can benefit from continued OT. Pt with Decreased ADL status, Decreased upper extremity strength, Decreased safe judgment during ADL, Decreased cognition, Decreased endurance, Decreased gross motor control, Decreased functional mobility, Decreased IADLs  Prognosis:    Barriers to discharge home: Physical needs, Caregiver assistance, Cognition needs           Tolerance:      PLAN  Frequency: 3 times per week  Treatment Interventions: ADL retraining, Functional transfer training, UE strengthening/ROM, Endurance training, Cognitive reorientation, Patient/family training, Equipment evaluation/education, Neuromuscular reeducation  Discharge Recommendations: Other (Comment), Low intensity level of continued care  OT OK to discharge: Yes    GENERAL VISIT INFORMATION   Start of session communication: Bedside nurse  End of session communication: Bedside nurse  Family/caregiver present:  (daughter present)  Caregiver feedback:    Co-Treatment: PT  Reason for co-treatment: to optimize safety and mobility, while focusing  Patient called and asked if this could be done by today since she has been out of this medication on discipline specific goals   Position Pt Received:  Bed, 3 rail up, Alarm on  End of session position: Up in chair, Alarm on    SUBJECTIVE  Home Living:  Home Living Comments: Patient states she lives with daughter in split level home, 7 steps into the main level where she resides. Handrail on steps. Patient has walk in shower with shower chair and grab bars on main level.     Prior Level of Function:  Receives Help From: Family  ADL Assistance: Independent  Homemaking Assistance: Needs assistance  Ambulatory Assistance: Independent (cane)  Prior Function Comments: Patient states she uses cane at home for AMB and has a FWW. Has aides who come 3 days per week and assist with ADLs. Patient daughter states she receives help with ADLs prn and family completing IADLs. Family drives to appointments. Denies falls in last 3 months.    IADL History:       Pain:  Assessment: 0-10  Score: 0 - No pain  Type:    Location:    Interventions:    Response to pain interventions:      OBJECTIVE  Vital Signs:       Cognition:  Overall Cognitive Status: Within Functional Limits  Orientation Level: Oriented X4  Processing Speed: Delayed             Current ADL function:   EATING:  Stand by     GROOMING: Minimal     BATHING: Maximal     UB DRESSING: Minimal     LB DRESSING: Total Don/doff L sock, Don/doff R sock   TOILETING: Maximal    ADL comments:       Activity Tolerance:  Endurance: Decreased tolerance for upright activites    Bed Mobility/Transfers:   Bed Mobility  Bed Mobility: Yes  Bed Mobility 1  Bed Mobility 1: Supine to sitting  Level of Assistance 1: Contact guard  Transfers  Transfer:  (sit<>stand CGAX1. max cues for safety)    Ambulation/Gait Training:  Functional Mobility  Functional Mobility Performed:  (pt performed functional mobility with min A x2 and cane from bedside to chair. pt unsteady and decreased balance with only cane. needed 2 hand support and kept switching hands she held her cane in. improved to min A x1  with walker for functional mobility)    Sitting Balance:  Static Sitting Balance  Static Sitting-Level of Assistance: Close supervision  Dynamic Sitting Balance  Dynamic Sitting-Level of Assistance: Close supervision    Standing Balance:  Static Standing Balance  Static Standing-Level of Assistance: Contact guard, Minimum assistance  Dynamic Standing Balance  Dynamic Standing-Level of Assistance: Minimum assistance    Vision: Vision - Basic Assessment  Current Vision: No visual deficits   and      Sensation:  Light Touch: No apparent deficits    Strength:  Strength Comments: BUE grossly 3/5    Perception:  Inattention/Neglect: Appears intact    Coordination:  Movements are Fluid and Coordinated: Yes     Hand Function:  Hand Function  Gross Grasp: Functional    Extremities: RUE   RUE : Within Functional Limits and LUE   LUE: Within Functional Limits    Outcome Measures: [facility] Daily Activity   Putting on and taking off regular lower body clothing: A lot  Bathing (including washing, rinsing, drying): A lot  Putting on and taking off regular upper body clothing: A little  Toileting, which includes using toilet, bedpan or urinal: A lot  Taking care of personal grooming such as brushing teeth: A little   Eating Meals: A little   Daily Activity - Total Score: 15    EDUCATION:     Education Documentation  ADL Training, taught by Ursula Blancas OT at 7/11/2025  3:41 PM.  Learner: Patient  Readiness: Acceptance  Method: Explanation  Response: Needs Reinforcement    Education Comments  No comments found.        Goals:   Encounter Problems       Encounter Problems (Active)       ADLs       Patient with complete lower body dressing with modified independent level of assistance donning and doffing all LE clothes  with PRN adaptive equipment while supported sitting and standing (Progressing)       Start:  07/11/25    Expected End:  07/25/25               TRANSFERS       Patient will complete functional transfers with least  restrictive device with modified independent level of assistance. (Progressing)       Start:  07/11/25    Expected End:  07/25/25

## 2025-07-11 NOTE — PROGRESS NOTES
Social work consult placed for discharge planning. SW reviewed pt's chart and communicated with [facility]. No SW needs foreseen at this time. SW signing off; available upon request.    Nabeel Henson, MSW, LSW ([id number])   Care Transitions

## 2025-07-11 NOTE — PROGRESS NOTES
Jennifer Magana is a 89 y.o. female on day 1 of admission presenting with Elevated INR.      Subjective   Jennifer Magana is a 89 y.o. female with PMHx s/f combined systolic and diastolic heart failure EF 45 to 50%, atrial fibrillation, aortic stenosis, chronic kidney disease stage IV, hypothyroidism, long-term anticoagulation therapy with warfarin presenting with shortness of breath and chest pressure.   For the last couple days patient has had increasing shortness of breath.  Weeping from her lower extremities and orthopnea.  Her shortness of breath exacerbated with activity.  No fevers chills no purulent sputum production no hemoptysis.  In emergency department patient had BN peptide that was elevated chest x-ray consistent with pulmonary edema.  Patient was subsequently given IV furosemide.  Incidental finding and her lab work showed that she has coagulopathy with INR on calculable.  Patient was given 5 mg of IV vitamin K.  Case was discussed with the ED provider plan is to admit patient for further workup and evaluation length of stay is likely to exceed 2 midnights.     ED Course:   Vitals on presentation: T 36.3 °C (97.3 °F)  HR 76  /81  RR 16  O2 98 % None (Room air)  Labs:   CBC with WBC 6.3, Hgb 13.0, Plts 243.   CMP with glucose 133, Na 147, K 4.2, BUN 26, sCr 1.63, alk phos 112, ALT 19, AST 22, bilirubin 0.8. Magnesium 2.09.   . Trop 17.   Lactate 2.0  EKG: Atrial fibrillation with controlled ventricular response, QTc interval is normal, no ST segment elevation to suggest acute ischemia  Imaging - agree with radiology interpretation(s):   Interventions: Furosemide, vitamin K      7.11.25 The patient was seen by her cardiologist, started on low dose entresto yesterday, her vitals have been stable overnight, am labs show correction of the INR, very mild increase in creatinine, No evidence of bleeding in the CBC.  Patient reports her breathing is somewhat improved.  There is minimal  weeping from her lower extremities the edema is markedly improved on physical exam.       Patient evaluated in the presence of RN    Review of Systems       Objective     Last Recorded Vitals  /85 (BP Location: Right arm, Patient Position: Lying)   Pulse 50   Temp 36 °C (96.8 °F) (Temporal)   Resp 16   Wt 55.8 kg (123 lb)   SpO2 94%     Image Results  Imaging  XR chest 1 view  Result Date: 7/10/2025  Limited study. Mild cardiomegaly, bilateral infiltrates and effusions, most likely due to congestive heart failure. Superimposed left lower lobe pneumonia can not be completely excluded; follow-up as needed.   Signed by: Aditya Ghosh 7/10/2025 11:44 AM Dictation workstation:   [id number]      Cardiology, Vascular, and Other Imaging  ECG 12 lead  Result Date: 7/10/2025  Atrial fibrillation Probable anterior infarct, age indeterminate         Lab Results  Results for orders placed or performed during the hospital encounter of 07/10/25 (from the past 24 hours)   ECG 12 lead   Result Value Ref Range    Ventricular Rate 86 BPM    Atrial Rate 0 BPM    MN Interval 132 ms    QRS Duration 90 ms    QT Interval 374 ms    QTC Calculation(Bazett) 448 ms    R Axis -13 degrees    T Axis 183 degrees    QRS Count 14 beats    Q Onset 253 ms    T Offset 440 ms    QTC Fredericia 421 ms   CBC and Auto Differential   Result Value Ref Range    WBC 6.3 4.4 - 11.3 x10*3/uL    nRBC 0.0 0.0 - 0.0 /100 WBCs    RBC 4.43 4.00 - 5.20 x10*6/uL    Hemoglobin 13.0 12.0 - 16.0 g/dL    Hematocrit 42.3 36.0 - 46.0 %    MCV 96 80 - 100 fL    MCH 29.3 26.0 - 34.0 pg    MCHC 30.7 (L) 32.0 - 36.0 g/dL    RDW 15.8 (H) 11.5 - 14.5 %    Platelets 243 150 - 450 x10*3/uL    Neutrophils % 74.5 40.0 - 80.0 %    Immature Granulocytes %, Automated 0.5 0.0 - 0.9 %    Lymphocytes % 12.8 13.0 - 44.0 %    Monocytes % 9.9 2.0 - 10.0 %    Eosinophils % 1.7 0.0 - 6.0 %    Basophils % 0.6 0.0 - 2.0 %    Neutrophils Absolute 4.72 1.60 - 5.50 x10*3/uL    Immature  Granulocytes Absolute, Automated 0.03 0.00 - 0.50 x10*3/uL    Lymphocytes Absolute 0.81 0.80 - 3.00 x10*3/uL    Monocytes Absolute 0.63 0.05 - 0.80 x10*3/uL    Eosinophils Absolute 0.11 0.00 - 0.40 x10*3/uL    Basophils Absolute 0.04 0.00 - 0.10 x10*3/uL   Comprehensive Metabolic Panel   Result Value Ref Range    Glucose 133 (H) 74 - 99 mg/dL    Sodium 147 (H) 136 - 145 mmol/L    Potassium 4.2 3.5 - 5.3 mmol/L    Chloride 110 (H) 98 - 107 mmol/L    Bicarbonate 25 21 - 32 mmol/L    Anion Gap 16 10 - 20 mmol/L    Urea Nitrogen 26 (H) 6 - 23 mg/dL    Creatinine 1.63 (H) 0.50 - 1.05 mg/dL    eGFR 29 (L) >60 mL/min/1.73m*2    Calcium 10.1 8.6 - 10.3 mg/dL    Albumin 3.7 3.4 - 5.0 g/dL    Alkaline Phosphatase 112 33 - 136 U/L    Total Protein 5.8 (L) 6.4 - 8.2 g/dL    AST 22 9 - 39 U/L    Bilirubin, Total 0.8 0.0 - 1.2 mg/dL    ALT 19 7 - 45 U/L   Magnesium   Result Value Ref Range    Magnesium 2.09 1.60 - 2.40 mg/dL   Troponin I, High Sensitivity, Initial   Result Value Ref Range    Troponin I, High Sensitivity 17 (H) 0 - 13 ng/L   Protime-INR   Result Value Ref Range    Protime >100.0 (HH) 9.8 - 12.4 seconds    INR     aPTT   Result Value Ref Range    aPTT 87 (H) 26 - 36 seconds   B-Type Natriuretic Peptide   Result Value Ref Range     (H) 0 - 99 pg/mL   Digoxin level   Result Value Ref Range    Digoxin  <0.30 (L) 0.80 - <2.00 ng/mL   Troponin, High Sensitivity, 1 Hour   Result Value Ref Range    Troponin I, High Sensitivity 17 (H) 0 - 13 ng/L   Basic Metabolic Panel   Result Value Ref Range    Glucose 94 74 - 99 mg/dL    Sodium 145 136 - 145 mmol/L    Potassium 4.1 3.5 - 5.3 mmol/L    Chloride 109 (H) 98 - 107 mmol/L    Bicarbonate 29 21 - 32 mmol/L    Anion Gap 11 10 - 20 mmol/L    Urea Nitrogen 26 (H) 6 - 23 mg/dL    Creatinine 1.74 (H) 0.50 - 1.05 mg/dL    eGFR 26 (L) >60 mL/min/1.73m*2    Calcium 10.0 8.6 - 10.3 mg/dL   CBC and Auto Differential   Result Value Ref Range    WBC 6.6 4.4 - 11.3 x10*3/uL    nRBC  0.0 0.0 - 0.0 /100 WBCs    RBC 4.66 4.00 - 5.20 x10*6/uL    Hemoglobin 13.4 12.0 - 16.0 g/dL    Hematocrit 44.3 36.0 - 46.0 %    MCV 95 80 - 100 fL    MCH 28.8 26.0 - 34.0 pg    MCHC 30.2 (L) 32.0 - 36.0 g/dL    RDW 15.8 (H) 11.5 - 14.5 %    Platelets 236 150 - 450 x10*3/uL    Neutrophils % 65.5 40.0 - 80.0 %    Immature Granulocytes %, Automated 0.5 0.0 - 0.9 %    Lymphocytes % 17.0 13.0 - 44.0 %    Monocytes % 13.5 2.0 - 10.0 %    Eosinophils % 2.9 0.0 - 6.0 %    Basophils % 0.6 0.0 - 2.0 %    Neutrophils Absolute 4.31 1.60 - 5.50 x10*3/uL    Immature Granulocytes Absolute, Automated 0.03 0.00 - 0.50 x10*3/uL    Lymphocytes Absolute 1.12 0.80 - 3.00 x10*3/uL    Monocytes Absolute 0.89 (H) 0.05 - 0.80 x10*3/uL    Eosinophils Absolute 0.19 0.00 - 0.40 x10*3/uL    Basophils Absolute 0.04 0.00 - 0.10 x10*3/uL   Magnesium   Result Value Ref Range    Magnesium 1.99 1.60 - 2.40 mg/dL   Protime-INR   Result Value Ref Range    Protime 18.9 (H) 9.8 - 12.4 seconds    INR 1.7 (H) 0.9 - 1.1        Medications  Scheduled medications:  Scheduled Medications[1]  Continuous medications:  Continuous Medications[2]  PRN medications:  PRN Medications[3]     Physical Exam  Vitals reviewed.   Constitutional:       General: She is not in acute distress.  HENT:      Head: Normocephalic and atraumatic.      Right Ear: External ear normal.      Left Ear: External ear normal.      Nose: Nose normal.      Mouth/Throat:      Mouth: Mucous membranes are moist.      Pharynx: Oropharynx is clear.   Eyes:      Extraocular Movements: Extraocular movements intact.      Conjunctiva/sclera: Conjunctivae normal.      Pupils: Pupils are equal, round, and reactive to light.   Cardiovascular:      Rate and Rhythm: Normal rate and regular rhythm.      Pulses: Normal pulses.      Heart sounds: Normal heart sounds. No murmur heard.  Pulmonary:      Effort: Pulmonary effort is normal. No respiratory distress.      Breath sounds: Normal breath sounds. No  wheezing, rhonchi or rales.   Chest:      Chest wall: No tenderness.   Abdominal:      General: Bowel sounds are normal. There is no distension.      Palpations: Abdomen is soft. There is no mass.      Tenderness: There is no abdominal tenderness. There is no rebound.   Musculoskeletal:         General: No swelling or deformity. Normal range of motion.      Cervical back: Normal range of motion.      Right lower leg: No edema.      Left lower leg: No edema.   Skin:     General: Skin is warm and dry.      Capillary Refill: Capillary refill takes less than 2 seconds.      Comments: Mild erythema and varicosity noted lateral portion just proximal to the right ankle   Neurological:      General: No focal deficit present.      Mental Status: She is alert and oriented to person, place, and time.   Psychiatric:         Mood and Affect: Mood normal.         Behavior: Behavior normal.                  Assessment/Plan      Acute on chronic Combined systolic and diastolic CHF, moderate A/S  Pt given bolus dose of lasix and started on entresto yesterday  LVEF 45-50 on last echo  Continue to monitor renal fct closely    Perm atrial fibrillation  Pt not on dig, continue metoprolol  Rate control appears good  Pharmacy for dosing warfarin    Warfarin induced coagulopathy  S/p IV vit K 7/10/25  Pharmacy assistance with warfarin requested, will restart cautiously    CKD stage IV  Fct appears stable today, willl need to watch closely  Discussed with patient obtaining renal consultation to establish care    Hypothyroidism  Continue levothyroxine                 Code Status: DNR and No Intubation                 DVT ppx: SCDs,     Please see orders for more complete plan    Soto Cotter, APRN-CNP         [1] brinzolamide-brimonidine, 1 drop, Both Eyes, BID  furosemide, 20 mg, intravenous, q12h  latanoprost, 1 drop, Both Eyes, Nightly  levothyroxine, 100 mcg, oral, Daily  metoprolol succinate XL, 50 mg, oral, BID  pantoprazole, 40  mg, oral, Daily before breakfast   Or  pantoprazole, 40 mg, intravenous, Daily before breakfast  polyethylene glycol, 17 g, oral, Daily  sacubitriL-valsartan, 0.5 tablet, oral, BID    [2]    [3] PRN medications: bisacodyl, guaiFENesin, melatonin, ondansetron ODT **OR** ondansetron

## 2025-07-11 NOTE — PROGRESS NOTES
07/11/25 1133   Discharge Planning   Living Arrangements Children   Support Systems Family members;Children   Assistance Needed Independent with cane   Type of Residence Private residence   Expected Discharge Disposition [facility]     PCP is Shay Torres DO. Patient is from home with her daughter. Patient states she is mostly independent with her cane. Patients daughter assists with all the ADL's if needed. Patient was active with [facility] prior to admission and patient/ daughter plan to return home with resumption of [facility]. Notified YENY Mcneill. Patient and family denies any other home going needs at this time [facility] will continue to follow for needs if they arise.

## 2025-07-11 NOTE — CARE PLAN
Problem: Safety - Adult  Goal: Free from fall injury  Outcome: Progressing     Problem: Chronic Conditions and Co-morbidities  Goal: Patient's chronic conditions and co-morbidity symptoms are monitored and maintained or improved  Outcome: Progressing     Problem: Skin  Goal: Decreased wound size/increased tissue granulation at next dressing change  Outcome: Progressing  Goal: Participates in plan/prevention/treatment measures  Outcome: Progressing  Goal: Prevent/manage excess moisture  Outcome: Progressing  Goal: Prevent/minimize sheer/friction injuries  Outcome: Progressing  Goal: Promote/optimize nutrition  Outcome: Progressing  Goal: Promote skin healing  Outcome: Progressing   The patient's goals for the shift include resp even and unlabored and free of injuries    The clinical goals for the shift include Pt will remain HDS, INR comes down, free from falls and injury throughout this shift.    Over the shift, the patient did not make progress toward the following goals. Barriers to progression include n/a. Recommendations to address these barriers include n/a.

## 2025-07-11 NOTE — PROGRESS NOTES
Physical Therapy    Physical Therapy Evaluation    Patient Name: Jennifer Magana  MRN: [id number]  Today's Date: 7/11/2025   Time Calculation  Start Time: 1129  Stop Time: 1158  Time Calculation (min): 29 min  2321/2321-A    Assessment/Plan   PT Assessment  PT Assessment Results: Decreased strength, Decreased endurance, Impaired balance, Decreased mobility, Impaired judgement, Decreased safety awareness, Impaired hearing  Rehab Prognosis: Fair  Barriers to Discharge Home: No anticipated barriers  Evaluation/Treatment Tolerance: Patient limited by fatigue (mild increase in SOB, subsided with rest)  Medical Staff Made Aware: Yes  End of Session Communication: Bedside nurse  Assessment Comment: Patient CGA x 1 for bed mobility with min A x 2 AMB with cane, min A x 1 with FWW. Increased safety and improved gait pattern with use of FWW compared to cane. Would benefit from use of FWW next session and at home. Patient would benefit from continued PT for balance, strength, and functional mobility.  End of Session Patient Position: Up in chair, Alarm on (dtr present)  IP OR SWING BED PT PLAN  Inpatient or Swing Bed: Inpatient  PT Plan  Treatment/Interventions: Bed mobility, Transfer training, Gait training, Balance training, Stair training, Neuromuscular re-education, Strengthening, Endurance training, Therapeutic exercise, Therapeutic activity  PT Plan: Ongoing PT  PT Frequency: 4 times per week  PT Discharge Recommendations: Low intensity level of continued care (pending patient progress)  Equipment Recommended upon Discharge:  (TBD)  PT Recommended Transfer Status: Assist x1  PT - OK to Discharge: Yes (When medically cleared)        General Visit Information:  General  Reason for Referral: Impaired mobility, SOB, chest pressure  Referred By: Cyndee  Past Medical History Relevant to Rehab: PMHx s/f combined systolic and diastolic heart failure EF 45 to 50%, atrial fibrillation, aortic stenosis, chronic kidney disease  stage IV, hypothyroidism, long-term anticoagulation therapy with warfarin presenting with shortness of breath and chest pressure  Family/Caregiver Present: Yes (Daughter)  Caregiver Feedback: Discussed use of FWW rather than cane at home for increased safety with mobility  Co-Treatment: OT  Co-Treatment Reason: For increased safety with mobility  Prior to Session Communication: Bedside nurse  Patient Position Received: Bed, 3 rail up, Alarm on  General Comment: Room 2321. Patient agreeable to therapy. Daughter present and helped assist with subjective. Purewick in. Patient's own cane in room    Home Living:  Home Living  Home Living Comments: Patient states she lives with daughter in split level home, 7 steps into the main level where she resides. Handrail on steps. Patient has walk in shower with shower chair and grab bars on main level.    Prior Level of Function:  Prior Function Per Pt/Caregiver Report  Prior Function Comments: Patient states she uses cane at home for AMB and has a FWW. Has aides who come 3 days per week and assist with ADLs. Patient daughter states she receives help with ADLs prn and family completing IADLs. Family drives to appointments. Denies falls in last 3 months.    Precautions:  Precautions  Hearing/Visual Limitations: [facility]  Medical Precautions: Fall precautions    Vital Signs:     Objective     Pain:  Pain Assessment  0-10 (Numeric) Pain Score: 0 - No pain    Cognition:  Cognition  Overall Cognitive Status: Within Functional Limits  Orientation Level: Oriented X4  Attention: Within Functional Limits  Safety/Judgement:  (Mild decreased safety awareness and judgment duirng session)  Insight: Mild  Impulsive: Within functional limits  Processing Speed: Delayed    General Assessments:  General Observation  General Observation: Patient improved gait and safety with use of FWW compared to cane. Increased stride length with improved gait pattern using FWW.   Activity Tolerance  Endurance:  Decreased tolerance for upright activites  Activity Tolerance Comments: Patient increased fatigue and mild SOB with mobility  Sensation  Sensation Comment: Denies numbness/tingling  Strength  Strength Comments: BLEs grossly 3+/5        Postural Control  Posture Comment: Mild flexed posture with standing and AMB  Static Sitting Balance  Static Sitting-Comment/Number of Minutes: Fair+ CGA 5 min  Dynamic Sitting Balance  Dynamic Sitting-Comments: Fair  Static Standing Balance  Static Standing-Comment/Number of Minutes: Fair with cane, Fair+ with FWW  Dynamic Standing Balance  Dynamic Standing-Comments: Fair- with cane, Fair with FWW    Functional Assessments:     Bed Mobility  Bed Mobility:  (Supine to sit w/HOB elevated CGA x 1 with cueing for safety and hand placement. Sitting EOB CGA x 1)  Transfers  Transfer:  (Sit to stand from EOB CGA x 1 with cueing for hand placement and safety with cane. Sit to stand from chair CGA x 1 FWW with cueing for safety and sequencing with FWW.)  Ambulation/Gait Training  Ambulation/Gait Training Performed:  (AMB 4ft from EOB to chair with cane min A x 2. Cueing for sequencing and safety with cane. Short, shuffled steps with mild flexed posture. AMB 15ft FWW min A x 1 with improved stride length and safety. Cueing for FWW use.)  Stairs  Stairs: No       Extremity/Trunk Assessments:                Outcome Measures:     [facility] Basic Mobility  Turning from your back to your side while in a flat bed without using bedrails: A little  Moving from lying on your back to sitting on the side of a flat bed without using bedrails: A little  Moving to and from bed to chair (including a wheelchair): A little  Standing up from a chair using your arms (e.g. wheelchair or bedside chair): A little  To walk in hospital room: A little  Climbing 3-5 steps with railing: A lot  Basic Mobility - Total Score: 17                                                             Goals:  Encounter Problems        Encounter Problems (Active)       PT Problem       Transfers       Start:  07/11/25    Expected End:  07/25/25       Patient will perform all transfers with FWW SBA x 1 for increased safety           Gait       Start:  07/11/25    Expected End:  07/25/25       Patient will amb 60+ feet with FWW CGA x 1 for increased safety           Strengthening       Start:  07/11/25    Expected End:  07/25/25       Patient will perform 20+ reps of AROM/RROM for ABDIRASHID LE's to improve safety and functional independence               PT Problem       Stairs       Start:  07/11/25    Expected End:  07/25/25       Patient will ascend/descend 5+ stairs with rail/device prn and CGA x1                Education Documentation  Precautions, taught by CECI Erickson at 7/11/2025  3:19 PM.  Learner: Patient  Readiness: Acceptance  Method: Explanation  Response: Needs Reinforcement  Comment: For increased safety    Mobility Training, taught by CECI Erickson at 7/11/2025  3:19 PM.  Learner: Patient  Readiness: Acceptance  Method: Explanation  Response: Needs Reinforcement  Comment: For increased safety    Education Comments  No comments found.        Completion of this session, clinical decision making, and documentation performed under the supervision/direction of Tamara Barragan.        CECI ERICKSON

## 2025-07-12 ENCOUNTER — PHARMACY VISIT (OUTPATIENT)
Dept: PHARMACY | Facility: CLINIC | Age: OVER 89
End: 2025-07-12
Payer: MEDICARE

## 2025-07-12 VITALS
TEMPERATURE: 96.3 F | WEIGHT: 123 LBS | BODY MASS INDEX: 22.63 KG/M2 | OXYGEN SATURATION: 94 % | DIASTOLIC BLOOD PRESSURE: 62 MMHG | HEIGHT: 62 IN | RESPIRATION RATE: 16 BRPM | HEART RATE: 70 BPM | SYSTOLIC BLOOD PRESSURE: 99 MMHG

## 2025-07-12 LAB
ANION GAP SERPL CALC-SCNC: 14 MMOL/L (ref 10–20)
BUN SERPL-MCNC: 25 MG/DL (ref 6–23)
CALCIUM SERPL-MCNC: 9.7 MG/DL (ref 8.6–10.3)
CHLORIDE SERPL-SCNC: 106 MMOL/L (ref 98–107)
CO2 SERPL-SCNC: 27 MMOL/L (ref 21–32)
CREAT SERPL-MCNC: 1.69 MG/DL (ref 0.5–1.05)
EGFRCR SERPLBLD CKD-EPI 2021: 27 ML/MIN/1.73M*2
GLUCOSE SERPL-MCNC: 86 MG/DL (ref 74–99)
INR PPP: 1.2 (ref 0.9–1.1)
POTASSIUM SERPL-SCNC: 3.4 MMOL/L (ref 3.5–5.3)
PROTHROMBIN TIME: 13.6 SECONDS (ref 9.8–12.4)
SODIUM SERPL-SCNC: 144 MMOL/L (ref 136–145)

## 2025-07-12 PROCEDURE — 85610 PROTHROMBIN TIME: CPT

## 2025-07-12 PROCEDURE — 2500000001 HC RX 250 WO HCPCS SELF ADMINISTERED DRUGS (ALT 637 FOR MEDICARE OP): Performed by: INTERNAL MEDICINE

## 2025-07-12 PROCEDURE — 2500000002 HC RX 250 W HCPCS SELF ADMINISTERED DRUGS (ALT 637 FOR MEDICARE OP, ALT 636 FOR OP/ED): Performed by: NURSE PRACTITIONER

## 2025-07-12 PROCEDURE — 99239 HOSP IP/OBS DSCHRG MGMT >30: CPT | Performed by: NURSE PRACTITIONER

## 2025-07-12 PROCEDURE — 99232 SBSQ HOSP IP/OBS MODERATE 35: CPT | Performed by: PHYSICIAN ASSISTANT

## 2025-07-12 PROCEDURE — 36415 COLL VENOUS BLD VENIPUNCTURE: CPT

## 2025-07-12 PROCEDURE — 80048 BASIC METABOLIC PNL TOTAL CA: CPT | Performed by: NURSE PRACTITIONER

## 2025-07-12 PROCEDURE — 2500000001 HC RX 250 WO HCPCS SELF ADMINISTERED DRUGS (ALT 637 FOR MEDICARE OP): Performed by: NURSE PRACTITIONER

## 2025-07-12 PROCEDURE — 36415 COLL VENOUS BLD VENIPUNCTURE: CPT | Performed by: NURSE PRACTITIONER

## 2025-07-12 PROCEDURE — RXMED WILLOW AMBULATORY MEDICATION CHARGE

## 2025-07-12 RX ORDER — WARFARIN 2 MG/1
2 TABLET ORAL ONCE
Status: DISCONTINUED | OUTPATIENT
Start: 2025-07-12 | End: 2025-07-12 | Stop reason: HOSPADM

## 2025-07-12 RX ORDER — WARFARIN 2.5 MG/1
1.25 TABLET ORAL
Qty: 10 TABLET | Refills: 0 | OUTPATIENT
Start: 2025-07-12

## 2025-07-12 RX ORDER — WARFARIN 2 MG/1
2 TABLET ORAL
Qty: 30 TABLET | Refills: 0 | Status: SHIPPED | OUTPATIENT
Start: 2025-07-12 | End: 2026-07-12

## 2025-07-12 RX ORDER — SACUBITRIL AND VALSARTAN 24; 26 MG/1; MG/1
0.5 TABLET ORAL 2 TIMES DAILY
Qty: 30 TABLET | Refills: 1 | Status: SHIPPED | OUTPATIENT
Start: 2025-07-12

## 2025-07-12 RX ORDER — FUROSEMIDE 20 MG/1
20 TABLET ORAL DAILY
Qty: 30 TABLET | Refills: 1 | Status: SHIPPED | OUTPATIENT
Start: 2025-07-12

## 2025-07-12 RX ADMIN — METOPROLOL SUCCINATE 50 MG: 50 TABLET, EXTENDED RELEASE ORAL at 09:37

## 2025-07-12 RX ADMIN — SACUBITRIL AND VALSARTAN 0.5 TABLET: 24; 26 TABLET, FILM COATED ORAL at 09:37

## 2025-07-12 RX ADMIN — FUROSEMIDE 20 MG: 20 TABLET ORAL at 09:37

## 2025-07-12 RX ADMIN — LEVOTHYROXINE SODIUM 100 MCG: 0.1 TABLET ORAL at 05:57

## 2025-07-12 RX ADMIN — PANTOPRAZOLE SODIUM 40 MG: 40 TABLET, DELAYED RELEASE ORAL at 05:57

## 2025-07-12 RX ADMIN — POTASSIUM CHLORIDE 30 MEQ: 1500 TABLET, EXTENDED RELEASE ORAL at 11:48

## 2025-07-12 ASSESSMENT — ENCOUNTER SYMPTOMS
LIGHT-HEADEDNESS: 0
GASTROINTESTINAL NEGATIVE: 1
RESPIRATORY NEGATIVE: 1
PALPITATIONS: 0
FOCAL WEAKNESS: 0
ORTHOPNEA: 0
BLURRED VISION: 0
COUGH: 0
DEPRESSION: 0
IRREGULAR HEARTBEAT: 0
SHORTNESS OF BREATH: 0
SYNCOPE: 0
MEMORY LOSS: 0
CONSTITUTIONAL NEGATIVE: 1
DYSPNEA ON EXERTION: 0

## 2025-07-12 ASSESSMENT — PAIN - FUNCTIONAL ASSESSMENT: PAIN_FUNCTIONAL_ASSESSMENT: 0-10

## 2025-07-12 ASSESSMENT — PAIN SCALES - GENERAL: PAINLEVEL_OUTOF10: 0 - NO PAIN

## 2025-07-12 NOTE — NURSING NOTE
Discharge instructions reviewed with patient and daughter at bedside. All questions answered. IV and tele removed. Patient discharged via wheelchair with belongings.

## 2025-07-12 NOTE — DISCHARGE SUMMARY
Discharge Diagnosis  Elevated INR  Acute on chronic combined systolic and diastolic heart failure  Permanent atrial fibrillation  Chronic kidney disease stage IV  Hypothyroidism           Issues Requiring Follow-Up  To resume hospice care  Will need to follow INR closely    Discharge Meds     Medication List      START taking these medications     furosemide 20 mg tablet; Commonly known as: Lasix; Take 1 tablet (20 mg)   by mouth once daily.   sacubitriL-valsartan 24-26 mg tablet; Commonly known as: Entresto; Take   0.5 tablets by mouth 2 times a day.     CHANGE how you take these medications     warfarin 2 mg tablet; Commonly known as: Coumadin; Take as directed. If   you are unsure how to take this medication, talk to your nurse or doctor.;   Original instructions: 2mg tab daily except on Tuesday and Thursday take   1.25mg; What changed: medication strength, additional instructions     CONTINUE taking these medications     levothyroxine 100 mcg tablet; Commonly known as: Synthroid, Levoxyl   metoprolol succinate XL 50 mg 24 hr tablet; Commonly known as:   Toprol-XL; Take 1 tablet (50 mg) by mouth 2 times a day. Do not crush or   chew.   pantoprazole 40 mg EC tablet; Commonly known as: ProtoNix; Take 1 tablet   (40 mg) by mouth once daily in the morning. Take before meals.   Simbrinza 1-0.2 % drops,suspension ophthalmic suspension; Generic drug:   brinzolamide-brimonidine   travoprost 0.004 % drops ophthalmic solution; Commonly known as:   Travatan Z     STOP taking these medications     digoxin 62.5 MCG tablet; Commonly known as: Lanoxin   famotidine 20 mg tablet; Commonly known as: Pepcid   meloxicam 7.5 mg tablet; Commonly known as: Mobic       Test Results Pending At Discharge  Pending Labs       No current pending labs.            Hospital Course   Jennifer Magana is a 89 y.o. female with PMHx s/f combined systolic and diastolic heart failure EF 45 to 50%, atrial fibrillation, aortic stenosis, chronic  kidney disease stage IV, hypothyroidism, long-term anticoagulation therapy with warfarin presenting with shortness of breath and chest pressure.   For the last couple days patient has had increasing shortness of breath.  Weeping from her lower extremities and orthopnea.  Her shortness of breath exacerbated with activity.  No fevers chills no purulent sputum production no hemoptysis.  In emergency department patient had BN peptide that was elevated chest x-ray consistent with pulmonary edema.  Patient was subsequently given IV furosemide.  Incidental finding and her lab work showed that she has coagulopathy with INR on calculable.  Patient was given 5 mg of IV vitamin K.  Case was discussed with the ED provider plan is to admit patient for further workup and evaluation length of stay is likely to exceed 2 midnights.     ED Course:   Vitals on presentation: T 36.3 °C (97.3 °F)  HR 76  /81  RR 16  O2 98 % None (Room air)  Labs:   CBC with WBC 6.3, Hgb 13.0, Plts 243.   CMP with glucose 133, Na 147, K 4.2, BUN 26, sCr 1.63, alk phos 112, ALT 19, AST 22, bilirubin 0.8. Magnesium 2.09.   . Trop 17.   Lactate 2.0  EKG: Atrial fibrillation with controlled ventricular response, QTc interval is normal, no ST segment elevation to suggest acute ischemia  Imaging - agree with radiology interpretation(s):   Interventions: Furosemide, vitamin K        7.11.25 The patient was seen by her cardiologist, started on low dose entresto yesterday, her vitals have been stable overnight, am labs show correction of the INR, very mild increase in creatinine, No evidence of bleeding in the CBC.  Patient reports her breathing is somewhat improved.  There is minimal weeping from her lower extremities the edema is markedly improved on physical exam.    Nephrology was able to see the patient felt patient likely could tolerate the low-dose of Entresto and frequent diuretic use.  The patient resumed anticoagulation therapy with  warfarin to start at 2 mg daily and 1.25 mg on Tuesdays and Thursday.  The patient is discharged on furosemide 20 mg daily patient may decrease that to as needed as needed if there are renal complications.  Robert currently is discharge patient home today with patient to follow-up with hospice return to the warfarin clinic early next week for recheck.  Approximately 35 minutes was spent preparing patient for discharge.    Pertinent Physical Exam At Time of Discharge  Physical Exam  Vitals reviewed.   Constitutional:       General: She is not in acute distress.  HENT:      Head: Normocephalic and atraumatic.      Right Ear: External ear normal.      Left Ear: External ear normal.      Nose: Nose normal.      Mouth/Throat:      Mouth: Mucous membranes are moist.      Pharynx: Oropharynx is clear.   Eyes:      Extraocular Movements: Extraocular movements intact.      Conjunctiva/sclera: Conjunctivae normal.      Pupils: Pupils are equal, round, and reactive to light.   Cardiovascular:      Rate and Rhythm: Normal rate and regular rhythm.      Pulses: Normal pulses.      Heart sounds: Normal heart sounds.   Pulmonary:      Effort: Pulmonary effort is normal. No respiratory distress.      Breath sounds: Normal breath sounds. No wheezing, rhonchi or rales.   Chest:      Chest wall: No tenderness.   Abdominal:      General: Bowel sounds are normal. There is no distension.      Palpations: Abdomen is soft. There is no mass.      Tenderness: There is no abdominal tenderness. There is no rebound.   Musculoskeletal:         General: No swelling or deformity. Normal range of motion.      Cervical back: Normal range of motion.   Skin:     General: Skin is warm and dry.      Capillary Refill: Capillary refill takes less than 2 seconds.   Neurological:      General: No focal deficit present.      Mental Status: She is alert and oriented to person, place, and time.   Psychiatric:         Mood and Affect: Mood normal.         Behavior:  Behavior normal.         Outpatient Follow-Up  No future appointments.      Soto Cotter, APRN-CNP

## 2025-07-12 NOTE — CARE PLAN
The patient's goals for the shift include comfort    The clinical goals for the shift include pt will remain safe and free from falls this shift

## 2025-07-12 NOTE — PROGRESS NOTES
HPI:  Jennifer Magana is a 89 y.o. female who presented to the emergency department with shortness of breath and lower extremity edema worsening for approximately 2 days.  Patient states that she had been taking all of her medications as prescribed.  Given her worsening symptoms she came to the ED for evaluation.  BMP shows a serum sodium of 147, serum potassium of 4.2, serum creatinine of 1.67.  Serum magnesium was 2.09.  BNP was 548.  Troponin was 17 x 2.  PT was greater than 100.  CBC showed hemoglobin of 13.0.  Digoxin level was less than 0.30.  Chest x-ray showed cardiomegaly with bilateral infiltrates and effusions.  Echocardiogram done 3/31/2025 showed mildly reduced left ventricular systolic function with an ejection fraction of 45 to 50%, grade 3 diastolic dysfunction, normal right ventricular systolic function, moderate mitral valve regurgitation, mild to moderate tricuspid valve regurgitation, mild aortic valve regurgitation, severe low-flow/low gradient aortic valve stenosis with a mean aortic valve pressure gradient of 25 mmHg and a dimensionless index of 0.17.  During my exam the patient was resting comfortably in bed.     Subjective Data:  Feels well today.  Daughter at bedside.  No CP/dyspnea/orthopnea.  Monitor: Afib, rate controlled.  K 4.1, mag 1.99  Creatinine 1.74, INR 1.7.    7-12-25: Patient resting comfortably in bed with friend at bedside.  Patient denies any chest pain but at times does feel a little short of breath.  Really no other awareness of the atrial fibrillation heart rates are in the 80s and 90s.      Overnight Events:    None     Objective Data:  Last Recorded Vitals:  Vitals:    07/12/25 0429 07/12/25 0857 07/12/25 0936 07/12/25 1203   BP: 119/83 94/63 109/73 99/62   BP Location: Right arm Left arm  Left arm   Patient Position: Lying Lying  Lying   Pulse: 58 79 84 70   Resp: 17 16  16   Temp: 35.9 °C (96.7 °F) 36.1 °C (96.9 °F)  35.7 °C (96.3 °F)   TempSrc: Temporal      SpO2:  95% 96%  94%   Weight:       Height:           Last Labs:    Results from last 7 days   Lab Units 07/12/25  0945 07/11/25  0409 07/10/25  1054   SODIUM mmol/L 144 145 147*   POTASSIUM mmol/L 3.4* 4.1 4.2   CHLORIDE mmol/L 106 109* 110*   CO2 mmol/L 27 29 25   BUN mg/dL 25* 26* 26*   CREATININE mg/dL 1.69* 1.74* 1.63*   GLUCOSE mg/dL 86 94 133*   CALCIUM mg/dL 9.7 10.0 10.1        Results from last 7 days   Lab Units 07/11/25  0409 07/10/25  1054   WBC AUTO x10*3/uL 6.6 6.3   HEMOGLOBIN g/dL 13.4 13.0   HEMATOCRIT % 44.3 42.3   PLATELETS AUTO x10*3/uL 236 243               Last I/O:  I/O last 3 completed shifts:  In: 680 (12.2 mL/kg) [P.O.:680]  Out: 2150 (38.5 mL/kg) [Urine:2150 (1.1 mL/kg/hr)]  Weight: 55.8 kg     Past Cardiology Tests (Last 3 Years):    Echo:4-24-25  CONCLUSIONS:   1. No mitral valve vegetation visualized.   2. Mild mitral valve regurgitation.   3. Mild tricuspid regurgitation is visualized.   4. No tricuspid valve vegetation.   5. No aortic valve vegetation visualized.   6. No pulmonic valve vegetation visualized.    Inpatient Medications:  Scheduled Medications[1]  PRN Medications[2]  Continuous Medications[3]    ROS:  Review of Systems   Constitutional: Negative. Negative for malaise/fatigue.   HENT: Negative.     Eyes:  Negative for blurred vision and visual disturbance.   Cardiovascular:  Negative for chest pain, dyspnea on exertion, irregular heartbeat, leg swelling, orthopnea, palpitations and syncope.   Respiratory: Negative.  Negative for cough and shortness of breath.    Musculoskeletal:  Positive for arthritis.   Gastrointestinal: Negative.    Neurological:  Negative for focal weakness and light-headedness.   Psychiatric/Behavioral:  Negative for depression and memory loss.         Physical Exam:  Physical Exam  Constitutional:       Appearance: Normal appearance.   HENT:      Head: Normocephalic.   Eyes:      Conjunctiva/sclera: Conjunctivae normal.   Cardiovascular:      Rate and  Rhythm: Normal rate. Rhythm irregular.      Pulses: Normal pulses.      Heart sounds: S1 normal and S2 normal. Murmur heard.      No friction rub. No gallop.   Pulmonary:      Effort: Pulmonary effort is normal.      Breath sounds: Normal breath sounds.      Comments: Clear with diminished bases  Abdominal:      General: Bowel sounds are normal.      Palpations: Abdomen is soft.      Tenderness: There is no abdominal tenderness.   Musculoskeletal:      Cervical back: Neck supple.      Comments: LE's with trace edema, wrinkled skin   Skin:     General: Skin is warm and dry.   Neurological:      General: No focal deficit present.      Mental Status: She is alert and oriented to person, place, and time.   Psychiatric:         Attention and Perception: Attention normal.         Mood and Affect: Mood normal.          Assessment/Plan   1.  Acute on chronic HFmrEF  Patient presenting with increased shortness of breath, lower extremity edema, found to have elevated BNP of 548, and chest x-ray showing bilateral infiltrates/effusions.  Patient was started on IV furosemide for diuresis.  Continue metoprolol succinate 50 mg daily.  Will add a small dose of Entresto although we will need to closely monitor renal function given her known CKD.  Patient was also noted to have mild hyponatremia on admission of 147.  Patient states she had not been eating/drinking well prior to admission.  Continue to monitor urine output, serum electrolytes, and renal function while here.  7-11-25: Patient starting to eat and drink liquids. Does not appear to be volume overloaded today.  Creatinine is stable at 1.74.  Sodium starting to come down, 145 today.  BP stable.  Continue on IV Lasix today but will change to oral for tomorrow. Continue on metoprolol succinate and low dose Entresto.    7-12-25: Patient appears well compensated.  Really no evidence of fluid overload.  Occasionally will still feel slight shortness of breath.  Blood pressure  still marginal at times and creatinine is stable at 1.69.  Potassium is a bit low and oral replacement has been ordered.  Diuretics have been transitioned to oral.    2.  Permanent atrial fibrillation  Patient has a history of permanent atrial fibrillation and has been on warfarin for anticoagulation and digoxin/metoprolol for heart rate control.  INR was found to be supratherapeutic with a PTT of greater than 100.  Patient was given 5 mg of vitamin K.  Telemetry currently showing atrial fibrillation with heart rate in the 100s.  Can continue digoxin/metoprolol for heart rate control.  No plans for rhythm control strategy at this time.  7-11-25: Rate controlled Afib.  Continue on metoprolol.  On warfarin for anticoagulation.  INR 1.7 today after vitamin K.  Pharmacy is managing warfarin dosing. INR goal is 2-3  7-12-25: Ventricular rates well-controlled.  INR 1.2 and pharmacy has been managing warfarin therapy     3.  Hypertension  The patient has a history of hypertension which appears under moderate control on exam today.  Continue to monitor and adjust antihypertensive medical therapy as necessary.  7-11-25: BP stable.      4.  Valvular heart disease  Echocardiogram done 3/31/2025 showed mildly reduced left ventricular systolic function with an ejection fraction of 45 to 50%, grade 3 diastolic dysfunction, normal right ventricular systolic function, moderate mitral valve regurgitation, mild to moderate tricuspid valve regurgitation, mild aortic valve regurgitation, severe low-flow/low gradient aortic valve stenosis with a mean aortic valve pressure gradient of 25 mmHg and a dimensionless index of 0.17.     5.  CKD  Serum creatinine of 1.63 on admission.  Continue to monitor with diuresis and addition of small dose of Entresto.  7-11-25: Creatinine stable at 1.74, continue on Entresto.    7-12-25: Renal function is stable.     6.  Hypernatremia  Serum sodium of 147 on admission.  Continue to monitor with  medication adjustments as noted above.  Patient states she had not been eating/drinking well prior to admission.  7-11-25: Starting to eat/drink, Na 145 today.    Recommendations--overall patient appears well compensated and stable from cardiac standpoint.  Would continue current medical regimen but monitor blood pressures closely.  Again pharmacy dosing her warfarin.  No further cardiac testing is planned at this time.  Cardiology will sign off.        Code Status:  DNR and No Intubation          Sourav Daniel PA-C             [1]   Scheduled medications   Medication Dose Route Frequency    [Held by provider] brinzolamide-brimonidine  1 drop Both Eyes BID    furosemide  20 mg oral Daily    latanoprost  1 drop Both Eyes Nightly    levothyroxine  100 mcg oral Daily    metoprolol succinate XL  50 mg oral BID    pantoprazole  40 mg oral Daily before breakfast    Or    pantoprazole  40 mg intravenous Daily before breakfast    polyethylene glycol  17 g oral Daily    sacubitriL-valsartan  0.5 tablet oral BID   [2]   PRN medications   Medication    bisacodyl    guaiFENesin    melatonin    ondansetron ODT    Or    ondansetron   [3]   Continuous Medications   Medication Dose Last Rate

## 2025-07-12 NOTE — PROGRESS NOTES
Pharmacy Consult for Warfarin (Coumadin) Management - Daily Progress Note     Jennifer Magana is a 89 y.o. female admitted for Elevated INR. Pharmacy was consulted for warfarin dosing and monitoring.        Labs  INR External   Date Value Ref Range Status   06/19/2025 1.90 (A) 2.00 - 3.00 Final   06/13/2025 1.80 (A) 2.00 - 3.00 Final   06/06/2025 2.10 2.00 - 3.00 Final     INR   Date Value Ref Range Status   07/12/2025 1.2 (H) 0.9 - 1.1 Final   07/11/2025 1.7 (H) 0.9 - 1.1 Final   07/10/2025   Final     Comment:     Unable To Calculate INR     Review  Warfarin Indication: Atrial fibrillation or flutter  Target INR: 2 - 3     7/10 admission with a supratherapeutic undetectable INR and 5 mg Vit K given  7/11 Warfarin 2 mg given; INR= 1.7    Plan   Will  give Warfarin 2 mg on 7/12 for INR= 1.2    Previous home regimen: Home regimen: 1.25 mg every Tu/Th, 2.5 mg all other days      Orders placed per pharmacy consult. Pharmacy will continue to monitor and adjust therapy as needed.     Cassandra De La Rosa, PharmD

## 2025-07-12 NOTE — CARE PLAN
The patient's goals for the shift include resp even and unlabored and free of injuries    The clinical goals for the shift include pt will remain free from falls or injury throughout the shift    Over the shift, the patient did not make progress toward the following goals. Barriers to progression include . Recommendations to address these barriers include   Problem: Pain - Adult  Goal: Verbalizes/displays adequate comfort level or baseline comfort level  Outcome: Progressing     Problem: Safety - Adult  Goal: Free from fall injury  Outcome: Progressing     Problem: Discharge Planning  Goal: Discharge to home or other facility with appropriate resources  Outcome: Progressing     Problem: Chronic Conditions and Co-morbidities  Goal: Patient's chronic conditions and co-morbidity symptoms are monitored and maintained or improved  Outcome: Progressing     Problem: Nutrition  Goal: Nutrient intake appropriate for maintaining nutritional needs  Outcome: Progressing     Problem: Skin  Goal: Decreased wound size/increased tissue granulation at next dressing change  Outcome: Progressing  Goal: Participates in plan/prevention/treatment measures  Outcome: Progressing  Goal: Prevent/manage excess moisture  Outcome: Progressing  Goal: Prevent/minimize sheer/friction injuries  Outcome: Progressing  Flowsheets (Taken 7/12/2025 0010)  Prevent/minimize sheer/friction injuries:   HOB 30 degrees or less   Use pull sheet   Increase activity/out of bed for meals  Goal: Promote/optimize nutrition  Outcome: Progressing  Goal: Promote skin healing  Outcome: Progressing     Problem: Fall/Injury  Goal: Not fall by end of shift  Outcome: Progressing  Goal: Be free from injury by end of the shift  Outcome: Progressing  Goal: Verbalize understanding of personal risk factors for fall in the hospital  Outcome: Progressing  Goal: Verbalize understanding of risk factor reduction measures to prevent injury from fall in the home  Outcome:  Progressing  Goal: Use assistive devices by end of the shift  Outcome: Progressing  Goal: Pace activities to prevent fatigue by end of the shift  Outcome: Progressing     Problem: Respiratory  Goal: Clear secretions with interventions this shift  Outcome: Progressing  Goal: Minimize anxiety/maximize coping throughout shift  Outcome: Progressing  Goal: Minimal/no exertional discomfort or dyspnea this shift  Outcome: Progressing  Goal: No signs of respiratory distress (eg. Use of accessory muscles. Peds grunting)  Outcome: Progressing  Goal: Patent airway maintained this shift  Outcome: Progressing  Goal: Tolerate pulmonary toileting this shift  Outcome: Progressing   .

## 2025-07-15 ENCOUNTER — TELEPHONE (OUTPATIENT)
Dept: PHARMACY | Facility: HOSPITAL | Age: OVER 89
End: 2025-07-15
Payer: MEDICARE

## 2025-07-15 NOTE — TELEPHONE ENCOUNTER
I was unable to reach Goldy concerning eJnnifer's home INR testing. Last result was low on 7/12. Will continue attempts to make contact.

## 2025-07-18 LAB
ATRIAL RATE: 0 BPM
PR INTERVAL: 43 MS
Q ONSET: 252 MS
QRS COUNT: 15 BEATS
QRS DURATION: 96 MS
QT INTERVAL: 372 MS
QTC CALCULATION(BAZETT): 430 MS
QTC FREDERICIA: 409 MS
R AXIS: -18 DEGREES
T AXIS: 188 DEGREES
T OFFSET: 438 MS
VENTRICULAR RATE: 80 BPM

## 2025-07-24 ENCOUNTER — ANTICOAGULATION - WARFARIN VISIT (OUTPATIENT)
Dept: PHARMACY | Facility: HOSPITAL | Age: OVER 89
End: 2025-07-24
Payer: MEDICARE

## 2025-07-24 NOTE — PATIENT INSTRUCTIONS
Your INR today was in range at 2.0. Please continue your weekly regimen of 1.25mg Tues & Thurs, and 2.5mg all other days. We will follow up next week.  If any questions arise do not hesitate to call us at [phone] M-F from 8:30am-4:30pm or at   [phone] after 5pm or on the weekends. Continue to monitor for any excess bleeding or bruising, especially for blood in your stool.

## 2025-07-24 NOTE — PROGRESS NOTES
I called Goldy to follow up with Jennifer's INR testing. She states that she tested Jennifer today with an INR of 2.0. She reported that her mom has been in and out of the hospitals and is currently home with hospice. I asked Goldy to call in this test and those in the future so that we can receive the faxed results. No signs or symptoms of bleeding/bruising. No extra/missing doses of warfarin. Given her INR is in range, we will have her continue with her regimen of 1.25mg Tues, Thurs and 2.5mg all other days. We will follow up next week.

## 2025-07-25 LAB
ATRIAL RATE: 0 BPM
ATRIAL RATE: 0 BPM
PR INTERVAL: 132 MS
PR INTERVAL: 43 MS
Q ONSET: 252 MS
Q ONSET: 253 MS
QRS COUNT: 14 BEATS
QRS COUNT: 15 BEATS
QRS DURATION: 90 MS
QRS DURATION: 96 MS
QT INTERVAL: 372 MS
QT INTERVAL: 374 MS
QTC CALCULATION(BAZETT): 430 MS
QTC CALCULATION(BAZETT): 448 MS
QTC FREDERICIA: 409 MS
QTC FREDERICIA: 421 MS
R AXIS: -13 DEGREES
R AXIS: -18 DEGREES
T AXIS: 183 DEGREES
T AXIS: 188 DEGREES
T OFFSET: 438 MS
T OFFSET: 440 MS
VENTRICULAR RATE: 80 BPM
VENTRICULAR RATE: 86 BPM

## 2025-08-08 ENCOUNTER — TELEPHONE (OUTPATIENT)
Dept: PHARMACY | Facility: HOSPITAL | Age: OVER 89
End: 2025-08-08
Payer: MEDICARE

## 2025-08-11 ENCOUNTER — ANTICOAGULATION - WARFARIN VISIT (OUTPATIENT)
Dept: PHARMACY | Facility: HOSPITAL | Age: OVER 89
End: 2025-08-11
Payer: MEDICARE